# Patient Record
Sex: FEMALE | Race: WHITE | NOT HISPANIC OR LATINO | Employment: FULL TIME
[De-identification: names, ages, dates, MRNs, and addresses within clinical notes are randomized per-mention and may not be internally consistent; named-entity substitution may affect disease eponyms.]

---

## 2018-04-01 ENCOUNTER — HEALTH MAINTENANCE LETTER (OUTPATIENT)
Age: 38
End: 2018-04-01

## 2019-02-02 ENCOUNTER — NURSE TRIAGE (OUTPATIENT)
Dept: NURSING | Facility: CLINIC | Age: 39
End: 2019-02-02

## 2019-02-03 NOTE — TELEPHONE ENCOUNTER
Pt calling to see if she needs to be seen to get a script for Tamiflu.  Education provided.  Pt verbalized understanding and had no further questions.

## 2019-06-11 ENCOUNTER — TRANSFERRED RECORDS (OUTPATIENT)
Dept: HEALTH INFORMATION MANAGEMENT | Facility: CLINIC | Age: 39
End: 2019-06-11

## 2019-06-11 LAB — PAP-ABSTRACT: NORMAL

## 2019-10-17 ENCOUNTER — OFFICE VISIT (OUTPATIENT)
Dept: FAMILY MEDICINE | Facility: CLINIC | Age: 39
End: 2019-10-17

## 2019-10-17 VITALS
DIASTOLIC BLOOD PRESSURE: 80 MMHG | TEMPERATURE: 98.1 F | BODY MASS INDEX: 36.77 KG/M2 | RESPIRATION RATE: 16 BRPM | WEIGHT: 215.4 LBS | SYSTOLIC BLOOD PRESSURE: 128 MMHG | HEIGHT: 64 IN | HEART RATE: 79 BPM | OXYGEN SATURATION: 98 %

## 2019-10-17 DIAGNOSIS — S20.212D CONTUSION OF RIB ON LEFT SIDE, SUBSEQUENT ENCOUNTER: ICD-10-CM

## 2019-10-17 DIAGNOSIS — Z23 NEED FOR VACCINATION: ICD-10-CM

## 2019-10-17 DIAGNOSIS — S41.131D: Primary | ICD-10-CM

## 2019-10-17 PROCEDURE — 90715 TDAP VACCINE 7 YRS/> IM: CPT | Performed by: NURSE PRACTITIONER

## 2019-10-17 PROCEDURE — 90471 IMMUNIZATION ADMIN: CPT | Performed by: NURSE PRACTITIONER

## 2019-10-17 PROCEDURE — 99203 OFFICE O/P NEW LOW 30 MIN: CPT | Mod: 25 | Performed by: NURSE PRACTITIONER

## 2019-10-17 RX ORDER — SULFAMETHOXAZOLE/TRIMETHOPRIM 800-160 MG
1 TABLET ORAL 2 TIMES DAILY
Qty: 14 TABLET | Refills: 0 | Status: SHIPPED | OUTPATIENT
Start: 2019-10-17 | End: 2019-10-25

## 2019-10-17 ASSESSMENT — MIFFLIN-ST. JEOR: SCORE: 1633.08

## 2019-10-17 ASSESSMENT — PAIN SCALES - GENERAL: PAINLEVEL: MODERATE PAIN (4)

## 2019-10-17 NOTE — PATIENT INSTRUCTIONS
1.  Keep taking ibuprofen and add tylenol in between for pain  2.  Keep taking deep breaths   3.  Continue antibiotic for 1 week, refill sent to your pharmacy.  4.  Continue packing this week.  5.  Make follow-up appointment next week for recheck.  Call Monday and schedule on Friday in [place] with me.    Our Clinic hours are:  Mondays    7:20 am - 7 pm  Tues -  Fri  7:20 am - 5 pm    Clinic Phone: [phone]    The clinic lab opens at 7:30 am Mon - Fri and appointments are required.    [facility]  Ph. [phone]  Monday  8 am - 7pm  Tues - Fri 8 am - 5:30 pm
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
## 2019-10-18 ENCOUNTER — TELEPHONE (OUTPATIENT)
Dept: FAMILY MEDICINE | Facility: CLINIC | Age: 39
End: 2019-10-18
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
## 2019-10-18 NOTE — TELEPHONE ENCOUNTER
Reason for Call:  Other     Detailed comments: Pt was seen yesterday for abscess in her arm at the clinic with Cynthia.  Stating now she is in more pain and it is radiating elbow up to shoulder  Please advise.    Phone Number Patient can be reached at: Home number on file [phone] (home)    Best Time: any    Can we leave a detailed message on this number? YES    Call taken on 10/18/2019 at 12:22 PM by Alla Leal

## 2019-10-25 ENCOUNTER — OFFICE VISIT (OUTPATIENT)
Dept: FAMILY MEDICINE | Facility: CLINIC | Age: 39
End: 2019-10-25

## 2019-10-25 VITALS
WEIGHT: 214.5 LBS | BODY MASS INDEX: 36.62 KG/M2 | OXYGEN SATURATION: 97 % | RESPIRATION RATE: 16 BRPM | HEART RATE: 71 BPM | HEIGHT: 64 IN | SYSTOLIC BLOOD PRESSURE: 110 MMHG | DIASTOLIC BLOOD PRESSURE: 88 MMHG | TEMPERATURE: 98.4 F

## 2019-10-25 DIAGNOSIS — R07.81 RIB PAIN ON LEFT SIDE: ICD-10-CM

## 2019-10-25 DIAGNOSIS — T14.8XXA PUNCTURE WOUND: Primary | ICD-10-CM

## 2019-10-25 DIAGNOSIS — B37.31 YEAST INFECTION OF THE VAGINA: ICD-10-CM

## 2019-10-25 PROCEDURE — 99213 OFFICE O/P EST LOW 20 MIN: CPT | Performed by: NURSE PRACTITIONER

## 2019-10-25 RX ORDER — FLUCONAZOLE 150 MG/1
150 TABLET ORAL ONCE
Qty: 1 TABLET | Refills: 0 | Status: SHIPPED | OUTPATIENT
Start: 2019-10-25 | End: 2019-11-12

## 2019-10-25 RX ORDER — CYCLOBENZAPRINE HCL 10 MG
5-10 TABLET ORAL 3 TIMES DAILY PRN
Qty: 30 TABLET | Refills: 1 | Status: SHIPPED | OUTPATIENT
Start: 2019-10-25 | End: 2020-03-16

## 2019-10-25 ASSESSMENT — MIFFLIN-ST. JEOR: SCORE: 1629

## 2019-10-25 ASSESSMENT — PAIN SCALES - GENERAL: PAINLEVEL: SEVERE PAIN (6)
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
## 2019-10-25 NOTE — PATIENT INSTRUCTIONS
1.  Continue packing until you are not able to keep the packing in the hole.  2.  You can use topical antibiotic (neosporin or bacitracin) twice daily until scabbed over.  3.  Use ibuprofen and tylenol for pain in arm and rib pain.  4.  Use heat or ice 3 times daily for 15-20 minutes on the ribs.  5.  Try to stretch the ribs a little a couple of the days.  6.  Flexeril 10 mg 3 times daily as needed for muscle tightness.  7.  Diflucan ordered for yeast infection from antibiotic.  8.  Follow-up in clinic if any worsening symptoms with redness, drainage, or fevers.  9.  After healing if symptoms of swelling or pulling continue, follow-up with Dr. Fernández or someone for referral.

## 2019-11-12 ENCOUNTER — OFFICE VISIT (OUTPATIENT)
Dept: FAMILY MEDICINE | Facility: CLINIC | Age: 39
End: 2019-11-12

## 2019-11-12 VITALS
HEART RATE: 83 BPM | TEMPERATURE: 97.7 F | HEIGHT: 64 IN | DIASTOLIC BLOOD PRESSURE: 70 MMHG | WEIGHT: 216.8 LBS | BODY MASS INDEX: 37.01 KG/M2 | RESPIRATION RATE: 16 BRPM | SYSTOLIC BLOOD PRESSURE: 112 MMHG | OXYGEN SATURATION: 98 %

## 2019-11-12 DIAGNOSIS — S41.131D: Primary | ICD-10-CM

## 2019-11-12 PROCEDURE — 99213 OFFICE O/P EST LOW 20 MIN: CPT | Performed by: NURSE PRACTITIONER

## 2019-11-12 ASSESSMENT — PAIN SCALES - GENERAL: PAINLEVEL: MODERATE PAIN (5)

## 2019-11-12 ASSESSMENT — MIFFLIN-ST. JEOR: SCORE: 1639.43

## 2019-11-12 NOTE — PROGRESS NOTES
"Subjective     Lin Corona is a 39 year old female who presents to clinic today for the following health issues:    HPI   Concern -   Chief Complaint   Patient presents with     Wound Check     follow up right upper arm. States it's not healing, drainage, sensitive to the touch, burning         Therapies Tried and outcome: none      {HIST REVIEW/ LINKS 2 (Optional):515272}    Reviewed and updated as needed this visit by Provider  Tobacco  Meds         Review of Systems   {ROS COMP (Optional):664245}      Objective    /70 (BP Location: Right arm, Cuff Size: Adult Large)   Pulse 83   Temp 97.7  F (36.5  C) (Oral)   Resp 16   Ht 1.619 m (5' 3.75\")   Wt 98.3 kg (216 lb 12.8 oz)   SpO2 98%   Breastfeeding? No   BMI 37.51 kg/m    Body mass index is 37.51 kg/m .  Physical Exam   {Exam List (Optional):301669}    {Diagnostic Test Results (Optional):070264::\"Diagnostic Test Results:\",\"Labs reviewed in Epic\"}        {PROVIDER CHARTING PREFERENCE:577013}    "

## 2019-11-12 NOTE — PATIENT INSTRUCTIONS
1.  Make appointment with general surgery for consult for possible I&D of wound since this is not healing.
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
## 2019-11-13 ENCOUNTER — OFFICE VISIT (OUTPATIENT)
Dept: SURGERY | Facility: CLINIC | Age: 39
End: 2019-11-13

## 2019-11-13 VITALS
HEART RATE: 89 BPM | DIASTOLIC BLOOD PRESSURE: 73 MMHG | HEIGHT: 64 IN | TEMPERATURE: 97.7 F | SYSTOLIC BLOOD PRESSURE: 120 MMHG | BODY MASS INDEX: 37 KG/M2 | WEIGHT: 216.71 LBS

## 2019-11-13 DIAGNOSIS — T14.8XXA PUNCTURE WOUND: Primary | ICD-10-CM

## 2019-11-13 PROCEDURE — 99201 ZZC OFFICE/OUTPT VISIT, NEW, LEVEL I: CPT | Performed by: SURGERY

## 2019-11-13 ASSESSMENT — MIFFLIN-ST. JEOR: SCORE: 1639.03

## 2019-11-13 NOTE — NURSING NOTE
"Initial /73 (BP Location: Left arm, Patient Position: Sitting, Cuff Size: Adult Large)   Pulse 89   Temp 97.7  F (36.5  C) (Tympanic)   Ht 1.619 m (5' 3.75\")   Wt 98.3 kg (216 lb 11.4 oz)   BMI 37.49 kg/m   Estimated body mass index is 37.49 kg/m  as calculated from the following:    Height as of this encounter: 1.619 m (5' 3.75\").    Weight as of this encounter: 98.3 kg (216 lb 11.4 oz). .    Nathaly Lopez MA    "
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
## 2019-11-20 ENCOUNTER — OFFICE VISIT (OUTPATIENT)
Dept: SURGERY | Facility: CLINIC | Age: 39
End: 2019-11-20

## 2019-11-20 VITALS
RESPIRATION RATE: 12 BRPM | WEIGHT: 214 LBS | TEMPERATURE: 99.2 F | DIASTOLIC BLOOD PRESSURE: 90 MMHG | HEART RATE: 88 BPM | SYSTOLIC BLOOD PRESSURE: 136 MMHG | BODY MASS INDEX: 37.92 KG/M2 | HEIGHT: 63 IN

## 2019-11-20 DIAGNOSIS — T14.8XXA PUNCTURE WOUND: Primary | ICD-10-CM

## 2019-11-20 PROCEDURE — 99212 OFFICE O/P EST SF 10 MIN: CPT | Performed by: SURGERY

## 2019-11-20 RX ORDER — GABAPENTIN 300 MG/1
300 CAPSULE ORAL 3 TIMES DAILY
Qty: 90 CAPSULE | Refills: 0 | Status: SHIPPED | OUTPATIENT
Start: 2019-11-20 | End: 2020-03-16

## 2019-11-20 RX ORDER — HYDROCODONE BITARTRATE AND ACETAMINOPHEN 5; 325 MG/1; MG/1
1-2 TABLET ORAL EVERY 6 HOURS PRN
Qty: 20 TABLET | Refills: 0 | Status: SHIPPED | OUTPATIENT
Start: 2019-11-20 | End: 2020-03-16

## 2019-11-20 ASSESSMENT — MIFFLIN-ST. JEOR: SCORE: 1614.83

## 2019-11-20 NOTE — NURSING NOTE
"Initial BP (!) 151/92 (BP Location: Left arm, Patient Position: Sitting, Cuff Size: Adult Regular)   Pulse 88   Temp 99.2  F (37.3  C) (Tympanic)   Resp 12   Ht 1.6 m (5' 3\")   Wt 97.1 kg (214 lb)   BMI 37.91 kg/m   Estimated body mass index is 37.91 kg/m  as calculated from the following:    Height as of this encounter: 1.6 m (5' 3\").    Weight as of this encounter: 97.1 kg (214 lb). .    Rebeka Branham  [facility] RN    "
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
## 2019-12-18 ENCOUNTER — OFFICE VISIT (OUTPATIENT)
Dept: SURGERY | Facility: CLINIC | Age: 39
End: 2019-12-18

## 2019-12-18 VITALS
WEIGHT: 214 LBS | BODY MASS INDEX: 36.54 KG/M2 | HEIGHT: 64 IN | HEART RATE: 71 BPM | TEMPERATURE: 97.7 F | DIASTOLIC BLOOD PRESSURE: 76 MMHG | SYSTOLIC BLOOD PRESSURE: 133 MMHG

## 2019-12-18 DIAGNOSIS — R20.2 PARESTHESIA AND PAIN OF RIGHT EXTREMITY: Primary | ICD-10-CM

## 2019-12-18 DIAGNOSIS — M79.609 PARESTHESIA AND PAIN OF RIGHT EXTREMITY: Primary | ICD-10-CM

## 2019-12-18 PROCEDURE — 99212 OFFICE O/P EST SF 10 MIN: CPT | Performed by: SURGERY

## 2019-12-18 ASSESSMENT — MIFFLIN-ST. JEOR: SCORE: 1626.73

## 2019-12-18 NOTE — PROGRESS NOTES
Patient here for follow-up of a puncture wound to the right upper arm.  She took more Neurontin for the past month and reported little changes in the pain.  It is now mostly a numbness with areas of exquisite tenderness on the skin.    Exam:  Wound clean dry and intact.    Assessment and plan: 39-year-old female with paresthesias following injury to the right upper arm.  Possibly represents injury to the brachial nerve or surrounding smaller cutaneous nerves.  We will go ahead and order an MRI to look for inflammation around the nerve or fluid collections and other complications from the injury.  Advised patient that if there is some nerve damage this will likely fix itself slowly over time and there is not much more we can do about the paresthesias then just wait for them to resolve.  I assured her that this would resolve slowly over time.  She will get the MRI and I will call her with the results.    Marito France MD

## 2019-12-18 NOTE — NURSING NOTE
"Initial /76 (BP Location: Left arm, Patient Position: Sitting, Cuff Size: Adult Regular)   Pulse 71   Temp 97.7  F (36.5  C) (Tympanic)   Ht 1.619 m (5' 3.75\")   Wt 97.1 kg (214 lb)   BMI 37.02 kg/m   Estimated body mass index is 37.02 kg/m  as calculated from the following:    Height as of this encounter: 1.619 m (5' 3.75\").    Weight as of this encounter: 97.1 kg (214 lb). .    Nathaly Lopez MA    "

## 2019-12-18 NOTE — LETTER
12/18/2019         RE: Lin Corona  [address] [place] [id number]        Dear Colleague,    Thank you for referring your patient, Lin Corona, to the [facility]. Please see a copy of my visit note below.    Patient here for follow-up of a puncture wound to the right upper arm.  She took more Neurontin for the past month and reported little changes in the pain.  It is now mostly a numbness with areas of exquisite tenderness on the skin.    Exam:  Wound clean dry and intact.    Assessment and plan: 39-year-old female with paresthesias following injury to the right upper arm.  Possibly represents injury to the brachial nerve or surrounding smaller cutaneous nerves.  We will go ahead and order an MRI to look for inflammation around the nerve or fluid collections and other complications from the injury.  Advised patient that if there is some nerve damage this will likely fix itself slowly over time and there is not much more we can do about the paresthesias then just wait for them to resolve.  I assured her that this would resolve slowly over time.  She will get the MRI and I will call her with the results.    Marito France MD     Again, thank you for allowing me to participate in the care of your patient.        Sincerely,        Marito France MD

## 2019-12-31 ENCOUNTER — TELEPHONE (OUTPATIENT)
Dept: FAMILY MEDICINE | Facility: CLINIC | Age: 39
End: 2019-12-31

## 2019-12-31 NOTE — LETTER
[facility] [place]  [address]  [facility] [address]  [phone]  December 31, 2019    Lin Corona  [address] [address] [place] [address]  [place] [address]    Dear Lin,    We care about your health and have reviewed your health plan including your medical conditions, medication list, and lab results.  Based on this review, it is recommended that you follow up regarding the following health topic(s):     -Wellness (Physical) Visit     We recommend you take the following action(s):    -schedule a annual physical/pap with your primary care provider.     Please use [email or web address], or call the clinic at your earliest convenience, to schedule an appointment: [phone].     Please disregard this reminder if you have had this exam elsewhere within the last year. It would be helpful for us to have a copy of your recent pap smear report in our file so that we can best coordinate your care.     Thank you for trusting [facility] and we appreciate the opportunity to serve you.  We look forward to supporting your healthcare needs in the future.    Healthy Regards,      Your Health Care Team  [facility]

## 2020-02-17 ENCOUNTER — HOSPITAL ENCOUNTER (EMERGENCY)
Facility: CLINIC | Age: 40
Discharge: HOME | End: 2020-02-17
Attending: PHYSICIAN ASSISTANT | Admitting: PHYSICIAN ASSISTANT

## 2020-02-17 VITALS
HEART RATE: 84 BPM | SYSTOLIC BLOOD PRESSURE: 143 MMHG | RESPIRATION RATE: 18 BRPM | BODY MASS INDEX: 34.6 KG/M2 | DIASTOLIC BLOOD PRESSURE: 95 MMHG | WEIGHT: 200 LBS | TEMPERATURE: 98.4 F | OXYGEN SATURATION: 98 %

## 2020-02-17 DIAGNOSIS — J11.1 INFLUENZA-LIKE ILLNESS: ICD-10-CM

## 2020-02-17 DIAGNOSIS — J98.01 ACUTE BRONCHOSPASM: ICD-10-CM

## 2020-02-17 DIAGNOSIS — J02.9 ACUTE PHARYNGITIS, UNSPECIFIED ETIOLOGY: ICD-10-CM

## 2020-02-17 LAB
FLUAV AG UPPER RESP QL IA.RAPID: NEGATIVE
FLUBV AG UPPER RESP QL IA.RAPID: NEGATIVE
INTERNAL QC OK POCT: YES

## 2020-02-17 PROCEDURE — G0463 HOSPITAL OUTPT CLINIC VISIT: HCPCS | Performed by: PHYSICIAN ASSISTANT

## 2020-02-17 PROCEDURE — 99214 OFFICE O/P EST MOD 30 MIN: CPT | Mod: Z6 | Performed by: PHYSICIAN ASSISTANT

## 2020-02-17 PROCEDURE — 87804 INFLUENZA ASSAY W/OPTIC: CPT | Performed by: NURSE PRACTITIONER

## 2020-02-17 RX ORDER — DEXAMETHASONE 4 MG/1
10 TABLET ORAL ONCE
Qty: 3 TABLET | Refills: 0 | Status: SHIPPED | OUTPATIENT
Start: 2020-02-17 | End: 2020-03-16

## 2020-02-17 RX ORDER — ALBUTEROL SULFATE 90 UG/1
2 AEROSOL, METERED RESPIRATORY (INHALATION) EVERY 4 HOURS PRN
Qty: 1 INHALER | Refills: 0 | Status: SHIPPED | OUTPATIENT
Start: 2020-02-17 | End: 2020-03-16

## 2020-02-17 NOTE — ED AVS SNAPSHOT
[facility]  [address] [facility] [address]  Phone:  [phone]  Fax:  [phone]                                    Lin Corona   MRN: [id number]    Department:  [facility]   Date of Visit:  2/17/2020           After Visit Summary Signature Page    I have received my discharge instructions, and my questions have been answered. I have discussed any challenges I see with this plan with the nurse or doctor.    ..........................................................................................................................................  Patient/Patient Representative Signature      ..........................................................................................................................................  Patient Representative Print Name and Relationship to Patient    ..................................................               ................................................  Date                                   Time    ..........................................................................................................................................  Reviewed by Signature/Title    ...................................................              ..............................................  Date                                               Time          22EPIC Rev 08/18

## 2020-02-17 NOTE — ED PROVIDER NOTES
History     Chief Complaint   Patient presents with     Cough     cough and congestion, hot and cold spells, started sat with a sore throat     HPI  Lin Corona is a 39 year old female who presents with complaints of cough, sore throat, congestion, and subjective fevers today.  Pt states over the past couple hours, she has developed associated wheezing and frequent coughing fits.  Denies sinus pressure, chest pain, or shortness of breath.  Pt denies hx asthma or underlying lung disease.      Allergies:  No Known Allergies    Problem List:    Patient Active Problem List    Diagnosis Date Noted     Pelvic pain in female 02/02/2012     Priority: Medium     Dysmenorrhea 02/02/2012     Priority: Medium        Past Medical History:    History reviewed. No pertinent past medical history.    Past Surgical History:    Past Surgical History:   Procedure Laterality Date     APPENDECTOMY  7/13/2011     BREAST SURGERY  2000    Breast reduction     COLONOSCOPY  9/2011     GYN SURGERY  1/2012    Colposcopy exam        Family History:    Family History   Problem Relation Age of Onset     Heart Disease Father      Arthritis Maternal Grandmother      C.A.D. Paternal Grandmother      C.A.D. Paternal Grandfather        Social History:  Marital Status:   [4]  Social History     Tobacco Use     Smoking status: Former Smoker     Packs/day: 0.00     Smokeless tobacco: Never Used   Substance Use Topics     Alcohol use: Yes     Comment: occassional     Drug use: No        Medications:    albuterol (PROAIR HFA/PROVENTIL HFA/VENTOLIN HFA) 108 (90 Base) MCG/ACT inhaler  dexamethasone (DECADRON) 4 MG tablet  cyclobenzaprine (FLEXERIL) 10 MG tablet  etonogestrel-ethinyl estradiol (NUVARING) 0.12-0.015 MG/24HR vaginal ring  gabapentin (NEURONTIN) 300 MG capsule  HYDROcodone-acetaminophen (NORCO) 5-325 MG tablet          Review of Systems   Constitutional: Positive for fever.   HENT: Positive for congestion and sore throat.     Respiratory: Positive for cough and wheezing.    Cardiovascular: Negative.    Musculoskeletal: Negative.    Skin: Negative.    All other systems reviewed and are negative.      Physical Exam   BP: (!) 143/95  Pulse: 84  Temp: 98.4  F (36.9  C)  Resp: 18  Weight: 90.7 kg (200 lb)  SpO2: 98 %      Physical Exam  Constitutional:       General: She is not in acute distress.     Appearance: She is well-developed. She is not ill-appearing, toxic-appearing or diaphoretic.   HENT:      Head: Normocephalic and atraumatic.      Right Ear: Tympanic membrane, ear canal and external ear normal.      Left Ear: Tympanic membrane, ear canal and external ear normal.      Nose: Mucosal edema, congestion and rhinorrhea present.      Mouth/Throat:      Lips: Pink.      Mouth: Mucous membranes are moist.      Pharynx: Uvula midline. Posterior oropharyngeal erythema present. No pharyngeal swelling, oropharyngeal exudate or uvula swelling.      Tonsils: No tonsillar exudate or tonsillar abscesses.   Eyes:      Conjunctiva/sclera: Conjunctivae normal.      Pupils: Pupils are equal, round, and reactive to light.   Neck:      Musculoskeletal: Full passive range of motion without pain, normal range of motion and neck supple. Normal range of motion. No neck rigidity.   Cardiovascular:      Rate and Rhythm: Normal rate and regular rhythm.      Heart sounds: Normal heart sounds.   Pulmonary:      Effort: Pulmonary effort is normal. No respiratory distress.      Breath sounds: Normal air entry. No stridor, decreased air movement or transmitted upper airway sounds. Wheezing present. No decreased breath sounds, rhonchi or rales.   Lymphadenopathy:      Cervical: No cervical adenopathy.   Skin:     General: Skin is warm and dry.   Neurological:      Mental Status: She is alert and oriented to person, place, and time.         ED Course        Procedures    No results found for this or any previous visit (from the past 24 hour(s)).    Medications -  No data to display     Results for orders placed or performed during the hospital encounter of 02/17/20   Influenza A/B antigen POCT     Status: Normal   Result Value Ref Range    Influenza A negative neg    Influenza B negative neg    Internal QC OK Yes        Assessments & Plan (with Medical Decision Making)     Pt is a 39 year old female who presents with complaints of cough, sore throat, congestion, and subjective fevers today.  Pt states over the past couple hours, she has developed associated wheezing and frequent coughing fits.  Denies sinus pressure, chest pain, or shortness of breath.  Pt denies hx asthma or underlying lung disease.  Pt is afebrile on arrival.  Exam as above.  Influenza testing was negative.  Discussed results with patient.  Encouraged symptomatic treatments at home.  Return precautions were reviewed.  Hand-outs were provided.    Patient was sent with Albuterol inhaler and dose of Dexamethasone and was instructed to follow-up with PCP if no improvement in 5-7 days for continued care and management or sooner if new or worsening symptoms.  She is to return to the ED for persistent and/or worsening symptoms.  Patient expressed understanding of the diagnosis and plan and was discharged home in good condition.    I have reviewed the nursing notes.    I have reviewed the findings, diagnosis, plan and need for follow up with the patient.    Discharge Medication List as of 2/17/2020  3:43 PM      START taking these medications    Details   albuterol (PROAIR HFA/PROVENTIL HFA/VENTOLIN HFA) 108 (90 Base) MCG/ACT inhaler Inhale 2 puffs into the lungs every 4 hours as needed for shortness of breath / dyspnea or wheezing, Disp-1 Inhaler, R-0, E-PrescribePharmacy may dispense brand covered by insurance (Proair, or proventil or ventolin or generic albuterol inhaler)      dexamethasone (DECADRON) 4 MG tablet Take 2.5 tablets (10 mg) by mouth once for 1 dose, Disp-3 tablet, R-0, E-Prescribe              Final diagnoses:   Influenza-like illness   Acute pharyngitis, unspecified etiology   Acute bronchospasm       2/17/2020   [facility] EMERGENCY DEPARTMENT      Disclaimer:  This note consists of symbols derived from keyboarding, dictation and/or voice recognition software.  As a result, there may be errors in the script that have gone undetected.  Please consider this when interpreting information found in this chart.     Елена Mae PA-C  02/18/20 1936

## 2020-02-18 ASSESSMENT — ENCOUNTER SYMPTOMS
MUSCULOSKELETAL NEGATIVE: 1
WHEEZING: 1
SORE THROAT: 1
CARDIOVASCULAR NEGATIVE: 1
FEVER: 1
COUGH: 1

## 2020-03-16 ENCOUNTER — OFFICE VISIT (OUTPATIENT)
Dept: FAMILY MEDICINE | Facility: CLINIC | Age: 40
End: 2020-03-16

## 2020-03-16 ENCOUNTER — TELEPHONE (OUTPATIENT)
Dept: FAMILY MEDICINE | Facility: CLINIC | Age: 40
End: 2020-03-16

## 2020-03-16 VITALS
OXYGEN SATURATION: 98 % | BODY MASS INDEX: 36.19 KG/M2 | WEIGHT: 212 LBS | SYSTOLIC BLOOD PRESSURE: 120 MMHG | RESPIRATION RATE: 16 BRPM | TEMPERATURE: 97.8 F | HEART RATE: 74 BPM | DIASTOLIC BLOOD PRESSURE: 60 MMHG | HEIGHT: 64 IN

## 2020-03-16 DIAGNOSIS — Z13.1 SCREENING FOR DIABETES MELLITUS: ICD-10-CM

## 2020-03-16 DIAGNOSIS — Z11.4 SCREENING FOR HIV (HUMAN IMMUNODEFICIENCY VIRUS): ICD-10-CM

## 2020-03-16 DIAGNOSIS — L30.9 ECZEMA, UNSPECIFIED TYPE: Primary | ICD-10-CM

## 2020-03-16 DIAGNOSIS — Z13.220 SCREENING FOR LIPOID DISORDERS: ICD-10-CM

## 2020-03-16 DIAGNOSIS — R63.5 WEIGHT GAIN: ICD-10-CM

## 2020-03-16 LAB
ANION GAP SERPL CALCULATED.3IONS-SCNC: 11 MMOL/L (ref 3–14)
BUN SERPL-MCNC: 16 MG/DL (ref 7–30)
CALCIUM SERPL-MCNC: 9.2 MG/DL (ref 8.5–10.1)
CHLORIDE SERPL-SCNC: 109 MMOL/L (ref 94–109)
CHOLEST SERPL-MCNC: 245 MG/DL
CO2 SERPL-SCNC: 20 MMOL/L (ref 20–32)
CREAT SERPL-MCNC: 0.66 MG/DL (ref 0.52–1.04)
GFR SERPL CREATININE-BSD FRML MDRD: >90 ML/MIN/{1.73_M2}
GLUCOSE SERPL-MCNC: 96 MG/DL (ref 70–99)
HDLC SERPL-MCNC: 78 MG/DL
LDLC SERPL CALC-MCNC: 137 MG/DL
NONHDLC SERPL-MCNC: 167 MG/DL
POTASSIUM SERPL-SCNC: 3.9 MMOL/L (ref 3.4–5.3)
SODIUM SERPL-SCNC: 140 MMOL/L (ref 133–144)
TRIGL SERPL-MCNC: 149 MG/DL
TSH SERPL DL<=0.005 MIU/L-ACNC: 1.93 MU/L (ref 0.4–4)

## 2020-03-16 PROCEDURE — 36415 COLL VENOUS BLD VENIPUNCTURE: CPT | Performed by: NURSE PRACTITIONER

## 2020-03-16 PROCEDURE — 80061 LIPID PANEL: CPT | Performed by: NURSE PRACTITIONER

## 2020-03-16 PROCEDURE — 87389 HIV-1 AG W/HIV-1&-2 AB AG IA: CPT | Performed by: NURSE PRACTITIONER

## 2020-03-16 PROCEDURE — 84443 ASSAY THYROID STIM HORMONE: CPT | Performed by: NURSE PRACTITIONER

## 2020-03-16 PROCEDURE — 80048 BASIC METABOLIC PNL TOTAL CA: CPT | Performed by: NURSE PRACTITIONER

## 2020-03-16 PROCEDURE — 99214 OFFICE O/P EST MOD 30 MIN: CPT | Performed by: NURSE PRACTITIONER

## 2020-03-16 RX ORDER — TRIAMCINOLONE ACETONIDE 1 MG/G
OINTMENT TOPICAL 2 TIMES DAILY
Qty: 30 G | Refills: 1 | Status: SHIPPED | OUTPATIENT
Start: 2020-03-16 | End: 2022-01-19

## 2020-03-16 ASSESSMENT — MIFFLIN-ST. JEOR: SCORE: 1621.63

## 2020-03-16 NOTE — PATIENT INSTRUCTIONS
Will be notified of pending labs.  Continue with healthy diet, exercise. Can consider nutritionist consult at anytime.  Try the ointment for your rashes.      Our Clinic hours are:  Mondays    7:20 am - 7 pm  Tues -  Fri  7:20 am - 5 pm    Clinic Phone: [phone]    The clinic lab opens at 7:30 am Mon - Fri and appointments are required.    [facility]  Ph. [phone]  Monday  8 am - 7pm  Tues - Fri 8 am - 5:30 pm

## 2020-03-16 NOTE — LETTER
March 17, 2020      Lin AMADOR Cj  [address] [address] [place] [address]  [place] [address]        Dear ,    We are writing to inform you of your test results.    Labs were good other than a mild elevation in your cholesterol numbers.   Continue with healthy diet, exercise and repeat labs in one year.     Resulted Orders   HIV Antigen Antibody Combo   Result Value Ref Range    HIV Antigen Antibody Combo Nonreactive NR^Nonreactive          Comment:      HIV-1 p24 Ag & HIV-1/HIV-2 Ab Not Detected   Lipid Profile (Chol, Trig, HDL, LDL calc)   Result Value Ref Range    Cholesterol 245 (H) <200 mg/dL      Comment:      Desirable:       <200 mg/dl    Triglycerides 149 <150 mg/dL      Comment:      Fasting specimen    HDL Cholesterol 78 >49 mg/dL    LDL Cholesterol Calculated 137 (H) <100 mg/dL      Comment:      Above desirable:  100-129 mg/dl  Borderline High:  130-159 mg/dL  High:             160-189 mg/dL  Very high:       >189 mg/dl      Non HDL Cholesterol 167 (H) <130 mg/dL      Comment:      Above Desirable:  130-159 mg/dl  Borderline high:  160-189 mg/dl  High:             190-219 mg/dl  Very high:       >219 mg/dl     Basic metabolic panel   Result Value Ref Range    Sodium 140 133 - 144 mmol/L    Potassium 3.9 3.4 - 5.3 mmol/L    Chloride 109 94 - 109 mmol/L    Carbon Dioxide 20 20 - 32 mmol/L    Anion Gap 11 3 - 14 mmol/L    Glucose 96 70 - 99 mg/dL      Comment:      Fasting specimen    Urea Nitrogen 16 7 - 30 mg/dL    Creatinine 0.66 0.52 - 1.04 mg/dL    GFR Estimate >90 >60 mL/min/[1.73_m2]      Comment:      Non  GFR Calc  Starting 12/18/2018, serum creatinine based estimated GFR (eGFR) will be   calculated using the Chronic Kidney Disease Epidemiology Collaboration   (CKD-EPI) equation.      GFR Estimate If Black >90 >60 mL/min/[1.73_m2]      Comment:       GFR Calc  Starting 12/18/2018, serum creatinine based estimated GFR (eGFR) will be   calculated using the Chronic Kidney Disease  Epidemiology Collaboration   (CKD-EPI) equation.      Calcium 9.2 8.5 - 10.1 mg/dL   TSH with free T4 reflex   Result Value Ref Range    TSH 1.93 0.40 - 4.00 mU/L       If you have any questions or concerns, please call the clinic at the number listed above.       Sincerely,        TRUMAN Aguila CNP

## 2020-03-16 NOTE — PROGRESS NOTES
Subjective     Lin Corona is a 39 year old female who presents to clinic today for the following health issues:    HPI   Establish Care    Rash      Duration: comes every year when the weather gets warmer. Now she has it on her back neck. Itchy  And she itches in her sleep.    Description  Location: back of neck and hands and arms.  Itching: severe    Intensity:  moderate    Accompanying signs and symptoms: family history of psoriasis    History (similar episodes/previous evaluation): None    Precipitating or alleviating factors:  New exposures:  None  Recent travel: no      Therapies tried and outcome: hydrocortisone cream -  not effective, calamine lotion and Benadryl/diphenhydramine -  not effective, EO's.    Weight problem and wants labs.        She wishes to establish care. She is UTD on her pap smear, last one was normal at the [facility].  She has a few concerns. She's had heavy periods, she would consider hysterectomy as she is not planning on children. . She is engaged, monogamous. She is an . Family owns [employer].  She struggles with her weight. She used to be able to drop weight very easily in the past but now struggles.   Has had this recurrent, irritating rash on her hands and it's now on her neck. She's tried all kinds of OTC remedies for this and it doesn't help.          Patient Active Problem List   Diagnosis     Pelvic pain in female     Dysmenorrhea     Past Surgical History:   Procedure Laterality Date     APPENDECTOMY  2011     BREAST SURGERY  2000    Breast reduction     COLONOSCOPY  2011     GYN SURGERY  2012    Colposcopy exam      SHOULDER SURGERY Right        Social History     Tobacco Use     Smoking status: Former Smoker     Packs/day: 0.00     Smokeless tobacco: Never Used   Substance Use Topics     Alcohol use: Yes     Comment: occassional     Family History   Problem Relation Age of Onset     Heart Disease Father      Gallbladder Disease Father  "     Arthritis Maternal Grandmother      Unknown/Adopted Maternal Grandmother      CADENCEA.D. Paternal Grandmother      [email or web address]. Paternal Grandfather          Current Outpatient Medications   Medication Sig Dispense Refill     etonogestrel-ethinyl estradiol (NUVARING) 0.12-0.015 MG/24HR vaginal ring Place 1 each vaginally every 28 days.       triamcinolone (KENALOG) 0.1 % external ointment Apply topically 2 times daily 30 g 1     No Known Allergies  Recent Labs   Lab Test 01/25/12  0910   ALT 11   CR 0.69   GFRESTIMATED >90   GFRESTBLACK >90   POTASSIUM 4.3   TSH 1.19      BP Readings from Last 3 Encounters:   03/16/20 120/60   02/17/20 (!) 143/95   12/18/19 133/76    Wt Readings from Last 3 Encounters:   03/16/20 96.2 kg (212 lb)   02/17/20 90.7 kg (200 lb)   12/18/19 97.1 kg (214 lb)                      Reviewed and updated as needed this visit by Provider         Review of Systems   ROS COMP: Constitutional, HEENT, cardiovascular, pulmonary, gi and gu systems are negative, except as otherwise noted.      Objective    Resp 16   Ht 1.626 m (5' 4\")   Wt 96.2 kg (212 lb)   LMP 03/06/2020 (Exact Date)   BMI 36.39 kg/m    Body mass index is 36.39 kg/m .  Physical Exam   GENERAL: healthy, alert and no distress  HENT: ear canals and TM's normal, pharynx without erythema  NECK: no adenopathy, no asymmetry  RESP: lungs clear to auscultation - no rales, rhonchi or wheezes  CV: regular rate and rhythm, normal S1 S2, no S3 or S4, no murmur  ABDOMEN: soft, nontender, no hepatosplenomegaly, no masses and bowel sounds normal  MS: no gross musculoskeletal defects noted  SKIN: rash on hands and behind neck, c/w eczema    Diagnostic Test Results:  Labs reviewed in Epic  No results found for this or any previous visit (from the past 24 hour(s)).        Assessment & Plan     1. Eczema, unspecified type    - triamcinolone (KENALOG) 0.1 % external ointment; Apply topically 2 times daily  Dispense: 30 g; Refill: 1  Discussed how to take " "the medication(s), expected outcomes, potential side effects.    2. Weight gain    - TSH with free T4 reflex    3. Screening for HIV (human immunodeficiency virus)    - HIV Antigen Antibody Combo    4. Screening for diabetes mellitus    - Basic metabolic panel    5. Screening for lipoid disorders    - Lipid Profile (Chol, Trig, HDL, LDL calc)     BMI:   Estimated body mass index is 36.39 kg/m  as calculated from the following:    Height as of this encounter: 1.626 m (5' 4\").    Weight as of this encounter: 96.2 kg (212 lb).           See Patient Instructions  Patient Instructions   Will be notified of pending labs.  Continue with healthy diet, exercise. Can consider nutritionist consult at anytime.  Try the ointment for your rashes.      Our Clinic hours are:  Mondays    7:20 am - 7 pm  Tues -  Fri  7:20 am - 5 pm    Clinic Phone: [phone]    The clinic lab opens at 7:30 am Mon - Fri and appointments are required.    [facility]  Ph. [phone]  Monday  8 am - 7pm  Tues - Fri 8 am - 5:30 pm             Return in about 1 year (around 3/16/2021) for or sooner if symptoms persist or worsen, Routine Visit, Lab Work.    TRUMAN Aguila CNP  [facility]      "

## 2020-03-17 LAB — HIV 1+2 AB+HIV1 P24 AG SERPL QL IA: NONREACTIVE

## 2020-09-29 ENCOUNTER — ANCILLARY PROCEDURE (OUTPATIENT)
Dept: GENERAL RADIOLOGY | Facility: CLINIC | Age: 40
End: 2020-09-29
Attending: FAMILY MEDICINE
Payer: OTHER MISCELLANEOUS

## 2020-09-29 ENCOUNTER — OFFICE VISIT (OUTPATIENT)
Dept: FAMILY MEDICINE | Facility: CLINIC | Age: 40
End: 2020-09-29
Payer: OTHER MISCELLANEOUS

## 2020-09-29 VITALS
HEART RATE: 71 BPM | SYSTOLIC BLOOD PRESSURE: 112 MMHG | RESPIRATION RATE: 16 BRPM | TEMPERATURE: 98.3 F | DIASTOLIC BLOOD PRESSURE: 80 MMHG | BODY MASS INDEX: 33.97 KG/M2 | OXYGEN SATURATION: 98 % | HEIGHT: 64 IN | WEIGHT: 199 LBS

## 2020-09-29 DIAGNOSIS — S69.91XA WRIST INJURY, RIGHT, INITIAL ENCOUNTER: ICD-10-CM

## 2020-09-29 DIAGNOSIS — S69.91XA WRIST INJURY, RIGHT, INITIAL ENCOUNTER: Primary | ICD-10-CM

## 2020-09-29 PROCEDURE — 99213 OFFICE O/P EST LOW 20 MIN: CPT | Performed by: FAMILY MEDICINE

## 2020-09-29 PROCEDURE — 73090 X-RAY EXAM OF FOREARM: CPT | Mod: RT

## 2020-09-29 ASSESSMENT — MIFFLIN-ST. JEOR: SCORE: 1553.69

## 2020-09-29 NOTE — PATIENT INSTRUCTIONS
Our Clinic hours are:  Mondays    7:20 am - 7 pm  Tues -  Fri  7:20 am - 5 pm    Clinic Phone: [phone]    The clinic lab opens at 7:30 am Mon - Fri and appointments are required.    [facility]. [phone]  Monday  8 am - 7pm  Tues - Fri 8 am - 5:30 pm

## 2020-09-29 NOTE — PROGRESS NOTES
"Subjective     Lin Corona is a 40 year old female who presents to clinic today for the following health issues:    HPI       Chief Complaint   Patient presents with     Work Comp     right wrist pain        Musculoskeletal problem/pain  Onset/Duration: 8/19/20  Description  Location: wrist - right  Joint Swelling: YES  Redness: no  Pain: YES  Warmth: no  Intensity:  moderate  Progression of Symptoms:  worsening  Accompanying signs and symptoms:   Fevers: no  Numbness/tingling/weakness: YES  History  Trauma to the area: YES  Recent illness:  no  Previous similar problem: no  Previous evaluation:  no  Precipitating or alleviating factors:  Aggravating factors include: lifting, overuse and pulling   Therapies tried and outcome: ice, support wrap and Ibuprofen   Not much relief       Review of Systems         Objective    /80   Pulse 71   Temp 98.3  F (36.8  C)   Resp 16   Ht 1.619 m (5' 3.75\")   Wt 90.3 kg (199 lb)   SpO2 98%   BMI 34.43 kg/m    Body mass index is 34.43 kg/m .  Physical Exam               Further history obtained, clarified or corrected by physician:  She is 6 weeks status post right wrist injury that had not been evaluated.  She was pulling on a piece of [employer] electrical cord on the job and felt a sudden jerk and popping sensation in the right wrist.  Shortly after that she developed significant ecchymosis on the distal flexor portion of the wrist and she has had consistent soreness and tenderness over the radial side of the distal forearm proximal to the radial stylus.  The pain did improve but only to a point and the ecchymosis did resolve.  She continues to have pain and wears supportive tape on the wrist to try to relieve the discomfort.    OBJECTIVE:  /80   Pulse 71   Temp 98.3  F (36.8  C)   Resp 16   Ht 1.619 m (5' 3.75\")   Wt 90.3 kg (199 lb)   SpO2 98%   BMI 34.43 kg/m    LUNGS: clear to auscultation, normal breath sounds  CV: RRR without murmur  ABD: BS+, " soft, nontender, no masses, no hepatosplenomegaly   EXTREMITIES: without joint tenderness, swelling or erythema.  No muscle tenderness or abnormality.  The right wrist shows full range of motion she does have pain with resisted ulnar deviation and the pain is just proximal to the radial stylus which is also an area of mild tenderness.  There is no swelling, redness, or ecchymosis.    XR: neg    ASSESSMENT:  Wrist injury, right, initial encounter    PLAN:    She wants to can consider trying some physical therapy for what seems like a possible partial tendon tear slowly healing.  She also wants a referral to the orthopedist if symptoms are not improving.    Orders Placed This Encounter     XR Forearm Right 2 Views     PHYSICAL THERAPY REFERRAL     Orthopedic & Spine  Referral

## 2020-10-01 ENCOUNTER — TRANSFERRED RECORDS (OUTPATIENT)
Dept: HEALTH INFORMATION MANAGEMENT | Facility: CLINIC | Age: 40
End: 2020-10-01

## 2020-10-12 ENCOUNTER — HOSPITAL ENCOUNTER (OUTPATIENT)
Dept: OCCUPATIONAL THERAPY | Facility: CLINIC | Age: 40
Setting detail: THERAPIES SERIES
End: 2020-10-12
Attending: ORTHOPAEDIC SURGERY
Payer: OTHER MISCELLANEOUS

## 2020-10-12 PROCEDURE — 97535 SELF CARE MNGMENT TRAINING: CPT | Mod: GO | Performed by: OCCUPATIONAL THERAPIST

## 2020-10-12 PROCEDURE — L3906 WHO W/O JOINTS CF: HCPCS | Performed by: OCCUPATIONAL THERAPIST

## 2020-10-12 PROCEDURE — 97165 OT EVAL LOW COMPLEX 30 MIN: CPT | Mod: GO | Performed by: OCCUPATIONAL THERAPIST

## 2020-10-12 NOTE — PROGRESS NOTES
10/12/20   Hand Therapy Evaluation   General Information/History   Start Of Care Date 10/12/20   Referring Physician Dr. Diggs   Orders Evaluate And Treat As Indicated   Orders Date 10/01/20   Medical Diagnosis Right intersection syndrome   Additional Occupational Profile Info/Pertinent history of current problem Patient reports that she was working on a kitchen remodel and was reaching and heard a loud popping. Wrist got caught. It has been getting more painful since. Has a thumb spica splint but it is not helping much.   Previous treatment or current condition splinting, KT tape   Past medical history none listed   How/Where did it occur During contact with an object;At work   Onset date of current episode/exacerbation 08/19/20   Chronicity New   Hand Dominance Right   Affected side Right   Functional limitations perform activities of daily living;perform required work activities;perform desired leisure / sports activities   Reported Symptoms Pain;Loss of Motion/Stiffness;Catching   Prior level of function Independent ADL;Independent IADL   Important Activities horse back riding, Acting, outdoor things like camping   Patient role/Employment history Employed   Employment Status Working in normal job with restrictions   Primary Job Tasks Assembly;Gripping/pinching;Pushing/pulling;Prolonged standing;Reaching;Carrying;Repetitive tasks   Patient/Family goals statement Patient would like to be able to use and do twisting activities without pain   Fall Risk Screen   Fall screen completed by OT   Have you fallen 2 or more times in the past year? No   Have you fallen and had an injury in the past year? No   Is patient a fall risk? No   Abuse Screen (yes response referral indicated)   Feels Unsafe at Home or Work/School no   Feels Threatened by Someone no   Does Anyone Try to Keep You From Having Contact with Others or Doing Things Outside Your Home? no   Physical Signs of Abuse Present no   Pain   Pain Primary Pain  Report   Primary Pain Report   Location radial wrist into mid forearm   Pain Quality Shooting;Aching;Cramping   Frequency Intermittent   Scale 2/10;6/10   Pain Is Exacerbated By Lifting;Rest;Pushing;Gripping;Twisting , Pulling;Activity/movement   Pain Is Relieved By Nothing   Edema   Edema Distal Wrist Crease   Distal Wrist Crease (measured in cm)   Distal Wrist Crease - - Left 16   Distal Wrist Crease - - Right 16.5   Tenderness   Tenderness Second Dorsal   Second Dorsal   Right Moderate   Palpation   Palpation Assessed   Right Hand Palpation Comments severe tenderness with palpation to mid radial forearm , intersection, and dorsal forearm   ROM   ROM AROM   Special Tests   Special Tests Assessed   Right Hand Special Tests Comments increased pain with resistance to wrist and thumb extensors and APL   Sensation Findings   Sensation Findings Other (see comments)   Sensation Comments no sensory complaints   Strength   Strength Strength;Other (see comments)    Avg - Left 70    Avg - Right 38 4/10 pain   Lateral Pinch - Left 14   Lateral Pinch - Right 13   3 Point Pinch - Left 18   3 Point Pinch - Right 6   3 Point Pinch Comments pain 5/10   Education Assessment   Preferred Learning Style Listening   Barriers to Learning No barriers   Therapy Interventions   Planned Therapy Interventions Ultrasound;Iontophoresis (list drug);Light Therapy;Fluidotherapy;Strengthening;Stretching;Manual Therapy;Splinting;Education of splint wear, care, fit and precautions;Self Care/Home Management;Ergonomic Patient Education;Home Program   Therapy plan comments To be added as appropriate   Clinical Impression   Criteria for Skilled Therapeutic Interventions Met yes;treatment indicated   OT Diagnosis decreased ADL's IADL's   Influenced by the following impairments Pain;Edema;Decreased range of motion;Decreased strength   Assessment of Occupational Performance 5 or more Performance Deficits   Identified Performance Deficits work  tasks, home tasks, dressing, food prep, lifting , carrying,d dressing, sports   Clinical Decision Making (Complexity) Low complexity   Therapy Frequency 2x/week   Predicted Duration of Therapy Intervention (days/wks) 6 weeks   Risks and Benefits of Treatment have been explained. Yes   Patient, Family & other staff in agreement with plan of care Yes   Clinical Impression Comments clinical findings consistent with MD diagnosis of Intersection Syndrome   Hand Eval Goals   Hand Eval Goals 2;1;3   Hand Goal 1   Goal Identifier weight bearing   Goal Description Patient to go from 0 on UEFI to 3 to be able to push herself up from a chair with min difficulty   Target Date 11/26/20   Hand Goal 2   Goal Identifier lifting   Goal Description Patient to be able to lift a bag of groceries from 0 Extreme difficulty on UEFI to 1 A little bit)    Target Date 11/19/20   Hand Goal 3   Goal Identifier work   Goal Description Patient to report 75% improvement in being able to resume normal work tasks using tools.   Target Date 11/19/20   Nereida Bautista OTR/L CHT  Occupational Therapist, Certified Hand Therapist

## 2020-10-15 ENCOUNTER — HOSPITAL ENCOUNTER (OUTPATIENT)
Dept: OCCUPATIONAL THERAPY | Facility: CLINIC | Age: 40
Setting detail: THERAPIES SERIES
End: 2020-10-15
Attending: ORTHOPAEDIC SURGERY
Payer: OTHER MISCELLANEOUS

## 2020-10-15 PROCEDURE — 97035 APP MDLTY 1+ULTRASOUND EA 15: CPT | Mod: GO | Performed by: OCCUPATIONAL THERAPIST

## 2020-10-15 PROCEDURE — 97140 MANUAL THERAPY 1/> REGIONS: CPT | Mod: GO | Performed by: OCCUPATIONAL THERAPIST

## 2020-10-15 PROCEDURE — 97026 INFRARED THERAPY: CPT | Mod: GO | Performed by: OCCUPATIONAL THERAPIST

## 2020-10-19 ENCOUNTER — HOSPITAL ENCOUNTER (OUTPATIENT)
Dept: OCCUPATIONAL THERAPY | Facility: CLINIC | Age: 40
Setting detail: THERAPIES SERIES
End: 2020-10-19
Attending: ORTHOPAEDIC SURGERY
Payer: OTHER MISCELLANEOUS

## 2020-10-19 PROCEDURE — 97035 APP MDLTY 1+ULTRASOUND EA 15: CPT | Mod: GO | Performed by: OCCUPATIONAL THERAPIST

## 2020-10-19 PROCEDURE — 97140 MANUAL THERAPY 1/> REGIONS: CPT | Mod: GO | Performed by: OCCUPATIONAL THERAPIST

## 2020-10-19 PROCEDURE — 97026 INFRARED THERAPY: CPT | Mod: GO | Performed by: OCCUPATIONAL THERAPIST

## 2020-10-21 ENCOUNTER — HOSPITAL ENCOUNTER (OUTPATIENT)
Dept: OCCUPATIONAL THERAPY | Facility: CLINIC | Age: 40
Setting detail: THERAPIES SERIES
End: 2020-10-21
Attending: ORTHOPAEDIC SURGERY
Payer: OTHER MISCELLANEOUS

## 2020-10-21 PROCEDURE — 97035 APP MDLTY 1+ULTRASOUND EA 15: CPT | Mod: GO | Performed by: OCCUPATIONAL THERAPIST

## 2020-10-21 PROCEDURE — 97026 INFRARED THERAPY: CPT | Mod: GO | Performed by: OCCUPATIONAL THERAPIST

## 2020-10-21 PROCEDURE — 97140 MANUAL THERAPY 1/> REGIONS: CPT | Mod: GO | Performed by: OCCUPATIONAL THERAPIST

## 2020-10-28 ENCOUNTER — HOSPITAL ENCOUNTER (OUTPATIENT)
Dept: OCCUPATIONAL THERAPY | Facility: CLINIC | Age: 40
Setting detail: THERAPIES SERIES
End: 2020-10-28
Attending: ORTHOPAEDIC SURGERY
Payer: OTHER MISCELLANEOUS

## 2020-10-28 ENCOUNTER — OFFICE VISIT (OUTPATIENT)
Dept: OBGYN | Facility: CLINIC | Age: 40
End: 2020-10-28
Payer: COMMERCIAL

## 2020-10-28 VITALS
HEART RATE: 72 BPM | TEMPERATURE: 99.2 F | HEIGHT: 64 IN | BODY MASS INDEX: 34.18 KG/M2 | WEIGHT: 200.2 LBS | SYSTOLIC BLOOD PRESSURE: 123 MMHG | RESPIRATION RATE: 14 BRPM | DIASTOLIC BLOOD PRESSURE: 84 MMHG

## 2020-10-28 DIAGNOSIS — R10.2 PELVIC PAIN IN FEMALE: ICD-10-CM

## 2020-10-28 DIAGNOSIS — N93.9 ABNORMAL UTERINE BLEEDING (AUB): ICD-10-CM

## 2020-10-28 DIAGNOSIS — N80.9 ENDOMETRIOSIS: ICD-10-CM

## 2020-10-28 DIAGNOSIS — N94.6 DYSMENORRHEA: Primary | ICD-10-CM

## 2020-10-28 PROCEDURE — 97026 INFRARED THERAPY: CPT | Mod: GO | Performed by: OCCUPATIONAL THERAPIST

## 2020-10-28 PROCEDURE — 88305 TISSUE EXAM BY PATHOLOGIST: CPT | Performed by: PATHOLOGY

## 2020-10-28 PROCEDURE — 58100 BIOPSY OF UTERUS LINING: CPT | Performed by: OBSTETRICS & GYNECOLOGY

## 2020-10-28 PROCEDURE — 99204 OFFICE O/P NEW MOD 45 MIN: CPT | Mod: 25 | Performed by: OBSTETRICS & GYNECOLOGY

## 2020-10-28 PROCEDURE — 97035 APP MDLTY 1+ULTRASOUND EA 15: CPT | Mod: GO | Performed by: OCCUPATIONAL THERAPIST

## 2020-10-28 PROCEDURE — 97140 MANUAL THERAPY 1/> REGIONS: CPT | Mod: GO | Performed by: OCCUPATIONAL THERAPIST

## 2020-10-28 SDOH — ECONOMIC STABILITY: TRANSPORTATION INSECURITY
IN THE PAST 12 MONTHS, HAS LACK OF TRANSPORTATION KEPT YOU FROM MEETINGS, WORK, OR FROM GETTING THINGS NEEDED FOR DAILY LIVING?: NOT ASKED

## 2020-10-28 SDOH — ECONOMIC STABILITY: INCOME INSECURITY: HOW HARD IS IT FOR YOU TO PAY FOR THE VERY BASICS LIKE FOOD, HOUSING, MEDICAL CARE, AND HEATING?: NOT ASKED

## 2020-10-28 SDOH — ECONOMIC STABILITY: FOOD INSECURITY: WITHIN THE PAST 12 MONTHS, YOU WORRIED THAT YOUR FOOD WOULD RUN OUT BEFORE YOU GOT MONEY TO BUY MORE.: NOT ASKED

## 2020-10-28 SDOH — ECONOMIC STABILITY: TRANSPORTATION INSECURITY
IN THE PAST 12 MONTHS, HAS THE LACK OF TRANSPORTATION KEPT YOU FROM MEDICAL APPOINTMENTS OR FROM GETTING MEDICATIONS?: NOT ASKED

## 2020-10-28 SDOH — ECONOMIC STABILITY: FOOD INSECURITY: WITHIN THE PAST 12 MONTHS, THE FOOD YOU BOUGHT JUST DIDN'T LAST AND YOU DIDN'T HAVE MONEY TO GET MORE.: NOT ASKED

## 2020-10-28 ASSESSMENT — MIFFLIN-ST. JEOR: SCORE: 1563.1

## 2020-10-28 NOTE — NURSING NOTE
"Initial /84 (BP Location: Right arm, Patient Position: Sitting, Cuff Size: Adult Large)   Pulse 72   Temp 99.2  F (37.3  C) (Tympanic)   Resp 14   Ht 1.626 m (5' 4\")   Wt 90.8 kg (200 lb 3.2 oz)   LMP 10/08/2020 (Exact Date)   BMI 34.36 kg/m   Estimated body mass index is 34.36 kg/m  as calculated from the following:    Height as of this encounter: 1.626 m (5' 4\").    Weight as of this encounter: 90.8 kg (200 lb 3.2 oz). .      "

## 2020-10-28 NOTE — PROGRESS NOTES
"[facility]    Gynecology Office Note    CC:   Chief Complaint   Patient presents with     Consult     Menstraul pain and heavy bleeding         HPI: Lin Corona is a 40 year old  who presents for dysmenorrhea, heavy menstrual bleeding, and history of endometriosis. Patient reports a long history of painful, heavy menses for her whole life. She has been on birth control since the age of 11 for this. She reports she has pelvic pain about 3 out of every 4 weeks. She has one week where she feels \"normal.\" Pain doubles her over at times and is associated with nausea. She misses work frequently due to this. Also, her menstrual bleeding is very heavy and long, lasting 7-10 days. She is often concerned she will bleed through her protection.  Patient has tried OCPs, Depo provera, IUD, and now is using Nuvaring, yet her symptoms persist. In the past, patient underwent an appendectomy and was told by the general surgeon that she has extensive endometriosis in her abdomen. Has never had any gynecologic surgeries. Patient is recently  to her long time partner of 8 years. They do not want to have any children. He has one child from a previous relationship.    GYN Hx:     Patient is menopausal: no    Patient's last menstrual period was 10/08/2020 (exact date).    Cycle: Regular (with Nuvaring)  Dysmenorrhea: Yes, severe  Contraception: Nuva ring   Last Pap Smear: No results found for: PAP  Abnormal Pap Smears: Yes  Sexual Activity: currently sexually active with one male partner, relationship status:         ROS: A 10 pt ROS was completed and found to be otherwise negative unless mentioned in the HPI.     PMH:   Past Medical History:   Diagnosis Date     Dysmenorrhea      Endometriosis        PSHx:   Past Surgical History:   Procedure Laterality Date     APPENDECTOMY  2011     BREAST SURGERY      Breast reduction     COLONOSCOPY  2011     GYN SURGERY  2012    " Colposcopy exam      SHOULDER SURGERY Right        OBHx:   OB History    Para Term  AB Living   0 0 0 0 0 0   SAB TAB Ectopic Multiple Live Births   0 0 0 0 0       Medications:        etonogestrel-ethinyl estradiol (NUVARING) 0.12-0.015 MG/24HR vaginal ring, Place 1 each vaginally every 28 days.       triamcinolone (KENALOG) 0.1 % external ointment, Apply topically 2 times daily (Patient not taking: Reported on 2020)    No current facility-administered medications on file prior to visit.       Allergies:    No Known Allergies    Social History:   Works as an     Social History     Socioeconomic History     Marital status:      Spouse name: Not on file     Number of children: Not on file     Years of education: Not on file     Highest education level: Not on file   Occupational History     Not on file   Social Needs     Financial resource strain: Not on file     Food insecurity     Worry: Not on file     Inability: Not on file     Transportation needs     Medical: Not on file     Non-medical: Not on file   Tobacco Use     Smoking status: Former Smoker     Packs/day: 0.00     Smokeless tobacco: Never Used   Substance and Sexual Activity     Alcohol use: Yes     Comment: occassional     Drug use: No     Sexual activity: Yes     Partners: Male     Birth control/protection: Inserts     Comment: Nuvaring   Lifestyle     Physical activity     Days per week: Not on file     Minutes per session: Not on file     Stress: Not on file   Relationships     Social connections     Talks on phone: Not on file     Gets together: Not on file     Attends Holiness service: Not on file     Active member of club or organization: Not on file     Attends meetings of clubs or organizations: Not on file     Relationship status: Not on file     Intimate partner violence     Fear of current or ex partner: Not on file     Emotionally abused: Not on file     Physically abused: Not on file     Forced sexual  "activity: Not on file   Other Topics Concern     Parent/sibling w/ CABG, MI or angioplasty before 65F 55M? Not Asked   Social History Narrative     Not on file         Family History:   Family History   Problem Relation Age of Onset     Heart Disease Father      Gallbladder Disease Father      Arthritis Maternal Grandmother      Unknown/Adopted Maternal Grandmother      MERLE. Paternal Grandmother      MERLE. Paternal Grandfather      Cervical Cancer Cousin      Cervical Cancer Paternal Aunt        Physical Exam:   Vitals:    10/28/20 1426   BP: 123/84   BP Location: Right arm   Patient Position: Sitting   Cuff Size: Adult Large   Pulse: 72   Resp: 14   Temp: 99.2  F (37.3  C)   TempSrc: Tympanic   Weight: 90.8 kg (200 lb 3.2 oz)   Height: 1.626 m (5' 4\")      Estimated body mass index is 34.36 kg/m  as calculated from the following:    Height as of this encounter: 1.626 m (5' 4\").    Weight as of this encounter: 90.8 kg (200 lb 3.2 oz).    General appearance: well-hydrated, A&O x 3, no apparent distress  Neck: Appropriate symmetry, thyroid not enlarged  Lungs: Equal expansion bilaterally, no accessory muscle use  Skin: No rash, lesions, ulcers  Constitutional: See vitals  Abdomen: Soft, non-tender, non-distended. No rebound, rigidity, or guarding.  Extremities: no edema, no calf tenderness  Neuro: CN II-XII grossly intact  Genitourinary:  External genitalia: no erythema, no lesions.   Urethral meatus appropriate location without lesions or prolapse  Urethra: No masses, tenderness, or scarring  Bladder no fullness, masses, or tenderness.  Anus and Perineum: Unremarkable, no visible lesions  Vagina: Normal, healthy pink mucosa without any lesions. Physiologic vaginal discharge. Nuvaring in place  Cervix: normal appearance with some ectropian, no cervical motion tenderness.   Uterus: limited exam due to discomfort and body habitus, normal size, shape and consistency.   Adnexa: no masses or tenderness " bilaterally.      Endometrial Biopsy Procedure Note      Lin Corona  1980  [id number]    Indications:    Lin Corona is a 40 year old year old female, who is having an endometrial biopsy for abnormal uterine bleeding    Procedure:  Is a pregnancy test required: No. (Nuva ring in place)    Prior to the start of the procedure, written and verbal consent was obtained from the patient. The patient was then placed in the dorsal lithotomy position.  A speculum was placed in the vagina and the cervix visualized. The cervix was cleaned with betadine swabs x3. A tenaculum was placed on the cervix. A small plastic 5 mm Pipelle syringe curette was passed through the cervix to the fundus with return of moderate amount of tissue. This was placed in specimen jar and sent for permanent pathology. All instruments were removed.      The patient tolerated the procedure fairly well and she reported there was  cramping.      Post Procedure:    PLAN : Await the results of the biopsy. There were no complications. Patient was discharged in stable condition.    The patient was given post op instructions which included activity and pelvic restrictions.  She was advised to call the clinic if excessive bleeding, pelvic pain, or fever.     Follow-up based on results.       Assessment and Plan:     Encounter Diagnoses   Name Primary?     Dysmenorrhea Yes     Pelvic pain in female      Abnormal uterine bleeding (AUB)      Endometriosis      Patient has long history of dysmenorrhea, heavy menstrual bleeding, and endometriosis. She has not had insurance for a number of years but recently now has insurance and is seeking treatment for these issues. We discussed the chronic course of endometriosis and possible treatments. She has previously tried OCPs, Depo provera, IUD, and now Nuvaring and remains symptomatic. Discussed further medical management with GnRH agonists/antagonists vs. Surgical therapy. Discussed risks and benefits of  both. Did discuss surgical management with hysterectomy would likely need to include removal of ovaries as well is we were treating endometriosis. Discussed risks of early surgical menopause and possibly needing hormone replacement therapy afterwards. Patient also with questions about endometrial ablation. Discussed that this could help her heavy menstrual bleeding but would likely not help her pelvic pain. Patient currently undecided on treatment options. Provided pamphlets today.     EMB collected today. Pelvic ultrasound ordered. If patient would like surgery, will need US pre-op. If she decides on medical management, could get US done now, or offered trial of medications and updating US if this does not improve symptoms.     Will follow up in one week when we have pathology results and patient has had time to think about her options and discuss with .      Health maintenance: pap smear 2019 NIL, HPV negative      Jessica Gallardo DO      Duration of visit: 45 minutes. Of that time, >50% was in counseling and care coordination.

## 2020-10-29 ENCOUNTER — TRANSFERRED RECORDS (OUTPATIENT)
Dept: HEALTH INFORMATION MANAGEMENT | Facility: CLINIC | Age: 40
End: 2020-10-29

## 2020-10-30 ENCOUNTER — HOSPITAL ENCOUNTER (OUTPATIENT)
Dept: OCCUPATIONAL THERAPY | Facility: CLINIC | Age: 40
Setting detail: THERAPIES SERIES
End: 2020-10-30
Attending: ORTHOPAEDIC SURGERY
Payer: OTHER MISCELLANEOUS

## 2020-10-30 LAB — COPATH REPORT: NORMAL

## 2020-10-30 PROCEDURE — 97035 APP MDLTY 1+ULTRASOUND EA 15: CPT | Mod: GO | Performed by: OCCUPATIONAL THERAPIST

## 2020-10-30 PROCEDURE — 97140 MANUAL THERAPY 1/> REGIONS: CPT | Mod: GO | Performed by: OCCUPATIONAL THERAPIST

## 2020-10-30 PROCEDURE — 97026 INFRARED THERAPY: CPT | Mod: GO | Performed by: OCCUPATIONAL THERAPIST

## 2020-11-03 ENCOUNTER — HOSPITAL ENCOUNTER (OUTPATIENT)
Dept: ULTRASOUND IMAGING | Facility: CLINIC | Age: 40
Discharge: HOME | End: 2020-11-03
Attending: OBSTETRICS & GYNECOLOGY | Admitting: OBSTETRICS & GYNECOLOGY
Payer: COMMERCIAL

## 2020-11-03 DIAGNOSIS — N93.9 ABNORMAL UTERINE BLEEDING (AUB): ICD-10-CM

## 2020-11-03 DIAGNOSIS — N80.9 ENDOMETRIOSIS: ICD-10-CM

## 2020-11-03 PROCEDURE — 76856 US EXAM PELVIC COMPLETE: CPT

## 2020-11-04 ENCOUNTER — HOSPITAL ENCOUNTER (OUTPATIENT)
Dept: OCCUPATIONAL THERAPY | Facility: CLINIC | Age: 40
Setting detail: THERAPIES SERIES
End: 2020-11-04
Attending: ORTHOPAEDIC SURGERY
Payer: OTHER MISCELLANEOUS

## 2020-11-04 PROCEDURE — 97035 APP MDLTY 1+ULTRASOUND EA 15: CPT | Mod: GO | Performed by: OCCUPATIONAL THERAPIST

## 2020-11-04 PROCEDURE — 97026 INFRARED THERAPY: CPT | Mod: GO | Performed by: OCCUPATIONAL THERAPIST

## 2020-11-04 PROCEDURE — 97140 MANUAL THERAPY 1/> REGIONS: CPT | Mod: GO | Performed by: OCCUPATIONAL THERAPIST

## 2020-11-06 ENCOUNTER — HOSPITAL ENCOUNTER (OUTPATIENT)
Dept: OCCUPATIONAL THERAPY | Facility: CLINIC | Age: 40
Setting detail: THERAPIES SERIES
End: 2020-11-06
Attending: ORTHOPAEDIC SURGERY
Payer: OTHER MISCELLANEOUS

## 2020-11-06 PROCEDURE — 97035 APP MDLTY 1+ULTRASOUND EA 15: CPT | Mod: GO | Performed by: OCCUPATIONAL THERAPIST

## 2020-11-06 PROCEDURE — 97140 MANUAL THERAPY 1/> REGIONS: CPT | Mod: GO | Performed by: OCCUPATIONAL THERAPIST

## 2020-11-06 PROCEDURE — 97026 INFRARED THERAPY: CPT | Mod: GO | Performed by: OCCUPATIONAL THERAPIST

## 2020-11-11 ENCOUNTER — TELEPHONE (OUTPATIENT)
Dept: OBGYN | Facility: CLINIC | Age: 40
End: 2020-11-11

## 2020-11-11 NOTE — TELEPHONE ENCOUNTER
Reason for Call:  Other call back    Detailed comments: Pt states she had an U/S and a bx done.  Wondering about scheduling her surgery - Hysterectomy.  What does she need to do next?  Pt is looking to set up for January.    Phone Number Patient can be reached at: Home number on file [phone] (home)    Best Time:     Can we leave a detailed message on this number? YES    Call taken on 11/11/2020 at 11:29 AM by Breanne Dover

## 2020-11-16 ENCOUNTER — HOSPITAL ENCOUNTER (OUTPATIENT)
Dept: OCCUPATIONAL THERAPY | Facility: CLINIC | Age: 40
Setting detail: THERAPIES SERIES
End: 2020-11-16
Attending: ORTHOPAEDIC SURGERY
Payer: OTHER MISCELLANEOUS

## 2020-11-16 ENCOUNTER — VIRTUAL VISIT (OUTPATIENT)
Dept: FAMILY MEDICINE | Facility: CLINIC | Age: 40
End: 2020-11-16
Payer: COMMERCIAL

## 2020-11-16 DIAGNOSIS — L30.9 ECZEMA, UNSPECIFIED TYPE: Primary | ICD-10-CM

## 2020-11-16 PROCEDURE — 97140 MANUAL THERAPY 1/> REGIONS: CPT | Mod: GO | Performed by: OCCUPATIONAL THERAPIST

## 2020-11-16 PROCEDURE — 97026 INFRARED THERAPY: CPT | Mod: GO | Performed by: OCCUPATIONAL THERAPIST

## 2020-11-16 PROCEDURE — 97035 APP MDLTY 1+ULTRASOUND EA 15: CPT | Mod: GO | Performed by: OCCUPATIONAL THERAPIST

## 2020-11-16 PROCEDURE — 99213 OFFICE O/P EST LOW 20 MIN: CPT | Mod: 95 | Performed by: NURSE PRACTITIONER

## 2020-11-16 RX ORDER — TRIAMCINOLONE ACETONIDE 5 MG/G
CREAM TOPICAL 2 TIMES DAILY
Qty: 15 G | Refills: 1 | Status: SHIPPED | OUTPATIENT
Start: 2020-11-16 | End: 2022-03-10

## 2020-11-16 NOTE — PATIENT INSTRUCTIONS
Try the stronger steroid cream to your hand twice a day.  Follow up with dermatology if no relief or worsening.      Our Clinic hours are:  Mondays    7:20 am - 7 pm  Tues -  Fri  7:20 am - 5 pm    Clinic Phone: [phone]    The clinic lab opens at 7:30 am Mon - Fri and appointments are required.    [facility]  Ph. [phone]  Monday  8 am - 7pm  Tues - Fri 8 am - 5:30 pm

## 2020-11-16 NOTE — PROGRESS NOTES
"Lin Corona is a 40 year old female who is being evaluated via a billable video visit.      The patient has been notified of following:     \"This video visit will be conducted via a call between you and your physician/provider. We have found that certain health care needs can be provided without the need for an in-person physical exam.  This service lets us provide the care you need with a video conversation.  If a prescription is necessary we can send it directly to your pharmacy.  If lab work is needed we can place an order for that and you can then stop by our lab to have the test done at a later time.    Video visits are billed at different rates depending on your insurance coverage.  Please reach out to your insurance provider with any questions.    If during the course of the call the physician/provider feels a video visit is not appropriate, you will not be charged for this service.\"    Patient has given verbal consent for Video visit? Yes  How would you like to obtain your AVS? Mail a copy  If you are dropped from the video visit, the video invite should be resent to: Text to cell phone: [phone]  Will anyone else be joining your video visit? No      Subjective     Lin Corona is a 40 year old female who presents today via video visit for the following health issues:    HPI     Psoriasis, wants a stronger medication.    How many servings of fruits and vegetables do you eat daily?  2-3    On average, how many sweetened beverages do you drink each day (Examples: soda, juice, sweet tea, etc.  Do NOT count diet or artificially sweetened beverages)?   0    How many days per week do you exercise enough to make your heart beat faster? 3 or less    How many minutes a day do you exercise enough to make your heart beat faster? 20 - 29    How many days per week do you miss taking your medication? 0         Video Start Time: N/A         Review of Systems   See above        Objective           Vitals:  No " "vitals were obtained today due to virtual visit.    Physical Exam     GENERAL: Healthy, alert and no distress  RESP: No audible wheeze, cough, or visible cyanosis.  No visible retractions or increased work of breathing.                Assessment & Plan     Eczema, unspecified type    - triamcinolone (ARISTOCORT HP) 0.5 % external cream; Apply topically 2 times daily  - DERMATOLOGY ADULT REFERRAL; Future     BMI:   Estimated body mass index is 34.36 kg/m  as calculated from the following:    Height as of 10/28/20: 1.626 m (5' 4\").    Weight as of 10/28/20: 90.8 kg (200 lb 3.2 oz).            See Patient Instructions  Patient Instructions   Try the stronger steroid cream to your hand twice a day.  Follow up with dermatology if no relief or worsening.      Our Clinic hours are:  Mondays    7:20 am - 7 pm  Tues -  Fri  7:20 am - 5 pm    Clinic Phone: [phone]    The clinic lab opens at 7:30 am Mon - Fri and appointments are required.    [facility]  Ph. [phone]  Monday  8 am - 7pm  Tues - Fri 8 am - 5:30 pm             Return in about 2 weeks (around 11/30/2020) for or sooner if symptoms persist or worsen.    TRUMAN Aguila CNP  [facility]              "

## 2020-11-16 NOTE — TELEPHONE ENCOUNTER
Pt stopped by the clinic - states Jessica Gallardo, DO tried to call her last week.  Wondering what the next step is/ and her results.    Please call [phone].    Thanks-    -Breanne Dover  [facility] [place]

## 2020-11-18 ENCOUNTER — HOSPITAL ENCOUNTER (OUTPATIENT)
Dept: OCCUPATIONAL THERAPY | Facility: CLINIC | Age: 40
Setting detail: THERAPIES SERIES
End: 2020-11-18
Attending: ORTHOPAEDIC SURGERY
Payer: OTHER MISCELLANEOUS

## 2020-11-18 ENCOUNTER — MYC MEDICAL ADVICE (OUTPATIENT)
Dept: OBGYN | Facility: CLINIC | Age: 40
End: 2020-11-18

## 2020-11-18 ENCOUNTER — PREP FOR PROCEDURE (OUTPATIENT)
Dept: OBGYN | Facility: CLINIC | Age: 40
End: 2020-11-18

## 2020-11-18 DIAGNOSIS — R10.2 CHRONIC PELVIC PAIN IN FEMALE: Primary | ICD-10-CM

## 2020-11-18 DIAGNOSIS — G89.29 CHRONIC PELVIC PAIN IN FEMALE: Primary | ICD-10-CM

## 2020-11-18 DIAGNOSIS — N93.9 ABNORMAL UTERINE BLEEDING: ICD-10-CM

## 2020-11-18 PROCEDURE — 97140 MANUAL THERAPY 1/> REGIONS: CPT | Mod: GO | Performed by: OCCUPATIONAL THERAPIST

## 2020-11-18 PROCEDURE — 97026 INFRARED THERAPY: CPT | Mod: GO | Performed by: OCCUPATIONAL THERAPIST

## 2020-11-18 PROCEDURE — 97035 APP MDLTY 1+ULTRASOUND EA 15: CPT | Mod: GO | Performed by: OCCUPATIONAL THERAPIST

## 2020-11-18 RX ORDER — ACETAMINOPHEN 325 MG/1
975 TABLET ORAL ONCE
Status: CANCELLED | OUTPATIENT
Start: 2020-11-18 | End: 2020-11-18

## 2020-11-18 RX ORDER — PHENAZOPYRIDINE HYDROCHLORIDE 100 MG/1
200 TABLET, FILM COATED ORAL ONCE
Status: CANCELLED | OUTPATIENT
Start: 2020-11-18 | End: 2020-11-18

## 2020-11-18 RX ORDER — CEFAZOLIN SODIUM 1 G/50ML
1 INJECTION, SOLUTION INTRAVENOUS SEE ADMIN INSTRUCTIONS
Status: CANCELLED | OUTPATIENT
Start: 2020-11-18

## 2020-11-18 RX ORDER — CEFAZOLIN SODIUM 2 G/100ML
2 INJECTION, SOLUTION INTRAVENOUS
Status: CANCELLED | OUTPATIENT
Start: 2020-11-18

## 2020-11-18 NOTE — PROGRESS NOTES
11/18/20   Hand Therapy Progress Note   Signing Clinician's Name / Credentials   Signing clinician's name / credentials Nereida Bautista OTR/L CHT   Session Number   Session Number 10( reporting period  10/12/20 to 11/18/20   Quick Add   Quick Add  Hand   Hand   Referring Physician Dr. Funes   Medical Diagnosis Right intersection syndrome   Orders Evaluate And Treat As Indicated   Adult OT Eval Goals   Hand Eval Goals 2;1;3   Hand Goal 1   Goal Identifier weight bearing- Is not a 2 - goals still appropriate continue   Goal Description Patient to go from 0 on UEFI to 3 to be able to push herself up from a chair with min difficulty   Target Date 12/26/20   Hand Goal 2   Goal Identifier lifting is not at a 2 - goal still appropriate continue   Goal Description Patient to be able to lift a bag of groceries from 0 Extreme difficulty on UEFI to 1 A little bit)    Target Date 12/19/20   Hand Goal 3   Goal Identifier work- 25% better   Goal Description Patient to report 75% improve in being able to resume normal work tasks using tools.   Target Date 11/19/20   Subjective Report   Subjective Report back to work so is notably sore   Patient Reported % Functional Improvement 25 UEFI 47 was 19   Initial Pain level 6/10  (at worst 2 at best)   Current Pain level 5/10  (at worst 0 at best)   Objective Measures   Objective Measures Objective Measure 1   Strength  45 with 2/10 pain was 38 with 5/10 pain   Strength Apple Pinch 12 with 4/10 pain was 5 with 5/10 pain   Strength Lateral Pinch 12   Objective Measure 1   Objective Measure palpation to second dorsal compartment   Details 3/10   Modalities   Modalities Infrared;Ultrasound    Infrared   Infrared Treatment Minutes (80056) 2 Minutes   Skilled Intervention to enhance tissue repair   Treatment Detail 3j/cm2 second dorsal compartment   Ultrasound   Ultrasound, Minutes (92122) 8 Minutes   Skilled Intervention to improve healing   Treatment Detail 3 mhz,1.0w/cm2, 50%  pulsed second dorsal compartment   Therapeutic Interventions   Therapeutic Interventions Manual Therapy;Therapeutic Procedure/Exercise   Manual Therapy   Manual Therapy Minutes (14584) 15   Skilled Intervention to increase flexibility , decrease pain   Treatment Detail myofacial to second dorsal compartment and light STM with Iglesia   Assessments Completed   Assessments Completed Patient does have more symptoms today and is back to splinting more in the last few days since she started back to work.  But overall has made some significant progress towards goals.   Education   Learner Patient   Readiness Eager   Method Booklet/handout;Explanation;Video   Response Verbalizes understanding;Demonstrates understanding   Education Notes see home program   Plan   Home program splinting, activity modification, massage   Updates to plan of care patient to continue to wean from splint   Plan for next session will continue up to 2x/week for the he next month to continue to work towards meeting functional goals.   Total Session Time           Nereida Bautista OTR/L CHT  Occupational Therapist, Certified Hand Therapist

## 2020-11-19 DIAGNOSIS — Z11.52 ENCOUNTER FOR PREOPERATIVE SCREENING LABORATORY TESTING FOR COVID-19 VIRUS: Primary | ICD-10-CM

## 2020-11-19 DIAGNOSIS — Z01.812 ENCOUNTER FOR PREOPERATIVE SCREENING LABORATORY TESTING FOR COVID-19 VIRUS: Primary | ICD-10-CM

## 2020-11-19 NOTE — TELEPHONE ENCOUNTER
Have been playing phone tag with patient for a few days. Called her to discuss results of ultrasound. No structural abnormalities seen in uterus or adnexa. Patient with continued pelvic pain and AUB. Has had a chance to think about her options. Patient desires pursuing surgical management. Discussed options for hysterectomy with and without oophorectomy. With known history of endometriosis, if oophorectomy is not performed, higher likelihood of continued pelvic pain. If oophorectomy is performed at such a young age, would have higher risk of CAD, stroke, and osteoporosis, would recommend hormone replacement therapy. Patient has family history of ovarian cancer and is concerned about need for repeat surgery. She would prefer for everything to be removed and is agreeable to hormone replacement therapy. Discussed attempt for laparoscopic approach. Known history of endometriosis, diagnosed by general surgeon at time of appendectomy. Discussed higher risk of adhesions and possibility for need for larger incision to ensure safety. Patient understands. All questions answered. She would prefer to wait until January for surgery. Instructed to make appointment for pre-operative clearance within 30 days of surgery. She will also need preoperative COVID testing. Patient does not need to be seen or evaluated by myself in office unless she has further concerns. Also can do virtual visit if something comes up in the meantime.     Plan for TLH, BSO, cystoscopy on 1/11/2021. Pre-operative ordered placed.     Jessica Gallardo DO

## 2020-11-20 NOTE — TELEPHONE ENCOUNTER
"Lin AMADOR Cj  [id number]    You are now scheduled for surgery at [facility].  Below are the details for your surgery.  Please read the \"Preparing for Your Surgery\" instructions and let us know if you have any questions.    Type of surgery: HYSTERECTOMY, TOTAL, LAPAROSCOPIC, WITH SALPINGO-OOPHORECTOMY,   CYSTOSCOPY  Surgeon:  Jessica Gallardo DO  Location of surgery: [facility] OR    Date of surgery: 1-    Time: 7:30am   Arrival Time: 6:00am    Time can change, to be confirmed a couple of days prior by pre-op surgery nurse.    Pre-Op Appt Date: Patient to schedule with a PCP or Family Practice Provider within 30 days to the surgery.  Post-Op Appt Date: To be determined by provider     COVID test 2-4 days prior at [facility]  Date 1-8-21  Time 4:30pm    Packet sent out: Yes  Pre-cert/Authorization completed:  TBD by Financial Securing Office.   MA Sterilization/Hysterectomy Acknowledgment Consent signed: Not Applicable    [facility]  [phone]    Fax: [phone]  Same Day Surgery [phone]  Fax: [phone]  [facility] [phone]    "

## 2020-11-23 ENCOUNTER — HOSPITAL ENCOUNTER (OUTPATIENT)
Dept: OCCUPATIONAL THERAPY | Facility: CLINIC | Age: 40
Setting detail: THERAPIES SERIES
End: 2020-11-23
Attending: ORTHOPAEDIC SURGERY
Payer: OTHER MISCELLANEOUS

## 2020-11-23 PROCEDURE — 97026 INFRARED THERAPY: CPT | Mod: GO | Performed by: OCCUPATIONAL THERAPIST

## 2020-11-23 PROCEDURE — 97035 APP MDLTY 1+ULTRASOUND EA 15: CPT | Mod: GO | Performed by: OCCUPATIONAL THERAPIST

## 2020-11-23 PROCEDURE — 97140 MANUAL THERAPY 1/> REGIONS: CPT | Mod: GO | Performed by: OCCUPATIONAL THERAPIST

## 2020-11-30 ENCOUNTER — VIRTUAL VISIT (OUTPATIENT)
Dept: FAMILY MEDICINE | Facility: OTHER | Age: 40
End: 2020-11-30

## 2020-11-30 DIAGNOSIS — Z20.822 SUSPECTED COVID-19 VIRUS INFECTION: ICD-10-CM

## 2020-11-30 DIAGNOSIS — Z20.822 SUSPECTED COVID-19 VIRUS INFECTION: Primary | ICD-10-CM

## 2020-11-30 DIAGNOSIS — Z20.822 SUSPECTED 2019 NOVEL CORONAVIRUS INFECTION: Primary | ICD-10-CM

## 2020-11-30 PROCEDURE — U0003 INFECTIOUS AGENT DETECTION BY NUCLEIC ACID (DNA OR RNA); SEVERE ACUTE RESPIRATORY SYNDROME CORONAVIRUS 2 (SARS-COV-2) (CORONAVIRUS DISEASE [COVID-19]), AMPLIFIED PROBE TECHNIQUE, MAKING USE OF HIGH THROUGHPUT TECHNOLOGIES AS DESCRIBED BY CMS-2020-01-R: HCPCS | Performed by: NURSE PRACTITIONER

## 2020-11-30 NOTE — PROGRESS NOTES
"Date: 2020 07:32:29  Clinician: Jenifer Baird  Clinician NPI: [id number]  Patient: Lin [place]  Patient : 1980  Patient Address: [id number] [place] [address] [place] [address]  Patient Phone: [phone]  Visit Protocol: URI  Patient Summary:  Lin is a 40 year old ( : 1980 ) female who initiated a [facility] Visit for COVID-19 (Coronavirus) evaluation and screening. When asked the question \"Please sign me up to receive news, health information and promotions. \", Lin responded \"No\".    Lin states her symptoms started gradually 3-4 days ago. After her symptoms started, they improved and then got worse again.   Her symptoms consist of ear pain, a headache, enlarged lymph nodes, a cough, nasal congestion, nausea, rhinitis, facial pain or pressure, myalgia, malaise, a sore throat, and tooth pain. Lin also feels feverish but was unable to measure her temperature.   Symptom details     Nasal secretions: The color of her mucus is clear.    Cough: Lin coughs a few times an hour and her cough is not more bothersome at night. Phlegm does not come into her throat when she coughs. She does not believe her cough is caused by post-nasal drip.     Sore throat: Lin reports having mild throat pain (1-3 on a 10 point pain scale), does not have exudate on her tonsils, and can swallow liquids. The lymph nodes in her neck are enlarged. A rash has not appeared on the skin since the sore throat started.     Facial pain or pressure: The facial pain or pressure does not feel worse when bending or leaning forward.     Headache: She states the headache is mild (1-3 on a 10 point pain scale).     Tooth pain: The tooth pain is not caused by a cavity, recent dental work, or other mouth problems.      Lin denies having wheezing, vomiting, chills, ageusia, diarrhea, and anosmia. She also denies taking antibiotic medication in the past month, having recent facial or sinus surgery in the past 60 days, " and having a sinus infection within the past year. She is not experiencing dyspnea.   Precipitating events  Within the past week, Lin has not been exposed to someone with strep throat. She has not recently been exposed to someone with influenza. Lin has not been in close contact with any high risk individuals.   Pertinent COVID-19 (Coronavirus) information  Lin does not work or volunteer as healthcare worker or a . In the past 14 days, Lin has not worked or volunteered at a healthcare facility or group living setting.   In the past 14 days, she also has not lived in a congregate living setting.   Lin has not had a close contact with a laboratory-confirmed COVID-19 patient within 14 days of symptom onset.    Since December 2019, Lin has not been tested for COVID-19 and has had upper respiratory infection (URI) or influenza-like illness.      Date(s) of previous URI or influenza-like illness (free-text): Feb 2020     Symptoms Lin experienced during previous URI or influenza-like illness as reported by the patient (free-text): Sore throat,  head ache, short of breath        Pertinent medical history  Lin typically gets a yeast infection when she takes antibiotics. She is not sure if she has used fluconazole (Diflucan) to treat previous yeast infections.   She has not been told by her provider to avoid NSAIDs.   Lin does not have diabetes. She denies having immunosuppressive conditions (e.g., chemotherapy, HIV, organ transplant, long-term use of steroids or other immunosuppressive medications, splenectomy). She does not have severe COPD and congestive heart failure. She does not have asthma.   Lin does not need a return to work/school note.   Weight: 200 lbs   Lin does not smoke or use smokeless tobacco.   She denies pregnancy and denies breastfeeding. She has menstruated in the past month.   Additional information as reported by the patient (free  text): My  also has similar symptoms.  He has been approved for a covid test   Weight: 200 lbs    MEDICATIONS: NuvaRing vaginal, ALLERGIES: NKDA  Clinician Response:  Dear Lin,   Your symptoms show that you may have coronavirus (COVID-19). This illness can cause fever, cough and trouble breathing. Many people get a mild case and get better on their own. Some people can get very sick.  What should I do?  We would like to test you for this virus.   1. Please call [phone] to schedule your visit. Explain that you were referred by [facility] to have a COVID-19 test. Be ready to share your [facility] visit ID number.  * If you need to schedule in [facility] [place] please call [phone] or for [employer] employees please call [phone].  * If you need to schedule in the [place] area please call [phone]. [employer] employees call [phone].  The following will serve as your written order for this COVID Test, ordered by me, for the indication of suspected COVID [Z20.828]: The test will be ordered in [employer], our electronic health record, after you are scheduled. It will show as ordered and authorized by Anthony Nye MD.  Order: COVID-19 (Coronavirus) PCR for SYMPTOMATIC testing from [facility].   2. When it's time for your COVID test:  Stay at least 6 feet away from others. (If someone will drive you to your test, stay in the backseat, as far away from the  as you can.)   Cover your mouth and nose with a mask, tissue or washcloth.  Go straight to the testing site. Don't make any stops on the way there or back.      3.Starting now: Stay home and away from others (self-isolate) until:   You've had no fever---and no medicine that reduces fever---for one full day (24 hours). And...   Your other symptoms have gotten better. For example, your cough or breathing has improved. And...   At least 10 days have passed since your symptoms started.       During this time, don't leave the house except for testing or medical  "care.   Stay in your own room, even for meals. Use your own bathroom if you can.   Stay away from others in your home. No hugging, kissing or shaking hands. No visitors.  Don't go to work, school or anywhere else.    Clean \"high touch\" surfaces often (doorknobs, counters, handles, etc.). Use a household cleaning spray or wipes. You'll find a full list of  on the EPA website: www.epa.gov/pesticide-registration/list-n-disinfectants-use-against-sars-cov-2.   Cover your mouth and nose with a mask, tissue or washcloth to avoid spreading germs.  Wash your hands and face often. Use soap and water.  Caregivers in these groups are at risk for severe illness due to COVID-19:  o People 65 years and older  o People who live in a nursing home or long-term care facility  o People with chronic disease (lung, heart, cancer, diabetes, kidney, liver, immunologic)  o People who have a weakened immune system, including those who:   Are in cancer treatment  Take medicine that weakens the immune system, such as corticosteroids  Had a bone marrow or organ transplant  Have an immune deficiency  Have poorly controlled HIV or AIDS  Are obese (body mass index of 40 or higher)  Smoke regularly   o Caregivers should wear gloves while washing dishes, handling laundry and cleaning bedrooms and bathrooms.  o Use caution when washing and drying laundry: Don't shake dirty laundry, and use the warmest water setting that you can.  o For more tips, go to www.cdc.gov/coronavirus/2019-ncov/downloads/10Things.pdf.    4.Sign up for [employer]. We know it's scary to hear that you might have COVID-19. We want to track your symptoms to make sure you're okay over the next 2 weeks. Please look for an email from [employer]---this is a free, online program that we'll use to keep in touch. To sign up, follow the link in the email. Learn more at [email or web address]  How can I take care of myself?   Get lots of rest. Drink extra fluids " (unless a doctor has told you not to).   Take Tylenol (acetaminophen) for fever or pain. If you have liver or kidney problems, ask your family doctor if it's okay to take Tylenol.   Adults can take either:    650 mg (two 325 mg pills) every 4 to 6 hours, or...   1,000 mg (two 500 mg pills) every 8 hours as needed.    Note: Don't take more than 3,000 mg in one day. Acetaminophen is found in many medicines (both prescribed and over-the-counter medicines). Read all labels to be sure you don't take too much.   For children, check the Tylenol bottle for the right dose. The dose is based on the child's age or weight.    If you have other health problems (like cancer, heart failure, an organ transplant or severe kidney disease): Call your specialty clinic if you don't feel better in the next 2 days.       Know when to call 911. Emergency warning signs include:    Trouble breathing or shortness of breath Pain or pressure in the chest that doesn't go away Feeling confused like you haven't felt before, or not being able to wake up Bluish-colored lips or face.  Where can I get more information?   [facility] -- About COVID-19: [email or web address]   CDC -- What to Do If You're Sick: www.cdc.gov/coronavirus/2019-ncov/about/steps-when-sick.html   CDC -- Ending Home Isolation: www.cdc.gov/coronavirus/2019-ncov/hcp/disposition-in-home-patients.html   CDC -- Caring for Someone: www.cdc.gov/coronavirus/2019-ncov/if-you-are-sick/care-for-someone.html   [facility] -- Interim Guidance for Hospital Discharge to Home: www.health.[facility].[place]/diseases/coronavirus/hcp/hospdischarge.pdf   [facility] clinical trials (COVID-19 research studies): [email or web address]/umn-clinical-trials    Below are the COVID-19 hotlines at the [place] Department of [facility] ([facility]). Interpreters are available.    For health questions: Call 497-232-6064 or 1-310.573.5371 (7 a.m. to 7 p.m.) For questions about schools and childcare: Call  205-383-5014 or 1-102.642.2519 (7 a.m. to 7 p.m.)    Diagnosis: Cough  Diagnosis ICD: R05

## 2020-12-01 LAB
SARS-COV-2 RNA SPEC QL NAA+PROBE: NOT DETECTED
SPECIMEN SOURCE: NORMAL

## 2020-12-03 ENCOUNTER — NURSE TRIAGE (OUTPATIENT)
Dept: NURSING | Facility: CLINIC | Age: 40
End: 2020-12-03

## 2020-12-03 NOTE — TELEPHONE ENCOUNTER
"Pt reports feeling \"kind of foggy and dizziness as well as feeling like can't get a deep breath at times\". Pt reports she is able to walk without assistance. Symptoms started last Friday. No fever. Pt reports she has been staying home due to Covid 19 concerns and had some viral symptoms over the past week, but tested negative. Pt reports she does think she may be having anxiety due to fears about Covid 19.     Advised pt to see PCP within the next three days. Increase fluids and rest with feet elevated. Move slowly when going from sitting to standing. Advised pt to call back if symptoms worsen or new symptoms arise.     Pt verbalizes understanding and agrees to plan.     COVID 19 Nurse Triage Plan/Patient Instructions    Please be aware that novel coronavirus (COVID-19) may be circulating in the community. If you develop symptoms such as fever, cough, or SOB or if you have concerns about the presence of another infection including coronavirus (COVID-19), please contact your health care provider or visit [email or web address].     Disposition/Instructions    Home care recommended. Follow home care protocol based instructions.    Thank you for taking steps to prevent the spread of this virus.  o Limit your contact with others.  o Wear a simple mask to cover your cough.  o Wash your hands well and often.    Resources    [facility]: About COVID-19: [email or web address]    CDC: What to Do If You're Sick: www.cdc.gov/coronavirus/2019-ncov/about/steps-when-sick.html    CDC: Ending Home Isolation: www.cdc.gov/coronavirus/2019-ncov/hcp/disposition-in-home-patients.html     CDC: Caring for Someone: www.cdc.gov/coronavirus/2019-ncov/if-you-are-sick/care-for-someone.html     [facility]: Interim Guidance for Hospital Discharge to Home: www.health.[facility].[place]/diseases/coronavirus/hcp/hospdischarge.pdf    [facility] clinical trials (COVID-19 research studies): [email or web address].[email or web address]/umn-clinical-trials     Below " "are the COVID-19 hotlines at the [place] Department [facility] ([facility]). Interpreters are available.   o For health questions: Call 950-841-4968 or 1-265.419.9208 (7 a.m. to 7 p.m.)  o For questions about schools and childcare: Call 122-916-8074 or 1-523.438.2131 (7 a.m. to 7 p.m.)           Additional Information    Negative: Severe difficulty breathing (e.g., struggling for each breath, speaks in single words)    Negative: [1] Difficulty breathing or swallowing AND [2] started suddenly after medicine, an allergic food or bee sting    Negative: Shock suspected (e.g., cold/pale/clammy skin, too weak to stand, low BP, rapid pulse)    Negative: Difficult to awaken or acting confused (e.g., disoriented, slurred speech)    Negative: [1] Weakness (i.e., paralysis, loss of muscle strength) of the face, arm or leg on one side of the body AND [2] sudden onset AND [3] present now    Negative: [1] Numbness (i.e., loss of sensation) of the face, arm or leg on one side of the body AND [2] sudden onset AND [3] present now    Negative: [1] Loss of speech or garbled speech AND [2] sudden onset AND [3] present now    Negative: Overdose (accidental or intentional) of medications    Negative: [1] Fainted > 15 minutes ago AND [2] still feels too weak or dizzy to stand    Negative: Heart beating < 50 beats per minute OR > 140 beats per minute    Negative: Sounds like a life-threatening emergency to the triager    Negative: Chest pain    Negative: Rectal bleeding, bloody stool, or tarry-black stool    Negative: [1] Vomiting AND [2] contains red blood or black (\"coffee ground\") material    Negative: Vomiting is main symptom    Negative: Diarrhea is main symptom    Negative: Headache is main symptom    Negative: Patient states that he/she is having an anxiety/panic attack    Negative: Dizziness from low blood sugar (i.e., < 60 mg/dl or 3.5 mmol/l)    Negative: Dizziness is described as a spinning sensation (i.e., vertigo)    Negative: Heat " "exhaustion suspected (i.e., dehydration from heat exposure)    Negative: Difficulty breathing    Negative: SEVERE dizziness (e.g., unable to stand, requires support to walk, feels like passing out now)    Negative: Extra heart beats OR irregular heart beating  (i.e., \"palpitations\")    Negative: [1] Drinking very little AND [2] dehydration suspected (e.g., no urine > 12 hours, very dry mouth, very lightheaded)    Negative: Patient sounds very sick or weak to the triager    Negative: [1] MODERATE dizziness (e.g., interferes with normal activities) AND [2] has NOT been evaluated by physician for this  (Exception: dizziness caused by heat exposure, sudden standing, or poor fluid intake)    Negative: Fever present > 3 days (72 hours)    Negative: Taking a medicine that could cause dizziness (e.g., blood pressure medications, diuretics)    Negative: [1] MODERATE dizziness (e.g., interferes with normal activities) AND [2] has been evaluated by physician for this    [1] MILD dizziness (e.g., walking normally) AND [2] has NOT been evaluated by physician for this  (Exception: dizziness caused by heat exposure, sudden standing, or poor fluid intake)    Protocols used: DIZZINESS - [id number]      "

## 2020-12-14 ENCOUNTER — HEALTH MAINTENANCE LETTER (OUTPATIENT)
Age: 40
End: 2020-12-14

## 2020-12-31 ENCOUNTER — TRANSFERRED RECORDS (OUTPATIENT)
Dept: HEALTH INFORMATION MANAGEMENT | Facility: CLINIC | Age: 40
End: 2020-12-31

## 2021-01-04 ENCOUNTER — HOSPITAL ENCOUNTER (OUTPATIENT)
Dept: OCCUPATIONAL THERAPY | Facility: CLINIC | Age: 41
Setting detail: THERAPIES SERIES
End: 2021-01-04
Attending: ORTHOPAEDIC SURGERY
Payer: OTHER MISCELLANEOUS

## 2021-01-04 PROCEDURE — 97026 INFRARED THERAPY: CPT | Mod: GO | Performed by: OCCUPATIONAL THERAPIST

## 2021-01-04 PROCEDURE — 97140 MANUAL THERAPY 1/> REGIONS: CPT | Mod: GO | Performed by: OCCUPATIONAL THERAPIST

## 2021-01-08 ENCOUNTER — OFFICE VISIT (OUTPATIENT)
Dept: FAMILY MEDICINE | Facility: CLINIC | Age: 41
End: 2021-01-08
Payer: COMMERCIAL

## 2021-01-08 ENCOUNTER — ANESTHESIA EVENT (OUTPATIENT)
Dept: SURGERY | Facility: CLINIC | Age: 41
End: 2021-01-08
Payer: COMMERCIAL

## 2021-01-08 ENCOUNTER — HOSPITAL ENCOUNTER (OUTPATIENT)
Dept: OCCUPATIONAL THERAPY | Facility: CLINIC | Age: 41
Setting detail: THERAPIES SERIES
End: 2021-01-08
Attending: ORTHOPAEDIC SURGERY
Payer: OTHER MISCELLANEOUS

## 2021-01-08 VITALS
HEART RATE: 82 BPM | SYSTOLIC BLOOD PRESSURE: 130 MMHG | BODY MASS INDEX: 36.68 KG/M2 | OXYGEN SATURATION: 98 % | RESPIRATION RATE: 18 BRPM | WEIGHT: 207 LBS | HEIGHT: 63 IN | DIASTOLIC BLOOD PRESSURE: 80 MMHG | TEMPERATURE: 98.2 F

## 2021-01-08 DIAGNOSIS — N94.6 DYSMENORRHEA: ICD-10-CM

## 2021-01-08 DIAGNOSIS — Z01.818 PREOP GENERAL PHYSICAL EXAM: ICD-10-CM

## 2021-01-08 DIAGNOSIS — Z01.818 PRE-OPERATIVE EXAMINATION: Primary | ICD-10-CM

## 2021-01-08 DIAGNOSIS — Z01.812 ENCOUNTER FOR PREOPERATIVE SCREENING LABORATORY TESTING FOR COVID-19 VIRUS: ICD-10-CM

## 2021-01-08 DIAGNOSIS — Z11.52 ENCOUNTER FOR PREOPERATIVE SCREENING LABORATORY TESTING FOR COVID-19 VIRUS: ICD-10-CM

## 2021-01-08 DIAGNOSIS — N93.9 ABNORMAL UTERINE BLEEDING: ICD-10-CM

## 2021-01-08 PROCEDURE — 97026 INFRARED THERAPY: CPT | Mod: GO | Performed by: OCCUPATIONAL THERAPIST

## 2021-01-08 PROCEDURE — 97140 MANUAL THERAPY 1/> REGIONS: CPT | Mod: GO | Performed by: OCCUPATIONAL THERAPIST

## 2021-01-08 PROCEDURE — 99214 OFFICE O/P EST MOD 30 MIN: CPT | Performed by: NURSE PRACTITIONER

## 2021-01-08 PROCEDURE — U0003 INFECTIOUS AGENT DETECTION BY NUCLEIC ACID (DNA OR RNA); SEVERE ACUTE RESPIRATORY SYNDROME CORONAVIRUS 2 (SARS-COV-2) (CORONAVIRUS DISEASE [COVID-19]), AMPLIFIED PROBE TECHNIQUE, MAKING USE OF HIGH THROUGHPUT TECHNOLOGIES AS DESCRIBED BY CMS-2020-01-R: HCPCS | Performed by: OBSTETRICS & GYNECOLOGY

## 2021-01-08 PROCEDURE — U0005 INFEC AGEN DETEC AMPLI PROBE: HCPCS | Performed by: OBSTETRICS & GYNECOLOGY

## 2021-01-08 ASSESSMENT — MIFFLIN-ST. JEOR: SCORE: 1578.08

## 2021-01-08 ASSESSMENT — LIFESTYLE VARIABLES: TOBACCO_USE: 1

## 2021-01-08 NOTE — PROGRESS NOTES
[facility]  [address] [facility] [address]  Phone: [phone]  Primary Provider: Channing Fernández  Pre-op Performing Provider: JEFFERY SPRINGER    PREOPERATIVE EVALUATION:  Today's date: 1/8/2021    Lin Corona is a 40 year old female who presents for a preoperative evaluation.    Surgical Information:  Surgery/Procedure:   HYSTERECTOMY, TOTAL, LAPAROSCOPIC, WITH SALPINGO-OOPHORECTOMY     CYSTOSCOPY         Surgery Location:  [facility]   Surgeon: Jessica Gallardo DO  Surgery Date: 1/11/21  Time of Surgery: 7:30 am   Where patient plans to recover: At home with family  Fax number for surgical facility: Note does not need to be faxed, will be available electronically in Epic.    Type of Anesthesia Anticipated: General    Subjective     HPI related to upcoming procedure: has had worsening heavy and painful periods, FMH of ovarian cancer so will have uterus, cervix and ovaries removed.        Preop Questions 1/8/2021   1. Have you ever had a heart attack or stroke? No   2. Have you ever had surgery on your heart or blood vessels, such as a stent placement, a coronary artery bypass, or surgery on an artery in your head, neck, heart, or legs? No   3. Do you have chest pain with activity? No   4. Do you have a history of  heart failure? No   5. Do you currently have a cold, bronchitis or symptoms of other infection? No   6. Do you have a cough, shortness of breath, or wheezing? No   7. Do you or anyone in your family have previous history of blood clots? No   8. Do you or does anyone in your family have a serious bleeding problem such as prolonged bleeding following surgeries or cuts? No   9. Have you ever had problems with anemia or been told to take iron pills? No   10. Have you had any abnormal blood loss such as black, tarry or bloody stools, or abnormal vaginal bleeding? No   11. Have you ever had a blood transfusion? No   12. Are you willing to have a blood  transfusion if it is medically needed before, during, or after your surgery? Yes   13. Have you or any of your relatives ever had problems with anesthesia? No   14. Do you have sleep apnea, excessive snoring or daytime drowsiness? No   15. Do you have any artifical heart valves or other implanted medical devices like a pacemaker, defibrillator, or continuous glucose monitor? No   16. Do you have artificial joints? No   17. Are you allergic to latex? No   18. Is there any chance that you may be pregnant? No     Health Care Directive:  Patient does not have a Health Care Directive or Living Will: Discussed advance care planning with patient; information given to patient to review.    Preoperative Review of :        Status of Chronic Conditions:  See problem list for active medical problems.  Problems all longstanding and stable, except as noted/documented.  See ROS for pertinent symptoms related to these conditions.      Review of Systems  CONSTITUTIONAL: NEGATIVE for fever, chills, change in weight  INTEGUMENTARY/SKIN: NEGATIVE for worrisome rashes, moles or lesions  EYES: NEGATIVE for vision changes or irritation  ENT/MOUTH: NEGATIVE for ear, mouth and throat problems  RESP: NEGATIVE for significant cough or SOB  BREAST: NEGATIVE for masses, tenderness or discharge  CV: NEGATIVE for chest pain, palpitations or peripheral edema  GI: NEGATIVE for nausea, abdominal pain, heartburn, or change in bowel habits  : NEGATIVE for frequency, dysuria, or hematuria  MUSCULOSKELETAL: NEGATIVE for significant arthralgias or myalgia  NEURO: NEGATIVE for weakness, dizziness or paresthesias  ENDOCRINE: NEGATIVE for temperature intolerance, skin/hair changes  HEME: NEGATIVE for bleeding problems  PSYCHIATRIC: NEGATIVE for changes in mood or affect    Patient Active Problem List    Diagnosis Date Noted     Chronic pelvic pain in female 11/18/2020     Priority: Medium     Added automatically from request for surgery [id number]        "Abnormal uterine bleeding 11/18/2020     Priority: Medium     Added automatically from request for surgery [id number]       Endometriosis 10/28/2020     Priority: Medium     Pelvic pain in female 02/02/2012     Priority: Medium     Dysmenorrhea 02/02/2012     Priority: Medium      Past Medical History:   Diagnosis Date     Dysmenorrhea      Endometriosis      Past Surgical History:   Procedure Laterality Date     APPENDECTOMY  7/13/2011     BREAST SURGERY  2000    Breast reduction     COLONOSCOPY  9/2011     GYN SURGERY  1/2012    Colposcopy exam      SHOULDER SURGERY Right      Current Outpatient Medications   Medication Sig Dispense Refill     etonogestrel-ethinyl estradiol (NUVARING) 0.12-0.015 MG/24HR vaginal ring Place 1 each vaginally every 28 days.       triamcinolone (ARISTOCORT HP) 0.5 % external cream Apply topically 2 times daily 15 g 1     triamcinolone (KENALOG) 0.1 % external ointment Apply topically 2 times daily (Patient not taking: Reported on 1/8/2021) 30 g 1       No Known Allergies     Social History     Tobacco Use     Smoking status: Former Smoker     Packs/day: 0.00     Smokeless tobacco: Never Used   Substance Use Topics     Alcohol use: Yes     Comment: occassional     Family History   Problem Relation Age of Onset     Arthritis Mother      Heart Disease Father      Gallbladder Disease Father      Arthritis Maternal Grandmother      Unknown/Adopted Maternal Grandmother      C.A.D. Paternal Grandmother      Ovarian Cancer Paternal Grandmother      Cancer Paternal Grandmother      C.A.D. Paternal Grandfather      Cervical Cancer Cousin      Cervical Cancer Paternal Aunt      History   Drug Use No         Objective     /80   Pulse 82   Temp 98.2  F (36.8  C)   Resp 18   Ht 1.6 m (5' 3\")   Wt 93.9 kg (207 lb)   LMP 01/06/2021   SpO2 98%   BMI 36.67 kg/m      Physical Exam    GENERAL APPEARANCE: healthy, alert and no distress     EYES: EOMI, PERRL     HENT: ear canals and TM's normal " and nose and mouth without ulcers or lesions     NECK: no adenopathy, no asymmetry, masses, or scars and thyroid normal to palpation     RESP: lungs clear to auscultation - no rales, rhonchi or wheezes     CV: regular rates and rhythm, normal S1 S2, no S3 or S4 and no murmur, click or rub     ABDOMEN:  soft, nontender, no HSM or masses and bowel sounds normal     MS: extremities normal- no gross deformities noted, no evidence of inflammation in joints, FROM in all extremities.     SKIN: no suspicious lesions or rashes     NEURO: Normal strength and tone, sensory exam grossly normal, mentation intact and speech normal     PSYCH: mentation appears normal. and affect normal/bright     LYMPHATICS: No cervical adenopathy    Recent Labs   Lab Test 03/16/20  0925      POTASSIUM 3.9   CR 0.66        Diagnostics:  No labs were ordered during this visit.   No EKG required for low risk surgery (cataract, skin procedure, breast biopsy, etc).    Revised Cardiac Risk Index (RCRI):  The patient has the following serious cardiovascular risks for perioperative complications:   - No serious cardiac risks = 0 points     RCRI Interpretation: 0 points: Class I (very low risk - 0.4% complication rate)           Assessment & Plan   The proposed surgical procedure is considered LOW risk.    Pre-operative examination      Dysmenorrhea      Abnormal uterine bleeding          Risks and Recommendations:  The patient has the following additional risks and recommendations for perioperative complications:   - No identified additional risk factors other than previously addressed    Medication Instructions:  Patient is on no chronic medications    RECOMMENDATION:  APPROVAL GIVEN to proceed with proposed procedure, without further diagnostic evaluation.    Signed Electronically by: TRUMAN Aguila CNP    Copy of this evaluation report is provided to requesting physician.    [facility] "[employer]    [facility]  Guidelines    Revised Cardiac Risk Index

## 2021-01-08 NOTE — H&P (VIEW-ONLY)
[facility]  [address] [address] [address]  Phone: [phone]  Primary Provider: Channing Fernández  Pre-op Performing Provider: JEFFERY SPRINGER    PREOPERATIVE EVALUATION:  Today's date: 1/8/2021    Lin Corona is a 40 year old female who presents for a preoperative evaluation.    Surgical Information:  Surgery/Procedure:   HYSTERECTOMY, TOTAL, LAPAROSCOPIC, WITH SALPINGO-OOPHORECTOMY     CYSTOSCOPY         Surgery Location:  [facility]   Surgeon: Jessica Gallardo DO  Surgery Date: 1/11/21  Time of Surgery: 7:30 am   Where patient plans to recover: At home with family  Fax number for surgical facility: Note does not need to be faxed, will be available electronically in Epic.    Type of Anesthesia Anticipated: General    Subjective     HPI related to upcoming procedure: has had worsening heavy and painful periods, FMH of ovarian cancer so will have uterus, cervix and ovaries removed.        Preop Questions 1/8/2021   1. Have you ever had a heart attack or stroke? No   2. Have you ever had surgery on your heart or blood vessels, such as a stent placement, a coronary artery bypass, or surgery on an artery in your head, neck, heart, or legs? No   3. Do you have chest pain with activity? No   4. Do you have a history of  heart failure? No   5. Do you currently have a cold, bronchitis or symptoms of other infection? No   6. Do you have a cough, shortness of breath, or wheezing? No   7. Do you or anyone in your family have previous history of blood clots? No   8. Do you or does anyone in your family have a serious bleeding problem such as prolonged bleeding following surgeries or cuts? No   9. Have you ever had problems with anemia or been told to take iron pills? No   10. Have you had any abnormal blood loss such as black, tarry or bloody stools, or abnormal vaginal bleeding? No   11. Have you ever had a blood transfusion? No   12. Are you willing to have a blood  transfusion if it is medically needed before, during, or after your surgery? Yes   13. Have you or any of your relatives ever had problems with anesthesia? No   14. Do you have sleep apnea, excessive snoring or daytime drowsiness? No   15. Do you have any artifical heart valves or other implanted medical devices like a pacemaker, defibrillator, or continuous glucose monitor? No   16. Do you have artificial joints? No   17. Are you allergic to latex? No   18. Is there any chance that you may be pregnant? No     Health Care Directive:  Patient does not have a Health Care Directive or Living Will: Discussed advance care planning with patient; information given to patient to review.    Preoperative Review of :        Status of Chronic Conditions:  See problem list for active medical problems.  Problems all longstanding and stable, except as noted/documented.  See ROS for pertinent symptoms related to these conditions.      Review of Systems  CONSTITUTIONAL: NEGATIVE for fever, chills, change in weight  INTEGUMENTARY/SKIN: NEGATIVE for worrisome rashes, moles or lesions  EYES: NEGATIVE for vision changes or irritation  ENT/MOUTH: NEGATIVE for ear, mouth and throat problems  RESP: NEGATIVE for significant cough or SOB  BREAST: NEGATIVE for masses, tenderness or discharge  CV: NEGATIVE for chest pain, palpitations or peripheral edema  GI: NEGATIVE for nausea, abdominal pain, heartburn, or change in bowel habits  : NEGATIVE for frequency, dysuria, or hematuria  MUSCULOSKELETAL: NEGATIVE for significant arthralgias or myalgia  NEURO: NEGATIVE for weakness, dizziness or paresthesias  ENDOCRINE: NEGATIVE for temperature intolerance, skin/hair changes  HEME: NEGATIVE for bleeding problems  PSYCHIATRIC: NEGATIVE for changes in mood or affect    Patient Active Problem List    Diagnosis Date Noted     Chronic pelvic pain in female 11/18/2020     Priority: Medium     Added automatically from request for surgery [id number]        "Abnormal uterine bleeding 11/18/2020     Priority: Medium     Added automatically from request for surgery [id number]       Endometriosis 10/28/2020     Priority: Medium     Pelvic pain in female 02/02/2012     Priority: Medium     Dysmenorrhea 02/02/2012     Priority: Medium      Past Medical History:   Diagnosis Date     Dysmenorrhea      Endometriosis      Past Surgical History:   Procedure Laterality Date     APPENDECTOMY  7/13/2011     BREAST SURGERY  2000    Breast reduction     COLONOSCOPY  9/2011     GYN SURGERY  1/2012    Colposcopy exam      SHOULDER SURGERY Right      Current Outpatient Medications   Medication Sig Dispense Refill     etonogestrel-ethinyl estradiol (NUVARING) 0.12-0.015 MG/24HR vaginal ring Place 1 each vaginally every 28 days.       triamcinolone (ARISTOCORT HP) 0.5 % external cream Apply topically 2 times daily 15 g 1     triamcinolone (KENALOG) 0.1 % external ointment Apply topically 2 times daily (Patient not taking: Reported on 1/8/2021) 30 g 1       No Known Allergies     Social History     Tobacco Use     Smoking status: Former Smoker     Packs/day: 0.00     Smokeless tobacco: Never Used   Substance Use Topics     Alcohol use: Yes     Comment: occassional     Family History   Problem Relation Age of Onset     Arthritis Mother      Heart Disease Father      Gallbladder Disease Father      Arthritis Maternal Grandmother      Unknown/Adopted Maternal Grandmother      C.A.D. Paternal Grandmother      Ovarian Cancer Paternal Grandmother      Cancer Paternal Grandmother      C.A.D. Paternal Grandfather      Cervical Cancer Cousin      Cervical Cancer Paternal Aunt      History   Drug Use No         Objective     /80   Pulse 82   Temp 98.2  F (36.8  C)   Resp 18   Ht 1.6 m (5' 3\")   Wt 93.9 kg (207 lb)   LMP 01/06/2021   SpO2 98%   BMI 36.67 kg/m      Physical Exam    GENERAL APPEARANCE: healthy, alert and no distress     EYES: EOMI, PERRL     HENT: ear canals and TM's normal " and nose and mouth without ulcers or lesions     NECK: no adenopathy, no asymmetry, masses, or scars and thyroid normal to palpation     RESP: lungs clear to auscultation - no rales, rhonchi or wheezes     CV: regular rates and rhythm, normal S1 S2, no S3 or S4 and no murmur, click or rub     ABDOMEN:  soft, nontender, no HSM or masses and bowel sounds normal     MS: extremities normal- no gross deformities noted, no evidence of inflammation in joints, FROM in all extremities.     SKIN: no suspicious lesions or rashes     NEURO: Normal strength and tone, sensory exam grossly normal, mentation intact and speech normal     PSYCH: mentation appears normal. and affect normal/bright     LYMPHATICS: No cervical adenopathy    Recent Labs   Lab Test 03/16/20  0925      POTASSIUM 3.9   CR 0.66        Diagnostics:  No labs were ordered during this visit.   No EKG required for low risk surgery (cataract, skin procedure, breast biopsy, etc).    Revised Cardiac Risk Index (RCRI):  The patient has the following serious cardiovascular risks for perioperative complications:   - No serious cardiac risks = 0 points     RCRI Interpretation: 0 points: Class I (very low risk - 0.4% complication rate)           Assessment & Plan   The proposed surgical procedure is considered LOW risk.    Pre-operative examination      Dysmenorrhea      Abnormal uterine bleeding          Risks and Recommendations:  The patient has the following additional risks and recommendations for perioperative complications:   - No identified additional risk factors other than previously addressed    Medication Instructions:  Patient is on no chronic medications    RECOMMENDATION:  APPROVAL GIVEN to proceed with proposed procedure, without further diagnostic evaluation.    Signed Electronically by: TRUMAN Aguila CNP    Copy of this evaluation report is provided to requesting physician.    [facility]    [facility]  Guidelines    Revised Cardiac Risk Index

## 2021-01-08 NOTE — ANESTHESIA PREPROCEDURE EVALUATION
Anesthesia Pre-Procedure Evaluation    Patient: Lin Corona   MRN: [id number] : 1980          Preoperative Diagnosis: Chronic pelvic pain in female [R10.2, G89.29]  Abnormal uterine bleeding [N93.9]    Procedure(s):  HYSTERECTOMY, TOTAL, LAPAROSCOPIC, WITH SALPINGO-OOPHORECTOMY  CYSTOSCOPY    Past Medical History:   Diagnosis Date     Dysmenorrhea      Endometriosis      Past Surgical History:   Procedure Laterality Date     APPENDECTOMY  2011     BREAST SURGERY  2000    Breast reduction     COLONOSCOPY  2011     GYN SURGERY  2012    Colposcopy exam      SHOULDER SURGERY Right        Anesthesia Evaluation     . Pt has had prior anesthetic. Type: General and MAC           ROS/MED HX    ENT/Pulmonary:     (+)tobacco use, Past use , . .    Neurologic:  - neg neurologic ROS     Cardiovascular:  - neg cardiovascular ROS       METS/Exercise Tolerance:  >4 METS   Hematologic:         Musculoskeletal:  - neg musculoskeletal ROS       GI/Hepatic:  - neg GI/hepatic ROS       Renal/Genitourinary:  - ROS Renal section negative       Endo:     (+) Obesity, Other Endocrine Disorder Endometriosis.      Psychiatric:  - neg psychiatric ROS       Infectious Disease:         Malignancy:      - no malignancy   Other:                          Physical Exam  Normal systems: cardiovascular, pulmonary and dental    Airway   Mallampati: I  TM distance: >3 FB  Neck ROM: full    Dental     Cardiovascular       Pulmonary             Lab Results   Component Value Date    WBC 5.8 2012    HGB 13.9 2012    HCT 41.4 2012     2012     2020    POTASSIUM 3.9 2020    CHLORIDE 109 2020    CO2 20 2020    BUN 16 2020    CR 0.66 2020    GLC 96 2020    JOLYNN 9.2 2020    ALBUMIN 4.2 2012    PROTTOTAL 7.4 2012    ALT 11 2012    AST 22 2012    ALKPHOS 44 2012    BILITOTAL 0.6 2012    LIPASE 28 2012    TSH 1.93  "03/16/2020    HCG Negative 01/25/2012       Preop Vitals  BP Readings from Last 3 Encounters:   10/28/20 123/84   09/29/20 112/80   03/16/20 120/60    Pulse Readings from Last 3 Encounters:   10/28/20 72   09/29/20 71   03/16/20 74      Resp Readings from Last 3 Encounters:   10/28/20 14   09/29/20 16   03/16/20 16    SpO2 Readings from Last 3 Encounters:   09/29/20 98%   03/16/20 98%   02/17/20 98%      Temp Readings from Last 1 Encounters:   10/28/20 37.3  C (99.2  F) (Tympanic)    Ht Readings from Last 1 Encounters:   10/28/20 1.626 m (5' 4\")      Wt Readings from Last 1 Encounters:   10/28/20 90.8 kg (200 lb 3.2 oz)    Estimated body mass index is 34.36 kg/m  as calculated from the following:    Height as of 10/28/20: 1.626 m (5' 4\").    Weight as of 10/28/20: 90.8 kg (200 lb 3.2 oz).       Anesthesia Plan      History & Physical Review  History and physical reviewed and following examination; no interval change.    ASA Status:  2 .    NPO Status:  > 8 hours    Plan for General with Intravenous induction. Maintenance will be Inhalation.    PONV prophylaxis:  Ondansetron (or other 5HT-3) and Dexamethasone or Solumedrol  Additional equipment: Videolaryngoscope        Postoperative Care  Postoperative pain management:  Multi-modal analgesia.      Consents  Anesthetic plan, risks, benefits and alternatives discussed with:  Patient.  Use of blood products discussed: Yes.   Use of blood products discussed with Patient.  Consented to blood products.  .                 Mandie Garcia, APRN CRNA  "

## 2021-01-08 NOTE — PATIENT INSTRUCTIONS

## 2021-01-09 LAB
SARS-COV-2 RNA RESP QL NAA+PROBE: NOT DETECTED
SPECIMEN SOURCE: NORMAL

## 2021-01-11 ENCOUNTER — HOSPITAL ENCOUNTER (OUTPATIENT)
Facility: CLINIC | Age: 41
Discharge: HOME | End: 2021-01-11
Attending: OBSTETRICS & GYNECOLOGY | Admitting: OBSTETRICS & GYNECOLOGY
Payer: COMMERCIAL

## 2021-01-11 ENCOUNTER — ANESTHESIA (OUTPATIENT)
Dept: SURGERY | Facility: CLINIC | Age: 41
End: 2021-01-11
Payer: COMMERCIAL

## 2021-01-11 VITALS
WEIGHT: 207 LBS | HEART RATE: 64 BPM | RESPIRATION RATE: 12 BRPM | HEIGHT: 63 IN | OXYGEN SATURATION: 95 % | SYSTOLIC BLOOD PRESSURE: 108 MMHG | TEMPERATURE: 97.9 F | DIASTOLIC BLOOD PRESSURE: 64 MMHG | BODY MASS INDEX: 36.68 KG/M2

## 2021-01-11 DIAGNOSIS — N93.9 ABNORMAL UTERINE BLEEDING: ICD-10-CM

## 2021-01-11 DIAGNOSIS — Z90.710 S/P LAPAROSCOPIC HYSTERECTOMY: Primary | ICD-10-CM

## 2021-01-11 DIAGNOSIS — R10.2 CHRONIC PELVIC PAIN IN FEMALE: ICD-10-CM

## 2021-01-11 DIAGNOSIS — G89.29 CHRONIC PELVIC PAIN IN FEMALE: ICD-10-CM

## 2021-01-11 DIAGNOSIS — Z90.722 S/P BSO (BILATERAL SALPINGO-OOPHORECTOMY): ICD-10-CM

## 2021-01-11 LAB
ABO + RH BLD: NORMAL
ABO + RH BLD: NORMAL
BLD GP AB SCN SERPL QL: NORMAL
BLOOD BANK CMNT PATIENT-IMP: NORMAL
ERYTHROCYTE [DISTWIDTH] IN BLOOD BY AUTOMATED COUNT: 12.2 % (ref 10–15)
HCG UR QL: NEGATIVE
HCT VFR BLD AUTO: 39.1 % (ref 35–47)
HGB BLD-MCNC: 13 G/DL (ref 11.7–15.7)
MCH RBC QN AUTO: 31.6 PG (ref 26.5–33)
MCHC RBC AUTO-ENTMCNC: 33.2 G/DL (ref 31.5–36.5)
MCV RBC AUTO: 95 FL (ref 78–100)
PLATELET # BLD AUTO: 233 10E9/L (ref 150–450)
RBC # BLD AUTO: 4.12 10E12/L (ref 3.8–5.2)
SPECIMEN EXP DATE BLD: NORMAL
WBC # BLD AUTO: 7.8 10E9/L (ref 4–11)

## 2021-01-11 PROCEDURE — 250N000013 HC RX MED GY IP 250 OP 250 PS 637: Performed by: OBSTETRICS & GYNECOLOGY

## 2021-01-11 PROCEDURE — 85027 COMPLETE CBC AUTOMATED: CPT | Performed by: OBSTETRICS & GYNECOLOGY

## 2021-01-11 PROCEDURE — 258N000003 HC RX IP 258 OP 636: Performed by: NURSE ANESTHETIST, CERTIFIED REGISTERED

## 2021-01-11 PROCEDURE — 88307 TISSUE EXAM BY PATHOLOGIST: CPT | Mod: TC | Performed by: OBSTETRICS & GYNECOLOGY

## 2021-01-11 PROCEDURE — 81025 URINE PREGNANCY TEST: CPT | Performed by: OBSTETRICS & GYNECOLOGY

## 2021-01-11 PROCEDURE — 250N000009 HC RX 250: Performed by: NURSE ANESTHETIST, CERTIFIED REGISTERED

## 2021-01-11 PROCEDURE — 86850 RBC ANTIBODY SCREEN: CPT | Performed by: OBSTETRICS & GYNECOLOGY

## 2021-01-11 PROCEDURE — 250N000025 HC SEVOFLURANE, PER MIN: Performed by: OBSTETRICS & GYNECOLOGY

## 2021-01-11 PROCEDURE — 710N000012 HC RECOVERY PHASE 2, PER MINUTE: Performed by: OBSTETRICS & GYNECOLOGY

## 2021-01-11 PROCEDURE — 258N000003 HC RX IP 258 OP 636: Performed by: OBSTETRICS & GYNECOLOGY

## 2021-01-11 PROCEDURE — 272N000002 HC OR SUPPLY OTHER OPNP: Performed by: OBSTETRICS & GYNECOLOGY

## 2021-01-11 PROCEDURE — 272N000001 HC OR GENERAL SUPPLY STERILE: Performed by: OBSTETRICS & GYNECOLOGY

## 2021-01-11 PROCEDURE — 999N000141 HC STATISTIC PRE-PROCEDURE NURSING ASSESSMENT: Performed by: OBSTETRICS & GYNECOLOGY

## 2021-01-11 PROCEDURE — 250N000011 HC RX IP 250 OP 636: Performed by: OBSTETRICS & GYNECOLOGY

## 2021-01-11 PROCEDURE — 710N000009 HC RECOVERY PHASE 1, LEVEL 1, PER MIN: Performed by: OBSTETRICS & GYNECOLOGY

## 2021-01-11 PROCEDURE — 250N000009 HC RX 250: Performed by: REGISTERED NURSE

## 2021-01-11 PROCEDURE — 88307 TISSUE EXAM BY PATHOLOGIST: CPT | Mod: 26 | Performed by: PATHOLOGY

## 2021-01-11 PROCEDURE — 370N000017 HC ANESTHESIA TECHNICAL FEE, PER MIN: Performed by: OBSTETRICS & GYNECOLOGY

## 2021-01-11 PROCEDURE — 58571 TLH W/T/O 250 G OR LESS: CPT | Mod: 80 | Performed by: OBSTETRICS & GYNECOLOGY

## 2021-01-11 PROCEDURE — 250N000011 HC RX IP 250 OP 636: Performed by: REGISTERED NURSE

## 2021-01-11 PROCEDURE — 36415 COLL VENOUS BLD VENIPUNCTURE: CPT | Performed by: OBSTETRICS & GYNECOLOGY

## 2021-01-11 PROCEDURE — 250N000013 HC RX MED GY IP 250 OP 250 PS 637: Performed by: NURSE ANESTHETIST, CERTIFIED REGISTERED

## 2021-01-11 PROCEDURE — 86901 BLOOD TYPING SEROLOGIC RH(D): CPT | Performed by: OBSTETRICS & GYNECOLOGY

## 2021-01-11 PROCEDURE — 58571 TLH W/T/O 250 G OR LESS: CPT | Performed by: OBSTETRICS & GYNECOLOGY

## 2021-01-11 PROCEDURE — 360N000077 HC SURGERY LEVEL 4, PER MIN: Performed by: OBSTETRICS & GYNECOLOGY

## 2021-01-11 PROCEDURE — 86900 BLOOD TYPING SEROLOGIC ABO: CPT | Performed by: OBSTETRICS & GYNECOLOGY

## 2021-01-11 PROCEDURE — 271N000001 HC OR GENERAL SUPPLY NON-STERILE: Performed by: OBSTETRICS & GYNECOLOGY

## 2021-01-11 RX ORDER — ACETAMINOPHEN 325 MG/1
975 TABLET ORAL ONCE
Status: DISCONTINUED | OUTPATIENT
Start: 2021-01-11 | End: 2021-01-11 | Stop reason: HOSPADM

## 2021-01-11 RX ORDER — HYDROMORPHONE HYDROCHLORIDE 1 MG/ML
.3-.5 INJECTION, SOLUTION INTRAMUSCULAR; INTRAVENOUS; SUBCUTANEOUS EVERY 10 MIN PRN
Status: DISCONTINUED | OUTPATIENT
Start: 2021-01-11 | End: 2021-01-11 | Stop reason: HOSPADM

## 2021-01-11 RX ORDER — HYDRALAZINE HYDROCHLORIDE 20 MG/ML
2.5-5 INJECTION INTRAMUSCULAR; INTRAVENOUS EVERY 10 MIN PRN
Status: DISCONTINUED | OUTPATIENT
Start: 2021-01-11 | End: 2021-01-11 | Stop reason: HOSPADM

## 2021-01-11 RX ORDER — LIDOCAINE HYDROCHLORIDE 10 MG/ML
INJECTION, SOLUTION INFILTRATION; PERINEURAL PRN
Status: DISCONTINUED | OUTPATIENT
Start: 2021-01-11 | End: 2021-01-11

## 2021-01-11 RX ORDER — CEFAZOLIN SODIUM 1 G/50ML
1 INJECTION, SOLUTION INTRAVENOUS SEE ADMIN INSTRUCTIONS
Status: DISCONTINUED | OUTPATIENT
Start: 2021-01-11 | End: 2021-01-11 | Stop reason: HOSPADM

## 2021-01-11 RX ORDER — NORETHINDRONE ACETATE AND ETHINYL ESTRADIOL 1MG-20(21)
1 KIT ORAL DAILY
Qty: 84 TABLET | Refills: 3 | Status: SHIPPED | OUTPATIENT
Start: 2021-01-11 | End: 2021-02-23

## 2021-01-11 RX ORDER — KETAMINE HYDROCHLORIDE 10 MG/ML
INJECTION, SOLUTION INTRAMUSCULAR; INTRAVENOUS PRN
Status: DISCONTINUED | OUTPATIENT
Start: 2021-01-11 | End: 2021-01-11

## 2021-01-11 RX ORDER — ALBUTEROL SULFATE 0.83 MG/ML
2.5 SOLUTION RESPIRATORY (INHALATION) EVERY 4 HOURS PRN
Status: DISCONTINUED | OUTPATIENT
Start: 2021-01-11 | End: 2021-01-11 | Stop reason: HOSPADM

## 2021-01-11 RX ORDER — ONDANSETRON 4 MG/1
4 TABLET, ORALLY DISINTEGRATING ORAL EVERY 6 HOURS PRN
Qty: 10 TABLET | Refills: 0 | Status: SHIPPED | OUTPATIENT
Start: 2021-01-11 | End: 2021-04-22

## 2021-01-11 RX ORDER — OXYCODONE HYDROCHLORIDE 5 MG/1
5 TABLET ORAL EVERY 4 HOURS PRN
Qty: 15 TABLET | Refills: 0 | Status: SHIPPED | OUTPATIENT
Start: 2021-01-11 | End: 2021-04-22

## 2021-01-11 RX ORDER — ACETAMINOPHEN 325 MG/1
975 TABLET ORAL ONCE
Status: COMPLETED | OUTPATIENT
Start: 2021-01-11 | End: 2021-01-11

## 2021-01-11 RX ORDER — NALOXONE HYDROCHLORIDE 0.4 MG/ML
0.2 INJECTION, SOLUTION INTRAMUSCULAR; INTRAVENOUS; SUBCUTANEOUS
Status: DISCONTINUED | OUTPATIENT
Start: 2021-01-11 | End: 2021-01-11 | Stop reason: HOSPADM

## 2021-01-11 RX ORDER — GABAPENTIN 300 MG/1
300 CAPSULE ORAL ONCE
Status: COMPLETED | OUTPATIENT
Start: 2021-01-11 | End: 2021-01-11

## 2021-01-11 RX ORDER — ONDANSETRON 4 MG/1
4 TABLET, ORALLY DISINTEGRATING ORAL EVERY 30 MIN PRN
Status: DISCONTINUED | OUTPATIENT
Start: 2021-01-11 | End: 2021-01-11 | Stop reason: HOSPADM

## 2021-01-11 RX ORDER — BUPIVACAINE HYDROCHLORIDE 2.5 MG/ML
INJECTION, SOLUTION INFILTRATION; PERINEURAL PRN
Status: DISCONTINUED | OUTPATIENT
Start: 2021-01-11 | End: 2021-01-11 | Stop reason: HOSPADM

## 2021-01-11 RX ORDER — OXYCODONE HCL 5 MG/5 ML
5 SOLUTION, ORAL ORAL EVERY 4 HOURS PRN
Status: DISCONTINUED | OUTPATIENT
Start: 2021-01-11 | End: 2021-01-11 | Stop reason: HOSPADM

## 2021-01-11 RX ORDER — ONDANSETRON 2 MG/ML
INJECTION INTRAMUSCULAR; INTRAVENOUS PRN
Status: DISCONTINUED | OUTPATIENT
Start: 2021-01-11 | End: 2021-01-11

## 2021-01-11 RX ORDER — IBUPROFEN 600 MG/1
600 TABLET, FILM COATED ORAL
Status: DISCONTINUED | OUTPATIENT
Start: 2021-01-11 | End: 2021-01-11 | Stop reason: HOSPADM

## 2021-01-11 RX ORDER — LIDOCAINE 40 MG/G
CREAM TOPICAL
Status: DISCONTINUED | OUTPATIENT
Start: 2021-01-11 | End: 2021-01-11 | Stop reason: HOSPADM

## 2021-01-11 RX ORDER — SODIUM CHLORIDE, SODIUM LACTATE, POTASSIUM CHLORIDE, CALCIUM CHLORIDE 600; 310; 30; 20 MG/100ML; MG/100ML; MG/100ML; MG/100ML
INJECTION, SOLUTION INTRAVENOUS CONTINUOUS
Status: DISCONTINUED | OUTPATIENT
Start: 2021-01-11 | End: 2021-01-11 | Stop reason: HOSPADM

## 2021-01-11 RX ORDER — NALOXONE HYDROCHLORIDE 0.4 MG/ML
0.4 INJECTION, SOLUTION INTRAMUSCULAR; INTRAVENOUS; SUBCUTANEOUS
Status: DISCONTINUED | OUTPATIENT
Start: 2021-01-11 | End: 2021-01-11 | Stop reason: HOSPADM

## 2021-01-11 RX ORDER — METOCLOPRAMIDE HYDROCHLORIDE 5 MG/ML
10 INJECTION INTRAMUSCULAR; INTRAVENOUS EVERY 6 HOURS PRN
Status: DISCONTINUED | OUTPATIENT
Start: 2021-01-11 | End: 2021-01-11 | Stop reason: HOSPADM

## 2021-01-11 RX ORDER — METOPROLOL TARTRATE 1 MG/ML
1-2 INJECTION, SOLUTION INTRAVENOUS EVERY 5 MIN PRN
Status: DISCONTINUED | OUTPATIENT
Start: 2021-01-11 | End: 2021-01-11 | Stop reason: HOSPADM

## 2021-01-11 RX ORDER — OXYCODONE HYDROCHLORIDE 5 MG/1
5 TABLET ORAL
Status: DISCONTINUED | OUTPATIENT
Start: 2021-01-11 | End: 2021-01-11 | Stop reason: HOSPADM

## 2021-01-11 RX ORDER — KETOROLAC TROMETHAMINE 30 MG/ML
INJECTION, SOLUTION INTRAMUSCULAR; INTRAVENOUS PRN
Status: DISCONTINUED | OUTPATIENT
Start: 2021-01-11 | End: 2021-01-11

## 2021-01-11 RX ORDER — FENTANYL CITRATE 50 UG/ML
25-50 INJECTION, SOLUTION INTRAMUSCULAR; INTRAVENOUS
Status: DISCONTINUED | OUTPATIENT
Start: 2021-01-11 | End: 2021-01-11 | Stop reason: HOSPADM

## 2021-01-11 RX ORDER — METOCLOPRAMIDE 10 MG/1
10 TABLET ORAL EVERY 6 HOURS PRN
Status: DISCONTINUED | OUTPATIENT
Start: 2021-01-11 | End: 2021-01-11 | Stop reason: HOSPADM

## 2021-01-11 RX ORDER — DOCUSATE SODIUM 100 MG/1
100 CAPSULE, LIQUID FILLED ORAL 2 TIMES DAILY PRN
Qty: 30 CAPSULE | Refills: 0 | Status: SHIPPED | OUTPATIENT
Start: 2021-01-11 | End: 2021-02-23

## 2021-01-11 RX ORDER — CEFAZOLIN SODIUM 2 G/100ML
2 INJECTION, SOLUTION INTRAVENOUS
Status: COMPLETED | OUTPATIENT
Start: 2021-01-11 | End: 2021-01-11

## 2021-01-11 RX ORDER — FENTANYL CITRATE 50 UG/ML
INJECTION, SOLUTION INTRAMUSCULAR; INTRAVENOUS PRN
Status: DISCONTINUED | OUTPATIENT
Start: 2021-01-11 | End: 2021-01-11

## 2021-01-11 RX ORDER — PHENAZOPYRIDINE HYDROCHLORIDE 200 MG/1
200 TABLET, FILM COATED ORAL ONCE
Status: COMPLETED | OUTPATIENT
Start: 2021-01-11 | End: 2021-01-11

## 2021-01-11 RX ORDER — IBUPROFEN 600 MG/1
600 TABLET, FILM COATED ORAL EVERY 6 HOURS PRN
Qty: 30 TABLET | Refills: 0 | Status: SHIPPED | OUTPATIENT
Start: 2021-01-11 | End: 2021-08-27

## 2021-01-11 RX ORDER — ACETAMINOPHEN 325 MG/1
975 TABLET ORAL ONCE
Status: DISCONTINUED | OUTPATIENT
Start: 2021-01-11 | End: 2021-01-11

## 2021-01-11 RX ORDER — PROPOFOL 10 MG/ML
INJECTION, EMULSION INTRAVENOUS PRN
Status: DISCONTINUED | OUTPATIENT
Start: 2021-01-11 | End: 2021-01-11

## 2021-01-11 RX ORDER — MEPERIDINE HYDROCHLORIDE 25 MG/ML
12.5 INJECTION INTRAMUSCULAR; INTRAVENOUS; SUBCUTANEOUS
Status: COMPLETED | OUTPATIENT
Start: 2021-01-11 | End: 2021-01-11

## 2021-01-11 RX ORDER — ONDANSETRON 2 MG/ML
4 INJECTION INTRAMUSCULAR; INTRAVENOUS EVERY 30 MIN PRN
Status: DISCONTINUED | OUTPATIENT
Start: 2021-01-11 | End: 2021-01-11 | Stop reason: HOSPADM

## 2021-01-11 RX ORDER — DEXAMETHASONE SODIUM PHOSPHATE 4 MG/ML
INJECTION, SOLUTION INTRA-ARTICULAR; INTRALESIONAL; INTRAMUSCULAR; INTRAVENOUS; SOFT TISSUE PRN
Status: DISCONTINUED | OUTPATIENT
Start: 2021-01-11 | End: 2021-01-11

## 2021-01-11 RX ADMIN — CEFAZOLIN SODIUM 2 G: 2 INJECTION, SOLUTION INTRAVENOUS at 07:47

## 2021-01-11 RX ADMIN — SODIUM CHLORIDE, POTASSIUM CHLORIDE, SODIUM LACTATE AND CALCIUM CHLORIDE: 600; 310; 30; 20 INJECTION, SOLUTION INTRAVENOUS at 06:56

## 2021-01-11 RX ADMIN — FENTANYL CITRATE 50 MCG: 50 INJECTION, SOLUTION INTRAMUSCULAR; INTRAVENOUS at 10:16

## 2021-01-11 RX ADMIN — MEPERIDINE HYDROCHLORIDE 12.5 MG: 25 INJECTION INTRAMUSCULAR; INTRAVENOUS; SUBCUTANEOUS at 10:55

## 2021-01-11 RX ADMIN — ACETAMINOPHEN 975 MG: 325 TABLET, FILM COATED ORAL at 06:38

## 2021-01-11 RX ADMIN — FENTANYL CITRATE 100 MCG: 50 INJECTION, SOLUTION INTRAMUSCULAR; INTRAVENOUS at 07:38

## 2021-01-11 RX ADMIN — FENTANYL CITRATE 50 MCG: 50 INJECTION, SOLUTION INTRAMUSCULAR; INTRAVENOUS at 09:36

## 2021-01-11 RX ADMIN — METRONIDAZOLE 500 MG: 500 INJECTION, SOLUTION INTRAVENOUS at 07:35

## 2021-01-11 RX ADMIN — KETOROLAC TROMETHAMINE 30 MG: 30 INJECTION, SOLUTION INTRAMUSCULAR at 09:40

## 2021-01-11 RX ADMIN — ROCURONIUM BROMIDE 20 MG: 10 INJECTION INTRAVENOUS at 08:39

## 2021-01-11 RX ADMIN — SODIUM CHLORIDE, POTASSIUM CHLORIDE, SODIUM LACTATE AND CALCIUM CHLORIDE: 600; 310; 30; 20 INJECTION, SOLUTION INTRAVENOUS at 11:10

## 2021-01-11 RX ADMIN — ONDANSETRON 4 MG: 2 INJECTION INTRAMUSCULAR; INTRAVENOUS at 09:20

## 2021-01-11 RX ADMIN — PROPOFOL 170 MG: 10 INJECTION, EMULSION INTRAVENOUS at 07:38

## 2021-01-11 RX ADMIN — FENTANYL CITRATE 50 MCG: 50 INJECTION, SOLUTION INTRAMUSCULAR; INTRAVENOUS at 09:26

## 2021-01-11 RX ADMIN — SUGAMMADEX 200 MG: 100 INJECTION, SOLUTION INTRAVENOUS at 09:42

## 2021-01-11 RX ADMIN — MIDAZOLAM 2 MG: 1 INJECTION INTRAMUSCULAR; INTRAVENOUS at 07:37

## 2021-01-11 RX ADMIN — LIDOCAINE HYDROCHLORIDE 50 MG: 10 INJECTION, SOLUTION INFILTRATION; PERINEURAL at 07:38

## 2021-01-11 RX ADMIN — LIDOCAINE HYDROCHLORIDE 0.1 ML: 10 INJECTION, SOLUTION EPIDURAL; INFILTRATION; INTRACAUDAL; PERINEURAL at 06:56

## 2021-01-11 RX ADMIN — GABAPENTIN 300 MG: 300 CAPSULE ORAL at 06:38

## 2021-01-11 RX ADMIN — FENTANYL CITRATE 100 MCG: 50 INJECTION, SOLUTION INTRAMUSCULAR; INTRAVENOUS at 08:07

## 2021-01-11 RX ADMIN — SODIUM CHLORIDE, POTASSIUM CHLORIDE, SODIUM LACTATE AND CALCIUM CHLORIDE: 600; 310; 30; 20 INJECTION, SOLUTION INTRAVENOUS at 07:29

## 2021-01-11 RX ADMIN — KETAMINE HYDROCHLORIDE 10 MG: 10 INJECTION INTRAMUSCULAR; INTRAVENOUS at 08:07

## 2021-01-11 RX ADMIN — FENTANYL CITRATE 50 MCG: 50 INJECTION, SOLUTION INTRAMUSCULAR; INTRAVENOUS at 10:11

## 2021-01-11 RX ADMIN — ROCURONIUM BROMIDE 50 MG: 10 INJECTION INTRAVENOUS at 07:38

## 2021-01-11 RX ADMIN — ROCURONIUM BROMIDE 10 MG: 10 INJECTION INTRAVENOUS at 09:07

## 2021-01-11 RX ADMIN — OXYCODONE HYDROCHLORIDE 5 MG: 5 SOLUTION ORAL at 11:02

## 2021-01-11 RX ADMIN — PHENAZOPYRIDINE HYDROCHLORIDE 200 MG: 200 TABLET ORAL at 06:38

## 2021-01-11 RX ADMIN — PROCHLORPERAZINE EDISYLATE 10 MG: 5 INJECTION INTRAMUSCULAR; INTRAVENOUS at 10:14

## 2021-01-11 RX ADMIN — FENTANYL CITRATE 50 MCG: 50 INJECTION, SOLUTION INTRAMUSCULAR; INTRAVENOUS at 08:01

## 2021-01-11 RX ADMIN — KETAMINE HYDROCHLORIDE 30 MG: 10 INJECTION INTRAMUSCULAR; INTRAVENOUS at 07:38

## 2021-01-11 RX ADMIN — MEPERIDINE HYDROCHLORIDE 12.5 MG: 25 INJECTION INTRAMUSCULAR; INTRAVENOUS; SUBCUTANEOUS at 10:40

## 2021-01-11 RX ADMIN — DEXAMETHASONE SODIUM PHOSPHATE 8 MG: 4 INJECTION, SOLUTION INTRA-ARTICULAR; INTRALESIONAL; INTRAMUSCULAR; INTRAVENOUS; SOFT TISSUE at 07:38

## 2021-01-11 RX ADMIN — KETAMINE HYDROCHLORIDE 10 MG: 10 INJECTION INTRAMUSCULAR; INTRAVENOUS at 07:56

## 2021-01-11 ASSESSMENT — MIFFLIN-ST. JEOR: SCORE: 1578.08

## 2021-01-11 NOTE — DISCHARGE INSTRUCTIONS
Same Day Surgery Discharge Instructions  Special Precautions After Surgery - Adult    1. It is not unusual to feel lightheaded or faint, up to 24 hours after surgery or while taking pain medication.  If you have these symptoms; sit for a few minutes before standing and have someone assist you when getting up.  2. You should rest and relax for the next 24 hours and must have someone stay with you for at least 24 hours after your discharge.  3. DO NOT DRIVE any vehicle or operate mechanical equipment for 24 hours following the end of your surgery.  DO NOT DRIVE while taking narcotic pain medications that have been prescribed by your physician.  If you had a limb operated on, you must be able to use it fully to drive.  4. DO NOT drink alcoholic beverages for 24 hours following surgery or while taking prescription pain medication.  5. Drink clear liquids (apple juice, ginger ale, broth, 7-Up, etc.).  Progress to your regular diet as you feel able.  6. Any questions call your physician and do not make important decisions for 24 hours.      __________________________________________________________________________________________________________________________________  IMPORTANT NUMBERS:    [facility] Main Number:  [phone], [phone]  Pharmacy:  [phone]  Same Day Surgery:  [phone], Monday - Friday until 8:30 p.m.   Clinic:  [phone]

## 2021-01-11 NOTE — ANESTHESIA POSTPROCEDURE EVALUATION
Patient: Lin Corona    Procedure(s):  HYSTERECTOMY, TOTAL, LAPAROSCOPIC, WITH BILATERAL SALPINGO-OOPHORECTOMY  CYSTOSCOPY    Diagnosis:Chronic pelvic pain in female [R10.2, G89.29]  Abnormal uterine bleeding [N93.9]  Diagnosis Additional Information: No value filed.    Anesthesia Type:  General    Note:  Anesthesia Post Evaluation    Patient location during evaluation: Phase 2  Patient participation: Able to fully participate in evaluation  Level of consciousness: awake and alert  Pain management: adequate  Airway patency: patent  Cardiovascular status: acceptable and hemodynamically stable  Respiratory status: acceptable  Hydration status: acceptable  PONV: none     Anesthetic complications: None          Last vitals:  Vitals:    01/11/21 1210 01/11/21 1219 01/11/21 1230   BP: 115/70  113/72   Pulse:   61   Resp: 16  14   Temp:      SpO2: 97% 95% 93%         Electronically Signed By: TRUMAN James CRNA  January 11, 2021  1:02 PM

## 2021-01-11 NOTE — ANESTHESIA CARE TRANSFER NOTE
Patient: Lin Corona    Procedure(s):  HYSTERECTOMY, TOTAL, LAPAROSCOPIC, WITH BILATERAL SALPINGO-OOPHORECTOMY  CYSTOSCOPY    Diagnosis: Chronic pelvic pain in female [R10.2, G89.29]  Abnormal uterine bleeding [N93.9]  Diagnosis Additional Information: No value filed.    Anesthesia Type:   General     Note:  Airway :Face Mask  Patient transferred to:PACU  Handoff Report: Identifed the Patient, Identified the Reponsible Provider, Reviewed the pertinent medical history, Discussed the surgical course, Reviewed Intra-OP anesthesia mangement and issues during anesthesia, Set expectations for post-procedure period and Allowed opportunity for questions and acknowledgement of understanding      Vitals: (Last set prior to Anesthesia Care Transfer)    CRNA VITALS  1/11/2021 0922 - 1/11/2021 1002      1/11/2021             EKG:  NSR                Electronically Signed By: Nathaly Kowalski  January 11, 2021  10:02 AM

## 2021-01-11 NOTE — ANESTHESIA PROCEDURE NOTES
Airway   Date/Time: 1/11/2021 7:40 AM   Patient location during procedure: OR    Staff -   CRNA: Maria De Jesus Mederos APRN CRNA  Other Anesthesia Staff: Nathaly Kowalski  Performed By: EMELIA    Consent for Airway   Urgency: elective    Indications and Patient Condition  Indications for airway management: delmar-procedural  Mask difficulty assessment: 1 - vent by mask    Final Airway Details  Final airway type: endotracheal airway  Successful airway:ETT - single  Endotracheal Airway Details   ETT size (mm): 7.0  Cuffed: yes  Successful intubation technique: video laryngoscopy  Grade View of Cords: 1  Adjucts: stylet  Measured from: lips  Secured at (cm): 21  Secured with: cloth tape  Bite block used: None    Post intubation assessment   Placement verified by: capnometry and chest rise   Number of attempts at approach: 1  Number of other approaches attempted: 0  Secured with:cloth tape  Ease of procedure: easy  Dentition: Intact and Unchanged

## 2021-01-11 NOTE — ANESTHESIA CARE TRANSFER NOTE
Patient: Lin Corona    Procedure(s):  HYSTERECTOMY, TOTAL, LAPAROSCOPIC, WITH BILATERAL SALPINGO-OOPHORECTOMY  CYSTOSCOPY    Diagnosis: Chronic pelvic pain in female [R10.2, G89.29]  Abnormal uterine bleeding [N93.9]  Diagnosis Additional Information: No value filed.    Anesthesia Type:   General     Note:  Airway :Face Mask  Patient transferred to:PACU  Handoff Report: Identifed the Patient, Identified the Reponsible Provider, Reviewed the pertinent medical history, Discussed the surgical course, Reviewed Intra-OP anesthesia mangement and issues during anesthesia, Set expectations for post-procedure period and Allowed opportunity for questions and acknowledgement of understanding      Vitals: (Last set prior to Anesthesia Care Transfer)    CRNA VITALS  1/11/2021 0922 - 1/11/2021 0959      1/11/2021             Pulse:  91    SpO2:  90 %                Electronically Signed By: Nathaly Kowalski  January 11, 2021  9:59 AM

## 2021-01-11 NOTE — BRIEF OP NOTE
[facility]     Brief Operative Note    Pre-operative diagnosis: Chronic pelvic pain in female [R10.2, G89.29]  Abnormal uterine bleeding [N93.9]  Post-operative diagnosis S/p total laparoscopic hysterectomy, bilateral salpingo-oophorectomy, cystoscopy    Procedure: Procedure(s):  HYSTERECTOMY, TOTAL, LAPAROSCOPIC, WITH BILATERAL SALPINGO-OOPHORECTOMY  CYSTOSCOPY  Surgeon: Surgeon(s) and Role:     * Jessica Gallardo DO - Primary     * Vasquez Ford MD - Assisting  Anesthesia: General   Estimated blood loss: 50cc  Drains: None  Specimens:   ID Type Source Tests Collected by Time Destination   A : uterus with cervix, bilateral fallopian tubes and ovaries Organ Uterus with Bilateral Ovaries and Fallopian Tubes SURGICAL PATHOLOGY EXAM Jessica Gallardo DO 1/11/2021  8:19 AM      Findings:   Stage 4 endometriosis with complete obliteration of the posterior cul-du-sac, scaring of the left round ligament interiorly to the bladder, dilated appearing fallopian tubes bilaterally .  Complications: None.  Implants: * No implants in log *

## 2021-01-13 ENCOUNTER — MYC MEDICAL ADVICE (OUTPATIENT)
Dept: OBGYN | Facility: CLINIC | Age: 41
End: 2021-01-13

## 2021-01-14 ENCOUNTER — MYC MEDICAL ADVICE (OUTPATIENT)
Dept: OBGYN | Facility: CLINIC | Age: 41
End: 2021-01-14

## 2021-01-14 DIAGNOSIS — Z90.710 S/P LAPAROSCOPIC HYSTERECTOMY: Primary | ICD-10-CM

## 2021-01-14 RX ORDER — TRAMADOL HYDROCHLORIDE 50 MG/1
50 TABLET ORAL EVERY 6 HOURS PRN
Qty: 12 TABLET | Refills: 0 | Status: SHIPPED | OUTPATIENT
Start: 2021-01-14 | End: 2021-01-17

## 2021-01-14 NOTE — TELEPHONE ENCOUNTER
Please advise of any recommendations you have for the patient.     Mary Alaniz RN on 1/14/2021 at 10:15 AM

## 2021-01-14 NOTE — TELEPHONE ENCOUNTER
Called patient and discussed post operative progress. Patient having difficulty sleeping at night because she is having pain when she moves around. She has not been taking many Oxycodone because it doesn't help much with the pain and makes her feel anxious and agitated. Will switch to Tramadol to see if she gets better pain relief without these side effects. Encouraged to continue Tylenol and Motrin. She has had a decreased appetite but no nausea or vomiting. Is getting up and moving around well. No bowel movement yet, has been taking Colace twice a day. Encouraged to try laxative like Miralax this weekend if still no bowel movement. Has follow up appointment scheduled with me next week.     Jessica Gallardo,

## 2021-01-15 LAB — COPATH REPORT: NORMAL

## 2021-01-22 ENCOUNTER — TELEPHONE (OUTPATIENT)
Dept: OBGYN | Facility: CLINIC | Age: 41
End: 2021-01-22

## 2021-01-22 ENCOUNTER — OFFICE VISIT (OUTPATIENT)
Dept: OBGYN | Facility: CLINIC | Age: 41
End: 2021-01-22
Payer: COMMERCIAL

## 2021-01-22 ENCOUNTER — HOSPITAL ENCOUNTER (OUTPATIENT)
Dept: OCCUPATIONAL THERAPY | Facility: CLINIC | Age: 41
Setting detail: THERAPIES SERIES
End: 2021-01-22
Attending: ORTHOPAEDIC SURGERY
Payer: OTHER MISCELLANEOUS

## 2021-01-22 VITALS
RESPIRATION RATE: 14 BRPM | HEART RATE: 71 BPM | WEIGHT: 204.9 LBS | SYSTOLIC BLOOD PRESSURE: 126 MMHG | BODY MASS INDEX: 34.98 KG/M2 | TEMPERATURE: 98.3 F | DIASTOLIC BLOOD PRESSURE: 80 MMHG | HEIGHT: 64 IN

## 2021-01-22 DIAGNOSIS — Z90.710 S/P LAPAROSCOPIC HYSTERECTOMY: ICD-10-CM

## 2021-01-22 DIAGNOSIS — Z98.890 POSTOPERATIVE STATE: Primary | ICD-10-CM

## 2021-01-22 PROCEDURE — 97035 APP MDLTY 1+ULTRASOUND EA 15: CPT | Mod: GO | Performed by: OCCUPATIONAL THERAPIST

## 2021-01-22 PROCEDURE — 97140 MANUAL THERAPY 1/> REGIONS: CPT | Mod: GO | Performed by: OCCUPATIONAL THERAPIST

## 2021-01-22 PROCEDURE — 97026 INFRARED THERAPY: CPT | Mod: GO | Performed by: OCCUPATIONAL THERAPIST

## 2021-01-22 PROCEDURE — 99213 OFFICE O/P EST LOW 20 MIN: CPT | Performed by: OBSTETRICS & GYNECOLOGY

## 2021-01-22 RX ORDER — CEPHALEXIN 500 MG/1
500 CAPSULE ORAL 2 TIMES DAILY
Qty: 14 CAPSULE | Refills: 0 | Status: SHIPPED | OUTPATIENT
Start: 2021-01-22 | End: 2021-01-29

## 2021-01-22 RX ORDER — METRONIDAZOLE 500 MG/1
500 TABLET ORAL 2 TIMES DAILY
Qty: 14 TABLET | Refills: 0 | Status: SHIPPED | OUTPATIENT
Start: 2021-01-22 | End: 2021-01-29

## 2021-01-22 ASSESSMENT — MIFFLIN-ST. JEOR: SCORE: 1584.42

## 2021-01-22 NOTE — TELEPHONE ENCOUNTER
Paper work rec'd on 1-22-21 was filled out and given to Jessica Gallardo DO to sign.  Then will mail to pt.    -Breanne Dover  [facility] [place]

## 2021-01-22 NOTE — NURSING NOTE
"Initial /80 (BP Location: Right arm, Patient Position: Sitting, Cuff Size: Adult Large)   Pulse 71   Temp 98.3  F (36.8  C) (Tympanic)   Resp 14   Ht 1.626 m (5' 4\")   Wt 92.9 kg (204 lb 14.4 oz)   LMP 01/06/2021   BMI 35.17 kg/m   Estimated body mass index is 35.17 kg/m  as calculated from the following:    Height as of this encounter: 1.626 m (5' 4\").    Weight as of this encounter: 92.9 kg (204 lb 14.4 oz). .      "

## 2021-01-22 NOTE — PROGRESS NOTES
[facility]    Post-Operative Visit    Lin Corona  1980  [id number]    CC: Patient seen today for post-operative evaluation.    SUBJECTIVE:    Lin Corona is a 40 year old female who is s/p TLH, BSO, cystoscopy on 1/11/21. She reports that she is improving post operatively. She has had some issues with discomfort and cramping, mainly when trying to sleep. She normally sleeps on her belly and hasn't been able to do so since surgery. The cramping has been intermittent, sometimes in low back, sometimes in lower abdomen. Denies any fever, chills, vaginal discharge, or dysuria. She has a history of chronic constipation and has had irregular bowel movements since surgery. Just over the past few days has noticed some improvement with Colace and Miralax. She has only had scant pink vaginal discharge. Appetite is improving. No nausea or vomiting. She is ambulating well around the house. No issues with urination.       Past Medical History:   Diagnosis Date     Dysmenorrhea      Endometriosis        Past Surgical History:   Procedure Laterality Date     APPENDECTOMY  7/13/2011     BREAST SURGERY  2000    Breast reduction     COLONOSCOPY  9/2011     CYSTOSCOPY N/A 1/11/2021    Procedure: CYSTOSCOPY;  Surgeon: Jessica Gallardo DO;  Location: [facility] OR     GYN SURGERY  1/2012    Colposcopy exam      LAPAROSCOPIC HYSTERECTOMY TOTAL, BILATERAL SALPINGO-OOPHORECTOMY, COMBINED N/A 1/11/2021    Procedure: HYSTERECTOMY, TOTAL, LAPAROSCOPIC, WITH BILATERAL SALPINGO-OOPHORECTOMY;  Surgeon: Jessica Gallardo DO;  Location: [facility] OR     SHOULDER SURGERY Right        Current Outpatient Medications   Medication Sig Dispense Refill     cephALEXin (KEFLEX) 500 MG capsule Take 1 capsule (500 mg) by mouth 2 times daily for 7 days 14 capsule 0     docusate sodium (COLACE) 100 MG capsule Take 1 capsule (100 mg) by mouth 2 times daily as needed for constipation 30 capsule 0     ibuprofen (ADVIL/MOTRIN) 600 MG  "tablet Take 1 tablet (600 mg) by mouth every 6 hours as needed for pain (mild) 30 tablet 0     metroNIDAZOLE (FLAGYL) 500 MG tablet Take 1 tablet (500 mg) by mouth 2 times daily for 7 days 14 tablet 0     norethindrone-ethinyl estradiol (JUNEL FE 1/20) 1-20 MG-MCG tablet Take 1 tablet by mouth daily 84 tablet 3     triamcinolone (ARISTOCORT HP) 0.5 % external cream Apply topically 2 times daily 15 g 1     ondansetron (ZOFRAN-ODT) 4 MG ODT tab Take 1 tablet (4 mg) by mouth every 6 hours as needed for nausea Dissolve ON the tongue. (Patient not taking: Reported on 1/22/2021) 10 tablet 0     oxyCODONE (ROXICODONE) 5 MG tablet Take 1 tablet (5 mg) by mouth every 4 hours as needed for pain (Moderate to Severe) (Patient not taking: Reported on 1/22/2021) 15 tablet 0     triamcinolone (KENALOG) 0.1 % external ointment Apply topically 2 times daily (Patient not taking: Reported on 1/8/2021) 30 g 1       OBJECTIVE:  /80 (BP Location: Right arm, Patient Position: Sitting, Cuff Size: Adult Large)   Pulse 71   Temp 98.3  F (36.8  C) (Tympanic)   Resp 14   Ht 1.626 m (5' 4\")   Wt 92.9 kg (204 lb 14.4 oz)   LMP 01/06/2021   BMI 35.17 kg/m    Estimated body mass index is 35.17 kg/m  as calculated from the following:    Height as of this encounter: 1.626 m (5' 4\").    Weight as of this encounter: 92.9 kg (204 lb 14.4 oz).    General appearance: well-hydrated, A&O x 3, no apparent distress  Lungs: Equal expansion bilaterally, no accessory muscle use  Heart: No heaves or thrills. No peripheral varicosities  Constitutional: See vitals  Abdomen: Soft, non-tender, non-distended. No rebound, rigidity, or guarding. Incisions healed well, no erythema or induration, resolving ecchymosis.   Extremities: no edema  Neuro: CN II-XII grossly intact  Genitourinary:  External genitalia: no erythema, no lesions.   Urethral meatus appropriate location without lesions or prolapse  Urethra: No masses, tenderness, or scarring  Bladder no " fullness, masses, or tenderness.  Anus and Perineum: Unremarkable, no visible lesions  Vagina: Normal, healthy pink mucosa without any lesions.   Vaginal cuff: probed and intact, small amount of granulation tissue at the midline but no bleeding. No erythema or purulent drainage. Upon palpation, some induration noted     Pathology: Benign cervix, endometrium, and ovaries. Endometriosis noted on fallopian tubes, ovary, and uterine serosa.    ASSESSMENT/PLAN:    Lin was seen today for post-op visit.    Diagnoses and all orders for this visit:    Postoperative state    S/P laparoscopic hysterectomy  -     metroNIDAZOLE (FLAGYL) 500 MG tablet; Take 1 tablet (500 mg) by mouth 2 times daily for 7 days  -     cephALEXin (KEFLEX) 500 MG capsule; Take 1 capsule (500 mg) by mouth 2 times daily for 7 days        40 year old female who is 12 days s/p TLH, BSO, cystoscopy for endometriosis, chronic pelvic pain, and AUB. She is progressing well post operatively. Is still having some non-focal pelvic cramping. This with the finding of induration around the vaginal cuff, will treat for possible evolving cuff cellulitis. Cuff is probed and intact on exam today.     RTC in 4 weeks or sooner if symptoms do not improve.       Jessica Gallardo, DO

## 2021-01-25 NOTE — TELEPHONE ENCOUNTER
Paper work rec'd on 1-22-21 was filled out and mailed as requested.  Sent to be scanned.  Copy put up front.    -Breanne Dover  [facility] [place]

## 2021-01-29 ENCOUNTER — HOSPITAL ENCOUNTER (OUTPATIENT)
Dept: OCCUPATIONAL THERAPY | Facility: CLINIC | Age: 41
Setting detail: THERAPIES SERIES
End: 2021-01-29
Attending: ORTHOPAEDIC SURGERY
Payer: OTHER MISCELLANEOUS

## 2021-01-29 PROCEDURE — 97035 APP MDLTY 1+ULTRASOUND EA 15: CPT | Mod: GO | Performed by: OCCUPATIONAL THERAPIST

## 2021-01-29 PROCEDURE — 97026 INFRARED THERAPY: CPT | Mod: GO | Performed by: OCCUPATIONAL THERAPIST

## 2021-01-29 PROCEDURE — 97140 MANUAL THERAPY 1/> REGIONS: CPT | Mod: GO | Performed by: OCCUPATIONAL THERAPIST

## 2021-02-05 ENCOUNTER — HOSPITAL ENCOUNTER (OUTPATIENT)
Dept: OCCUPATIONAL THERAPY | Facility: CLINIC | Age: 41
Setting detail: THERAPIES SERIES
End: 2021-02-05
Attending: ORTHOPAEDIC SURGERY
Payer: OTHER MISCELLANEOUS

## 2021-02-05 ENCOUNTER — MYC MEDICAL ADVICE (OUTPATIENT)
Dept: OBGYN | Facility: CLINIC | Age: 41
End: 2021-02-05

## 2021-02-05 PROCEDURE — 97026 INFRARED THERAPY: CPT | Mod: GO | Performed by: OCCUPATIONAL THERAPIST

## 2021-02-05 PROCEDURE — 97140 MANUAL THERAPY 1/> REGIONS: CPT | Mod: GO | Performed by: OCCUPATIONAL THERAPIST

## 2021-02-05 PROCEDURE — 97035 APP MDLTY 1+ULTRASOUND EA 15: CPT | Mod: GO | Performed by: OCCUPATIONAL THERAPIST

## 2021-02-05 NOTE — TELEPHONE ENCOUNTER
Please advise. Patient is 3 1/2 weeks post op and still experiencing some mild cramping. Wondering if this is a normal healing process or something to be concerned about.     Thanks,    Katherine Alaniz RN

## 2021-02-12 ENCOUNTER — HOSPITAL ENCOUNTER (OUTPATIENT)
Dept: OCCUPATIONAL THERAPY | Facility: CLINIC | Age: 41
Setting detail: THERAPIES SERIES
End: 2021-02-12
Attending: ORTHOPAEDIC SURGERY
Payer: OTHER MISCELLANEOUS

## 2021-02-12 PROCEDURE — 97035 APP MDLTY 1+ULTRASOUND EA 15: CPT | Mod: GO | Performed by: OCCUPATIONAL THERAPIST

## 2021-02-12 PROCEDURE — 97026 INFRARED THERAPY: CPT | Mod: GO | Performed by: OCCUPATIONAL THERAPIST

## 2021-02-12 PROCEDURE — 97140 MANUAL THERAPY 1/> REGIONS: CPT | Mod: GO | Performed by: OCCUPATIONAL THERAPIST

## 2021-02-15 ENCOUNTER — AMBULATORY - HEALTHEAST (OUTPATIENT)
Dept: OTHER | Facility: CLINIC | Age: 41
End: 2021-02-15

## 2021-02-15 ENCOUNTER — DOCUMENTATION ONLY (OUTPATIENT)
Dept: OTHER | Facility: CLINIC | Age: 41
End: 2021-02-15

## 2021-02-16 ENCOUNTER — OFFICE VISIT (OUTPATIENT)
Dept: FAMILY MEDICINE | Facility: CLINIC | Age: 41
End: 2021-02-16
Payer: COMMERCIAL

## 2021-02-16 VITALS
HEART RATE: 79 BPM | BODY MASS INDEX: 37.74 KG/M2 | DIASTOLIC BLOOD PRESSURE: 80 MMHG | WEIGHT: 213 LBS | HEIGHT: 63 IN | SYSTOLIC BLOOD PRESSURE: 112 MMHG | OXYGEN SATURATION: 98 % | RESPIRATION RATE: 18 BRPM | TEMPERATURE: 98.3 F

## 2021-02-16 DIAGNOSIS — L98.9 BENIGN SKIN LESION: ICD-10-CM

## 2021-02-16 DIAGNOSIS — R20.0 RIGHT ARM NUMBNESS: Primary | ICD-10-CM

## 2021-02-16 PROCEDURE — 99214 OFFICE O/P EST MOD 30 MIN: CPT | Performed by: NURSE PRACTITIONER

## 2021-02-16 ASSESSMENT — MIFFLIN-ST. JEOR: SCORE: 1605.29
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
## 2021-02-16 NOTE — PATIENT INSTRUCTIONS
Follow up with surgeon for right arm concerns.  See dermatology for overall skin assessment.      Our Clinic hours are:  Mondays    7:20 am - 7 pm  Tues -  Fri  7:20 am - 5 pm    Clinic Phone: [phone]    The clinic lab opens at 7:30 am Mon - Fri and appointments are required.    [facility]  Ph. [phone]  Monday  8 am - 7pm  Tues - Fri 8 am - 5:30 pm

## 2021-02-17 ENCOUNTER — HOSPITAL ENCOUNTER (OUTPATIENT)
Dept: OCCUPATIONAL THERAPY | Facility: CLINIC | Age: 41
Setting detail: THERAPIES SERIES
End: 2021-02-17
Attending: ORTHOPAEDIC SURGERY
Payer: OTHER MISCELLANEOUS

## 2021-02-17 PROCEDURE — 97140 MANUAL THERAPY 1/> REGIONS: CPT | Mod: GO | Performed by: OCCUPATIONAL THERAPIST

## 2021-02-17 PROCEDURE — 97026 INFRARED THERAPY: CPT | Mod: GO | Performed by: OCCUPATIONAL THERAPIST

## 2021-02-17 PROCEDURE — 97035 APP MDLTY 1+ULTRASOUND EA 15: CPT | Mod: GO | Performed by: OCCUPATIONAL THERAPIST

## 2021-02-19 ENCOUNTER — OFFICE VISIT (OUTPATIENT)
Dept: DERMATOLOGY | Facility: CLINIC | Age: 41
End: 2021-02-19
Payer: COMMERCIAL

## 2021-02-19 VITALS — SYSTOLIC BLOOD PRESSURE: 106 MMHG | DIASTOLIC BLOOD PRESSURE: 69 MMHG | OXYGEN SATURATION: 97 % | HEART RATE: 81 BPM

## 2021-02-19 DIAGNOSIS — L81.4 LENTIGO: ICD-10-CM

## 2021-02-19 DIAGNOSIS — L20.9 ATOPIC DERMATITIS, UNSPECIFIED TYPE: ICD-10-CM

## 2021-02-19 DIAGNOSIS — L82.1 SEBORRHEIC KERATOSIS: ICD-10-CM

## 2021-02-19 DIAGNOSIS — L73.8 SEBACEOUS HYPERPLASIA: ICD-10-CM

## 2021-02-19 DIAGNOSIS — D22.9 MULTIPLE BENIGN NEVI: Primary | ICD-10-CM

## 2021-02-19 DIAGNOSIS — D18.01 CHERRY ANGIOMA: ICD-10-CM

## 2021-02-19 PROCEDURE — 96999 UNLISTED SPEC DERM SVC/PX: CPT | Performed by: PHYSICIAN ASSISTANT

## 2021-02-19 PROCEDURE — 99214 OFFICE O/P EST MOD 30 MIN: CPT | Performed by: PHYSICIAN ASSISTANT

## 2021-02-19 NOTE — LETTER
2/19/2021         RE: Lin Corona  [id number] [address] [place] [id number]        Dear Colleague,    Thank you for referring your patient, Lin Corona, to the [facility]. Please see a copy of my visit note below.    Lin Corona is an extremely pleasant 40 year old year old female patient here today for bumps on face and rash on hands. She notes bumps have been present for years, not painful and no bleeding. She notes rash has been present for years, itchy. She notes triamcinolone helps treat rash. She has a history of sensitive skin. Patient has no other skin complaints today.  Remainder of the HPI, Meds, PMH, Allergies, FH, and SH was reviewed in chart.    Pertinent Hx:   No personal history of skin cancer.   Past Medical History:   Diagnosis Date     Dysmenorrhea      Endometriosis        Past Surgical History:   Procedure Laterality Date     APPENDECTOMY  7/13/2011     BREAST SURGERY  2000    Breast reduction     COLONOSCOPY  9/2011     CYSTOSCOPY N/A 1/11/2021    Procedure: CYSTOSCOPY;  Surgeon: Jessica Gallardo DO;  Location: [facility] OR     GYN SURGERY  1/2012    Colposcopy exam      LAPAROSCOPIC HYSTERECTOMY TOTAL, BILATERAL SALPINGO-OOPHORECTOMY, COMBINED N/A 1/11/2021    Procedure: HYSTERECTOMY, TOTAL, LAPAROSCOPIC, WITH BILATERAL SALPINGO-OOPHORECTOMY;  Surgeon: Jessica Gallardo DO;  Location: [facility] OR     SHOULDER SURGERY Right         Family History   Problem Relation Age of Onset     Arthritis Mother      Heart Disease Father      Gallbladder Disease Father      Arthritis Maternal Grandmother      Unknown/Adopted Maternal Grandmother      C.A.D. Paternal Grandmother      Ovarian Cancer Paternal Grandmother      Cancer Paternal Grandmother      C.A.D. Paternal Grandfather      Cervical Cancer Cousin      Cervical Cancer Paternal Aunt        Social History     Socioeconomic History     Marital status:      Spouse name: Not on file     Number of children: Not on file      Years of education: Not on file     Highest education level: Not on file   Occupational History     Occupation:    Social Needs     Financial resource strain: Not on file     Food insecurity     Worry: Not on file     Inability: Not on file     Transportation needs     Medical: Not on file     Non-medical: Not on file   Tobacco Use     Smoking status: Former Smoker     Packs/day: 0.00     Smokeless tobacco: Never Used   Substance and Sexual Activity     Alcohol use: Yes     Comment: occassional     Drug use: No     Sexual activity: Yes     Partners: Male     Birth control/protection: Inserts/Ring     Comment: Nuvaring   Lifestyle     Physical activity     Days per week: Not on file     Minutes per session: Not on file     Stress: Not on file   Relationships     Social connections     Talks on phone: Not on file     Gets together: Not on file     Attends [employer] service: Not on file     Active member of club or organization: Not on file     Attends meetings of clubs or organizations: Not on file     Relationship status: Not on file     Intimate partner violence     Fear of current or ex partner: Not on file     Emotionally abused: Not on file     Physically abused: Not on file     Forced sexual activity: Not on file   Other Topics Concern     Parent/sibling w/ CABG, MI or angioplasty before 65F 55M? Not Asked   Social History Narrative     Not on file       Outpatient Encounter Medications as of 2/19/2021   Medication Sig Dispense Refill     docusate sodium (COLACE) 100 MG capsule Take 1 capsule (100 mg) by mouth 2 times daily as needed for constipation 30 capsule 0     ibuprofen (ADVIL/MOTRIN) 600 MG tablet Take 1 tablet (600 mg) by mouth every 6 hours as needed for pain (mild) 30 tablet 0     norethindrone-ethinyl estradiol (JUNEL FE 1/20) 1-20 MG-MCG tablet Take 1 tablet by mouth daily 84 tablet 3     triamcinolone (ARISTOCORT HP) 0.5 % external cream Apply topically 2 times daily 15 g 1      triamcinolone (KENALOG) 0.1 % external ointment Apply topically 2 times daily 30 g 1     ondansetron (ZOFRAN-ODT) 4 MG ODT tab Take 1 tablet (4 mg) by mouth every 6 hours as needed for nausea Dissolve ON the tongue. (Patient not taking: Reported on 1/22/2021) 10 tablet 0     oxyCODONE (ROXICODONE) 5 MG tablet Take 1 tablet (5 mg) by mouth every 4 hours as needed for pain (Moderate to Severe) (Patient not taking: Reported on 1/22/2021) 15 tablet 0     No facility-administered encounter medications on file as of 2/19/2021.              Review Of Systems  Skin: As above  Eyes: negative  Ears/Nose/Throat: negative  Respiratory: No shortness of breath, dyspnea on exertion, cough      O:   NAD, WDWN, Alert & Oriented, Mood & Affect wnl, Vitals stable   Here today alone   /69   Pulse 81   LMP 01/06/2021   SpO2 97%    General appearance normal   Vitals stable   Alert, oriented and in no acute distress   Eczematous plaques on neck, hands antecubtial fossa   Stuck on papules and brown macules on trunk and ext   Red papules on trunk  Brown papules and macules on torso and extremities  Yellow lobulate papules on cheeks nose, temples      The remainder of skin exam is normal       Eyes: Conjunctivae/lids:Normal     ENT: Lips: normal    MSK:Normal    Cardiovascular: peripheral edema none    Pulm: Breathing Normal    Neuro/Psych: Orientation:Alert and Orientedx3 ; Mood/Affect:normal   A/P:  1. Atopic Dermatitis  Rash is consistent with eczema.   Apply triamcinolone cream twice daily as needed to affected area on back of neck, hands. Discussed to moisturizer daily to twice daily.   2. Sebaceous hyperplasia on face x x4  Discussed treatment is considered cosmetic. Charge of 150 dollars.   Cauterized on setting of 3. Routine wound care.   3. Seborrheic keratosis, lentigo, angioma, benign nevi   BENIGN LESIONS DISCUSSED WITH PATIENT:  I discussed the specifics of tumor, prognosis, and genetics of benign lesions.  I explained  that treatment of these lesions would be purely cosmetic and not medically neccessary.  I discussed with patient different removal options including excision, cautery and /or laser.      Nature and genetics of benign skin lesions dicussed with patient.  Signs and Symptoms of skin cancer discussed with patient.  ABCDEs of melanoma reviewed with patient.  Patient encouraged to perform monthly skin exams.  UV precautions reviewed with patient.  Risks of non-melanoma skin cancer discussed with patient   Return to clinic in one year or sooner if needed.       Again, thank you for allowing me to participate in the care of your patient.        Sincerely,        Diana Lincoln PA-C

## 2021-02-22 NOTE — PROGRESS NOTES
Lin Corona is an extremely pleasant 40 year old year old female patient here today for bumps on face and rash on hands. She notes bumps have been present for years, not painful and no bleeding. She notes rash has been present for years, itchy. She notes triamcinolone helps treat rash. She has a history of sensitive skin. Patient has no other skin complaints today.  Remainder of the HPI, Meds, PMH, Allergies, FH, and SH was reviewed in chart.    Pertinent Hx:   No personal history of skin cancer.   Past Medical History:   Diagnosis Date     Dysmenorrhea      Endometriosis        Past Surgical History:   Procedure Laterality Date     APPENDECTOMY  7/13/2011     BREAST SURGERY  2000    Breast reduction     COLONOSCOPY  9/2011     CYSTOSCOPY N/A 1/11/2021    Procedure: CYSTOSCOPY;  Surgeon: Jessica Gallardo DO;  Location: [facility]     GYN SURGERY  1/2012    Colposcopy exam      LAPAROSCOPIC HYSTERECTOMY TOTAL, BILATERAL SALPINGO-OOPHORECTOMY, COMBINED N/A 1/11/2021    Procedure: HYSTERECTOMY, TOTAL, LAPAROSCOPIC, WITH BILATERAL SALPINGO-OOPHORECTOMY;  Surgeon: Jessica Gallardo DO;  Location: [facility]     SHOULDER SURGERY Right         Family History   Problem Relation Age of Onset     Arthritis Mother      Heart Disease Father      Gallbladder Disease Father      Arthritis Maternal Grandmother      Unknown/Adopted Maternal Grandmother      C.A.D. Paternal Grandmother      Ovarian Cancer Paternal Grandmother      Cancer Paternal Grandmother      C.A.D. Paternal Grandfather      Cervical Cancer Cousin      Cervical Cancer Paternal Aunt        Social History     Socioeconomic History     Marital status:      Spouse name: Not on file     Number of children: Not on file     Years of education: Not on file     Highest education level: Not on file   Occupational History     Occupation:    Social Needs     Financial resource strain: Not on file     Food insecurity     Worry: Not on file     Inability: Not  on file     Transportation needs     Medical: Not on file     Non-medical: Not on file   Tobacco Use     Smoking status: Former Smoker     Packs/day: 0.00     Smokeless tobacco: Never Used   Substance and Sexual Activity     Alcohol use: Yes     Comment: occassional     Drug use: No     Sexual activity: Yes     Partners: Male     Birth control/protection: Inserts/Ring     Comment: Nuvaring   Lifestyle     Physical activity     Days per week: Not on file     Minutes per session: Not on file     Stress: Not on file   Relationships     Social connections     Talks on phone: Not on file     Gets together: Not on file     Attends Buddhism service: Not on file     Active member of club or organization: Not on file     Attends meetings of clubs or organizations: Not on file     Relationship status: Not on file     Intimate partner violence     Fear of current or ex partner: Not on file     Emotionally abused: Not on file     Physically abused: Not on file     Forced sexual activity: Not on file   Other Topics Concern     Parent/sibling w/ CABG, MI or angioplasty before 65F 55M? Not Asked   Social History Narrative     Not on file       Outpatient Encounter Medications as of 2/19/2021   Medication Sig Dispense Refill     docusate sodium (COLACE) 100 MG capsule Take 1 capsule (100 mg) by mouth 2 times daily as needed for constipation 30 capsule 0     ibuprofen (ADVIL/MOTRIN) 600 MG tablet Take 1 tablet (600 mg) by mouth every 6 hours as needed for pain (mild) 30 tablet 0     norethindrone-ethinyl estradiol (JUNEL FE 1/20) 1-20 MG-MCG tablet Take 1 tablet by mouth daily 84 tablet 3     triamcinolone (ARISTOCORT HP) 0.5 % external cream Apply topically 2 times daily 15 g 1     triamcinolone (KENALOG) 0.1 % external ointment Apply topically 2 times daily 30 g 1     ondansetron (ZOFRAN-ODT) 4 MG ODT tab Take 1 tablet (4 mg) by mouth every 6 hours as needed for nausea Dissolve ON the tongue. (Patient not taking: Reported on  1/22/2021) 10 tablet 0     oxyCODONE (ROXICODONE) 5 MG tablet Take 1 tablet (5 mg) by mouth every 4 hours as needed for pain (Moderate to Severe) (Patient not taking: Reported on 1/22/2021) 15 tablet 0     No facility-administered encounter medications on file as of 2/19/2021.              Review Of Systems  Skin: As above  Eyes: negative  Ears/Nose/Throat: negative  Respiratory: No shortness of breath, dyspnea on exertion, cough      O:   NAD, WDWN, Alert & Oriented, Mood & Affect wnl, Vitals stable   Here today alone   /69   Pulse 81   LMP 01/06/2021   SpO2 97%    General appearance normal   Vitals stable   Alert, oriented and in no acute distress   Eczematous plaques on neck, hands antecubtial fossa   Stuck on papules and brown macules on trunk and ext   Red papules on trunk  Brown papules and macules on torso and extremities  Yellow lobulate papules on cheeks nose, temples      The remainder of skin exam is normal       Eyes: Conjunctivae/lids:Normal     ENT: Lips: normal    MSK:Normal    Cardiovascular: peripheral edema none    Pulm: Breathing Normal    Neuro/Psych: Orientation:Alert and Orientedx3 ; Mood/Affect:normal   A/P:  1. Atopic Dermatitis  Rash is consistent with eczema.   Apply triamcinolone cream twice daily as needed to affected area on back of neck, hands. Discussed to moisturizer daily to twice daily.   2. Sebaceous hyperplasia on face x x4  Discussed treatment is considered cosmetic. Charge of 150 dollars.   Cauterized on setting of 3. Routine wound care.   3. Seborrheic keratosis, lentigo, angioma, benign nevi   BENIGN LESIONS DISCUSSED WITH PATIENT:  I discussed the specifics of tumor, prognosis, and genetics of benign lesions.  I explained that treatment of these lesions would be purely cosmetic and not medically neccessary.  I discussed with patient different removal options including excision, cautery and /or laser.      Nature and genetics of benign skin lesions dicussed with  patient.  Signs and Symptoms of skin cancer discussed with patient.  ABCDEs of melanoma reviewed with patient.  Patient encouraged to perform monthly skin exams.  UV precautions reviewed with patient.  Risks of non-melanoma skin cancer discussed with patient   Return to clinic in one year or sooner if needed.

## 2021-02-23 ENCOUNTER — HOSPITAL ENCOUNTER (OUTPATIENT)
Dept: OCCUPATIONAL THERAPY | Facility: CLINIC | Age: 41
Setting detail: THERAPIES SERIES
End: 2021-02-23
Attending: ORTHOPAEDIC SURGERY
Payer: OTHER MISCELLANEOUS

## 2021-02-23 ENCOUNTER — OFFICE VISIT (OUTPATIENT)
Dept: OBGYN | Facility: CLINIC | Age: 41
End: 2021-02-23
Payer: COMMERCIAL

## 2021-02-23 VITALS
SYSTOLIC BLOOD PRESSURE: 127 MMHG | HEIGHT: 64 IN | BODY MASS INDEX: 36.77 KG/M2 | DIASTOLIC BLOOD PRESSURE: 83 MMHG | TEMPERATURE: 98.9 F | RESPIRATION RATE: 14 BRPM | WEIGHT: 215.4 LBS | HEART RATE: 70 BPM

## 2021-02-23 DIAGNOSIS — Z90.710 S/P LAPAROSCOPIC HYSTERECTOMY: ICD-10-CM

## 2021-02-23 DIAGNOSIS — R35.0 FREQUENT URINATION: Primary | ICD-10-CM

## 2021-02-23 DIAGNOSIS — Z98.890 POSTOPERATIVE STATE: ICD-10-CM

## 2021-02-23 LAB
ALBUMIN UR-MCNC: NEGATIVE MG/DL
APPEARANCE UR: CLEAR
BILIRUB UR QL STRIP: NEGATIVE
COLOR UR AUTO: YELLOW
GLUCOSE UR STRIP-MCNC: NEGATIVE MG/DL
HGB UR QL STRIP: NEGATIVE
KETONES UR STRIP-MCNC: ABNORMAL MG/DL
LEUKOCYTE ESTERASE UR QL STRIP: NEGATIVE
NITRATE UR QL: NEGATIVE
PH UR STRIP: 5.5 PH (ref 5–7)
SOURCE: ABNORMAL
SP GR UR STRIP: 1.02 (ref 1–1.03)
UROBILINOGEN UR STRIP-ACNC: 0.2 EU/DL (ref 0.2–1)

## 2021-02-23 PROCEDURE — 97026 INFRARED THERAPY: CPT | Mod: GO | Performed by: OCCUPATIONAL THERAPIST

## 2021-02-23 PROCEDURE — 87186 SC STD MICRODIL/AGAR DIL: CPT | Performed by: OBSTETRICS & GYNECOLOGY

## 2021-02-23 PROCEDURE — 81003 URINALYSIS AUTO W/O SCOPE: CPT | Performed by: OBSTETRICS & GYNECOLOGY

## 2021-02-23 PROCEDURE — 99024 POSTOP FOLLOW-UP VISIT: CPT | Performed by: OBSTETRICS & GYNECOLOGY

## 2021-02-23 PROCEDURE — 87088 URINE BACTERIA CULTURE: CPT | Performed by: OBSTETRICS & GYNECOLOGY

## 2021-02-23 PROCEDURE — 97035 APP MDLTY 1+ULTRASOUND EA 15: CPT | Mod: GO | Performed by: OCCUPATIONAL THERAPIST

## 2021-02-23 PROCEDURE — 87086 URINE CULTURE/COLONY COUNT: CPT | Performed by: OBSTETRICS & GYNECOLOGY

## 2021-02-23 PROCEDURE — 97140 MANUAL THERAPY 1/> REGIONS: CPT | Mod: GO | Performed by: OCCUPATIONAL THERAPIST

## 2021-02-23 RX ORDER — DOCUSATE SODIUM 100 MG/1
100 CAPSULE, LIQUID FILLED ORAL 2 TIMES DAILY PRN
Qty: 90 CAPSULE | Refills: 4 | Status: SHIPPED | OUTPATIENT
Start: 2021-02-23 | End: 2021-12-21

## 2021-02-23 RX ORDER — NORGESTIMATE AND ETHINYL ESTRADIOL 0.25-0.035
1 KIT ORAL DAILY
Qty: 84 TABLET | Refills: 4 | Status: SHIPPED | OUTPATIENT
Start: 2021-02-23 | End: 2021-11-10

## 2021-02-23 ASSESSMENT — MIFFLIN-ST. JEOR: SCORE: 1632.05

## 2021-02-23 NOTE — NURSING NOTE
"Initial /83 (BP Location: Right arm, Patient Position: Sitting, Cuff Size: Adult Large)   Pulse 70   Temp 98.9  F (37.2  C) (Tympanic)   Resp 14   Ht 1.626 m (5' 4\")   Wt 97.7 kg (215 lb 6.4 oz)   LMP 01/06/2021   BMI 36.97 kg/m   Estimated body mass index is 36.97 kg/m  as calculated from the following:    Height as of this encounter: 1.626 m (5' 4\").    Weight as of this encounter: 97.7 kg (215 lb 6.4 oz). .      "

## 2021-02-23 NOTE — PROGRESS NOTES
[facility]    Post-Operative Visit    Lin Corona  1980  [id number]    CC: Patient seen today for post-operative evaluation.    SUBJECTIVE:    Lin Corona is a 40 year old female who is s/p TLH, BSO on 1/11/21. She reports that she is doing well post operatively. She is concerned about increasing hot flashes and knee pain. She has also been having more frequent urination. Denies dysuria or hematuria. Feels some sensitivity around her abdominal incisions. Chronic constipation is improved with bowel movements every 2-3 days vs. Every 10 days pre operatively.     Past Medical History:   Diagnosis Date     Dysmenorrhea      Endometriosis        Past Surgical History:   Procedure Laterality Date     APPENDECTOMY  7/13/2011     BREAST SURGERY  2000    Breast reduction     COLONOSCOPY  9/2011     CYSTOSCOPY N/A 1/11/2021    Procedure: CYSTOSCOPY;  Surgeon: Jessica Gallardo DO;  Location: [facility]     GYN SURGERY  1/2012    Colposcopy exam      LAPAROSCOPIC HYSTERECTOMY TOTAL, BILATERAL SALPINGO-OOPHORECTOMY, COMBINED N/A 1/11/2021    Procedure: HYSTERECTOMY, TOTAL, LAPAROSCOPIC, WITH BILATERAL SALPINGO-OOPHORECTOMY;  Surgeon: Jessica Gallardo DO;  Location: [facility]     SHOULDER SURGERY Right        Current Outpatient Medications   Medication Sig Dispense Refill     docusate sodium (COLACE) 100 MG capsule Take 1 capsule (100 mg) by mouth 2 times daily as needed for constipation 90 capsule 4     ibuprofen (ADVIL/MOTRIN) 600 MG tablet Take 1 tablet (600 mg) by mouth every 6 hours as needed for pain (mild) 30 tablet 0     norgestimate-ethinyl estradiol (ORTHO-CYCLEN) 0.25-35 MG-MCG tablet Take 1 tablet by mouth daily 84 tablet 4     triamcinolone (ARISTOCORT HP) 0.5 % external cream Apply topically 2 times daily 15 g 1     triamcinolone (KENALOG) 0.1 % external ointment Apply topically 2 times daily 30 g 1     ondansetron (ZOFRAN-ODT) 4 MG ODT tab Take 1 tablet (4 mg) by mouth every 6  "hours as needed for nausea Dissolve ON the tongue. (Patient not taking: Reported on 1/22/2021) 10 tablet 0     oxyCODONE (ROXICODONE) 5 MG tablet Take 1 tablet (5 mg) by mouth every 4 hours as needed for pain (Moderate to Severe) (Patient not taking: Reported on 1/22/2021) 15 tablet 0       OBJECTIVE:  /83 (BP Location: Right arm, Patient Position: Sitting, Cuff Size: Adult Large)   Pulse 70   Temp 98.9  F (37.2  C) (Tympanic)   Resp 14   Ht 1.626 m (5' 4\")   Wt 97.7 kg (215 lb 6.4 oz)   LMP 01/06/2021   BMI 36.97 kg/m    Estimated body mass index is 36.97 kg/m  as calculated from the following:    Height as of this encounter: 1.626 m (5' 4\").    Weight as of this encounter: 97.7 kg (215 lb 6.4 oz).    General appearance: well-hydrated, A&O x 3, no apparent distress  Lungs: Equal expansion bilaterally, no accessory muscle use  Heart: No heaves or thrills. No peripheral varicosities  Constitutional: See vitals  Abdomen: Soft, non-tender, non-distended. No rebound, rigidity, or guarding. Incisions well healed, no erythema or induration.  Genitourinary:  External genitalia: no erythema, no lesions.   Urethral meatus appropriate location without lesions or prolapse  Urethra: No masses, tenderness, or scarring  Bladder no fullness, masses, or tenderness.  Anus and Perineum: Unremarkable, no visible lesions  Vagina: Normal, healthy pink mucosa without any lesions. Physiologic vaginal discharge. Vaginal cuff well healed, intact  Cervix: surgically absent  Uterus: surgically absent    ASSESSMENT/PLAN:    Lin was seen today for post-op visit.    Diagnoses and all orders for this visit:    Frequent urination  -     Urine Culture Aerobic Bacterial  -     *UA reflex to Microscopic    Postoperative state    S/P laparoscopic hysterectomy  -     docusate sodium (COLACE) 100 MG capsule; Take 1 capsule (100 mg) by mouth 2 times daily as needed for constipation  -     norgestimate-ethinyl estradiol (ORTHO-CYCLEN) " 0.25-35 MG-MCG tablet; Take 1 tablet by mouth daily        40 year old female who is 6 weeks s/p TLH, BSO for chronic pelvic pain and AUB. Patient is doing well post op. Some urinary symptoms today, UA and UCx ordered. Also complaining of some hot flashes and knee pain, will increase dose of HRT. Continue stool softeners for chronic constipation.     Cleared for increased physical and sexual activity. Counseled on slowly increasing since deconditioning from surgery.       RTC for annual exam or as needed.     Jessica Gallardo,

## 2021-02-25 DIAGNOSIS — N30.00 ACUTE CYSTITIS WITHOUT HEMATURIA: Primary | ICD-10-CM

## 2021-02-25 LAB
BACTERIA SPEC CULT: ABNORMAL
Lab: ABNORMAL
SPECIMEN SOURCE: ABNORMAL

## 2021-02-25 RX ORDER — NITROFURANTOIN 25; 75 MG/1; MG/1
100 CAPSULE ORAL 2 TIMES DAILY
Qty: 14 CAPSULE | Refills: 0 | Status: SHIPPED | OUTPATIENT
Start: 2021-02-25 | End: 2021-03-04

## 2021-02-26 ENCOUNTER — OFFICE VISIT (OUTPATIENT)
Dept: SURGERY | Facility: CLINIC | Age: 41
End: 2021-02-26
Payer: COMMERCIAL

## 2021-02-26 ENCOUNTER — ANCILLARY PROCEDURE (OUTPATIENT)
Dept: GENERAL RADIOLOGY | Facility: CLINIC | Age: 41
End: 2021-02-26
Attending: SURGERY
Payer: COMMERCIAL

## 2021-02-26 VITALS
HEART RATE: 74 BPM | BODY MASS INDEX: 36.77 KG/M2 | WEIGHT: 215.39 LBS | TEMPERATURE: 97.2 F | SYSTOLIC BLOOD PRESSURE: 110 MMHG | HEIGHT: 64 IN | DIASTOLIC BLOOD PRESSURE: 67 MMHG

## 2021-02-26 DIAGNOSIS — M79.609 PARESTHESIA AND PAIN OF RIGHT EXTREMITY: Primary | ICD-10-CM

## 2021-02-26 DIAGNOSIS — R20.2 PARESTHESIA AND PAIN OF RIGHT EXTREMITY: Primary | ICD-10-CM

## 2021-02-26 PROCEDURE — 73060 X-RAY EXAM OF HUMERUS: CPT | Mod: RT | Performed by: RADIOLOGY

## 2021-02-26 PROCEDURE — 99212 OFFICE O/P EST SF 10 MIN: CPT | Performed by: SURGERY

## 2021-02-26 ASSESSMENT — MIFFLIN-ST. JEOR: SCORE: 1632

## 2021-02-26 NOTE — LETTER
2/26/2021         RE: Lin Corona  [address] [address] [place] [id number]        Dear Colleague,    Thank you for referring your patient, Lin Corona, to the [facility]. Please see a copy of my visit note below.    40-year-old female seen by me about a year and a half ago complaining of right upper arm paresthesias.  Over the past year patient reports these have unchanged.  She still reports occasional sharp and achy pain in that upper inner arm.  She also has hyperesthesias on her inner arm occasionally so that even the light touch of clothing irritate it.  She has not lost any strength in her right upper arm.    X-ray-right humeral x-ray plain film done today shows no foreign bodies or other evidence of trauma.      Exam:  Right upper arm with no skin changes and no palpable masses although some irregularities possibly consistent with scar tissue      Assessment plan: Continue right upper arm pain following puncture injury about a year and a half ago.  Plain films are negative for foreign body.  Will order an MRI to evaluate nerve and muscle planes.  Explained to the patient that exploratory surgery to break up and remove scar tissue may not change her pain picture and may only continue and cause more scar tissue.  Patient understood and we will see what the MRI shows first.    Marito France MD       Again, thank you for allowing me to participate in the care of your patient.        Sincerely,        Marito France MD

## 2021-02-26 NOTE — NURSING NOTE
"Initial /67 (BP Location: Right arm, Patient Position: Sitting, Cuff Size: Adult Large)   Pulse 74   Temp 97.2  F (36.2  C) (Tympanic)   Ht 1.626 m (5' 4\")   Wt 97.7 kg (215 lb 6.2 oz)   LMP 01/06/2021   BMI 36.97 kg/m   Estimated body mass index is 36.97 kg/m  as calculated from the following:    Height as of this encounter: 1.626 m (5' 4\").    Weight as of this encounter: 97.7 kg (215 lb 6.2 oz). .    Nathaly Lopez MA    "

## 2021-02-26 NOTE — PROGRESS NOTES
40-year-old female seen by me about a year and a half ago complaining of right upper arm paresthesias.  Over the past year patient reports these have unchanged.  She still reports occasional sharp and achy pain in that upper inner arm.  She also has hyperesthesias on her inner arm occasionally so that even the light touch of clothing irritate it.  She has not lost any strength in her right upper arm.    X-ray-right humeral x-ray plain film done today shows no foreign bodies or other evidence of trauma.      Exam:  Right upper arm with no skin changes and no palpable masses although some irregularities possibly consistent with scar tissue      Assessment plan: Continue right upper arm pain following puncture injury about a year and a half ago.  Plain films are negative for foreign body.  Will order an MRI to evaluate nerve and muscle planes.  Explained to the patient that exploratory surgery to break up and remove scar tissue may not change her pain picture and may only continue and cause more scar tissue.  Patient understood and we will see what the MRI shows first.    Marito France MD

## 2021-03-01 ENCOUNTER — HOSPITAL ENCOUNTER (OUTPATIENT)
Dept: MRI IMAGING | Facility: CLINIC | Age: 41
Discharge: HOME | End: 2021-03-01
Attending: SURGERY | Admitting: SURGERY
Payer: COMMERCIAL

## 2021-03-01 DIAGNOSIS — M79.609 PARESTHESIA AND PAIN OF RIGHT EXTREMITY: ICD-10-CM

## 2021-03-01 DIAGNOSIS — R20.2 PARESTHESIA AND PAIN OF RIGHT EXTREMITY: ICD-10-CM

## 2021-03-01 PROCEDURE — A9585 GADOBUTROL INJECTION: HCPCS | Performed by: SURGERY

## 2021-03-01 PROCEDURE — 73223 MRI JOINT UPR EXTR W/O&W/DYE: CPT | Mod: RT

## 2021-03-01 PROCEDURE — 73223 MRI JOINT UPR EXTR W/O&W/DYE: CPT | Mod: 26 | Performed by: RADIOLOGY

## 2021-03-01 PROCEDURE — 255N000002 HC RX 255 OP 636: Performed by: SURGERY

## 2021-03-01 RX ORDER — GADOBUTROL 604.72 MG/ML
9 INJECTION INTRAVENOUS ONCE
Status: COMPLETED | OUTPATIENT
Start: 2021-03-01 | End: 2021-03-01

## 2021-03-01 RX ADMIN — GADOBUTROL 9 ML: 604.72 INJECTION INTRAVENOUS at 17:09

## 2021-03-10 ENCOUNTER — OFFICE VISIT (OUTPATIENT)
Dept: SURGERY | Facility: CLINIC | Age: 41
End: 2021-03-10
Payer: COMMERCIAL

## 2021-03-10 VITALS
HEIGHT: 64 IN | DIASTOLIC BLOOD PRESSURE: 80 MMHG | SYSTOLIC BLOOD PRESSURE: 125 MMHG | HEART RATE: 64 BPM | TEMPERATURE: 97.4 F | BODY MASS INDEX: 36.77 KG/M2 | WEIGHT: 215.39 LBS

## 2021-03-10 DIAGNOSIS — R20.2 PARESTHESIA AND PAIN OF RIGHT EXTREMITY: Primary | ICD-10-CM

## 2021-03-10 DIAGNOSIS — M79.609 PARESTHESIA AND PAIN OF RIGHT EXTREMITY: Primary | ICD-10-CM

## 2021-03-10 PROCEDURE — 99212 OFFICE O/P EST SF 10 MIN: CPT | Performed by: SURGERY

## 2021-03-10 RX ORDER — AMITRIPTYLINE HYDROCHLORIDE 10 MG/1
10 TABLET ORAL AT BEDTIME
Qty: 30 TABLET | Refills: 0 | Status: SHIPPED | OUTPATIENT
Start: 2021-03-10 | End: 2021-04-05

## 2021-03-10 RX ORDER — PREGABALIN 50 MG/1
50 CAPSULE ORAL 3 TIMES DAILY
Qty: 90 CAPSULE | Refills: 0 | Status: SHIPPED | OUTPATIENT
Start: 2021-03-10 | End: 2021-04-05

## 2021-03-10 ASSESSMENT — MIFFLIN-ST. JEOR: SCORE: 1632

## 2021-03-10 NOTE — LETTER
3/10/2021         RE: Lin Corona  [address] [address] [place] [id number]        Dear Colleague,    Thank you for referring your patient, Lin Corona, to the [facility]. Please see a copy of my visit note below.    40-year-old female here for follow-up of MRI of right upper extremity.  MRI reports seeing some scar tissue in the subcutaneous tissue which did not extend to the level of the median nerve.  Otherwise no evidence of continued inflammation or irritation of the neurovascular bundle.  Patient still has occasional paresthesias around her arm that do not interfere with activities of daily me but are yet annoying.    Assessment and plan: Paresthesias following trauma about 18 months ago.  I went and started the patient on 30 days of Lyrica and amitriptyline.  If this works and will continue on her prescription.  If not patient understands that this may be something that she may just have to live with.  Will follow-up again in 30 days.    Marito France MD       Again, thank you for allowing me to participate in the care of your patient.        Sincerely,        Marito France MD

## 2021-03-10 NOTE — PROGRESS NOTES
40-year-old female here for follow-up of MRI of right upper extremity.  MRI reports seeing some scar tissue in the subcutaneous tissue which did not extend to the level of the median nerve.  Otherwise no evidence of continued inflammation or irritation of the neurovascular bundle.  Patient still has occasional paresthesias around her arm that do not interfere with activities of daily me but are yet annoying.    Assessment and plan: Paresthesias following trauma about 18 months ago.  I went and started the patient on 30 days of Lyrica and amitriptyline.  If this works and will continue on her prescription.  If not patient understands that this may be something that she may just have to live with.  Will follow-up again in 30 days.    Marito France MD

## 2021-03-10 NOTE — NURSING NOTE
"Initial /80 (BP Location: Left arm, Patient Position: Sitting, Cuff Size: Adult Large)   Pulse 64   Temp 97.4  F (36.3  C) (Tympanic)   Ht 1.626 m (5' 4\")   Wt 97.7 kg (215 lb 6.2 oz)   LMP 01/06/2021   BMI 36.97 kg/m   Estimated body mass index is 36.97 kg/m  as calculated from the following:    Height as of this encounter: 1.626 m (5' 4\").    Weight as of this encounter: 97.7 kg (215 lb 6.2 oz). .    Nathaly Lopez MA    "

## 2021-03-31 ENCOUNTER — MYC MEDICAL ADVICE (OUTPATIENT)
Dept: OBGYN | Facility: CLINIC | Age: 41
End: 2021-03-31

## 2021-03-31 DIAGNOSIS — R35.0 URINARY FREQUENCY: ICD-10-CM

## 2021-03-31 DIAGNOSIS — R35.0 URINARY FREQUENCY: Primary | ICD-10-CM

## 2021-03-31 LAB
ALBUMIN UR-MCNC: NEGATIVE MG/DL
APPEARANCE UR: CLEAR
BACTERIA #/AREA URNS HPF: ABNORMAL /HPF
BILIRUB UR QL STRIP: NEGATIVE
COLOR UR AUTO: YELLOW
GLUCOSE UR STRIP-MCNC: NEGATIVE MG/DL
HGB UR QL STRIP: NEGATIVE
KETONES UR STRIP-MCNC: NEGATIVE MG/DL
LEUKOCYTE ESTERASE UR QL STRIP: NEGATIVE
NITRATE UR QL: NEGATIVE
NON-SQ EPI CELLS #/AREA URNS LPF: ABNORMAL /LPF
PH UR STRIP: 5 PH (ref 5–7)
RBC #/AREA URNS AUTO: ABNORMAL /HPF
SOURCE: ABNORMAL
SP GR UR STRIP: 1.02 (ref 1–1.03)
UROBILINOGEN UR STRIP-ACNC: 0.2 EU/DL (ref 0.2–1)
WBC #/AREA URNS AUTO: ABNORMAL /HPF

## 2021-03-31 PROCEDURE — 81001 URINALYSIS AUTO W/SCOPE: CPT | Performed by: OBSTETRICS & GYNECOLOGY

## 2021-03-31 PROCEDURE — 87147 CULTURE TYPE IMMUNOLOGIC: CPT | Performed by: OBSTETRICS & GYNECOLOGY

## 2021-03-31 PROCEDURE — 87086 URINE CULTURE/COLONY COUNT: CPT | Performed by: OBSTETRICS & GYNECOLOGY

## 2021-03-31 NOTE — TELEPHONE ENCOUNTER
Please review and advise.    Should patient see PCP to R/O UTI?  What do you think about the joint pain and Estrogen?    Thanks,    Eunice Castillo   [facility]yn Clinic  RN

## 2021-04-02 DIAGNOSIS — N32.89 BLADDER SPASM: Primary | ICD-10-CM

## 2021-04-02 LAB
BACTERIA SPEC CULT: ABNORMAL
BACTERIA SPEC CULT: ABNORMAL
Lab: ABNORMAL
SPECIMEN SOURCE: ABNORMAL

## 2021-04-02 RX ORDER — PHENAZOPYRIDINE HYDROCHLORIDE 100 MG/1
100 TABLET, FILM COATED ORAL 3 TIMES DAILY PRN
Qty: 9 TABLET | Refills: 0 | Status: SHIPPED | OUTPATIENT
Start: 2021-04-02 | End: 2021-04-19

## 2021-04-05 ENCOUNTER — MYC MEDICAL ADVICE (OUTPATIENT)
Dept: SURGERY | Facility: CLINIC | Age: 41
End: 2021-04-05

## 2021-04-05 DIAGNOSIS — M79.609 PARESTHESIA AND PAIN OF RIGHT EXTREMITY: ICD-10-CM

## 2021-04-05 DIAGNOSIS — R20.2 PARESTHESIA AND PAIN OF RIGHT EXTREMITY: ICD-10-CM

## 2021-04-05 RX ORDER — AMITRIPTYLINE HYDROCHLORIDE 10 MG/1
10 TABLET ORAL AT BEDTIME
Qty: 30 TABLET | Refills: 0 | Status: SHIPPED | OUTPATIENT
Start: 2021-04-05 | End: 2021-04-22

## 2021-04-05 RX ORDER — PREGABALIN 50 MG/1
50 CAPSULE ORAL 3 TIMES DAILY
Qty: 90 CAPSULE | Refills: 0 | Status: SHIPPED | OUTPATIENT
Start: 2021-04-05 | End: 2021-04-22

## 2021-04-07 ENCOUNTER — TELEPHONE (OUTPATIENT)
Dept: SURGERY | Facility: CLINIC | Age: 41
End: 2021-04-07

## 2021-04-07 DIAGNOSIS — M79.609 PARESTHESIA AND PAIN OF RIGHT EXTREMITY: ICD-10-CM

## 2021-04-07 DIAGNOSIS — R20.2 PARESTHESIA AND PAIN OF RIGHT EXTREMITY: ICD-10-CM

## 2021-04-07 RX ORDER — PREGABALIN 50 MG/1
50 CAPSULE ORAL 3 TIMES DAILY
Qty: 90 CAPSULE | Refills: 0 | Status: CANCELLED | OUTPATIENT
Start: 2021-04-07

## 2021-04-07 NOTE — TELEPHONE ENCOUNTER
See note below and advise if pt can fill early for her trip?  Vee CALHOUN RN BSN PHN  Specialty Clinics

## 2021-04-07 NOTE — TELEPHONE ENCOUNTER
Ninfa Pablo  Refill Pool Dyad 7 7 minutes ago (3:07 PM)     Patient is going on vacation and can fill her pregabalin 50mg on April 9. She will be leaving  on April 9, early in the morning and wont be back until April 19. We are wondering if it is ok for her to fill this medication early? Please call us at [phone] with an answer.     Thank you,     Lesley Pablo   Certified Pharmacy Technician, [facility]   Ph: [phone]   Fx: [phone]

## 2021-04-09 NOTE — PROGRESS NOTES
02/23/21   Note: This is a copy of patient's last daily visit and will also serve as their Discharge Summary as they have not been in for further treatment 30 days past this date. Final assessment of goals and physical and functional status , therefore unavailable.     Signing Clinician's Name / Credentials   Signing clinician's name / credentials JOHN PAUL Messina/L CHT   Session Number   Session Number 19 ( Reporting period 11/18/20 to 2/23/21)   Quick Add   Quick Add  Hand   Hand   Referring Physician Dr. Funes   Medical Diagnosis Right intersection syndrome   Orders Evaluate And Treat As Indicated   Adult OT Eval Goals   Hand Eval Goals 2;1;3   Hand Goal 1   Goal Identifier weight bearing- can do some light yoga and weight bearing and can feel it.   Goal Description Patient to go from 0 on UEFI to 3 to be able to push herself up from a chair with min difficulty   Target Date 02/26/21   Hand Goal 2   Goal Identifier lifting -   Goal Description Patient to be able to lift a bag of groceries from 0 Extreme difficulty on UEFI to 1 A little bit)    Target Date 02/19/21   Date Met 02/17/21   Hand Goal 3   Goal Identifier work-  Is supposed to go back on the first of March   Goal Description Patient to report 75% improvent in being able to resume normal work tasks using tools.   Target Date 03/19/21   Subjective Report   Subjective Report feels like she has not changed for a while   Patient Reported % Functional Improvement 40   Initial Pain level 6/10  (at worst 2 at best)   Current Pain level 2/10  (with use otherwise usually a 1  but only hurts when using)   Objective Measure 1   Objective Measure palpation to second dorsal compartment   Details 2-3/10   Modalities   Modalities Infrared;Ultrasound    Infrared   Infrared Treatment Minutes (74439) 2 Minutes   Skilled Intervention to enhance tissue repair   Treatment Detail 3j/cm2 second dorsal compartment   Ultrasound   Ultrasound, Minutes (39539) 8 Minutes    Skilled Intervention to improve healing   Therapeutic Interventions   Therapeutic Interventions Manual Therapy;Therapeutic Procedure/Exercise   Therapeutic Procedure/Exercise   Skilled Intervention verbal and physical cuing needed   Manual Therapy   Manual Therapy Minutes (65097) 15   Skilled Intervention to increase flexibility , decrease pain   Treatment Detail myofacial avoid second dorsal compartment and light STM with Iglesia surrounding   Assessments Completed   Assessments Completed Patient is feeling like she is plateauing   Education   Learner Patient   Readiness Eager   Method Booklet/handout;Explanation;Video   Response Verbalizes understanding;Demonstrates understanding   Education Notes see home program   Plan   Home program splinting, activity modification, massage   Updates to plan of care may need to splint again   Plan for next session will go on her own for the next 2.5 weeks and will do progress note and reassess next visit.   Total Session Time

## 2021-04-19 ENCOUNTER — ANCILLARY PROCEDURE (OUTPATIENT)
Dept: GENERAL RADIOLOGY | Facility: CLINIC | Age: 41
End: 2021-04-19
Attending: NURSE PRACTITIONER
Payer: COMMERCIAL

## 2021-04-19 ENCOUNTER — OFFICE VISIT (OUTPATIENT)
Dept: FAMILY MEDICINE | Facility: CLINIC | Age: 41
End: 2021-04-19
Payer: COMMERCIAL

## 2021-04-19 VITALS
HEIGHT: 64 IN | TEMPERATURE: 98.4 F | OXYGEN SATURATION: 98 % | DIASTOLIC BLOOD PRESSURE: 74 MMHG | BODY MASS INDEX: 36.54 KG/M2 | RESPIRATION RATE: 18 BRPM | HEART RATE: 83 BPM | WEIGHT: 214 LBS | SYSTOLIC BLOOD PRESSURE: 128 MMHG

## 2021-04-19 DIAGNOSIS — M25.562 LEFT KNEE PAIN, UNSPECIFIED CHRONICITY: ICD-10-CM

## 2021-04-19 DIAGNOSIS — R07.81 RIB PAIN ON LEFT SIDE: ICD-10-CM

## 2021-04-19 DIAGNOSIS — R07.81 RIB PAIN ON LEFT SIDE: Primary | ICD-10-CM

## 2021-04-19 PROCEDURE — 99214 OFFICE O/P EST MOD 30 MIN: CPT | Performed by: NURSE PRACTITIONER

## 2021-04-19 PROCEDURE — 71101 X-RAY EXAM UNILAT RIBS/CHEST: CPT | Mod: LT | Performed by: RADIOLOGY

## 2021-04-19 PROCEDURE — 73560 X-RAY EXAM OF KNEE 1 OR 2: CPT | Mod: LT | Performed by: RADIOLOGY

## 2021-04-19 ASSESSMENT — MIFFLIN-ST. JEOR: SCORE: 1625.7

## 2021-04-19 NOTE — PATIENT INSTRUCTIONS
Will be notified of pending x rays.  Start PT for your left knee.  Consult with surgeon about stopping other medications.      Our Clinic hours are:  Mondays    7:20 am - 7 pm  Tues -  Fri  7:20 am - 5 pm    Clinic Phone: [phone]    The clinic lab opens at 7:30 am Mon - Fri and appointments are required.    [facility]. [phone]  Monday  8 am - 7pm  Tues - Fri 8 am - 5:30 pm

## 2021-04-19 NOTE — PROGRESS NOTES
"    Assessment & Plan     Rib pain on left side    - XR Ribs & Chest Left G/E 3 Views; Future  Reviewed rest, deep breathing and pain control with ibuprofen as well as limiting risky activities until results of x ray are back.    Left knee pain, unspecified chronicity    - XR Knee Left 1/2 Views; Future  - PHYSICAL THERAPY REFERRAL; Future  Start PT and if no relief, will consider MRI and orthopedic consult.             BMI:   Estimated body mass index is 36.73 kg/m  as calculated from the following:    Height as of this encounter: 1.626 m (5' 4\").    Weight as of this encounter: 97.1 kg (214 lb).       See Patient Instructions  Patient Instructions   Will be notified of pending x rays.  Start PT for your left knee.  Consult with surgeon about stopping other medications.      Our Clinic hours are:  Mondays    7:20 am - 7 pm  Tues -  Fri  7:20 am - 5 pm    Clinic Phone: [phone]    The clinic lab opens at 7:30 am Mon - Fri and appointments are required.    [facility]. [phone]  Monday  8 am - 7pm  Tues - Fri 8 am - 5:30 pm             Return in about 4 weeks (around 5/17/2021) for or sooner if symptoms persist or worsen.    Dahlia Johns, TRUMAN CNP  [facility]    Subjective   Lin Price is a 40 year old who presents for the following health issues  accompanied by her self :    HPI   Chief Complaint   Patient presents with     Rib Pain     L rib pain      Knee Pain     L knee pain      Health Maintenance     patient declined flu shot        Concern - Rib pain L side from injury  Onset: 4-13-21  Description: Patient had an injury with her horse hurting L side ribs  she has been using heat and ice but only feel comfortable while applying it to area   Intensity: moderate  Progression of Symptoms:  same  Accompanying Signs & Symptoms: horse back riding injury   Previous history of similar problem: 1 yr ago injury with 4 phelan  Precipitating factors:    "     Worsened by: breathing, lying down, pulling    Alleviating factors:        Improved by: heat and ice only while applying   Therapies tried and outcome: Patient has been using heat and ice help while it is applied     Musculoskeletal problem/pain  Onset/Duration: 2-2021  Description Patient is having trouble with L knee she feels like it is a tendon increased pain with walking climbinf stairs and kneeling on knee   Location: knee - left  Joint Swelling: no  Redness: no  Pain: YES  Warmth: no  Intensity:  moderate  Progression of Symptoms:  worsening  Accompanying signs and symptoms:   Fevers: no  Numbness/tingling/weakness: no  History  Trauma to the area: no  Recent illness:  no  Previous similar problem: no  Previous evaluation:  no  Precipitating or alleviating factors:  Aggravating factors include: walking, climbing stairs and kneeling   Therapies tried and outcome: taping the knee     Past Medical History:   Diagnosis Date     Dysmenorrhea      Endometriosis      Current Outpatient Medications   Medication     amitriptyline (ELAVIL) 10 MG tablet     docusate sodium (COLACE) 100 MG capsule     ibuprofen (ADVIL/MOTRIN) 600 MG tablet     norgestimate-ethinyl estradiol (ORTHO-CYCLEN) 0.25-35 MG-MCG tablet     pregabalin (LYRICA) 50 MG capsule     triamcinolone (ARISTOCORT HP) 0.5 % external cream     triamcinolone (KENALOG) 0.1 % external ointment     ondansetron (ZOFRAN-ODT) 4 MG ODT tab     oxyCODONE (ROXICODONE) 5 MG tablet     No current facility-administered medications for this visit.              Review of Systems   Constitutional, HEENT, cardiovascular, pulmonary, gi and gu systems are negative, except as otherwise noted.      Objective    LMP 01/06/2021   There is no height or weight on file to calculate BMI.  Physical Exam   GENERAL: healthy, alert and no distress  NECK: no adenopathy, no asymmetry  RESP: lungs clear  CV: regular rate and rhythm  MS: no gross musculoskeletal defects noted, pain left  side of rib cage, left knee appears normal, FROM of knee without pain or limitations

## 2021-04-22 ENCOUNTER — ANCILLARY PROCEDURE (OUTPATIENT)
Dept: GENERAL RADIOLOGY | Facility: CLINIC | Age: 41
End: 2021-04-22
Attending: NURSE PRACTITIONER
Payer: COMMERCIAL

## 2021-04-22 ENCOUNTER — OFFICE VISIT (OUTPATIENT)
Dept: FAMILY MEDICINE | Facility: CLINIC | Age: 41
End: 2021-04-22
Payer: COMMERCIAL

## 2021-04-22 VITALS
SYSTOLIC BLOOD PRESSURE: 132 MMHG | WEIGHT: 213.4 LBS | OXYGEN SATURATION: 97 % | HEIGHT: 64 IN | HEART RATE: 81 BPM | TEMPERATURE: 98.3 F | DIASTOLIC BLOOD PRESSURE: 100 MMHG | RESPIRATION RATE: 16 BRPM | BODY MASS INDEX: 36.43 KG/M2

## 2021-04-22 DIAGNOSIS — S29.9XXA TRAUMATIC INJURY OF RIB: Primary | ICD-10-CM

## 2021-04-22 PROCEDURE — 99213 OFFICE O/P EST LOW 20 MIN: CPT | Performed by: NURSE PRACTITIONER

## 2021-04-22 PROCEDURE — 71101 X-RAY EXAM UNILAT RIBS/CHEST: CPT | Mod: LT | Performed by: RADIOLOGY

## 2021-04-22 RX ORDER — HYDROCODONE BITARTRATE AND ACETAMINOPHEN 5; 325 MG/1; MG/1
1 TABLET ORAL EVERY 4 HOURS PRN
Qty: 18 TABLET | Refills: 0 | Status: SHIPPED | OUTPATIENT
Start: 2021-04-22 | End: 2021-04-27

## 2021-04-22 RX ORDER — METHOCARBAMOL 500 MG/1
500 TABLET, FILM COATED ORAL 4 TIMES DAILY PRN
Qty: 30 TABLET | Refills: 1 | Status: SHIPPED | OUTPATIENT
Start: 2021-04-22 | End: 2021-07-08

## 2021-04-22 ASSESSMENT — MIFFLIN-ST. JEOR: SCORE: 1622.98

## 2021-04-22 NOTE — PROGRESS NOTES
"    Assessment & Plan     Traumatic injury of rib    - XR Ribs & Chest Left G/E 3 Views  - CT Chest w/o Contrast; Future  - HYDROcodone-acetaminophen (NORCO) 5-325 MG tablet; Take 1 tablet by mouth every 4 hours as needed for moderate to severe pain or severe pain  - methocarbamol (ROBAXIN) 500 MG tablet; Take 1 tablet (500 mg) by mouth 4 times daily as needed for muscle spasms             BMI:   Estimated body mass index is 36.63 kg/m  as calculated from the following:    Height as of this encounter: 1.626 m (5' 4\").    Weight as of this encounter: 96.8 kg (213 lb 6.4 oz).       FURTHER TESTING:       - CT - chest    FUTURE APPOINTMENTS:       - Follow-up visit in 2 weeks, sooner PRN new or worsening symptoms. Imaging pending.     Patient Instructions     Patient Education     Rib Contusion or Minor Fracture    A rib contusion is a bruise to one or more rib bones. It may cause pain, tenderness, swelling, and a purplish color to the skin. There may be a sharp pain with each breath. A rib contusion takes anywhere from a few days to a few weeks to heal. A minor rib fracture or break may cause the same symptoms as a rib contusion. The small crack may not be seen on a regular chest X-ray. Treatment for both problems is basically the same.  Home care    You may use over-the-counter pain medicine to control pain, unless another pain medicine was prescribed. If you have chronic liver or kidney disease or ever had a stomach ulcer or GI (gastrointestinal) bleeding, talk with your healthcare provider before using these medicines.    Rest. Don't lift anything heavy or do any activity that causes pain.    Apply an ice pack over the injured area for 15 to 20 minutes every 1 to 2 hours. You should do this for the first 24 to 48 hours. To make an ice pack, put ice cubes in a plastic bag that seals at the top. Wrap the bag in a clean, thin towel or cloth. Never put ice or an ice pack directly on the skin. Continue with ice packs as " needed for the relief of pain and swelling.    The first 3 to 4 weeks of healing will be the most painful. If your pain is not under control with the treatment given, call your healthcare provider. Sometimes a stronger pain medicine may be needed. A nerve block can be done in case of severe pain. It will numb the nerve between the ribs.  Follow-up care  Follow up with your healthcare provider, or as advised.  If X-rays were taken, you will be told of any new findings that may affect your care.  Call 911  Call 911 if you have:    Dizziness, weakness or fainting    Shortness of breath with or without chest discomfort    New or worsening pain  When to seek medical advice  Call your healthcare provider right away if any of these occur:    Fever of 100.4 F (38 C) or higher, or as directed by your healthcare provider    Chills    Stomach pain, vomiting  Nikhil last reviewed this educational content on 6/1/2018 2000-2021 [employer]. All rights reserved. This information is not intended as a substitute for professional medical care. Always follow your healthcare professional's instructions.               No follow-ups on file.    TRUMAN [facility]   Lin Price is a 40 year old who presents for the following health issues     HPI     Musculoskeletal problem/pain  Onset/Duration: 10 days   Description  Location: rib cage - left  Joint Swelling: no  Redness: YES- bruising   Pain: YES- sharp stabbing, pinching feeling   Warmth: YES- little bit   Intensity:  7/10, nausea from pain when it peaks   Progression of Symptoms:  Worsening, breath is feeling shorter  Accompanying signs and symptoms:   Fevers: no  Numbness/tingling/weakness: YES- weakness  History  Trauma to the area: YES- got pinned between a horse and a tree  Recent illness:  no  Previous similar problem: YES  Previous evaluation:  YES- seen 4/19, nothing seen on  "xray  Precipitating or alleviating factors:  Aggravating factors include: sitting, walking, climbing stairs, lifting, exercise, overuse and lying down   Therapies tried and outcome: heat, ice, acetaminophen, Ibuprofen and chiropractor-not effective        Review of Systems   Constitutional, HEENT, cardiovascular, pulmonary, gi and gu systems are negative, except as otherwise noted.      Objective    BP (!) 132/100   Pulse 81   Temp 98.3  F (36.8  C) (Tympanic)   Resp 16   Ht 1.626 m (5' 4\")   Wt 96.8 kg (213 lb 6.4 oz)   LMP 01/06/2021   SpO2 97%   BMI 36.63 kg/m    Body mass index is 36.63 kg/m .  Physical Exam   GENERAL: alert and mild distress  RESP: lungs clear to auscultation - no rales, rhonchi or wheezes  CV: regular rates and rhythm, normal S1 S2, no S3 or S4, no murmur, click or rub and no peripheral edema  MS: tenderness to palpation - left postero-lateral ribs, no midline tenderness  SKIN: no suspicious lesions or rashes. Edema to left upper back, no ecchymosis, no palpable subcutaneous emphysema  NEURO: Normal strength and tone, mentation intact and speech normal    Results for orders placed or performed in visit on 04/22/21 (from the past 24 hour(s))   XR Ribs & Chest Left G/E 3 Views    Narrative    XR LEFT RIBS AND CHEST THREE VIEWS   4/22/2021 3:16 PM     HISTORY: Left rib pain after injury.    COMPARISON: 4/19/2021 x-ray.      Impression    IMPRESSION: Lungs are clear. No pneumothorax. No evidence of rib  fracture. No significant change.               "

## 2021-04-22 NOTE — PATIENT INSTRUCTIONS
Patient Education     Rib Contusion or Minor Fracture    A rib contusion is a bruise to one or more rib bones. It may cause pain, tenderness, swelling, and a purplish color to the skin. There may be a sharp pain with each breath. A rib contusion takes anywhere from a few days to a few weeks to heal. A minor rib fracture or break may cause the same symptoms as a rib contusion. The small crack may not be seen on a regular chest X-ray. Treatment for both problems is basically the same.  Home care    You may use over-the-counter pain medicine to control pain, unless another pain medicine was prescribed. If you have chronic liver or kidney disease or ever had a stomach ulcer or GI (gastrointestinal) bleeding, talk with your healthcare provider before using these medicines.    Rest. Don't lift anything heavy or do any activity that causes pain.    Apply an ice pack over the injured area for 15 to 20 minutes every 1 to 2 hours. You should do this for the first 24 to 48 hours. To make an ice pack, put ice cubes in a plastic bag that seals at the top. Wrap the bag in a clean, thin towel or cloth. Never put ice or an ice pack directly on the skin. Continue with ice packs as needed for the relief of pain and swelling.    The first 3 to 4 weeks of healing will be the most painful. If your pain is not under control with the treatment given, call your healthcare provider. Sometimes a stronger pain medicine may be needed. A nerve block can be done in case of severe pain. It will numb the nerve between the ribs.  Follow-up care  Follow up with your healthcare provider, or as advised.  If X-rays were taken, you will be told of any new findings that may affect your care.  Call 911  Call 911 if you have:    Dizziness, weakness or fainting    Shortness of breath with or without chest discomfort    New or worsening pain  When to seek medical advice  Call your healthcare provider right away if any of these occur:    Fever of 100.4 F  (38 C) or higher, or as directed by your healthcare provider    Chills    Stomach pain, vomiting  Nikhil last reviewed this educational content on 6/1/2018 2000-2021 [employer]. All rights reserved. This information is not intended as a substitute for professional medical care. Always follow your healthcare professional's instructions.

## 2021-04-23 ENCOUNTER — HOSPITAL ENCOUNTER (OUTPATIENT)
Dept: CT IMAGING | Facility: CLINIC | Age: 41
Discharge: HOME | End: 2021-04-23
Attending: NURSE PRACTITIONER | Admitting: NURSE PRACTITIONER
Payer: COMMERCIAL

## 2021-04-23 ENCOUNTER — MYC MEDICAL ADVICE (OUTPATIENT)
Dept: FAMILY MEDICINE | Facility: CLINIC | Age: 41
End: 2021-04-23

## 2021-04-23 DIAGNOSIS — S29.9XXA TRAUMATIC INJURY OF RIB: ICD-10-CM

## 2021-04-23 PROCEDURE — 71250 CT THORAX DX C-: CPT

## 2021-04-27 ENCOUNTER — MYC MEDICAL ADVICE (OUTPATIENT)
Dept: FAMILY MEDICINE | Facility: CLINIC | Age: 41
End: 2021-04-27

## 2021-04-27 DIAGNOSIS — S29.9XXA TRAUMATIC INJURY OF RIB: ICD-10-CM

## 2021-04-27 RX ORDER — HYDROCODONE BITARTRATE AND ACETAMINOPHEN 5; 325 MG/1; MG/1
1 TABLET ORAL EVERY 4 HOURS PRN
Qty: 40 TABLET | Refills: 0 | Status: SHIPPED | OUTPATIENT
Start: 2021-04-27 | End: 2021-08-27

## 2021-04-28 ENCOUNTER — E-VISIT (OUTPATIENT)
Dept: URGENT CARE | Facility: CLINIC | Age: 41
End: 2021-04-28
Payer: COMMERCIAL

## 2021-04-28 DIAGNOSIS — Z20.822 SUSPECTED COVID-19 VIRUS INFECTION: ICD-10-CM

## 2021-04-28 DIAGNOSIS — Z20.822 SUSPECTED COVID-19 VIRUS INFECTION: Primary | ICD-10-CM

## 2021-04-28 LAB
SARS-COV-2 RNA RESP QL NAA+PROBE: NORMAL
SPECIMEN SOURCE: NORMAL

## 2021-04-28 PROCEDURE — U0005 INFEC AGEN DETEC AMPLI PROBE: HCPCS | Performed by: PHYSICIAN ASSISTANT

## 2021-04-28 PROCEDURE — U0003 INFECTIOUS AGENT DETECTION BY NUCLEIC ACID (DNA OR RNA); SEVERE ACUTE RESPIRATORY SYNDROME CORONAVIRUS 2 (SARS-COV-2) (CORONAVIRUS DISEASE [COVID-19]), AMPLIFIED PROBE TECHNIQUE, MAKING USE OF HIGH THROUGHPUT TECHNOLOGIES AS DESCRIBED BY CMS-2020-01-R: HCPCS | Performed by: PHYSICIAN ASSISTANT

## 2021-04-28 PROCEDURE — 99421 OL DIG E/M SVC 5-10 MIN: CPT | Performed by: PHYSICIAN ASSISTANT

## 2021-04-28 NOTE — PATIENT INSTRUCTIONS
Dear Lin Corona,    Your symptoms show that you may have coronavirus (COVID-19). This illness can cause fever, cough and trouble breathing. Many people get a mild case and get better on their own. Some people can get very sick.    Will I be tested for COVID-19?  We would like to test you for Covid-19 virus. I have placed orders for this test.     To schedule: go to your [employer] home page and scroll down to the section that says  You have an appointment that needs to be scheduled  and click the large green button that says  Schedule Now  and follow the steps to find the next available openings.    If you are unable to complete these [employer] scheduling steps, please call [phone] to schedule your testing.     Return to work/school/ guidance:  Please let your workplace manager and staffing office know when your quarantine ends     We can t give you an exact date as it depends on the above. You can calculate this on your own or work with your manager/staffing office to calculate this. (For example if you were exposed on 10/4, you would have to quarantine for 14 full days. That would be through 10/18. You could return on 10/19.)      If you receive a positive COVID-19 test result, follow the guidance of the those who are giving you the results. Usually the return to work is 10 (or in some cases 20 days from symptom onset.) If you work at [facility], you must also be cleared by Employee Occupational Health and Safety to return to work.        If you receive a negative COVID-19 test result and did not have a high risk exposure to someone with a known positive COVID-19 test, you can return to work once you're free of fever for 24 hours without fever-reducing medication and your symptoms are improving or resolved.      If you receive a negative COVID-19 test and If you had a high risk exposure to someone who has tested positive for COVID-19 then you can return to work 14 days after your last contact  with the positive individual    Note: If you have ongoing exposure to the covid positive person, this quarantine period may be more than 14 days. (For example, if you are continued to be exposed to your child who tested positive and cannot isolate from them, then the quarantine of 7-14 days can't start until your child is no longer contagious. This is typically 10 days from onset of the child's symptoms. So the total duration may be 17-24 days in this case.)    Sign up for [employer].   We know it's scary to hear that you might have COVID-19. We want to track your symptoms to make sure you're okay over the next 2 weeks. Please look for an email from [employer]--this is a free, online program that we'll use to keep in touch. To sign up, follow the link in the email you will receive. Learn more at [email or web address]    How can I take care of myself?    Get lots of rest. Drink extra fluids (unless a doctor has told you not to)    Take Tylenol (acetaminophen) or ibuprofen for fever or pain. If you have liver or kidney problems, ask your family doctor if it's okay to take Tylenol o ibuprofen    If you have other health problems (like cancer, heart failure, an organ transplant or severe kidney disease): Call your specialty clinic if you don't feel better in the next 2 days.    Know when to call 911. Emergency warning signs include:  o Trouble breathing or shortness of breath  o Pain or pressure in the chest that doesn't go away  o Feeling confused like you haven't felt before, or not being able to wake up  o Bluish-colored lips or face    Where can I get more information?  [facility] - About COVID-19:   [email or web address]    CDC - What to Do If You're Sick:   www.cdc.gov/coronavirus/2019-ncov/about/steps-when-sick.html    April 28, 2021  RE:  Lin Corona                                                                                                                  [id number]  [place] [address]      To whom it may concern:    I evaluated Lin Corona on April 28, 2021. Lin Corona should be excused from work/school.     They should let their workplace manager and staffing office know when their quarantine ends.    We can not give an exact date as it depends on the information below. They can calculate this on their own or work with their manager/staffing office to calculate this. (For example if they were exposed on 10/04, they would have to quarantine for 14 full days. That would be through 10/18. They could return on 10/19.)    Quarantine Guidelines:      If patient receives a positive COVID-19 test result, they should follow the guidance of those who are giving the results. Usually the return to work is 10 (or in some cases 20 days from symptom onset.) If they work at [employer], they must be cleared by Employee Occupational Health and Safety to return to work.        If patient receives a negative COVID-19 test result and did not have a high risk exposure to someone with a known positive COVID-19 test, they can return to work once they're free of fever for 24 hours without fever-reducing medication and their symptoms are improving or resolved.      If patient receives a negative COVID-19 test and if they had a high risk exposure to someone who has tested positive for COVID-19 then they can return to work 14 days after their last contact with the positive individual    Note: If there is ongoing exposure to the covid positive person, this quarantine period may be longer than 14 days. (For example, if they are continually exposed to their child, who tested positive and cannot isolate from them, then the quarantine of 7-14 days can't start until their child is no longer contagious. This is typically 10 days from onset to the child's symptoms. So the total duration may be 17-24 days in this case.)     Sincerely,  Soraida Raymundo PA-C

## 2021-04-29 LAB
LABORATORY COMMENT REPORT: NORMAL
SARS-COV-2 RNA RESP QL NAA+PROBE: NEGATIVE
SPECIMEN SOURCE: NORMAL

## 2021-06-23 ENCOUNTER — HOSPITAL ENCOUNTER (EMERGENCY)
Facility: CLINIC | Age: 41
Discharge: HOME | End: 2021-06-23
Attending: FAMILY MEDICINE | Admitting: FAMILY MEDICINE
Payer: COMMERCIAL

## 2021-06-23 VITALS
SYSTOLIC BLOOD PRESSURE: 150 MMHG | HEART RATE: 84 BPM | HEIGHT: 63 IN | DIASTOLIC BLOOD PRESSURE: 100 MMHG | WEIGHT: 206 LBS | TEMPERATURE: 98.2 F | BODY MASS INDEX: 36.5 KG/M2 | RESPIRATION RATE: 18 BRPM | OXYGEN SATURATION: 96 %

## 2021-06-23 DIAGNOSIS — S04.31XA INJURY OF RIGHT TRIGEMINAL NERVE, INITIAL ENCOUNTER: ICD-10-CM

## 2021-06-23 PROCEDURE — 99284 EMERGENCY DEPT VISIT MOD MDM: CPT | Performed by: FAMILY MEDICINE

## 2021-06-23 PROCEDURE — 250N000013 HC RX MED GY IP 250 OP 250 PS 637: Performed by: FAMILY MEDICINE

## 2021-06-23 PROCEDURE — 99283 EMERGENCY DEPT VISIT LOW MDM: CPT | Performed by: FAMILY MEDICINE

## 2021-06-23 RX ORDER — INDOMETHACIN 25 MG/1
CAPSULE ORAL
Qty: 20 CAPSULE | Refills: 0 | Status: SHIPPED | OUTPATIENT
Start: 2021-06-23 | End: 2021-08-27

## 2021-06-23 RX ORDER — INDOMETHACIN 25 MG/1
50 CAPSULE ORAL ONCE
Status: COMPLETED | OUTPATIENT
Start: 2021-06-23 | End: 2021-06-23

## 2021-06-23 RX ORDER — OXYCODONE HYDROCHLORIDE 5 MG/1
5 TABLET ORAL EVERY 6 HOURS PRN
Qty: 6 TABLET | Refills: 0 | Status: SHIPPED | OUTPATIENT
Start: 2021-06-23 | End: 2021-08-27

## 2021-06-23 RX ADMIN — INDOMETHACIN 50 MG: 25 CAPSULE ORAL at 14:59

## 2021-06-23 ASSESSMENT — MIFFLIN-ST. JEOR: SCORE: 1573.54

## 2021-06-23 NOTE — DISCHARGE INSTRUCTIONS
ICD-10-CM    1. Injury of right trigeminal nerve, initial encounter  S04.31XA     Absolutely no other NSAID (ibuprofen, naprosyn) with indomethacin.  inmdomethacin starting today - first dose here at 50 mg,  you can repeat this again tonight after 8-10 hours at 50 mg walways with food or milk.  tomorrow decrease to 25 mg orally twice to three times daily and then taper off over the next few days and as neeed onl;y at that time.  take tylenol 1000 mg orally every 6 hours scheduled for the next few days,  treat the tmj and follow-up with head and neck pain cliunic.  return for fever.  stay hydrated.   may use oxycodone for breakthrough pain at night

## 2021-06-23 NOTE — ED NOTES
Off and on right jaw pain and upper roof her mouth - was seen at head and neck pain clinic yesterday with CT done at that time. Patient was then started on prednisone and flexeril without any relief. Tearful in room on arrival - patient states she has tried taking old norco without any relief as well. Patient c/o headache, right side of face, head and ear pain.

## 2021-06-23 NOTE — ED PROVIDER NOTES
History     Chief Complaint   Patient presents with     Jaw Pain     Dx with TMJ/ painful jaw and tongue     HPI  Lin Corona is a 40 year old female who presents with a history of TMJ for which she has been saw an outpatient and then with [facility] in [place].  They Evaluated her yesterday she underwent CT that was performed yesterday.  The results are still pending.  She notes increased pain in that region from the examination.  She has had no obvious abscess or dental infection.  She was prescribed steroids and muscle relaxants.  Overnight she developed significant pain in the region of the right jaw and numbness sensation throughout the region of the jaw the palate the face.  This appears to follow only the right side and follow a trigeminal nerve distribution.  There is been no facial weakness or other neurologic deficits.  The pain is been severe sharp and interfered with sleep.  She feels that it is spasms in this area.  She has no obvious weakness.  There are no other acute changes.  She has a right-sided headache with this.    Allergies:  No Known Allergies    Problem List:    Patient Active Problem List    Diagnosis Date Noted     Chronic pelvic pain in female 11/18/2020     Priority: Medium     Added automatically from request for surgery [id number]       Abnormal uterine bleeding 11/18/2020     Priority: Medium     Added automatically from request for surgery [id number]       Endometriosis 10/28/2020     Priority: Medium     Pelvic pain in female 02/02/2012     Priority: Medium     Dysmenorrhea 02/02/2012     Priority: Medium        Past Medical History:    Past Medical History:   Diagnosis Date     Dysmenorrhea      Endometriosis        Past Surgical History:    Past Surgical History:   Procedure Laterality Date     APPENDECTOMY  7/13/2011     BREAST SURGERY  2000    Breast reduction     COLONOSCOPY  9/2011     CYSTOSCOPY N/A 1/11/2021    Procedure: CYSTOSCOPY;  Surgeon: Sami  "DO Jessica;  Location: [facility]     GYN SURGERY  1/2012    Colposcopy exam      LAPAROSCOPIC HYSTERECTOMY TOTAL, BILATERAL SALPINGO-OOPHORECTOMY, COMBINED N/A 1/11/2021    Procedure: HYSTERECTOMY, TOTAL, LAPAROSCOPIC, WITH BILATERAL SALPINGO-OOPHORECTOMY;  Surgeon: Jessica Gallardo DO;  Location: [facility]     SHOULDER SURGERY Right        Family History:    Family History   Problem Relation Age of Onset     Arthritis Mother      Heart Disease Father      Gallbladder Disease Father      Arthritis Maternal Grandmother      Unknown/Adopted Maternal Grandmother      C.A.D. Paternal Grandmother      Ovarian Cancer Paternal Grandmother      Cancer Paternal Grandmother      C.A.D. Paternal Grandfather      Cervical Cancer Cousin      Cervical Cancer Paternal Aunt        Social History:  Marital Status:   [2]  Social History     Tobacco Use     Smoking status: Former Smoker     Packs/day: 0.00     Smokeless tobacco: Never Used   Substance Use Topics     Alcohol use: Yes     Comment: occassional     Drug use: No        Medications:    indomethacin (INDOCIN) 25 MG capsule  oxyCODONE (ROXICODONE) 5 MG tablet  docusate sodium (COLACE) 100 MG capsule  HYDROcodone-acetaminophen (NORCO) 5-325 MG tablet  ibuprofen (ADVIL/MOTRIN) 600 MG tablet  methocarbamol (ROBAXIN) 500 MG tablet  norgestimate-ethinyl estradiol (ORTHO-CYCLEN) 0.25-35 MG-MCG tablet  triamcinolone (ARISTOCORT HP) 0.5 % external cream  triamcinolone (KENALOG) 0.1 % external ointment      Review of Systems    ROS:  5 point ROS negative except as noted above in HPI, including Gen., Resp., CV, GI &  system review.    Physical Exam   BP: (!) 150/100  Pulse: 84  Temp: 98.2  F (36.8  C)  Resp: 18  Height: 160 cm (5' 3\")  Weight: 93.4 kg (206 lb)  SpO2: 96 %      Physical Exam  Constitutional:       General: She is in acute distress.   HENT:      Head: Atraumatic.   Eyes:      Conjunctiva/sclera: Conjunctivae normal.   Cardiovascular:      Rate and Rhythm: Normal rate " and regular rhythm.      Heart sounds: No murmur.   Pulmonary:      Effort: No respiratory distress.      Breath sounds: Normal breath sounds. No stridor. No wheezing or rhonchi.   Musculoskeletal:      Right lower leg: No edema.      Left lower leg: No edema.   Skin:     Coloration: Skin is not pale.      Findings: No rash.   Neurological:      General: No focal deficit present.      Mental Status: She is alert.      Cranial Nerves: Cranial nerve deficit present.     There is pain at the right jaw that is severe.  There is tenderness to palpation in the region of the TMJ J joint but there is no obvious erythema swelling.  Oropharynx is clear and there is no mass palpated no abscess.  No obvious dental source of pain.  Her cranial nerves II through XII are intact with a sensation loss versus paresthesias in the right side of the face.  There is no obvious weakness.  There is no upper extremity or lower extremity weakness -neurologic exam is otherwise completely intact.    There is no adenopathy.      ED Course        Procedures               Critical Care time:  none               No results found for this or any previous visit (from the past 24 hour(s)).    Medications   indomethacin (INDOCIN) capsule 50 mg (has no administration in time range)       Assessments & Plan (with Medical Decision Making)     MDM: Lin Corona is a 40 year old female who presents after dental evaluation performed in the last day for TMJ now with significant trigeminal related pain numbness.  She tells me there was no injection with this.  She did undergo CT which is currently pending and I do not have access via care everywhere for this imaging.  Did ask for it to be sent to me.  Her findings are all in the trigeminal nerve distribution suspect this is related to trigeminal headache.  There are paresthesias in this region.  It is too short of a time to be trigeminal neuralgia but similar type headache that she has that is likely  hemicrania syndrome.  Recommended that she go on indomethacin currently with food or milk.  No NSAIDs.  Discussed the use of oxycodone only for breakthrough pain.  PDMP is reviewed.  Asked her to follow-up with her primary provider on Friday.  Asked her to also call her pain clinic and let her know of these new symptoms that are in the trigeminal nerve distribution.  There are no systemic neurologic changes that would suggest other evaluation.    PDMP Review       Value Time User    State PDMP site checked  Yes 6/23/2021  2:53 PM Weston Ahumada MD            I have reviewed the nursing notes.    I have reviewed the findings, diagnosis, plan and need for follow up with the patient.       New Prescriptions    INDOMETHACIN (INDOCIN) 25 MG CAPSULE    2 tabs twice to three times daily today, then 25 mg orally three times daily for the next 3-4 days, then as needed - always with food or milk    OXYCODONE (ROXICODONE) 5 MG TABLET    Take 1 tablet (5 mg) by mouth every 6 hours as needed for severe pain       Final diagnoses:   Injury of right trigeminal nerve, initial encounter - Absolutely no other NSAID (ibuprofen, naprosyn) with indomethacin.  inmdomethacin starting today - first dose here at 50 mg,  you can repeat this again tonight after 8-10 hours at 50 mg walways with food or milk.  tomorrow decrease to 25 mg orally twice to three times daily and then taper off over the next few days and as neeed onl;y at that time.  take tylenol 1000 mg orally every 6 hours scheduled for the next few days,  treat the tmj and follow-up with head and neck pain cliunic.  return for fever.  stay hydrated.   may use oxycodone for breakthrough pain at night       6/23/2021   [facility] EMERGENCY DEPT     Weston Ahumada MD  06/23/21 2009

## 2021-06-23 NOTE — ED NOTES
[facility] called back stating that CT was done yesterday and hasn't been read yet.  Unable to fax information to us.

## 2021-07-07 DIAGNOSIS — S29.9XXA TRAUMATIC INJURY OF RIB: ICD-10-CM

## 2021-07-08 RX ORDER — METHOCARBAMOL 500 MG/1
TABLET, FILM COATED ORAL
Qty: 60 TABLET | Refills: 1 | Status: SHIPPED | OUTPATIENT
Start: 2021-07-08 | End: 2022-01-19

## 2021-07-29 ENCOUNTER — OFFICE VISIT (OUTPATIENT)
Dept: DERMATOLOGY | Facility: CLINIC | Age: 41
End: 2021-07-29
Payer: COMMERCIAL

## 2021-07-29 VITALS — HEART RATE: 60 BPM | OXYGEN SATURATION: 98 % | SYSTOLIC BLOOD PRESSURE: 126 MMHG | DIASTOLIC BLOOD PRESSURE: 85 MMHG

## 2021-07-29 DIAGNOSIS — L20.9 ATOPIC DERMATITIS, UNSPECIFIED TYPE: Primary | ICD-10-CM

## 2021-07-29 LAB
ALBUMIN SERPL-MCNC: 3.6 G/DL (ref 3.4–5)
ALP SERPL-CCNC: 52 U/L (ref 40–150)
ALT SERPL W P-5'-P-CCNC: 20 U/L (ref 0–50)
ANION GAP SERPL CALCULATED.3IONS-SCNC: 7 MMOL/L (ref 3–14)
AST SERPL W P-5'-P-CCNC: 16 U/L (ref 0–45)
BILIRUB SERPL-MCNC: 0.5 MG/DL (ref 0.2–1.3)
BUN SERPL-MCNC: 15 MG/DL (ref 7–30)
CALCIUM SERPL-MCNC: 9.5 MG/DL (ref 8.5–10.1)
CHLORIDE BLD-SCNC: 104 MMOL/L (ref 94–109)
CO2 SERPL-SCNC: 24 MMOL/L (ref 20–32)
CREAT SERPL-MCNC: 0.69 MG/DL (ref 0.52–1.04)
ERYTHROCYTE [DISTWIDTH] IN BLOOD BY AUTOMATED COUNT: 12 % (ref 10–15)
GFR SERPL CREATININE-BSD FRML MDRD: >90 ML/MIN/1.73M2
GLUCOSE BLD-MCNC: 98 MG/DL (ref 70–99)
HCT VFR BLD AUTO: 40.3 % (ref 35–47)
HGB BLD-MCNC: 13.5 G/DL (ref 11.7–15.7)
MCH RBC QN AUTO: 32 PG (ref 26.5–33)
MCHC RBC AUTO-ENTMCNC: 33.5 G/DL (ref 31.5–36.5)
MCV RBC AUTO: 96 FL (ref 78–100)
PLATELET # BLD AUTO: 219 10E3/UL (ref 150–450)
POTASSIUM BLD-SCNC: 4.4 MMOL/L (ref 3.4–5.3)
PROT SERPL-MCNC: 7.7 G/DL (ref 6.8–8.8)
RBC # BLD AUTO: 4.22 10E6/UL (ref 3.8–5.2)
SODIUM SERPL-SCNC: 135 MMOL/L (ref 133–144)
WBC # BLD AUTO: 7.5 10E3/UL (ref 4–11)

## 2021-07-29 PROCEDURE — 85027 COMPLETE CBC AUTOMATED: CPT | Performed by: PHYSICIAN ASSISTANT

## 2021-07-29 PROCEDURE — 80053 COMPREHEN METABOLIC PANEL: CPT | Performed by: PHYSICIAN ASSISTANT

## 2021-07-29 PROCEDURE — 36415 COLL VENOUS BLD VENIPUNCTURE: CPT | Performed by: PHYSICIAN ASSISTANT

## 2021-07-29 PROCEDURE — 87340 HEPATITIS B SURFACE AG IA: CPT | Performed by: PHYSICIAN ASSISTANT

## 2021-07-29 PROCEDURE — 86803 HEPATITIS C AB TEST: CPT | Performed by: PHYSICIAN ASSISTANT

## 2021-07-29 PROCEDURE — 86481 TB AG RESPONSE T-CELL SUSP: CPT | Performed by: PHYSICIAN ASSISTANT

## 2021-07-29 PROCEDURE — 11900 INJECT SKIN LESIONS </W 7: CPT | Performed by: PHYSICIAN ASSISTANT

## 2021-07-29 PROCEDURE — 99213 OFFICE O/P EST LOW 20 MIN: CPT | Mod: 25 | Performed by: PHYSICIAN ASSISTANT

## 2021-07-29 RX ORDER — DUPILUMAB 300 MG/2ML
600 INJECTION, SOLUTION SUBCUTANEOUS ONCE
Qty: 4 ML | Refills: 0 | OUTPATIENT
Start: 2021-07-29 | End: 2021-09-02

## 2021-07-29 RX ORDER — CLOBETASOL PROPIONATE 0.5 MG/G
CREAM TOPICAL
Qty: 45 G | Refills: 4 | Status: SHIPPED | OUTPATIENT
Start: 2021-07-29 | End: 2022-03-10

## 2021-07-29 RX ORDER — DUPILUMAB 300 MG/2ML
300 INJECTION, SOLUTION SUBCUTANEOUS
Qty: 4 ML | Refills: 11 | OUTPATIENT
Start: 2021-07-29 | End: 2021-09-02

## 2021-07-29 NOTE — LETTER
7/29/2021         RE: Lin Corona  [id number] [address] [place] [id number]        Dear Colleague,    Thank you for referring your patient, Lin Corona, to the [facility]. Please see a copy of my visit note below.    .  Lin Corona is an extremely pleasant 41 year old year old female patient here today for recheck atopic dermatitis. She has tried moisturizers and topical steroids. She notes her hands are very itchy. Present for many years. Patient has no other skin complaints today.  Remainder of the HPI, Meds, PMH, Allergies, FH, and SH was reviewed in chart.    Pertinent Hx:   Atopic dermatitis   Past Medical History:   Diagnosis Date     Dysmenorrhea      Endometriosis        Past Surgical History:   Procedure Laterality Date     APPENDECTOMY  7/13/2011     BREAST SURGERY  2000    Breast reduction     COLONOSCOPY  9/2011     CYSTOSCOPY N/A 1/11/2021    Procedure: CYSTOSCOPY;  Surgeon: Jessica Gallardo DO;  Location: [facility]     GYN SURGERY  1/2012    Colposcopy exam      LAPAROSCOPIC HYSTERECTOMY TOTAL, BILATERAL SALPINGO-OOPHORECTOMY, COMBINED N/A 1/11/2021    Procedure: HYSTERECTOMY, TOTAL, LAPAROSCOPIC, WITH BILATERAL SALPINGO-OOPHORECTOMY;  Surgeon: Jessica Gallardo DO;  Location: [facility]     SHOULDER SURGERY Right         Family History   Problem Relation Age of Onset     Arthritis Mother      Heart Disease Father      Gallbladder Disease Father      Arthritis Maternal Grandmother      Unknown/Adopted Maternal Grandmother      C.A.D. Paternal Grandmother      Ovarian Cancer Paternal Grandmother      Cancer Paternal Grandmother      C.A.D. Paternal Grandfather      Cervical Cancer Cousin      Cervical Cancer Paternal Aunt        Social History     Socioeconomic History     Marital status:      Spouse name: Not on file     Number of children: Not on file     Years of education: Not on file     Highest education level: Not on file   Occupational History     Occupation:     Tobacco Use     Smoking status: Former Smoker     Packs/day: 0.00     Smokeless tobacco: Never Used   Substance and Sexual Activity     Alcohol use: Yes     Comment: occassional     Drug use: No     Sexual activity: Yes     Partners: Male     Birth control/protection: Inserts/Ring     Comment: Nuvaring   Other Topics Concern     Parent/sibling w/ CABG, MI or angioplasty before 65F 55M? Not Asked   Social History Narrative     Not on file     Social Determinants of Health     Financial Resource Strain:      Difficulty of Paying Living Expenses:    Food Insecurity:      Worried About Running Out of Food in the Last Year:      Ran Out of Food in the Last Year:    Transportation Needs:      Lack of Transportation (Medical):      Lack of Transportation (Non-Medical):    Physical Activity:      Days of Exercise per Week:      Minutes of Exercise per Session:    Stress:      Feeling of Stress :    Social Connections:      Frequency of Communication with Friends and Family:      Frequency of Social Gatherings with Friends and Family:      Attends Restorationism Services:      Active Member of Clubs or Organizations:      Attends Club or Organization Meetings:      Marital Status:    Intimate Partner Violence:      Fear of Current or Ex-Partner:      Emotionally Abused:      Physically Abused:      Sexually Abused:        Outpatient Encounter Medications as of 2021   Medication Sig Dispense Refill     clobetasol (TEMOVATE) 0.05 % external cream Apply twice daily as needed. 45 g 4     [] Dupilumab (DUPIXENT) 300 MG/2ML SOPN Inject 600 mg Subcutaneous once for 1 dose 4 mL 0     Dupilumab (DUPIXENT) 300 MG/2ML SOPN Inject 300 mg Subcutaneous every 14 days 4 mL 11     norgestimate-ethinyl estradiol (ORTHO-CYCLEN) 0.25-35 MG-MCG tablet Take 1 tablet by mouth daily 84 tablet 4     triamcinolone (ARISTOCORT HP) 0.5 % external cream Apply topically 2 times daily 15 g 1     triamcinolone (KENALOG) 0.1 % external  ointment Apply topically 2 times daily 30 g 1     docusate sodium (COLACE) 100 MG capsule Take 1 capsule (100 mg) by mouth 2 times daily as needed for constipation (Patient not taking: Reported on 7/29/2021) 90 capsule 4     HYDROcodone-acetaminophen (NORCO) 5-325 MG tablet Take 1 tablet by mouth every 4 hours as needed for moderate to severe pain or severe pain 40 tablet 0     ibuprofen (ADVIL/MOTRIN) 600 MG tablet Take 1 tablet (600 mg) by mouth every 6 hours as needed for pain (mild) 30 tablet 0     indomethacin (INDOCIN) 25 MG capsule 2 tabs twice to three times daily today, then 25 mg orally three times daily for the next 3-4 days, then as needed - always with food or milk 20 capsule 0     methocarbamol (ROBAXIN) 500 MG tablet TAKE ONE TABLET BY MOUTH FOUR TIMES A DAY AS NEEDED FOR MUSCLE SPASMS (Patient not taking: Reported on 7/29/2021) 60 tablet 1     oxyCODONE (ROXICODONE) 5 MG tablet Take 1 tablet (5 mg) by mouth every 6 hours as needed for severe pain 6 tablet 0     No facility-administered encounter medications on file as of 7/29/2021.             O:   NAD, WDWN, Alert & Oriented, Mood & Affect wnl, Vitals stable   Here today alone   /85 (BP Location: Right arm, Patient Position: Sitting, Cuff Size: Adult Large)   Pulse 60   LMP 01/06/2021   SpO2 98%    General appearance normal   Vitals stable   Alert, oriented and in no acute distress     Thicker eczematous plaques on hands  Eczematous plaques on neck, back, elbow       Eyes: Conjunctivae/lids:Normal     ENT: Lips: normal    MSK:Normal    Pulm: Breathing Normal    Neuro/Psych: Orientation:Alert and Orientedx3 ; Mood/Affect:normal   A/P:  1. Atopic Dermatitis   Discussed start dupixent. Apply clobetasol cream twice daily as needed.   Check baseline labs.   IL TAC: PGACAC discussed.  Risks including but not limited to injection site reaction, bruising, no resolution.  All questions answered and entertained to patient s satisfaction.  Informed  consent obtained.  IL TAC in concentration of 10mg/ml was injected ID to eczema on hands.  Total injected was  0.3 ml.  Patient tolerated without complications and given wound care instructions, including not to move product around.  Return in 4 weeks for follow-up and possible additional IL TAC.    Skin care regimen reviewed with patient: Eliminate harsh soaps, i.e. Dial, zest, irsih spring; Mild soaps such as Cetaphil or Dove sensitive skin, avoid hot or cold showers, aggressive use of emollients including vanicream, cetaphil or cerave discussed with patient.    Return to clinic in 3 months.       Again, thank you for allowing me to participate in the care of your patient.        Sincerely,        Diana Lincoln PA-C

## 2021-07-29 NOTE — NURSING NOTE
Chief Complaint   Patient presents with     Rash       Vitals:    07/29/21 1030   BP: 126/85   BP Location: Right arm   Patient Position: Sitting   Cuff Size: Adult Large   Pulse: 60   SpO2: 98%     Wt Readings from Last 1 Encounters:   06/23/21 93.4 kg (206 lb)       Yesenia Culver LPN .................7/29/2021

## 2021-07-30 LAB
HBV SURFACE AG SERPL QL IA: NONREACTIVE
HCV AB SERPL QL IA: NONREACTIVE

## 2021-08-01 LAB
GAMMA INTERFERON BACKGROUND BLD IA-ACNC: 0 IU/ML
M TB IFN-G BLD-IMP: NEGATIVE
M TB IFN-G CD4+ BCKGRND COR BLD-ACNC: 10 IU/ML
MITOGEN IGNF BCKGRD COR BLD-ACNC: 0 IU/ML
MITOGEN IGNF BCKGRD COR BLD-ACNC: 0.01 IU/ML
QUANTIFERON MITOGEN: 10 IU/ML
QUANTIFERON NIL TUBE: 0 IU/ML
QUANTIFERON TB1 TUBE: 0.01 IU/ML
QUANTIFERON TB2 TUBE: 0

## 2021-08-02 ENCOUNTER — TELEPHONE (OUTPATIENT)
Dept: DERMATOLOGY | Facility: CLINIC | Age: 41
End: 2021-08-02

## 2021-08-02 DIAGNOSIS — L20.9 ATOPIC DERMATITIS, UNSPECIFIED TYPE: Primary | ICD-10-CM

## 2021-08-02 RX ORDER — TACROLIMUS 1 MG/G
OINTMENT TOPICAL
Qty: 30 G | Refills: 1 | Status: SHIPPED | OUTPATIENT
Start: 2021-08-02 | End: 2022-03-10

## 2021-08-02 NOTE — TELEPHONE ENCOUNTER
Patient wants us to try light therapy or oral immunosuppressant mediation and topical protopic first. Does patient's schedule allow for light therapy?

## 2021-08-02 NOTE — TELEPHONE ENCOUNTER
Plan requires the following in order to cover Dupixent:    Elidel or Protopic trial       Light therapy or immunosuppression trial:      The also want scores

## 2021-08-02 NOTE — TELEPHONE ENCOUNTER
Spoke to patient and advised of notes below. She wants to think about it and I did send a My chart message with requirements from insurance so she can look up these medications as well.     She will let us know what she decides to try. I did advise NBUVB therapy is usually 2 to 3 times a week for 2 to 3 months and then if doing well on light therapy, most insurers will cover a home light machine...     Angelita Lopez RN

## 2021-08-02 NOTE — PROGRESS NOTES
.  Lin Corona is an extremely pleasant 41 year old year old female patient here today for recheck atopic dermatitis. She has tried moisturizers and topical steroids. She notes her hands are very itchy. Present for many years. Patient has no other skin complaints today.  Remainder of the HPI, Meds, PMH, Allergies, FH, and SH was reviewed in chart.    Pertinent Hx:   Atopic dermatitis   Past Medical History:   Diagnosis Date     Dysmenorrhea      Endometriosis        Past Surgical History:   Procedure Laterality Date     APPENDECTOMY  7/13/2011     BREAST SURGERY  2000    Breast reduction     COLONOSCOPY  9/2011     CYSTOSCOPY N/A 1/11/2021    Procedure: CYSTOSCOPY;  Surgeon: Jessica Gallardo DO;  Location: [facility]     GYN SURGERY  1/2012    Colposcopy exam      LAPAROSCOPIC HYSTERECTOMY TOTAL, BILATERAL SALPINGO-OOPHORECTOMY, COMBINED N/A 1/11/2021    Procedure: HYSTERECTOMY, TOTAL, LAPAROSCOPIC, WITH BILATERAL SALPINGO-OOPHORECTOMY;  Surgeon: Jessica Gallardo DO;  Location: [facility]     SHOULDER SURGERY Right         Family History   Problem Relation Age of Onset     Arthritis Mother      Heart Disease Father      Gallbladder Disease Father      Arthritis Maternal Grandmother      Unknown/Adopted Maternal Grandmother      C.A.D. Paternal Grandmother      Ovarian Cancer Paternal Grandmother      Cancer Paternal Grandmother      C.A.D. Paternal Grandfather      Cervical Cancer Cousin      Cervical Cancer Paternal Aunt        Social History     Socioeconomic History     Marital status:      Spouse name: Not on file     Number of children: Not on file     Years of education: Not on file     Highest education level: Not on file   Occupational History     Occupation:    Tobacco Use     Smoking status: Former Smoker     Packs/day: 0.00     Smokeless tobacco: Never Used   Substance and Sexual Activity     Alcohol use: Yes     Comment: occassional     Drug use: No     Sexual activity: Yes     Partners:  Male     Birth control/protection: Inserts/Ring     Comment: Nuvaring   Other Topics Concern     Parent/sibling w/ CABG, MI or angioplasty before 65F 55M? Not Asked   Social History Narrative     Not on file     Social Determinants of Health     Financial Resource Strain:      Difficulty of Paying Living Expenses:    Food Insecurity:      Worried About Running Out of Food in the Last Year:      Ran Out of Food in the Last Year:    Transportation Needs:      Lack of Transportation (Medical):      Lack of Transportation (Non-Medical):    Physical Activity:      Days of Exercise per Week:      Minutes of Exercise per Session:    Stress:      Feeling of Stress :    Social Connections:      Frequency of Communication with Friends and Family:      Frequency of Social Gatherings with Friends and Family:      Attends Judaism Services:      Active Member of Clubs or Organizations:      Attends Club or Organization Meetings:      Marital Status:    Intimate Partner Violence:      Fear of Current or Ex-Partner:      Emotionally Abused:      Physically Abused:      Sexually Abused:        Outpatient Encounter Medications as of 2021   Medication Sig Dispense Refill     clobetasol (TEMOVATE) 0.05 % external cream Apply twice daily as needed. 45 g 4     [] Dupilumab (DUPIXENT) 300 MG/2ML SOPN Inject 600 mg Subcutaneous once for 1 dose 4 mL 0     Dupilumab (DUPIXENT) 300 MG/2ML SOPN Inject 300 mg Subcutaneous every 14 days 4 mL 11     norgestimate-ethinyl estradiol (ORTHO-CYCLEN) 0.25-35 MG-MCG tablet Take 1 tablet by mouth daily 84 tablet 4     triamcinolone (ARISTOCORT HP) 0.5 % external cream Apply topically 2 times daily 15 g 1     triamcinolone (KENALOG) 0.1 % external ointment Apply topically 2 times daily 30 g 1     docusate sodium (COLACE) 100 MG capsule Take 1 capsule (100 mg) by mouth 2 times daily as needed for constipation (Patient not taking: Reported on 2021) 90 capsule 4      HYDROcodone-acetaminophen (NORCO) 5-325 MG tablet Take 1 tablet by mouth every 4 hours as needed for moderate to severe pain or severe pain 40 tablet 0     ibuprofen (ADVIL/MOTRIN) 600 MG tablet Take 1 tablet (600 mg) by mouth every 6 hours as needed for pain (mild) 30 tablet 0     indomethacin (INDOCIN) 25 MG capsule 2 tabs twice to three times daily today, then 25 mg orally three times daily for the next 3-4 days, then as needed - always with food or milk 20 capsule 0     methocarbamol (ROBAXIN) 500 MG tablet TAKE ONE TABLET BY MOUTH FOUR TIMES A DAY AS NEEDED FOR MUSCLE SPASMS (Patient not taking: Reported on 7/29/2021) 60 tablet 1     oxyCODONE (ROXICODONE) 5 MG tablet Take 1 tablet (5 mg) by mouth every 6 hours as needed for severe pain 6 tablet 0     No facility-administered encounter medications on file as of 7/29/2021.             O:   NAD, WDWN, Alert & Oriented, Mood & Affect wnl, Vitals stable   Here today alone   /85 (BP Location: Right arm, Patient Position: Sitting, Cuff Size: Adult Large)   Pulse 60   LMP 01/06/2021   SpO2 98%    General appearance normal   Vitals stable   Alert, oriented and in no acute distress     Thicker eczematous plaques on hands  Eczematous plaques on neck, back, elbow       Eyes: Conjunctivae/lids:Normal     ENT: Lips: normal    MSK:Normal    Pulm: Breathing Normal    Neuro/Psych: Orientation:Alert and Orientedx3 ; Mood/Affect:normal   A/P:  1. Atopic Dermatitis   Discussed start dupixent. Apply clobetasol cream twice daily as needed.   Check baseline labs.   IL TAC: PGACAC discussed.  Risks including but not limited to injection site reaction, bruising, no resolution.  All questions answered and entertained to patient s satisfaction.  Informed consent obtained.  IL TAC in concentration of 10mg/ml was injected ID to eczema on hands.  Total injected was  0.3 ml.  Patient tolerated without complications and given wound care instructions, including not to move product  around.  Return in 4 weeks for follow-up and possible additional IL TAC.    Skin care regimen reviewed with patient: Eliminate harsh soaps, i.e. Dial, zest, irsih spring; Mild soaps such as Cetaphil or Dove sensitive skin, avoid hot or cold showers, aggressive use of emollients including vanicream, cetaphil or cerave discussed with patient.    Return to clinic in 3 months.

## 2021-08-13 NOTE — TELEPHONE ENCOUNTER
Please call patient to get update on protopic: if at all helpful or if it burns when applying?  Also with her schedule is she unable to commit to light therapy, we can use this info to appeal to get dupixent covered.

## 2021-08-16 NOTE — TELEPHONE ENCOUNTER
"Message left to return call on listed cell number.    (which was \"[employer]\" so I suspect she has her calls rolled to her cell and voice mail stated she is out of the office until 8-23-21.)  I asked that she either call us back or read her My  chart message.       Angelita Lopez RN    "

## 2021-08-19 NOTE — TELEPHONE ENCOUNTER
Tried again to reach on listed cell number and got same message as per note below. Angelita Lopez RN

## 2021-08-27 ENCOUNTER — ANCILLARY PROCEDURE (OUTPATIENT)
Dept: GENERAL RADIOLOGY | Facility: CLINIC | Age: 41
End: 2021-08-27
Attending: NURSE PRACTITIONER
Payer: COMMERCIAL

## 2021-08-27 ENCOUNTER — OFFICE VISIT (OUTPATIENT)
Dept: FAMILY MEDICINE | Facility: CLINIC | Age: 41
End: 2021-08-27
Payer: COMMERCIAL

## 2021-08-27 VITALS
DIASTOLIC BLOOD PRESSURE: 88 MMHG | HEART RATE: 72 BPM | WEIGHT: 207.6 LBS | OXYGEN SATURATION: 98 % | RESPIRATION RATE: 18 BRPM | BODY MASS INDEX: 36.77 KG/M2 | SYSTOLIC BLOOD PRESSURE: 124 MMHG | TEMPERATURE: 97.6 F

## 2021-08-27 DIAGNOSIS — M79.671 RIGHT FOOT PAIN: Primary | ICD-10-CM

## 2021-08-27 DIAGNOSIS — M79.671 RIGHT FOOT PAIN: ICD-10-CM

## 2021-08-27 LAB
BASOPHILS # BLD AUTO: 0 10E3/UL (ref 0–0.2)
BASOPHILS NFR BLD AUTO: 0 %
CRP SERPL-MCNC: 12.6 MG/L (ref 0–8)
EOSINOPHIL # BLD AUTO: 0.1 10E3/UL (ref 0–0.7)
EOSINOPHIL NFR BLD AUTO: 2 %
ERYTHROCYTE [DISTWIDTH] IN BLOOD BY AUTOMATED COUNT: 12.3 % (ref 10–15)
HCT VFR BLD AUTO: 39.5 % (ref 35–47)
HGB BLD-MCNC: 13.3 G/DL (ref 11.7–15.7)
LYMPHOCYTES # BLD AUTO: 2.2 10E3/UL (ref 0.8–5.3)
LYMPHOCYTES NFR BLD AUTO: 31 %
MCH RBC QN AUTO: 31.4 PG (ref 26.5–33)
MCHC RBC AUTO-ENTMCNC: 33.7 G/DL (ref 31.5–36.5)
MCV RBC AUTO: 93 FL (ref 78–100)
MONOCYTES # BLD AUTO: 0.4 10E3/UL (ref 0–1.3)
MONOCYTES NFR BLD AUTO: 6 %
NEUTROPHILS # BLD AUTO: 4.2 10E3/UL (ref 1.6–8.3)
NEUTROPHILS NFR BLD AUTO: 60 %
PLATELET # BLD AUTO: 240 10E3/UL (ref 150–450)
RBC # BLD AUTO: 4.23 10E6/UL (ref 3.8–5.2)
URATE SERPL-MCNC: 3.5 MG/DL (ref 2.6–6)
WBC # BLD AUTO: 6.9 10E3/UL (ref 4–11)

## 2021-08-27 PROCEDURE — 84550 ASSAY OF BLOOD/URIC ACID: CPT | Performed by: NURSE PRACTITIONER

## 2021-08-27 PROCEDURE — 86431 RHEUMATOID FACTOR QUANT: CPT | Performed by: NURSE PRACTITIONER

## 2021-08-27 PROCEDURE — 73610 X-RAY EXAM OF ANKLE: CPT | Mod: RT | Performed by: RADIOLOGY

## 2021-08-27 PROCEDURE — 86618 LYME DISEASE ANTIBODY: CPT | Performed by: NURSE PRACTITIONER

## 2021-08-27 PROCEDURE — 36415 COLL VENOUS BLD VENIPUNCTURE: CPT | Performed by: NURSE PRACTITIONER

## 2021-08-27 PROCEDURE — 73630 X-RAY EXAM OF FOOT: CPT | Mod: RT | Performed by: RADIOLOGY

## 2021-08-27 PROCEDURE — 99213 OFFICE O/P EST LOW 20 MIN: CPT | Performed by: NURSE PRACTITIONER

## 2021-08-27 PROCEDURE — 86140 C-REACTIVE PROTEIN: CPT | Performed by: NURSE PRACTITIONER

## 2021-08-27 PROCEDURE — 85025 COMPLETE CBC W/AUTO DIFF WBC: CPT | Performed by: NURSE PRACTITIONER

## 2021-08-27 ASSESSMENT — PAIN SCALES - GENERAL: PAINLEVEL: EXTREME PAIN (8)

## 2021-08-27 NOTE — TELEPHONE ENCOUNTER
Medication Appeal Initiation    We have initiated an appeal for the requested medication:  Medication: dupixent

## 2021-08-27 NOTE — PROGRESS NOTES
"    Assessment & Plan     Right foot pain    - XR Foot Right G/E 3 Views; Future  - XR Ankle Right G/E 3 Views; Future  - CBC with platelets and differential; Future  - CRP, inflammation; Future  - Lyme Disease Karime with reflex to WB Serum; Future  - Rheumatoid factor; Future  - Uric acid; Future  - CBC with platelets and differential  - CRP, inflammation  - Lyme Disease Karime with reflex to WB Serum  - Rheumatoid factor  - Uric acid             BMI:   Estimated body mass index is 36.77 kg/m  as calculated from the following:    Height as of 6/23/21: 1.6 m (5' 3\").    Weight as of this encounter: 94.2 kg (207 lb 9.6 oz).       FUTURE APPOINTMENTS:       - Follow up in 1 week for persistent symptoms, sooner for new or worsening symptoms. Advised to see Podiatry with [facility] if not improving. To UC or ER over the weekend for new or worsening symptoms.     See Patient Instructions    No follow-ups on file.    TRUMAN German CNP  [facility]   Lin is a 41 year old who presents for the following health issues     HPI     Musculoskeletal problem/pain  Onset/Duration: x2 days- maybe just mild discomfort last weekend  Description  Location: ankle - right  Joint Swelling: YES and red/mild warmth- radiates up outer leg- has been walking on heel, that is more comfortable than walking with a flat foot.   Redness: no  Pain: YES- 8/10- worse when walking  Warmth: YES  Intensity:  Moderate to severe  Progression of Symptoms:  worsening  Accompanying signs and symptoms:   Fevers: no  Numbness/tingling/weakness: YES- weakness  History  Trauma to the area: no. Drove 14 hours home in a car over a week ago. No hx of DVT.   Recent illness:  no  Previous similar problem: no  Previous evaluation:  no  Precipitating or alleviating factors:  Aggravating factors include: standing, walking, climbing stairs, lifting and overuse  Therapies tried and outcome: rest/inactivity, ice " and Ibuprofen        Review of Systems   Constitutional, HEENT, cardiovascular, pulmonary, gi and gu systems are negative, except as otherwise noted.      Objective    /88 (BP Location: Right arm, Patient Position: Sitting, Cuff Size: Adult Regular)   Pulse 72   Temp 97.6  F (36.4  C) (Tympanic)   Resp 18   Wt 94.2 kg (207 lb 9.6 oz)   LMP 01/06/2021   SpO2 98%   Breastfeeding No   BMI 36.77 kg/m    Body mass index is 36.77 kg/m .  Physical Exam   GENERAL: healthy, alert and no distress  MS: RLE exam shows normal strength and muscle mass, ROM of all joints is normal and tender to palpation at 5th metatarsal and lateral malleolus and left calf. +2 dorsalis pedis and post tib pulses. Mild erythema, mild warmth over lateral malleolus, tender to calf palpation  NEURO: Normal strength and tone, mentation intact and speech normal    Xray - Reviewed and interpreted by me.  Unremarkable, pending radiology review

## 2021-08-27 NOTE — PATIENT INSTRUCTIONS
Patient Education     Tendonitis  A tendon is the thick fibrous cord that joins muscle to bone and allows joints to move. When a tendon becomes inflamed, it is called tendonitis. This can occur from overuse, injury, or infection. This usually involves the shoulders, forearm, wrist, hands and feet. Symptoms include pain, swelling and tenderness to the touch. Moving the joint increases the pain.  It takes 4 to 6 weeks or more for tendonitis to heal. It is treated by preventing motion of the tendon, occasionally with a splint or brace, and the use of anti-inflammatory medicine.  Home care    Some people find relief with ice packs. These can be crushed or cubed ice in a plastic bag or a bag of frozen vegetables wrapped in a thin towel. Other people get better relief with heat. This can include a hot shower, hot bath, or a moist towel warmed in a microwave. Try each and use the method that feels best, for 15 to 20 minutes several times a day.    Rest the inflamed joint and protect it from movement.    You may use over-the-counter ibuprofen or naproxen to treat pain and inflammation, unless another medicine was prescribed. If you can't take these medicines, acetaminophen may help with the pain, but does not treat inflammation. If you have chronic liver or kidney disease or ever had a stomach ulcer or gastrointestinal bleeding, talk with your doctor before using these medicines.    As your symptoms improve, begin gradual motion at the involved joint.  Follow-up care  Follow up with your healthcare provider if you are not improving after 5 to 7 days of treatment.  When to seek medical advice  Call your healthcare provider right away if any of these occur:    Redness over the painful area    Increasing pain or swelling at the joint    Fever lasting 24 to 48 hours or chills, or as advised by your healthcare provider  Nikhil last reviewed this educational content on 5/1/2018 2000-2021 [employer]. All  rights reserved. This information is not intended as a substitute for professional medical care. Always follow your healthcare professional's instructions.           Patient Education     Treating Tendonitis of the Foot  Your healthcare provider's first concern is to reduce your symptoms. Using ice and heat, taking medicines, and limiting activity help control pain and swelling. Follow all of your healthcare provider's instructions. Returning to activity too soon may cause your symptoms to come back.    Ice and heat  Ice helps prevent swelling and reduce pain. Place ice on the painful area for 10 to 15 minutes. Repeat the icing several times a day. If ?you have had the problem for a while, using heat may help. Apply a heating pad or hot towels to the tendon for 20 to 30 minutes 2 or 3 times a day.  Medicines  Your healthcare provider may tell you to take ibuprofen or other anti-inflammatory medicines. These reduce pain and swelling. Take them as directed. Don t wait until you feel pain. In more severe cases, cortisone may be injected to relieve pain.  Limiting activities  Rest allows the tissues in your foot to heal. Stay off your feet for a few days, then slowly work back into activity. If you do high-impact activities, such as running or aerobics, try other activities that place less strain on your foot. Cycling and swimming are good choices.  [employer] last reviewed this educational content on 1/1/2018 2000-2021 [employer]. All rights reserved. This information is not intended as a substitute for professional medical care. Always follow your healthcare professional's instructions.

## 2021-08-30 LAB
B BURGDOR IGG+IGM SER QL: 0.15
RHEUMATOID FACT SER NEPH-ACNC: 7 IU/ML

## 2021-08-30 NOTE — TELEPHONE ENCOUNTER
MEDICATION APPEAL DENIED    Medication: dupixent     Denial Date: 8/30/2021    Denial Rational: appeal denied stating she must try/fail light therapy or immunosuppression.

## 2021-08-30 NOTE — RESULT ENCOUNTER NOTE
Gulshan Ceballos    Your inflammatory marker is just slightly elevated. The uric acid is normal- this is not gout. Blood counts are normal- this is not infection. Still waiting for the Lyme and Rheumatoid screening. Please let us know if you have any questions.     Take care,    TRUMAN Osuna CNP

## 2021-08-30 NOTE — RESULT ENCOUNTER NOTE
Lin,    All of the labs were normal or acceptable.    Please contact my office if you have questions.    Nathaly Greenwood M.D.      Lin,    All of the labs were normal or acceptable.    Please contact my office if you have questions.    Nathaly Greenwood M.D. for [facility]

## 2021-08-31 ENCOUNTER — MYC MEDICAL ADVICE (OUTPATIENT)
Dept: SURGERY | Facility: CLINIC | Age: 41
End: 2021-08-31

## 2021-08-31 DIAGNOSIS — L20.9 ATOPIC DERMATITIS, UNSPECIFIED TYPE: Primary | ICD-10-CM

## 2021-08-31 NOTE — TELEPHONE ENCOUNTER
Message sent to patient with all information below. Will await response regarding plan for methotrexate    Juana RUIZ RN   Specialty Clinics

## 2021-08-31 NOTE — TELEPHONE ENCOUNTER
Insurance wants her to try an immunosuppressive medication first like methotrexate.    can discuss methotrexate with her.   Methotrexate is a medication used in low doses to treat inflammatory skin conditions such as psoriasis and eczema/dermatitis. It is also prescribed for rheumatoid arthritis, psoriatic arthritis, and increasingly, other inflammatory and autoimmune disorders (off-label). In much higher doses, it is used as a chemotherapy agent for leukemia and some other forms of cancer.    For responding skin diseases, methotrexate usually shows some benefit within 6 to 8 weeks. Maximum effects are generally achieved within 5 to 6 months, depending on dose escalation.    Side effects of methotrexate  Side effects can occur at any time during treatment with methotrexate, but are most common in the first few weeks. Folic acid supplements, is thought to reduce some of the side effects of methotrexate.     If the side effects described below or other problems trouble you, or should you develop any signs of infection or unusual bleeding, notify your doctor promptly and before your next dose of methotrexate is due.      The most common side effects of methotrexate are loss of appetite, nausea and diarrhea, and affect about one in 12 patients. These side effects are usually temporary, but changes in dose and/or supplemental folic acid tablets may be helpful.    An overdose of methotrexate or deficiency of the vitamin folic acid may result in anemia , reduced white cell count, risking serious infections, and low platelet count, resulting in bruising and bleeding.    Methotrexate is stored by the liver. Transaminase liver enzyme levels may rise for a few days after treatment but they quickly return to normal.     Long term therapy may be associated with scarring (fibrosis or cirrhosis) of the liver. This is more commonly due to other reasons such as fatty liver, diabetes, hyperlipidemia, and obesity (ie metabolic  syndrome), but can also develop from viral hepatitis and alcohol.    Methotrexate can rarely cause a lung reaction similar to pneumonia called acute pneumonitis or interstitial pneumonia.      Today  -  Some baseline laboratory tests will be checked  - A prescription for folic acid will be sent to pharmacy for you to start.  (This is a vitamin that can help reduce the side effects of methotrexate)  -  A test dose of the medication to the pharmacy   -  Take all of the pills in the methotrexate prescription on the same day  -  Recheck your labs at any [facility] 7 days from the date you take the test dose  -  Once your blood work is complete, our office we will call and let you know if you can continue methotrexate

## 2021-09-01 RX ORDER — METHOTREXATE 2.5 MG/1
TABLET ORAL
Qty: 3 TABLET | Refills: 0 | Status: SHIPPED | OUTPATIENT
Start: 2021-09-01 | End: 2021-09-15

## 2021-09-01 RX ORDER — FOLIC ACID 1 MG/1
1 TABLET ORAL DAILY
Qty: 90 TABLET | Refills: 3 | Status: SHIPPED | OUTPATIENT
Start: 2021-09-01 | End: 2022-03-10

## 2021-09-01 NOTE — TELEPHONE ENCOUNTER
Patient agreeable to trying Methotrexate.   Cued- please adjust as needed and sign if okay    Thank you,   Juana RUIZ RN   Specialty Clinics

## 2021-09-13 ENCOUNTER — LAB (OUTPATIENT)
Dept: LAB | Facility: CLINIC | Age: 41
End: 2021-09-13
Payer: COMMERCIAL

## 2021-09-13 DIAGNOSIS — L20.9 ATOPIC DERMATITIS, UNSPECIFIED TYPE: ICD-10-CM

## 2021-09-13 LAB
ALBUMIN SERPL-MCNC: 3.4 G/DL (ref 3.4–5)
ALP SERPL-CCNC: 56 U/L (ref 40–150)
ALT SERPL W P-5'-P-CCNC: 21 U/L (ref 0–50)
ANION GAP SERPL CALCULATED.3IONS-SCNC: 6 MMOL/L (ref 3–14)
AST SERPL W P-5'-P-CCNC: 14 U/L (ref 0–45)
BILIRUB SERPL-MCNC: 0.2 MG/DL (ref 0.2–1.3)
BUN SERPL-MCNC: 15 MG/DL (ref 7–30)
CALCIUM SERPL-MCNC: 8.6 MG/DL (ref 8.5–10.1)
CHLORIDE BLD-SCNC: 105 MMOL/L (ref 94–109)
CO2 SERPL-SCNC: 26 MMOL/L (ref 20–32)
CREAT SERPL-MCNC: 0.76 MG/DL (ref 0.52–1.04)
ERYTHROCYTE [DISTWIDTH] IN BLOOD BY AUTOMATED COUNT: 12.3 % (ref 10–15)
GFR SERPL CREATININE-BSD FRML MDRD: >90 ML/MIN/1.73M2
GLUCOSE BLD-MCNC: 100 MG/DL (ref 70–99)
HCT VFR BLD AUTO: 38.2 % (ref 35–47)
HGB BLD-MCNC: 12.6 G/DL (ref 11.7–15.7)
MCH RBC QN AUTO: 31.3 PG (ref 26.5–33)
MCHC RBC AUTO-ENTMCNC: 33 G/DL (ref 31.5–36.5)
MCV RBC AUTO: 95 FL (ref 78–100)
PLATELET # BLD AUTO: 247 10E3/UL (ref 150–450)
POTASSIUM BLD-SCNC: 3.9 MMOL/L (ref 3.4–5.3)
PROT SERPL-MCNC: 7.2 G/DL (ref 6.8–8.8)
RBC # BLD AUTO: 4.03 10E6/UL (ref 3.8–5.2)
SODIUM SERPL-SCNC: 137 MMOL/L (ref 133–144)
WBC # BLD AUTO: 8.9 10E3/UL (ref 4–11)

## 2021-09-13 PROCEDURE — 80053 COMPREHEN METABOLIC PANEL: CPT

## 2021-09-13 PROCEDURE — 85027 COMPLETE CBC AUTOMATED: CPT

## 2021-09-13 PROCEDURE — 36415 COLL VENOUS BLD VENIPUNCTURE: CPT

## 2021-09-15 ENCOUNTER — TELEPHONE (OUTPATIENT)
Dept: DERMATOLOGY | Facility: CLINIC | Age: 41
End: 2021-09-15

## 2021-09-15 DIAGNOSIS — L20.9 ATOPIC DERMATITIS, UNSPECIFIED TYPE: ICD-10-CM

## 2021-09-15 RX ORDER — METHOTREXATE 2.5 MG/1
6 TABLET ORAL WEEKLY
Qty: 30 TABLET | Refills: 2 | Status: SHIPPED | OUTPATIENT
Start: 2021-09-15 | End: 2022-01-19

## 2021-09-15 RX ORDER — METHOTREXATE 2.5 MG/1
6 TABLET ORAL WEEKLY
Qty: 30 TABLET | Refills: 2 | Status: CANCELLED | OUTPATIENT
Start: 2021-09-15

## 2021-09-15 RX ORDER — METHOTREXATE 2.5 MG/1
6 TABLET ORAL WEEKLY
Qty: 30 TABLET | Refills: 2 | Status: SHIPPED | OUTPATIENT
Start: 2021-09-15 | End: 2021-09-15

## 2021-09-20 ENCOUNTER — E-VISIT (OUTPATIENT)
Dept: URGENT CARE | Facility: URGENT CARE | Age: 41
End: 2021-09-20
Payer: COMMERCIAL

## 2021-09-20 DIAGNOSIS — Z20.822 SUSPECTED COVID-19 VIRUS INFECTION: Primary | ICD-10-CM

## 2021-09-20 PROCEDURE — 99421 OL DIG E/M SVC 5-10 MIN: CPT | Performed by: NURSE PRACTITIONER

## 2021-09-20 NOTE — PATIENT INSTRUCTIONS
Dear Lin Corona,    Your symptoms show that you may have coronavirus (COVID-19). This illness can cause fever, cough and trouble breathing. Many people get a mild case and get better on their own. Some people can get very sick.    Will I be tested for COVID-19?  We would like to test you for Covid-19 virus. I have placed orders for this test.     To schedule: go to your [employer] home page and scroll down to the section that says  You have an appointment that needs to be scheduled  and click the large green button that says  Schedule Now  and follow the steps to find the next available openings.    If you are unable to complete these [employer] scheduling steps, please call [phone] to schedule your testing.     Return to work/school/ guidance:  Please let your workplace manager and staffing office know when your quarantine ends     We can t give you an exact date as it depends on the above. You can calculate this on your own or work with your manager/staffing office to calculate this. (For example if you were exposed on 10/4, you would have to quarantine for 14 full days. That would be through 10/18. You could return on 10/19.)      If you receive a positive COVID-19 test result, follow the guidance of the those who are giving you the results. Usually the return to work is 10 (or in some cases 20 days from symptom onset.) If you work at [facility], you must also be cleared by Employee Occupational Health and Safety to return to work.        If you receive a negative COVID-19 test result and did not have a high risk exposure to someone with a known positive COVID-19 test, you can return to work once you're free of fever for 24 hours without fever-reducing medication and your symptoms are improving or resolved.      If you receive a negative COVID-19 test and If you had a high risk exposure to someone who has tested positive for COVID-19 then you can return to work 14 days after your last contact  with the positive individual    Note: If you have ongoing exposure to the covid positive person, this quarantine period may be more than 14 days. (For example, if you are continued to be exposed to your child who tested positive and cannot isolate from them, then the quarantine of 7-14 days can't start until your child is no longer contagious. This is typically 10 days from onset of the child's symptoms. So the total duration may be 17-24 days in this case.)    Sign up for [employer].   We know it's scary to hear that you might have COVID-19. We want to track your symptoms to make sure you're okay over the next 2 weeks. Please look for an email from [employer]--this is a free, online program that we'll use to keep in touch. To sign up, follow the link in the email you will receive. Learn more at [email or web address]    How can I take care of myself?    Get lots of rest. Drink extra fluids (unless a doctor has told you not to)    Take Tylenol (acetaminophen) or ibuprofen for fever or pain. If you have liver or kidney problems, ask your family doctor if it's okay to take Tylenol o ibuprofen    If you have other health problems (like cancer, heart failure, an organ transplant or severe kidney disease): Call your specialty clinic if you don't feel better in the next 2 days.    Know when to call 911. Emergency warning signs include:  o Trouble breathing or shortness of breath  o Pain or pressure in the chest that doesn't go away  o Feeling confused like you haven't felt before, or not being able to wake up  o Bluish-colored lips or face    Where can I get more information?  [facility] - About COVID-19:   [email or web address]    CDC - What to Do If You're Sick:   www.cdc.gov/coronavirus/2019-ncov/about/steps-when-sick.html

## 2021-09-30 RX ORDER — PREDNISONE 10 MG/1
TABLET ORAL
Qty: 42 TABLET | Refills: 0 | Status: SHIPPED | OUTPATIENT
Start: 2021-09-30 | End: 2021-11-22

## 2021-09-30 NOTE — TELEPHONE ENCOUNTER
Patient would like to try prednisone. [facility] is [facility].     Thank you,   Juana RUIZ RN   Specialty Clinics

## 2021-09-30 NOTE — TELEPHONE ENCOUNTER
Any recommendations for patient while she is waiting for methotrexate to be effective?     Thank you,   Juana RUIZ RN   Specialty Clinics

## 2021-10-02 ENCOUNTER — HEALTH MAINTENANCE LETTER (OUTPATIENT)
Age: 41
End: 2021-10-02

## 2021-10-12 ENCOUNTER — MYC MEDICAL ADVICE (OUTPATIENT)
Dept: FAMILY MEDICINE | Facility: CLINIC | Age: 41
End: 2021-10-12

## 2021-11-10 DIAGNOSIS — Z90.710 S/P LAPAROSCOPIC HYSTERECTOMY: ICD-10-CM

## 2021-11-10 RX ORDER — NORGESTIMATE AND ETHINYL ESTRADIOL 0.25-0.035
1 KIT ORAL DAILY
Qty: 84 TABLET | Refills: 1 | Status: SHIPPED | OUTPATIENT
Start: 2021-11-10 | End: 2022-03-10

## 2021-11-10 NOTE — TELEPHONE ENCOUNTER
"Last Written Prescription Date:  2/23/2021  Last Fill Quantity: 84,  # refills: 4  Last office visit: 2/23/2021 with prescribing provider: Dr. Gallardo    Future Office Visit:   Next 5 appointments (look out 90 days)    Nov 23, 2021  2:45 PM  (Arrive by 2:30 PM)  Return Visit with Diana Coyne PA-C  [facility] ([facility] ) [address] [id number] [phone]               Requested Prescriptions   Pending Prescriptions Disp Refills     norgestimate-ethinyl estradiol (ORTHO-CYCLEN) 0.25-35 MG-MCG tablet 84 tablet 4     Sig: Take 1 tablet by mouth daily       Contraceptives Protocol Passed - 11/10/2021 12:21 PM        Passed - Patient is not a current smoker if age is 35 or older        Passed - Recent (12 mo) or future (30 days) visit within the authorizing provider's specialty     Patient has had an office visit with the authorizing provider or a provider within the authorizing providers department within the previous 12 mos or has a future within next 30 days. See \"Patient Info\" tab in inbasket, or \"Choose Columns\" in Meds & Orders section of the refill encounter.              Passed - Medication is active on med list        Passed - No active pregnancy on record        Passed - No positive pregnancy test in past 12 months           Prescription approved per [facility] Refill Protocol.  Patient needed to change pharmacy.  Will send remainder of refills to new pharmacy.    Eunice Castillo   Ob/Gyn Clinic  RN    "

## 2021-11-10 NOTE — TELEPHONE ENCOUNTER
Requested Prescriptions   Pending Prescriptions Disp Refills     norgestimate-ethinyl estradiol (ORTHO-CYCLEN) 0.25-35 MG-MCG tablet 84 tablet 4     Sig: Take 1 tablet by mouth daily       There is no refill protocol information for this order          [facility] mail order pharmacy  [address] [place]  [address]  Ph:  [phone]  Fax:  [phone]    Last office visit: 2/23/2021 with prescribing provider:  Dr. Gallardo    Future Office Visit:   Next 5 appointments (look out 90 days)    Nov 23, 2021  2:45 PM  (Arrive by 2:30 PM)  Return Visit with Diana Coyne PA-C  [facility] ([facility] ) [address] [place] [id number] [phone]               [facility]  Specialty Clinic CSS

## 2021-11-15 ENCOUNTER — TELEPHONE (OUTPATIENT)
Dept: DERMATOLOGY | Facility: CLINIC | Age: 41
End: 2021-11-15
Payer: COMMERCIAL

## 2021-11-15 DIAGNOSIS — L20.9 ATOPIC DERMATITIS, UNSPECIFIED TYPE: ICD-10-CM

## 2021-11-22 ENCOUNTER — VIRTUAL VISIT (OUTPATIENT)
Dept: FAMILY MEDICINE | Facility: CLINIC | Age: 41
End: 2021-11-22
Payer: COMMERCIAL

## 2021-11-22 DIAGNOSIS — F41.1 GENERALIZED ANXIETY DISORDER: Primary | ICD-10-CM

## 2021-11-22 PROCEDURE — 99213 OFFICE O/P EST LOW 20 MIN: CPT | Mod: 95 | Performed by: NURSE PRACTITIONER

## 2021-11-22 RX ORDER — CITALOPRAM HYDROBROMIDE 10 MG/1
10 TABLET ORAL DAILY
Qty: 90 TABLET | Refills: 1 | Status: SHIPPED | OUTPATIENT
Start: 2021-11-22 | End: 2021-12-21 | Stop reason: DRUGHIGH

## 2021-11-22 ASSESSMENT — ANXIETY QUESTIONNAIRES
3. WORRYING TOO MUCH ABOUT DIFFERENT THINGS: MORE THAN HALF THE DAYS
IF YOU CHECKED OFF ANY PROBLEMS ON THIS QUESTIONNAIRE, HOW DIFFICULT HAVE THESE PROBLEMS MADE IT FOR YOU TO DO YOUR WORK, TAKE CARE OF THINGS AT HOME, OR GET ALONG WITH OTHER PEOPLE: SOMEWHAT DIFFICULT
5. BEING SO RESTLESS THAT IT IS HARD TO SIT STILL: NOT AT ALL
7. FEELING AFRAID AS IF SOMETHING AWFUL MIGHT HAPPEN: NOT AT ALL
6. BECOMING EASILY ANNOYED OR IRRITABLE: SEVERAL DAYS
1. FEELING NERVOUS, ANXIOUS, OR ON EDGE: MORE THAN HALF THE DAYS
GAD7 TOTAL SCORE: 7
2. NOT BEING ABLE TO STOP OR CONTROL WORRYING: NOT AT ALL

## 2021-11-22 ASSESSMENT — PATIENT HEALTH QUESTIONNAIRE - PHQ9: 5. POOR APPETITE OR OVEREATING: MORE THAN HALF THE DAYS

## 2021-11-22 NOTE — TELEPHONE ENCOUNTER
Can you provide me with the following so I can appeal:    1.) She needs to have a documented SCORAD score greater than 40, or an EASI score greater than 21, or documentation of continued disease severity and impaired activities of daily living on most successful treatment regiment.

## 2021-11-22 NOTE — PROGRESS NOTES
"Lin is a 41 year old who is being evaluated via a billable video visit.      How would you like to obtain your AVS? MyChart  If the video visit is dropped, the invitation should be resent by: Text to cell phone: [phone]  Will anyone else be joining your video visit? No    Video Start Time: 2:38    Assessment & Plan     Generalized anxiety disorder    - citalopram (CELEXA) 10 MG tablet; Take 1 tablet (10 mg) by mouth daily  Discussed how to take the medication(s), expected outcomes, potential side effects.  See below for plan of care.             BMI:   Estimated body mass index is 36.77 kg/m  as calculated from the following:    Height as of 6/23/21: 1.6 m (5' 3\").    Weight as of 8/27/21: 94.2 kg (207 lb 9.6 oz).       See Patient Instructions  Patient Instructions   Continue with therapy and try the medication once daily.  Let's touch base in 4 weeks, notify me sooner if you do not tolerate the medication.      Our Clinic hours are:  Mondays    7:20 am - 7 pm  Tues -  Fri  7:20 am - 5 pm    Clinic Phone: [phone]    The clinic lab opens at 7:30 am Mon - Fri and appointments are required.    [facility]. [phone]  Monday  8 am - 7pm  Tues - Fri 8 am - 5:30 pm             Return in about 4 weeks (around 12/20/2021) for or sooner if symptoms persist or worsen, Med Check.    TRUMAN Aguila CNP  [facility]    Jennifer Ceballos is a 41 year old who presents for the following health issues  accompanied by her none.    HPI     Abnormal Mood Symptoms  Onset/Duration: 6 mo   Description:   Depression (if yes, do PHQ-9): no  Anxiety (if yes, do LOS-7): YES  Accompanying Signs & Symptoms:  Still participating in activities that you used to enjoy: YES  Fatigue: YES  Irritability: YES  Difficulty concentrating: no  Changes in appetite: no  Problems with sleep: no  Heart racing/beating fast: no  Abnormally elevated, expansive, or irritable mood: " no  Persistently increased activity or energy: no  Thoughts of hurting yourself or others: no  History:  Recent stress or major life event: YES  Prior depression or anxiety: None  Family history of depression or anxiety: no  Alcohol/drug use: no  Difficulty sleeping: no  Precipitating or alleviating factors: None  Therapies tried and outcome: individual therapy  PHQ 11/22/2021   PHQ-9 Total Score 4   Q9: Thoughts of better off dead/self-harm past 2 weeks Not at all     LOS-7 SCORE 11/22/2021   Total Score 7     Feels well other than ongoing anxiety. She has been seeing a counselor for months. She has never been on any medication for her mood.  States she can't turn her thoughts off at night. Denies depression.      Review of Systems   See above      Objective           Vitals:  No vitals were obtained today due to virtual visit.    Physical Exam   GENERAL: Healthy, alert and no distress  EYES: Eyes grossly normal to inspection.  No discharge or erythema, or obvious scleral/conjunctival abnormalities.  RESP: No audible wheeze, cough, or visible cyanosis.  No visible retractions or increased work of breathing.    SKIN: Visible skin clear. No significant rash, abnormal pigmentation or lesions.  NEURO: Cranial nerves grossly intact.  Mentation and speech appropriate for age.  PSYCH: Mentation appears normal, affect normal/bright, judgement and insight intact, normal speech and appearance well-groomed.                Video-Visit Details    Type of service:  Video Visit    Video End Time:2:47 PM    Originating Location (pt. Location): Home    Distant Location (provider location):  [facility]     Platform used for Video Visit: [email or web address]

## 2021-11-22 NOTE — PATIENT INSTRUCTIONS
Continue with therapy and try the medication once daily.  Let's touch base in 4 weeks, notify me sooner if you do not tolerate the medication.      Our Clinic hours are:  Mondays    7:20 am - 7 pm  Tues -  Fri  7:20 am - 5 pm    Clinic Phone: [phone]    The clinic lab opens at 7:30 am Mon - Fri and appointments are required.    [facility]. [phone]  Monday  8 am - 7pm  Tues - Fri 8 am - 5:30 pm

## 2021-11-23 ENCOUNTER — OFFICE VISIT (OUTPATIENT)
Dept: DERMATOLOGY | Facility: CLINIC | Age: 41
End: 2021-11-23
Payer: COMMERCIAL

## 2021-11-23 VITALS — HEART RATE: 80 BPM | DIASTOLIC BLOOD PRESSURE: 80 MMHG | OXYGEN SATURATION: 97 % | SYSTOLIC BLOOD PRESSURE: 122 MMHG

## 2021-11-23 DIAGNOSIS — L20.9 ATOPIC DERMATITIS, UNSPECIFIED TYPE: Primary | ICD-10-CM

## 2021-11-23 PROCEDURE — 99213 OFFICE O/P EST LOW 20 MIN: CPT | Performed by: PHYSICIAN ASSISTANT

## 2021-11-23 ASSESSMENT — ANXIETY QUESTIONNAIRES: GAD7 TOTAL SCORE: 7

## 2021-11-23 ASSESSMENT — PATIENT HEALTH QUESTIONNAIRE - PHQ9: SUM OF ALL RESPONSES TO PHQ QUESTIONS 1-9: 4

## 2021-11-23 NOTE — NURSING NOTE
Chief Complaint   Patient presents with     Derm Problem     Atopic derm f/u        Sharon Kenyon on 11/23/2021 at 2:30 PM

## 2021-11-23 NOTE — LETTER
11/23/2021         RE: Lin Corona  [id number] [address] [place] [id number]        Dear Colleague,    Thank you for referring your patient, Lin Corona, to the [facility]. Please see a copy of my visit note below.    Lin Corona is an extremely pleasant 41 year old year old female patient here today for recheck atopic dermatitis. She had hair loss and side effects with methotrexate, recently stopped. She notes protopic caused burning and topical steroid do not control her rash. She would like to try again for dupixent. She is unable to due light therapy due to work schedule. Patient has no other skin complaints today.  Remainder of the HPI, Meds, PMH, Allergies, FH, and SH was reviewed in chart.    Pertinent Hx: Atopic Dermatitis  Past Medical History:   Diagnosis Date     Dysmenorrhea      Endometriosis        Past Surgical History:   Procedure Laterality Date     APPENDECTOMY  7/13/2011     BREAST SURGERY  2000    Breast reduction     COLONOSCOPY  9/2011     CYSTOSCOPY N/A 1/11/2021    Procedure: CYSTOSCOPY;  Surgeon: Jsesica Gallardo DO;  Location: [facility]     GYN SURGERY  1/2012    Colposcopy exam      LAPAROSCOPIC HYSTERECTOMY TOTAL, BILATERAL SALPINGO-OOPHORECTOMY, COMBINED N/A 1/11/2021    Procedure: HYSTERECTOMY, TOTAL, LAPAROSCOPIC, WITH BILATERAL SALPINGO-OOPHORECTOMY;  Surgeon: Jessica Gallardo DO;  Location: [facility]     SHOULDER SURGERY Right         Family History   Problem Relation Age of Onset     Arthritis Mother      Heart Disease Father      Gallbladder Disease Father      Arthritis Maternal Grandmother      Unknown/Adopted Maternal Grandmother      C.A.D. Paternal Grandmother      Ovarian Cancer Paternal Grandmother      Cancer Paternal Grandmother      C.A.D. Paternal Grandfather      Cervical Cancer Cousin      Cervical Cancer Paternal Aunt        Social History     Socioeconomic History     Marital status:      Spouse name: Not on file     Number of  children: Not on file     Years of education: Not on file     Highest education level: Not on file   Occupational History     Occupation:    Tobacco Use     Smoking status: Former Smoker     Packs/day: 0.00     Smokeless tobacco: Never Used   Vaping Use     Vaping Use: Never used   Substance and Sexual Activity     Alcohol use: Yes     Comment: occassional     Drug use: No     Sexual activity: Yes     Partners: Male     Birth control/protection: Pill     Comment: Nuvaring   Other Topics Concern     Parent/sibling w/ CABG, MI or angioplasty before 65F 55M? Not Asked   Social History Narrative     Not on file     Social Determinants of Health     Financial Resource Strain: Not on file   Food Insecurity: Not on file   Transportation Needs: Not on file   Physical Activity: Not on file   Stress: Not on file   Social Connections: Not on file   Intimate Partner Violence: Not on file   Housing Stability: Not on file       Outpatient Encounter Medications as of 11/23/2021   Medication Sig Dispense Refill     citalopram (CELEXA) 10 MG tablet Take 1 tablet (10 mg) by mouth daily 90 tablet 1     clobetasol (TEMOVATE) 0.05 % external cream Apply twice daily as needed. 45 g 4     docusate sodium (COLACE) 100 MG capsule Take 1 capsule (100 mg) by mouth 2 times daily as needed for constipation 90 capsule 4     methocarbamol (ROBAXIN) 500 MG tablet TAKE ONE TABLET BY MOUTH FOUR TIMES A DAY AS NEEDED FOR MUSCLE SPASMS 60 tablet 1     norgestimate-ethinyl estradiol (ORTHO-CYCLEN) 0.25-35 MG-MCG tablet Take 1 tablet by mouth daily 84 tablet 1     tacrolimus (PROTOPIC) 0.1 % external ointment Apply twice daily as needed. 30 g 1     triamcinolone (ARISTOCORT HP) 0.5 % external cream Apply topically 2 times daily 15 g 1     folic acid (FOLVITE) 1 MG tablet Take 1 tablet (1 mg) by mouth daily Do not take on the day that you take your Methotrexate. Take all other days of the week (Patient not taking: Reported on 11/23/2021) 90  tablet 3     methotrexate sodium 2.5 MG TABS Take 6 tablets (15 mg) by mouth once a week (Patient not taking: Reported on 11/23/2021) 30 tablet 2     triamcinolone (KENALOG) 0.1 % external ointment Apply topically 2 times daily 30 g 1     No facility-administered encounter medications on file as of 11/23/2021.             O:   NAD, WDWN, Alert & Oriented, Mood & Affect wnl, Vitals stable   Here today alone   /80 (BP Location: Left arm, Patient Position: Sitting, Cuff Size: Adult Large)   Pulse 80   LMP 01/06/2021   SpO2 97%    General appearance normal   Vitals stable   Alert, oriented and in no acute distress     Eczematous plaques on face, arms, mild on torso, mild on legs       Eyes: Conjunctivae/lids:Normal     ENT: Lips: normal    MSK:Normal    Pulm: Breathing Normal    Neuro/Psych: Orientation:Alert and Orientedx3 ; Mood/Affect:normal   A/P:  1. Atopic Dermatitis   SCORAD: 56.9  Patient is having issues with sleeping due to itching.   Will try for dupixent again.   She had side effects with methotrexate and burning with protopic. She notes topical steroid help but does not clear skin.   Skin care regimen reviewed with patient: Eliminate harsh soaps, i.e. Dial, zest, irsih spring; Mild soaps such as Cetaphil or Dove sensitive skin, avoid hot or cold showers, aggressive use of emollients including vanicream, cetaphil or cerave discussed with patient.    Return to clinic in 3 months      Again, thank you for allowing me to participate in the care of your patient.        Sincerely,        Diana Lincoln PA-C

## 2021-11-24 NOTE — PROGRESS NOTES
Lin Corona is an extremely pleasant 41 year old year old female patient here today for recheck atopic dermatitis. She had hair loss and side effects with methotrexate, recently stopped. She notes protopic caused burning and topical steroid do not control her rash. She would like to try again for dupixent. She is unable to due light therapy due to work schedule. Patient has no other skin complaints today.  Remainder of the HPI, Meds, PMH, Allergies, FH, and SH was reviewed in chart.    Pertinent Hx: Atopic Dermatitis  Past Medical History:   Diagnosis Date     Dysmenorrhea      Endometriosis        Past Surgical History:   Procedure Laterality Date     APPENDECTOMY  7/13/2011     BREAST SURGERY  2000    Breast reduction     COLONOSCOPY  9/2011     CYSTOSCOPY N/A 1/11/2021    Procedure: CYSTOSCOPY;  Surgeon: Jessica Gallardo DO;  Location: [facility]     GYN SURGERY  1/2012    Colposcopy exam      LAPAROSCOPIC HYSTERECTOMY TOTAL, BILATERAL SALPINGO-OOPHORECTOMY, COMBINED N/A 1/11/2021    Procedure: HYSTERECTOMY, TOTAL, LAPAROSCOPIC, WITH BILATERAL SALPINGO-OOPHORECTOMY;  Surgeon: Jessica Gallardo DO;  Location: [facility]     SHOULDER SURGERY Right         Family History   Problem Relation Age of Onset     Arthritis Mother      Heart Disease Father      Gallbladder Disease Father      Arthritis Maternal Grandmother      Unknown/Adopted Maternal Grandmother      C.A.D. Paternal Grandmother      Ovarian Cancer Paternal Grandmother      Cancer Paternal Grandmother      C.A.D. Paternal Grandfather      Cervical Cancer Cousin      Cervical Cancer Paternal Aunt        Social History     Socioeconomic History     Marital status:      Spouse name: Not on file     Number of children: Not on file     Years of education: Not on file     Highest education level: Not on file   Occupational History     Occupation:    Tobacco Use     Smoking status: Former Smoker     Packs/day: 0.00     Smokeless tobacco: Never  Used   Vaping Use     Vaping Use: Never used   Substance and Sexual Activity     Alcohol use: Yes     Comment: occassional     Drug use: No     Sexual activity: Yes     Partners: Male     Birth control/protection: Pill     Comment: Nuvaring   Other Topics Concern     Parent/sibling w/ CABG, MI or angioplasty before 65F 55M? Not Asked   Social History Narrative     Not on file     Social Determinants of Health     Financial Resource Strain: Not on file   Food Insecurity: Not on file   Transportation Needs: Not on file   Physical Activity: Not on file   Stress: Not on file   Social Connections: Not on file   Intimate Partner Violence: Not on file   Housing Stability: Not on file       Outpatient Encounter Medications as of 11/23/2021   Medication Sig Dispense Refill     citalopram (CELEXA) 10 MG tablet Take 1 tablet (10 mg) by mouth daily 90 tablet 1     clobetasol (TEMOVATE) 0.05 % external cream Apply twice daily as needed. 45 g 4     docusate sodium (COLACE) 100 MG capsule Take 1 capsule (100 mg) by mouth 2 times daily as needed for constipation 90 capsule 4     methocarbamol (ROBAXIN) 500 MG tablet TAKE ONE TABLET BY MOUTH FOUR TIMES A DAY AS NEEDED FOR MUSCLE SPASMS 60 tablet 1     norgestimate-ethinyl estradiol (ORTHO-CYCLEN) 0.25-35 MG-MCG tablet Take 1 tablet by mouth daily 84 tablet 1     tacrolimus (PROTOPIC) 0.1 % external ointment Apply twice daily as needed. 30 g 1     triamcinolone (ARISTOCORT HP) 0.5 % external cream Apply topically 2 times daily 15 g 1     folic acid (FOLVITE) 1 MG tablet Take 1 tablet (1 mg) by mouth daily Do not take on the day that you take your Methotrexate. Take all other days of the week (Patient not taking: Reported on 11/23/2021) 90 tablet 3     methotrexate sodium 2.5 MG TABS Take 6 tablets (15 mg) by mouth once a week (Patient not taking: Reported on 11/23/2021) 30 tablet 2     triamcinolone (KENALOG) 0.1 % external ointment Apply topically 2 times daily 30 g 1     No  facility-administered encounter medications on file as of 11/23/2021.             O:   NAD, WDWN, Alert & Oriented, Mood & Affect wnl, Vitals stable   Here today alone   /80 (BP Location: Left arm, Patient Position: Sitting, Cuff Size: Adult Large)   Pulse 80   LMP 01/06/2021   SpO2 97%    General appearance normal   Vitals stable   Alert, oriented and in no acute distress     Eczematous plaques on face, arms, mild on torso, mild on legs       Eyes: Conjunctivae/lids:Normal     ENT: Lips: normal    MSK:Normal    Pulm: Breathing Normal    Neuro/Psych: Orientation:Alert and Orientedx3 ; Mood/Affect:normal   A/P:  1. Atopic Dermatitis   SCORAD: 56.9  Patient is having issues with sleeping due to itching.   Will try for dupixent again.   She had side effects with methotrexate and burning with protopic. She notes topical steroid help but does not clear skin.   Skin care regimen reviewed with patient: Eliminate harsh soaps, i.e. Dial, zest, irsih spring; Mild soaps such as Cetaphil or Dove sensitive skin, avoid hot or cold showers, aggressive use of emollients including vanicream, cetaphil or cerave discussed with patient.    Return to clinic in 3 months

## 2021-11-26 NOTE — TELEPHONE ENCOUNTER
Medication Appeal Initiation    We have initiated an appeal for the requested medication:  Medication: dupixent   Appeal Start Date: 11/26/2021    Insurance Company:  [employer]    12/1-per call to plan, case is pending

## 2021-12-06 RX ORDER — DUPILUMAB 300 MG/2ML
600 INJECTION, SOLUTION SUBCUTANEOUS ONCE
Qty: 4 ML | Refills: 0 | Status: SHIPPED | OUTPATIENT
Start: 2021-12-06 | End: 2021-12-06

## 2021-12-06 RX ORDER — DUPILUMAB 300 MG/2ML
300 INJECTION, SOLUTION SUBCUTANEOUS
Qty: 4 ML | Refills: 11 | Status: SHIPPED | OUTPATIENT
Start: 2021-12-06 | End: 2022-11-18

## 2021-12-06 NOTE — TELEPHONE ENCOUNTER
Please re-release rx for Dupixent. PA Appeal was approved     MEDICATION APPEAL APPROVED-Loading dose PA only approved through 12/17/21. I will call [employer] and have this marked as urgent.     Medication: dupixent   Authorization Effective Date: 12/3/2021  Authorization Expiration Date: 4/3/2022  Approved Dose/Quantity: load and maintenance   Reference #: [id number]   Insurance Company:[employer] # [id number]  Expected CoPay: na     CoPay Card Available: Yes    Foundation Assistance Needed: na  Which Pharmacy is filling the prescription (Not needed for infusion/clinic administered): [facility] [place], [place] - [address]

## 2021-12-06 NOTE — TELEPHONE ENCOUNTER
"Re you asking that a Dupixient order be resent to a pharmacy?     I don't know how else to \"release\" a medication order that is not given in the clinic.     Thank you, (Diana is not in the office on Mondays)     Angelita Lopez RN        "

## 2021-12-21 ENCOUNTER — OFFICE VISIT (OUTPATIENT)
Dept: FAMILY MEDICINE | Facility: CLINIC | Age: 41
End: 2021-12-21
Payer: COMMERCIAL

## 2021-12-21 VITALS
RESPIRATION RATE: 16 BRPM | DIASTOLIC BLOOD PRESSURE: 80 MMHG | HEART RATE: 73 BPM | SYSTOLIC BLOOD PRESSURE: 138 MMHG | HEIGHT: 63 IN | OXYGEN SATURATION: 98 % | TEMPERATURE: 98 F | BODY MASS INDEX: 37.92 KG/M2 | WEIGHT: 214 LBS

## 2021-12-21 DIAGNOSIS — F41.1 GAD (GENERALIZED ANXIETY DISORDER): Primary | ICD-10-CM

## 2021-12-21 PROCEDURE — 99213 OFFICE O/P EST LOW 20 MIN: CPT | Performed by: NURSE PRACTITIONER

## 2021-12-21 RX ORDER — CITALOPRAM HYDROBROMIDE 20 MG/1
20 TABLET ORAL DAILY
Qty: 90 TABLET | Refills: 1 | Status: SHIPPED | OUTPATIENT
Start: 2021-12-21 | End: 2022-05-31

## 2021-12-21 ASSESSMENT — ANXIETY QUESTIONNAIRES
3. WORRYING TOO MUCH ABOUT DIFFERENT THINGS: MORE THAN HALF THE DAYS
1. FEELING NERVOUS, ANXIOUS, OR ON EDGE: SEVERAL DAYS
2. NOT BEING ABLE TO STOP OR CONTROL WORRYING: SEVERAL DAYS
5. BEING SO RESTLESS THAT IT IS HARD TO SIT STILL: SEVERAL DAYS
7. FEELING AFRAID AS IF SOMETHING AWFUL MIGHT HAPPEN: NOT AT ALL
GAD7 TOTAL SCORE: 6
IF YOU CHECKED OFF ANY PROBLEMS ON THIS QUESTIONNAIRE, HOW DIFFICULT HAVE THESE PROBLEMS MADE IT FOR YOU TO DO YOUR WORK, TAKE CARE OF THINGS AT HOME, OR GET ALONG WITH OTHER PEOPLE: SOMEWHAT DIFFICULT
6. BECOMING EASILY ANNOYED OR IRRITABLE: SEVERAL DAYS

## 2021-12-21 ASSESSMENT — MIFFLIN-ST. JEOR: SCORE: 1604.83

## 2021-12-21 ASSESSMENT — PATIENT HEALTH QUESTIONNAIRE - PHQ9
5. POOR APPETITE OR OVEREATING: NOT AT ALL
SUM OF ALL RESPONSES TO PHQ QUESTIONS 1-9: 7

## 2021-12-21 NOTE — PROGRESS NOTES
"  Assessment & Plan     LOS (generalized anxiety disorder)    - citalopram (CELEXA) 20 MG tablet; Take 1 tablet (20 mg) by mouth daily  Will increase to 20 mg to see if that will help with her ongoing anxiety. Continue counseling.  See her back in 4 weeks and will do PE at that time as well.           BMI:   Estimated body mass index is 37.91 kg/m  as calculated from the following:    Height as of this encounter: 1.6 m (5' 3\").    Weight as of this encounter: 97.1 kg (214 lb).       See Patient Instructions  Patient Instructions   Increase Citalopram to 20 mg daily and continue with counseling.  Return for physical and medication recheck in 4 weeks. Come fasting.      Our Clinic hours are:  Mondays    7:20 am - 7 pm  Tues -  Fri  7:20 am - 5 pm    Clinic Phone: [phone]    The clinic lab opens at 7:30 am Mon - Fri and appointments are required.    [facility]. [phone]  Monday  8 am - 7pm  Tues - Fri 8 am - 5:30 pm             Return in about 4 weeks (around 1/18/2022) for Med Check, or sooner if symptoms persist or worsen, Physical Exam, Lab Work.    TRUMAN Aguila CNP  [facility]    Jennifer Ceballos is a 41 year old who presents for the following health issues  accompanied by her self .    HPI       Chief Complaint   Patient presents with     Ear Problem     patient feels like something is in her right ear      Anxiety       Depression and Anxiety Follow-Up    How are you doing with your depression since your last visit? Improved  A little     How are you doing with your anxiety since your last visit?  Improved  A little     Are you having other symptoms that might be associated with depression or anxiety? No    Have you had a significant life event? Job Concerns and Health Concerns     Do you have any concerns with your use of alcohol or other drugs? No    Social History     Tobacco Use     Smoking status: Former Smoker     Packs/day: 0.00 "     Smokeless tobacco: Never Used   Vaping Use     Vaping Use: Never used   Substance Use Topics     Alcohol use: Yes     Comment: occassional     Drug use: No     PHQ 11/22/2021 12/21/2021   PHQ-9 Total Score 4 7   Q9: Thoughts of better off dead/self-harm past 2 weeks Not at all Not at all     LOS-7 SCORE 11/22/2021 12/21/2021   Total Score 7 6     Last PHQ-9 12/21/2021   1.  Little interest or pleasure in doing things 0   2.  Feeling down, depressed, or hopeless 0   3.  Trouble falling or staying asleep, or sleeping too much 1   4.  Feeling tired or having little energy 2   5.  Poor appetite or overeating 1   6.  Feeling bad about yourself 0   7.  Trouble concentrating 1   8.  Moving slowly or restless 2   Q9: Thoughts of better off dead/self-harm past 2 weeks 0   PHQ-9 Total Score 7   Difficulty at work, home, or with people Not difficult at all     LOS-7  12/21/2021   1. Feeling nervous, anxious, or on edge 1   2. Not being able to stop or control worrying 1   3. Worrying too much about different things 2   4. Trouble relaxing 0   5. Being so restless that it is hard to sit still 1   6. Becoming easily annoyed or irritable 1   7. Feeling afraid, as if something awful might happen 0   LOS-7 Total Score 6   If you checked any problems, how difficult have they made it for you to do your work, take care of things at home, or get along with other people? Somewhat difficult       Suicide Assessment Five-step Evaluation and Treatment (SAFE-T)      How many servings of fruits and vegetables do you eat daily?  4 or more    On average, how many sweetened beverages do you drink each day (Examples: soda, juice, sweet tea, etc.  Do NOT count diet or artificially sweetened beverages)?   0    How many days per week do you exercise enough to make your heart beat faster? 3 or less    How many minutes a day do you exercise enough to make your heart beat faster? 20 - 29    How many days per week do you miss taking your  "medication? 0        Review of Systems   Constitutional, HEENT, cardiovascular, pulmonary, gi and gu systems are negative, except as otherwise noted.      Objective    /80   Pulse 73   Temp 98  F (36.7  C)   Resp 16   Ht 1.6 m (5' 3\")   Wt 97.1 kg (214 lb)   LMP 01/06/2021   SpO2 98%   BMI 37.91 kg/m    Body mass index is 37.91 kg/m .  Physical Exam   GENERAL: healthy, alert and no distress  NECK: no adenopathy, no asymmetry  RESP: lungs clear to auscultation - no rales, rhonchi or wheezes  CV: regular rate and rhythm  MS: no gross musculoskeletal defects noted  PSYCH: pleasant, talkative, somewhat anxious                  "

## 2021-12-21 NOTE — PATIENT INSTRUCTIONS
Increase Citalopram to 20 mg daily and continue with counseling.  Return for physical and medication recheck in 4 weeks. Come fasting.      Our Clinic hours are:  Mondays    7:20 am - 7 pm  Tues -  Fri  7:20 am - 5 pm    Clinic Phone: [phone]    The clinic lab opens at 7:30 am Mon - Fri and appointments are required.    [facility]. [phone]  Monday  8 am - 7pm  Tues - Fri 8 am - 5:30 pm

## 2021-12-22 ASSESSMENT — ANXIETY QUESTIONNAIRES: GAD7 TOTAL SCORE: 6

## 2022-01-19 ENCOUNTER — OFFICE VISIT (OUTPATIENT)
Dept: FAMILY MEDICINE | Facility: CLINIC | Age: 42
End: 2022-01-19
Payer: COMMERCIAL

## 2022-01-19 VITALS
HEIGHT: 63 IN | OXYGEN SATURATION: 98 % | WEIGHT: 218 LBS | TEMPERATURE: 98.2 F | DIASTOLIC BLOOD PRESSURE: 82 MMHG | RESPIRATION RATE: 16 BRPM | HEART RATE: 82 BPM | BODY MASS INDEX: 38.62 KG/M2 | SYSTOLIC BLOOD PRESSURE: 122 MMHG

## 2022-01-19 DIAGNOSIS — F41.9 ANXIETY AND DEPRESSION: ICD-10-CM

## 2022-01-19 DIAGNOSIS — F32.A ANXIETY AND DEPRESSION: ICD-10-CM

## 2022-01-19 DIAGNOSIS — Z13.220 SCREENING FOR LIPOID DISORDERS: ICD-10-CM

## 2022-01-19 DIAGNOSIS — Z13.1 SCREENING FOR DIABETES MELLITUS: ICD-10-CM

## 2022-01-19 DIAGNOSIS — Z00.00 ROUTINE GENERAL MEDICAL EXAMINATION AT A HEALTH CARE FACILITY: Primary | ICD-10-CM

## 2022-01-19 DIAGNOSIS — R63.5 WEIGHT GAIN: ICD-10-CM

## 2022-01-19 DIAGNOSIS — K59.09 CHRONIC CONSTIPATION: ICD-10-CM

## 2022-01-19 LAB
ANION GAP SERPL CALCULATED.3IONS-SCNC: 6 MMOL/L (ref 3–14)
BUN SERPL-MCNC: 19 MG/DL (ref 7–30)
CALCIUM SERPL-MCNC: 9 MG/DL (ref 8.5–10.1)
CHLORIDE BLD-SCNC: 106 MMOL/L (ref 94–109)
CHOLEST SERPL-MCNC: 261 MG/DL
CO2 SERPL-SCNC: 24 MMOL/L (ref 20–32)
CREAT SERPL-MCNC: 0.65 MG/DL (ref 0.52–1.04)
FASTING STATUS PATIENT QL REPORTED: YES
GFR SERPL CREATININE-BSD FRML MDRD: >90 ML/MIN/1.73M2
GLUCOSE BLD-MCNC: 103 MG/DL (ref 70–99)
HDLC SERPL-MCNC: 71 MG/DL
LDLC SERPL CALC-MCNC: 144 MG/DL
NONHDLC SERPL-MCNC: 190 MG/DL
POTASSIUM BLD-SCNC: 4.4 MMOL/L (ref 3.4–5.3)
SODIUM SERPL-SCNC: 136 MMOL/L (ref 133–144)
TRIGL SERPL-MCNC: 228 MG/DL
TSH SERPL DL<=0.005 MIU/L-ACNC: 2.18 MU/L (ref 0.4–4)

## 2022-01-19 PROCEDURE — 84443 ASSAY THYROID STIM HORMONE: CPT | Performed by: NURSE PRACTITIONER

## 2022-01-19 PROCEDURE — 99396 PREV VISIT EST AGE 40-64: CPT | Performed by: NURSE PRACTITIONER

## 2022-01-19 PROCEDURE — 99213 OFFICE O/P EST LOW 20 MIN: CPT | Mod: 25 | Performed by: NURSE PRACTITIONER

## 2022-01-19 PROCEDURE — 80061 LIPID PANEL: CPT | Performed by: NURSE PRACTITIONER

## 2022-01-19 PROCEDURE — 80048 BASIC METABOLIC PNL TOTAL CA: CPT | Performed by: NURSE PRACTITIONER

## 2022-01-19 PROCEDURE — 36415 COLL VENOUS BLD VENIPUNCTURE: CPT | Performed by: NURSE PRACTITIONER

## 2022-01-19 ASSESSMENT — ENCOUNTER SYMPTOMS
DIZZINESS: 1
ABDOMINAL PAIN: 1
BREAST MASS: 0
SORE THROAT: 1

## 2022-01-19 ASSESSMENT — MIFFLIN-ST. JEOR: SCORE: 1622.97

## 2022-01-19 NOTE — PROGRESS NOTES
SUBJECTIVE:   CC: Lin Corona is an 41 year old woman who presents for preventive health visit.       Patient has been advised of split billing requirements and indicates understanding: Yes  Healthy Habits:     Getting at least 3 servings of Calcium per day:  Yes    Bi-annual eye exam:  NO    Dental care twice a year:  Yes    Sleep apnea or symptoms of sleep apnea:  Daytime drowsiness    Diet:  Carbohydrate counting and Other    Frequency of exercise:  1 day/week    Duration of exercise:  45-60 minutes    Taking medications regularly:  Yes    Medication side effects:  None    PHQ-2 Total Score: 0    Additional concerns today:  Yes      Has has constipation for years and was hoping hysterectomy would help. It still bothers her on occasion.  Some hot flashes noted. Mood  Is reported to be stable. Difficulty losing weight.      Constipation     Onset: about 1 year    Description:   Frequency of bowel movements: 5-7 days.  Stool consistency: soft formed    Progression of Symptoms:  same    Accompanying Signs & Symptoms:  Abdominal pain (cramping?): YES  Blood in stool: no   Rectal pain: no   Nausea/vomiting: no   Weight loss or gain: YES   History:   History of abdominal surgery: YES    Precipitating factors:   Recent use of narcotics, anticholinergics, calcium channel blockers, antacids, or iron supplements: no   Chronic Laxative Use: no          Therapies Tried and outcome: increase in fiber, increase in fluids, laxatives and stool softeners- not much relief       Today's PHQ-2 Score:   PHQ-2 ( 1999 Pf[employer]) 1/19/2022   Q1: Little interest or pleasure in doing things 0   Q2: Feeling down, depressed or hopeless 0   PHQ-2 Score 0   PHQ-2 Total Score (12-17 Years)- Positive if 3 or more points; Administer PHQ-A if positive -   Q1: Little interest or pleasure in doing things Not at all   Q2: Feeling down, depressed or hopeless Not at all   PHQ-2 Score 0       Abuse: Current or Past (Physical, Sexual or Emotional) -  No  Do you feel safe in your environment? Yes        Social History     Tobacco Use     Smoking status: Former Smoker     Packs/day: 0.00     Smokeless tobacco: Never Used   Substance Use Topics     Alcohol use: Yes     Comment: occassional     If you drink alcohol do you typically have >3 drinks per day or >7 drinks per week? No    Alcohol Use 1/19/2022   Prescreen: >3 drinks/day or >7 drinks/week? Yes   AUDIT SCORE  6       Reviewed orders with patient.  Reviewed health maintenance and updated orders accordingly - Yes  BP Readings from Last 3 Encounters:   01/19/22 122/82   12/21/21 138/80   11/23/21 122/80    Wt Readings from Last 3 Encounters:   01/19/22 98.9 kg (218 lb)   12/21/21 97.1 kg (214 lb)   08/27/21 94.2 kg (207 lb 9.6 oz)                  Patient Active Problem List   Diagnosis     Pelvic pain in female     Dysmenorrhea     Endometriosis     Chronic pelvic pain in female     Abnormal uterine bleeding     Anxiety and depression     Past Surgical History:   Procedure Laterality Date     APPENDECTOMY  7/13/2011     BREAST SURGERY  2000    Breast reduction     COLONOSCOPY  9/2011     CYSTOSCOPY N/A 1/11/2021    Procedure: CYSTOSCOPY;  Surgeon: Jessica Gallardo DO;  Location: [facility]     GYN SURGERY  1/2012    Colposcopy exam      HYSTERECTOMY       LAPAROSCOPIC HYSTERECTOMY TOTAL, BILATERAL SALPINGO-OOPHORECTOMY, COMBINED N/A 1/11/2021    Procedure: HYSTERECTOMY, TOTAL, LAPAROSCOPIC, WITH BILATERAL SALPINGO-OOPHORECTOMY;  Surgeon: Jessica Gallardo DO;  Location: [facility]     SHOULDER SURGERY Right        Social History     Tobacco Use     Smoking status: Former Smoker     Packs/day: 0.00     Smokeless tobacco: Never Used   Substance Use Topics     Alcohol use: Yes     Comment: occassional     Family History   Problem Relation Age of Onset     Arthritis Mother      Heart Disease Father      Gallbladder Disease Father      Arthritis Maternal Grandmother      Unknown/Adopted Maternal Grandmother      C.A.DTorrey  Paternal Grandmother      Ovarian Cancer Paternal Grandmother      Cancer Paternal Grandmother      MERLE. Paternal Grandfather      Cervical Cancer Cousin      Cervical Cancer Paternal Aunt          Current Outpatient Medications   Medication Sig Dispense Refill     citalopram (CELEXA) 20 MG tablet Take 1 tablet (20 mg) by mouth daily 90 tablet 1     Dupilumab (DUPIXENT) 300 MG/2ML SOPN Inject 300 mg Subcutaneous every 14 days 4 mL 11     norgestimate-ethinyl estradiol (ORTHO-CYCLEN) 0.25-35 MG-MCG tablet Take 1 tablet by mouth daily 84 tablet 1     clobetasol (TEMOVATE) 0.05 % external cream Apply twice daily as needed. (Patient not taking: Reported on 12/21/2021) 45 g 4     folic acid (FOLVITE) 1 MG tablet Take 1 tablet (1 mg) by mouth daily Do not take on the day that you take your Methotrexate. Take all other days of the week (Patient not taking: Reported on 11/23/2021) 90 tablet 3     tacrolimus (PROTOPIC) 0.1 % external ointment Apply twice daily as needed. (Patient not taking: Reported on 12/21/2021) 30 g 1     triamcinolone (ARISTOCORT HP) 0.5 % external cream Apply topically 2 times daily (Patient not taking: Reported on 12/21/2021) 15 g 1     No Known Allergies  Recent Labs   Lab Test 09/13/21  1438 07/29/21  1108 03/16/20  0925   LDL  --   --  137*   HDL  --   --  78   TRIG  --   --  149   ALT 21 20  --    CR 0.76 0.69 0.66   GFRESTIMATED >90 >90 >90   GFRESTBLACK  --   --  >90   POTASSIUM 3.9 4.4 3.9   TSH  --   --  1.93        Breast Cancer Screening:    FHS-7:   Breast CA Risk Assessment (FHS-7) 1/19/2022   Did any of your first-degree relatives have breast or ovarian cancer? Yes   Did any of your relatives have bilateral breast cancer? Unknown   Did any man in your family have breast cancer? No   Did any woman in your family have breast and ovarian cancer? No   Did any woman in your family have breast cancer before age 50 y? Yes   Do you have 2 or more relatives with breast and/or ovarian cancer? Yes  "  Do you have 2 or more relatives with breast and/or bowel cancer? Yes       Mammogram Screening - Offered annual screening and updated Health Maintenance for [place] plan based on risk factor consideration    Pertinent mammograms are reviewed under the imaging tab.    History of abnormal Pap smear: Status post benign hysterectomy. Health Maintenance and Surgical History updated.     Reviewed and updated as needed this visit by clinical staff  Tobacco  Allergies  Meds   Med Hx  Surg Hx  Fam Hx  Soc Hx       Reviewed and updated as needed this visit by Provider               Past Medical History:   Diagnosis Date     Anxiety and depression      Dysmenorrhea      Endometriosis      Intrinsic eczema       Past Surgical History:   Procedure Laterality Date     APPENDECTOMY  7/13/2011     BREAST SURGERY  2000    Breast reduction     COLONOSCOPY  9/2011     CYSTOSCOPY N/A 1/11/2021    Procedure: CYSTOSCOPY;  Surgeon: Jessica Gallardo DO;  Location: [facility]     GYN SURGERY  1/2012    Colposcopy exam      HYSTERECTOMY       LAPAROSCOPIC HYSTERECTOMY TOTAL, BILATERAL SALPINGO-OOPHORECTOMY, COMBINED N/A 1/11/2021    Procedure: HYSTERECTOMY, TOTAL, LAPAROSCOPIC, WITH BILATERAL SALPINGO-OOPHORECTOMY;  Surgeon: Jessica Gallardo DO;  Location: [facility]     SHOULDER SURGERY Right        Review of Systems   HENT: Positive for sore throat.    Eyes: Positive for visual disturbance.   Gastrointestinal: Positive for abdominal pain.   Breasts:  Negative for tenderness, breast mass and discharge.   Genitourinary: Positive for pelvic pain. Negative for vaginal bleeding and vaginal discharge.   Neurological: Positive for dizziness.          OBJECTIVE:   /82   Pulse 82   Temp 98.2  F (36.8  C)   Resp 16   Ht 1.6 m (5' 3\")   Wt 98.9 kg (218 lb)   LMP 01/06/2021   SpO2 98%   BMI 38.62 kg/m    Physical Exam  GENERAL: healthy, alert and no distress  EYES: Eyes grossly normal to inspection, PERRL and conjunctivae and sclerae " normal  HENT: ear canals and TM's normal, nose and mouth without ulcers or lesions  NECK: no adenopathy, no asymmetry, masses, or scars and thyroid normal to palpation  RESP: lungs clear to auscultation - no rales, rhonchi or wheezes  CV: regular rate and rhythm, normal S1 S2, no S3 or S4, no murmur, click or rub, no peripheral edema and peripheral pulses strong  ABDOMEN: soft, obese, nontender, no hepatosplenomegaly, no masses and bowel sounds normal  MS: no gross musculoskeletal defects noted, no edema  SKIN: no suspicious lesions or rashes  NEURO: Normal strength and tone, mentation intact and speech normal  PSYCH: mentation appears normal, affect normal/bright    Diagnostic Test Results:  Labs reviewed in Epic  No results found for this or any previous visit (from the past 24 hour(s)).    ASSESSMENT/PLAN:       ICD-10-CM    1. Routine general medical examination at a health care facility  Z00.00    2. Weight gain  R63.5 TSH with free T4 reflex     TSH with free T4 reflex   3. Screening for lipoid disorders  Z13.220 Lipid panel reflex to direct LDL Fasting     Lipid panel reflex to direct LDL Fasting   4. Screening for diabetes mellitus  Z13.1 Basic metabolic panel  (Ca, Cl, CO2, Creat, Gluc, K, Na, BUN)     Basic metabolic panel  (Ca, Cl, CO2, Creat, Gluc, K, Na, BUN)   5. Anxiety and depression  F41.9     F32.A    6. Chronic constipation  K59.09      Will obtain labs today. Offered her nutritional consult for concerns with weight and difficulty losing weight.  She will continue with present OBCP and consider consulting with ob/Gynecology for other post menopause treatment options.  Mood is good and she wishes to continue with Citalopram at present dose and counseling.  Add soluble fiber daily.      COUNSELING:  Reviewed preventive health counseling, as reflected in patient instructions       Regular exercise       Healthy diet/nutrition       Vision screening    Estimated body mass index is 38.62 kg/m  as  "calculated from the following:    Height as of this encounter: 1.6 m (5' 3\").    Weight as of this encounter: 98.9 kg (218 lb).    Weight management plan: Discussed healthy diet and exercise guidelines    She reports that she has quit smoking. She smoked 0.00 packs per day. She has never used smokeless tobacco.      Counseling Resources:  ATP IV Guidelines  Pooled Cohorts Equation Calculator  Breast Cancer Risk Calculator  BRCA-Related Cancer Risk Assessment: FHS-7 Tool  FRAX Risk Assessment  ICSI Preventive Guidelines  Dietary Guidelines for Americans, 2010  USDA's MyPlate  ASA Prophylaxis  Lung CA Screening    TRUMAN Aguila CNP  [facility]  "

## 2022-01-19 NOTE — PATIENT INSTRUCTIONS
Will be notified of pending labs.    Preventive Health Recommendations  Female Ages 40 to 49    Yearly exam:     See your health care provider every year in order to  1. Review health changes.   2. Discuss preventive care.    3. Review your medicines if your doctor prescribed any.      Get a Pap test every three years (unless you have an abnormal result and your provider advises testing more often).      If you get Pap tests with HPV test, you only need to test every 5 years, unless you have an abnormal result. You do not need a Pap test if your uterus was removed (hysterectomy) and you have not had cancer.      You should be tested each year for STDs (sexually transmitted diseases), if you're at risk.     Ask your doctor if you should have a mammogram.      Have a colonoscopy (test for colon cancer) if someone in your family has had colon cancer or polyps before age 50.       Have a cholesterol test every 5 years.       Have a diabetes test (fasting glucose) after age 45. If you are at risk for diabetes, you should have this test every 3 years.    Shots: Get a flu shot each year. Get a tetanus shot every 10 years.     Nutrition:     Eat at least 5 servings of fruits and vegetables each day.    Eat whole-grain bread, whole-wheat pasta and brown rice instead of white grains and rice.    Get adequate Calcium and Vitamin D.      Lifestyle    Exercise at least 150 minutes a week (an average of 30 minutes a day, 5 days a week). This will help you control your weight and prevent disease.    Limit alcohol to one drink per day.    No smoking.     Wear sunscreen to prevent skin cancer.    See your dentist every six months for an exam and cleaning.

## 2022-01-28 ENCOUNTER — OFFICE VISIT (OUTPATIENT)
Dept: DERMATOLOGY | Facility: CLINIC | Age: 42
End: 2022-01-28
Payer: COMMERCIAL

## 2022-01-28 VITALS — DIASTOLIC BLOOD PRESSURE: 84 MMHG | SYSTOLIC BLOOD PRESSURE: 128 MMHG | OXYGEN SATURATION: 99 % | HEART RATE: 78 BPM

## 2022-01-28 DIAGNOSIS — L20.9 ATOPIC DERMATITIS, UNSPECIFIED TYPE: Primary | ICD-10-CM

## 2022-01-28 PROCEDURE — 99213 OFFICE O/P EST LOW 20 MIN: CPT | Performed by: PHYSICIAN ASSISTANT

## 2022-01-28 RX ORDER — HYDROXYZINE HYDROCHLORIDE 25 MG/1
TABLET, FILM COATED ORAL
Qty: 60 TABLET | Refills: 4 | Status: SHIPPED | OUTPATIENT
Start: 2022-01-28 | End: 2023-03-17

## 2022-01-28 RX ORDER — TRIAMCINOLONE ACETONIDE 1 MG/G
OINTMENT TOPICAL
Qty: 453.6 G | Refills: 4 | Status: SHIPPED | OUTPATIENT
Start: 2022-01-28 | End: 2022-03-10

## 2022-01-28 NOTE — NURSING NOTE
Chief Complaint   Patient presents with     Eczema     f/u        Vitals:    01/28/22 0709   BP: 128/84   BP Location: Right arm   Patient Position: Sitting   Cuff Size: Adult Large   Pulse: 78   SpO2: 99%     Wt Readings from Last 1 Encounters:   01/19/22 98.9 kg (218 lb)       Yesenia Culver LPN .................1/28/2022

## 2022-01-28 NOTE — PROGRESS NOTES
Lin Corona is an extremely pleasant 41 year old year old female patient here today for atopic dermatitis. She notes her skin recently flared, now started to calm down. She notes she is using and essential oil which has been helping. She notes it is itchy and burning. She has been on dupixent for the past month. She works as an .  Patient has no other skin complaints today.  Remainder of the HPI, Meds, PMH, Allergies, FH, and SH was reviewed in chart.    Past Medical History:   Diagnosis Date     Anxiety and depression      Dysmenorrhea      Endometriosis      Intrinsic eczema        Past Surgical History:   Procedure Laterality Date     APPENDECTOMY  7/13/2011     BREAST SURGERY  2000    Breast reduction     COLONOSCOPY  9/2011     CYSTOSCOPY N/A 1/11/2021    Procedure: CYSTOSCOPY;  Surgeon: Jessica Gallardo DO;  Location: [facility]     GYN SURGERY  1/2012    Colposcopy exam      HYSTERECTOMY       LAPAROSCOPIC HYSTERECTOMY TOTAL, BILATERAL SALPINGO-OOPHORECTOMY, COMBINED N/A 1/11/2021    Procedure: HYSTERECTOMY, TOTAL, LAPAROSCOPIC, WITH BILATERAL SALPINGO-OOPHORECTOMY;  Surgeon: Jessica Gallardo DO;  Location: [facility]     SHOULDER SURGERY Right         Family History   Problem Relation Age of Onset     Arthritis Mother      Heart Disease Father      Gallbladder Disease Father      Arthritis Maternal Grandmother      Unknown/Adopted Maternal Grandmother      C.A.D. Paternal Grandmother      Ovarian Cancer Paternal Grandmother      Cancer Paternal Grandmother      C.A.D. Paternal Grandfather      Cervical Cancer Cousin      Cervical Cancer Paternal Aunt        Social History     Socioeconomic History     Marital status:      Spouse name: Not on file     Number of children: Not on file     Years of education: Not on file     Highest education level: Not on file   Occupational History     Occupation:    Tobacco Use     Smoking status: Former Smoker     Packs/day: 0.00     Smokeless  tobacco: Never Used   Vaping Use     Vaping Use: Never used   Substance and Sexual Activity     Alcohol use: Yes     Comment: occassional     Drug use: No     Sexual activity: Yes     Partners: Male     Birth control/protection: Pill     Comment: Nuvaring   Other Topics Concern     Parent/sibling w/ CABG, MI or angioplasty before 65F 55M? Not Asked   Social History Narrative     Not on file     Social Determinants of Health     Financial Resource Strain: Not on file   Food Insecurity: Not on file   Transportation Needs: Not on file   Physical Activity: Not on file   Stress: Not on file   Social Connections: Not on file   Intimate Partner Violence: Not on file   Housing Stability: Not on file       Outpatient Encounter Medications as of 1/28/2022   Medication Sig Dispense Refill     citalopram (CELEXA) 20 MG tablet Take 1 tablet (20 mg) by mouth daily 90 tablet 1     clobetasol (TEMOVATE) 0.05 % external cream Apply twice daily as needed. 45 g 4     Dupilumab (DUPIXENT) 300 MG/2ML SOPN Inject 300 mg Subcutaneous every 14 days 4 mL 11     hydrOXYzine (ATARAX) 25 MG tablet Take 1-2 tablets at bedtime. 60 tablet 4     norgestimate-ethinyl estradiol (ORTHO-CYCLEN) 0.25-35 MG-MCG tablet Take 1 tablet by mouth daily 84 tablet 1     triamcinolone (KENALOG) 0.1 % external ointment Apply twice daily as needed. 453.6 g 4     folic acid (FOLVITE) 1 MG tablet Take 1 tablet (1 mg) by mouth daily Do not take on the day that you take your Methotrexate. Take all other days of the week (Patient not taking: Reported on 11/23/2021) 90 tablet 3     tacrolimus (PROTOPIC) 0.1 % external ointment Apply twice daily as needed. (Patient not taking: Reported on 12/21/2021) 30 g 1     triamcinolone (ARISTOCORT HP) 0.5 % external cream Apply topically 2 times daily (Patient not taking: Reported on 12/21/2021) 15 g 1     No facility-administered encounter medications on file as of 1/28/2022.             O:   NAD, WDWN, Alert & Oriented, Mood &  Affect wnl, Vitals stable   Here today alone   /84 (BP Location: Right arm, Patient Position: Sitting, Cuff Size: Adult Large)   Pulse 78   LMP 01/06/2021   SpO2 99%    General appearance normal   Vitals stable   Alert, oriented and in no acute distress     Eczematous plaques and papules on arms and legs, torso mostly clear today       Eyes: Conjunctivae/lids:Normal     ENT: Lips: normal    MSK:Normal    Pulm: Breathing Normal    Neuro/Psych: Orientation:Alert and Orientedx3 ; Mood/Affect:normal     A/P:  1. Atopic Dermatitis   Continue dupixent.   Start Aquaphor spray.   Take hydroxyzine for itch.   Apply tac ointment if needed.   If not improving consider patch testing or biopsy.

## 2022-01-28 NOTE — LETTER
1/28/2022         RE: Lin Corona  [id number] [address] [place] [id number]        Dear Colleague,    Thank you for referring your patient, Lin Corona, to the [facility]. Please see a copy of my visit note below.    Lin Corona is an extremely pleasant 41 year old year old female patient here today for atopic dermatitis. She notes her skin recently flared, now started to calm down. She notes she is using and essential oil which has been helping. She notes it is itchy and burning. She has been on dupixent for the past month. She works as an .  Patient has no other skin complaints today.  Remainder of the HPI, Meds, PMH, Allergies, FH, and SH was reviewed in chart.    Past Medical History:   Diagnosis Date     Anxiety and depression      Dysmenorrhea      Endometriosis      Intrinsic eczema        Past Surgical History:   Procedure Laterality Date     APPENDECTOMY  7/13/2011     BREAST SURGERY  2000    Breast reduction     COLONOSCOPY  9/2011     CYSTOSCOPY N/A 1/11/2021    Procedure: CYSTOSCOPY;  Surgeon: Jessica Gallardo DO;  Location: [facility]     GYN SURGERY  1/2012    Colposcopy exam      HYSTERECTOMY       LAPAROSCOPIC HYSTERECTOMY TOTAL, BILATERAL SALPINGO-OOPHORECTOMY, COMBINED N/A 1/11/2021    Procedure: HYSTERECTOMY, TOTAL, LAPAROSCOPIC, WITH BILATERAL SALPINGO-OOPHORECTOMY;  Surgeon: Jessica Gallardo DO;  Location: [facility]     SHOULDER SURGERY Right         Family History   Problem Relation Age of Onset     Arthritis Mother      Heart Disease Father      Gallbladder Disease Father      Arthritis Maternal Grandmother      Unknown/Adopted Maternal Grandmother      C.A.D. Paternal Grandmother      Ovarian Cancer Paternal Grandmother      Cancer Paternal Grandmother      C.A.D. Paternal Grandfather      Cervical Cancer Cousin      Cervical Cancer Paternal Aunt        Social History     Socioeconomic History     Marital status:      Spouse name: Not on file      Number of children: Not on file     Years of education: Not on file     Highest education level: Not on file   Occupational History     Occupation:    Tobacco Use     Smoking status: Former Smoker     Packs/day: 0.00     Smokeless tobacco: Never Used   Vaping Use     Vaping Use: Never used   Substance and Sexual Activity     Alcohol use: Yes     Comment: occassional     Drug use: No     Sexual activity: Yes     Partners: Male     Birth control/protection: Pill     Comment: Nuvaring   Other Topics Concern     Parent/sibling w/ CABG, MI or angioplasty before 65F 55M? Not Asked   Social History Narrative     Not on file     Social Determinants of Health     Financial Resource Strain: Not on file   Food Insecurity: Not on file   Transportation Needs: Not on file   Physical Activity: Not on file   Stress: Not on file   Social Connections: Not on file   Intimate Partner Violence: Not on file   Housing Stability: Not on file       Outpatient Encounter Medications as of 1/28/2022   Medication Sig Dispense Refill     citalopram (CELEXA) 20 MG tablet Take 1 tablet (20 mg) by mouth daily 90 tablet 1     clobetasol (TEMOVATE) 0.05 % external cream Apply twice daily as needed. 45 g 4     Dupilumab (DUPIXENT) 300 MG/2ML SOPN Inject 300 mg Subcutaneous every 14 days 4 mL 11     hydrOXYzine (ATARAX) 25 MG tablet Take 1-2 tablets at bedtime. 60 tablet 4     norgestimate-ethinyl estradiol (ORTHO-CYCLEN) 0.25-35 MG-MCG tablet Take 1 tablet by mouth daily 84 tablet 1     triamcinolone (KENALOG) 0.1 % external ointment Apply twice daily as needed. 453.6 g 4     folic acid (FOLVITE) 1 MG tablet Take 1 tablet (1 mg) by mouth daily Do not take on the day that you take your Methotrexate. Take all other days of the week (Patient not taking: Reported on 11/23/2021) 90 tablet 3     tacrolimus (PROTOPIC) 0.1 % external ointment Apply twice daily as needed. (Patient not taking: Reported on 12/21/2021) 30 g 1     triamcinolone  (ARISTOCORT HP) 0.5 % external cream Apply topically 2 times daily (Patient not taking: Reported on 12/21/2021) 15 g 1     No facility-administered encounter medications on file as of 1/28/2022.             O:   NAD, WDWN, Alert & Oriented, Mood & Affect wnl, Vitals stable   Here today alone   /84 (BP Location: Right arm, Patient Position: Sitting, Cuff Size: Adult Large)   Pulse 78   LMP 01/06/2021   SpO2 99%    General appearance normal   Vitals stable   Alert, oriented and in no acute distress     Eczematous plaques and papules on arms and legs, torso mostly clear today       Eyes: Conjunctivae/lids:Normal     ENT: Lips: normal    MSK:Normal    Pulm: Breathing Normal    Neuro/Psych: Orientation:Alert and Orientedx3 ; Mood/Affect:normal     A/P:  1. Atopic Dermatitis   Continue dupixent.   Start Aquaphor spray.   Take hydroxyzine for itch.   Apply tac ointment if needed.   If not improving consider patch testing or biopsy.       Again, thank you for allowing me to participate in the care of your patient.        Sincerely,        Diana Lincoln PA-C

## 2022-03-10 ENCOUNTER — OFFICE VISIT (OUTPATIENT)
Dept: OBGYN | Facility: CLINIC | Age: 42
End: 2022-03-10
Payer: COMMERCIAL

## 2022-03-10 VITALS
TEMPERATURE: 97.5 F | BODY MASS INDEX: 38.34 KG/M2 | HEIGHT: 63 IN | WEIGHT: 216.4 LBS | SYSTOLIC BLOOD PRESSURE: 126 MMHG | HEART RATE: 70 BPM | RESPIRATION RATE: 12 BRPM | DIASTOLIC BLOOD PRESSURE: 83 MMHG

## 2022-03-10 DIAGNOSIS — Z79.890 SURGICAL MENOPAUSE ON HORMONE REPLACEMENT THERAPY: Primary | ICD-10-CM

## 2022-03-10 DIAGNOSIS — E89.40 SURGICAL MENOPAUSE ON HORMONE REPLACEMENT THERAPY: Primary | ICD-10-CM

## 2022-03-10 DIAGNOSIS — K59.01 SLOW TRANSIT CONSTIPATION: ICD-10-CM

## 2022-03-10 PROCEDURE — 99213 OFFICE O/P EST LOW 20 MIN: CPT | Performed by: OBSTETRICS & GYNECOLOGY

## 2022-03-10 RX ORDER — NORGESTIMATE AND ETHINYL ESTRADIOL 0.25-0.035
1 KIT ORAL DAILY
Qty: 84 TABLET | Refills: 4 | Status: SHIPPED | OUTPATIENT
Start: 2022-03-10 | End: 2023-02-06

## 2022-03-10 NOTE — PROGRESS NOTES
[facility]    Gynecology Office Note    CC:   Chief Complaint   Patient presents with     Follow Up        HPI: Lin Corona is a 41 year old  who presents for constipation. Patient reports continued issues with chronic constipation. Prior to hysterectomy was having very infrequent bowel movements, about every 10 days. Since surgery, she has continued to take stool softeners and reports bowel movements about every 4-6 days. Large volume stools when she is able to go. Also having associated symptoms of bloating and occasional abdominal cramping. She is wondering if her surgery or history of known pelvic adhesions would be contributing to the constipation. Additionally reports that she has an occasional hot flash, about once per week. Wondering if this is normal while she is taking the OCPs for hormone replacement.    GYN Hx:     Patient is menopausal: yes, surgical, maintained on HRT    Patient's last menstrual period was 2021.      ROS: A 10 pt ROS was completed and found to be otherwise negative unless mentioned in the HPI.     PMH:   Past Medical History:   Diagnosis Date     Anxiety and depression      Dysmenorrhea      Endometriosis      Intrinsic eczema        PSHx:   Past Surgical History:   Procedure Laterality Date     APPENDECTOMY  2011     BREAST SURGERY      Breast reduction     COLONOSCOPY  2011     CYSTOSCOPY N/A 2021    Procedure: CYSTOSCOPY;  Surgeon: Jessica Gallardo DO;  Location: [facility]     GYN SURGERY  2012    Colposcopy exam      HYSTERECTOMY       LAPAROSCOPIC HYSTERECTOMY TOTAL, BILATERAL SALPINGO-OOPHORECTOMY, COMBINED N/A 2021    Procedure: HYSTERECTOMY, TOTAL, LAPAROSCOPIC, WITH BILATERAL SALPINGO-OOPHORECTOMY;  Surgeon: Jessica Gallardo DO;  Location: [facility]     SHOULDER SURGERY Right        OBHx:   OB History    Para Term  AB Living   0 0 0 0 0 0   SAB IAB Ectopic Multiple Live Births   0 0 0 0 0        Medications:   citalopram (CELEXA) 20 MG tablet, Take 1 tablet (20 mg) by mouth daily  Dupilumab (DUPIXENT) 300 MG/2ML SOPN, Inject 300 mg Subcutaneous every 14 days  hydrOXYzine (ATARAX) 25 MG tablet, Take 1-2 tablets at bedtime. (Patient not taking: Reported on 3/10/2022)    No current facility-administered medications on file prior to visit.      Allergies:    No Known Allergies    Social History:   Social History     Socioeconomic History     Marital status:      Spouse name: Not on file     Number of children: Not on file     Years of education: Not on file     Highest education level: Not on file   Occupational History     Occupation:    Tobacco Use     Smoking status: Former Smoker     Packs/day: 0.00     Smokeless tobacco: Never Used   Vaping Use     Vaping Use: Never used   Substance and Sexual Activity     Alcohol use: Yes     Comment: occassional     Drug use: No     Sexual activity: Yes     Partners: Male     Birth control/protection: Pill     Comment: Nuvaring   Other Topics Concern     Parent/sibling w/ CABG, MI or angioplasty before 65F 55M? Not Asked   Social History Narrative     Not on file     Social Determinants of Health     Financial Resource Strain: Not on file   Food Insecurity: Not on file   Transportation Needs: Not on file   Physical Activity: Not on file   Stress: Not on file   Social Connections: Not on file   Intimate Partner Violence: Not on file   Housing Stability: Not on file         Family History:  Family History   Problem Relation Age of Onset     Arthritis Mother      Heart Disease Father      Gallbladder Disease Father      Arthritis Maternal Grandmother      Unknown/Adopted Maternal Grandmother      C.A.D. Paternal Grandmother      Ovarian Cancer Paternal Grandmother      Cancer Paternal Grandmother      C.A.D. Paternal Grandfather      Cervical Cancer Cousin      Cervical Cancer Paternal Aunt        Physical Exam:   Vitals:    03/10/22 1005   BP:  "126/83   BP Location: Left arm   Patient Position: Sitting   Cuff Size: Adult Large   Pulse: 70   Resp: 12   Temp: 97.5  F (36.4  C)   TempSrc: Tympanic   Weight: 98.2 kg (216 lb 6.4 oz)   Height: 1.6 m (5' 3\")      Estimated body mass index is 38.33 kg/m  as calculated from the following:    Height as of this encounter: 1.6 m (5' 3\").    Weight as of this encounter: 98.2 kg (216 lb 6.4 oz).    General appearance: well-hydrated, A&O x 3, no apparent distress  Lungs: Equal expansion bilaterally, no accessory muscle use  Heart: No heaves or thrills.   Constitutional: See vitals  Neuro: CN II-XII grossly intact      Assessment and Plan:     Encounter Diagnoses   Name Primary?     Surgical menopause on hormone replacement therapy Yes     Slow transit constipation      Patient's main concern today is continued constipation. This has been a chronic issue. Prior to hysterectomy, only having bowel movements about every 6-10 days. Now has improved to every 4-6 days but still feeling bloated and having some pain. Discussed that this was unlikely to be due to a GYN origin. She did have some pelvic adhesions from endometriosis but these were taken down at the time of surgery. While surgery can cause more adhesions, this typically presents as bowel obstruction if severe. Patient seems to be having chronic constipation which again pre-dates surgery. She has been taking Colace BID. Encouraged to try laxatives as well, could start with daily Miralax. Then would recommend GI follow up.     Patient is currently maintained on Ortho Cyclen (35mcg) for post surgical menopause. Reports occasional hot flash, about one per week. Otherwise has been doing well. With minimal symptoms, would recommend continuing current dose. If symptoms become more bothersome, could readdress. Refill for medication given.      Return to clinic as needed.    Jessica Gallardo,     "

## 2022-03-10 NOTE — NURSING NOTE
"Initial /83 (BP Location: Left arm, Patient Position: Sitting, Cuff Size: Adult Large)   Pulse 70   Temp 97.5  F (36.4  C) (Tympanic)   Resp 12   Ht 1.6 m (5' 3\")   Wt 98.2 kg (216 lb 6.4 oz)   LMP 01/06/2021   BMI 38.33 kg/m   Estimated body mass index is 38.33 kg/m  as calculated from the following:    Height as of this encounter: 1.6 m (5' 3\").    Weight as of this encounter: 98.2 kg (216 lb 6.4 oz). .      "

## 2022-03-22 ENCOUNTER — OFFICE VISIT (OUTPATIENT)
Dept: DERMATOLOGY | Facility: CLINIC | Age: 42
End: 2022-03-22
Payer: COMMERCIAL

## 2022-03-22 VITALS — SYSTOLIC BLOOD PRESSURE: 130 MMHG | DIASTOLIC BLOOD PRESSURE: 90 MMHG | OXYGEN SATURATION: 99 % | HEART RATE: 79 BPM

## 2022-03-22 DIAGNOSIS — L30.9 DERMATITIS: ICD-10-CM

## 2022-03-22 PROCEDURE — 99213 OFFICE O/P EST LOW 20 MIN: CPT | Mod: 25 | Performed by: PHYSICIAN ASSISTANT

## 2022-03-22 PROCEDURE — 88312 SPECIAL STAINS GROUP 1: CPT | Performed by: DERMATOLOGY

## 2022-03-22 PROCEDURE — 88305 TISSUE EXAM BY PATHOLOGIST: CPT | Performed by: DERMATOLOGY

## 2022-03-22 PROCEDURE — 11102 TANGNTL BX SKIN SINGLE LES: CPT | Performed by: PHYSICIAN ASSISTANT

## 2022-03-22 NOTE — NURSING NOTE
"Initial BP (!) 130/90 (BP Location: Right arm, Patient Position: Sitting, Cuff Size: Adult Large)   Pulse 79   LMP 01/06/2021   SpO2 99%  Estimated body mass index is 38.33 kg/m  as calculated from the following:    Height as of 3/10/22: 1.6 m (5' 3\").    Weight as of 3/10/22: 98.2 kg (216 lb 6.4 oz). .      "

## 2022-03-22 NOTE — PROGRESS NOTES
Lin Corona is an extremely pleasant 41 year old year old female patient here today for recheck dermatitis. Currently on dupixent but flaring on hands. She notes itchy and painful. Here today for true test.  Patient has no other skin complaints today.  Remainder of the HPI, Meds, PMH, Allergies, FH, and SH was reviewed in chart.    Past Medical History:   Diagnosis Date     Anxiety and depression      Dysmenorrhea      Endometriosis      Intrinsic eczema        Past Surgical History:   Procedure Laterality Date     APPENDECTOMY  7/13/2011     BREAST SURGERY  2000    Breast reduction     COLONOSCOPY  9/2011     CYSTOSCOPY N/A 1/11/2021    Procedure: CYSTOSCOPY;  Surgeon: Jessica Gallardo DO;  Location: [facility]     GYN SURGERY  1/2012    Colposcopy exam      HYSTERECTOMY       LAPAROSCOPIC HYSTERECTOMY TOTAL, BILATERAL SALPINGO-OOPHORECTOMY, COMBINED N/A 1/11/2021    Procedure: HYSTERECTOMY, TOTAL, LAPAROSCOPIC, WITH BILATERAL SALPINGO-OOPHORECTOMY;  Surgeon: Jessica Gallardo DO;  Location: [facility]     SHOULDER SURGERY Right         Family History   Problem Relation Age of Onset     Arthritis Mother      Heart Disease Father      Gallbladder Disease Father      Arthritis Maternal Grandmother      Unknown/Adopted Maternal Grandmother      C.A.D. Paternal Grandmother      Ovarian Cancer Paternal Grandmother      Cancer Paternal Grandmother      C.A.D. Paternal Grandfather      Cervical Cancer Cousin      Cervical Cancer Paternal Aunt        Social History     Socioeconomic History     Marital status:      Spouse name: Not on file     Number of children: Not on file     Years of education: Not on file     Highest education level: Not on file   Occupational History     Occupation:    Tobacco Use     Smoking status: Former Smoker     Packs/day: 0.00     Smokeless tobacco: Never Used   Vaping Use     Vaping Use: Never used   Substance and Sexual Activity     Alcohol use: Yes     Comment: occassional      Drug use: No     Sexual activity: Yes     Partners: Male     Birth control/protection: Pill     Comment: Nuvaring   Other Topics Concern     Parent/sibling w/ CABG, MI or angioplasty before 65F 55M? Not Asked   Social History Narrative     Not on file     Social Determinants of Health     Financial Resource Strain: Not on file   Food Insecurity: Not on file   Transportation Needs: Not on file   Physical Activity: Not on file   Stress: Not on file   Social Connections: Not on file   Intimate Partner Violence: Not on file   Housing Stability: Not on file       Outpatient Encounter Medications as of 3/22/2022   Medication Sig Dispense Refill     citalopram (CELEXA) 20 MG tablet Take 1 tablet (20 mg) by mouth daily 90 tablet 1     Dupilumab (DUPIXENT) 300 MG/2ML SOPN Inject 300 mg Subcutaneous every 14 days 4 mL 11     norgestimate-ethinyl estradiol (ORTHO-CYCLEN) 0.25-35 MG-MCG tablet Take 1 tablet by mouth daily 84 tablet 4     hydrOXYzine (ATARAX) 25 MG tablet Take 1-2 tablets at bedtime. (Patient not taking: Reported on 3/10/2022) 60 tablet 4     No facility-administered encounter medications on file as of 3/22/2022.             O:   NAD, WDWN, Alert & Oriented, Mood & Affect wnl, Vitals stable   Here today alone   BP (!) 130/90 (BP Location: Right arm, Patient Position: Sitting, Cuff Size: Adult Large)   Pulse 79   LMP 01/06/2021   SpO2 99%    General appearance normal   Vitals stable   Alert, oriented and in no acute distress  Pink flat topped papules on hands, wrists       Eyes: Conjunctivae/lids:Normal     ENT: Lips: normal    MSK:Normal    Pulm: Breathing Normal    Neuro/Psych: Orientation:Alert and Orientedx3 ; Mood/Affect:normal   A/P:  1. Dermatiits   R/O contact dermatitis  True test applied from left to right side.   Also cut up glove that she uses for work.   1. Inside bottom of glove.  2. Outside bottom of glove.   3. Top of glove     Biopsied right dorsal hand   R/O lichen planus vs irritant  dermatitis vs contact dermatitis vs atopic dermatitis   TANGENTIAL BIOPSY SENT OUT:  After consent, anesthesia with LEC and prep, tangential excision performed and specimen sent out for permanent section histology.  No complications and routine wound care. Patient told to call our office in 1-2 weeks for result.

## 2022-03-22 NOTE — LETTER
3/22/2022         RE: Lin Corona  [address] [address] [place] [id number]        Dear Colleague,    Thank you for referring your patient, Lin Corona, to the [facility]. Please see a copy of my visit note below.    Lin Corona is an extremely pleasant 41 year old year old female patient here today for recheck dermatitis. Currently on dupixent but flaring on hands. She notes itchy and painful. Here today for true test.  Patient has no other skin complaints today.  Remainder of the HPI, Meds, PMH, Allergies, FH, and SH was reviewed in chart.    Past Medical History:   Diagnosis Date     Anxiety and depression      Dysmenorrhea      Endometriosis      Intrinsic eczema        Past Surgical History:   Procedure Laterality Date     APPENDECTOMY  7/13/2011     BREAST SURGERY  2000    Breast reduction     COLONOSCOPY  9/2011     CYSTOSCOPY N/A 1/11/2021    Procedure: CYSTOSCOPY;  Surgeon: Jessica Gallardo DO;  Location: [facility]     GYN SURGERY  1/2012    Colposcopy exam      HYSTERECTOMY       LAPAROSCOPIC HYSTERECTOMY TOTAL, BILATERAL SALPINGO-OOPHORECTOMY, COMBINED N/A 1/11/2021    Procedure: HYSTERECTOMY, TOTAL, LAPAROSCOPIC, WITH BILATERAL SALPINGO-OOPHORECTOMY;  Surgeon: Jessica Gallardo DO;  Location: [facility]     SHOULDER SURGERY Right         Family History   Problem Relation Age of Onset     Arthritis Mother      Heart Disease Father      Gallbladder Disease Father      Arthritis Maternal Grandmother      Unknown/Adopted Maternal Grandmother      C.A.D. Paternal Grandmother      Ovarian Cancer Paternal Grandmother      Cancer Paternal Grandmother      C.A.D. Paternal Grandfather      Cervical Cancer Cousin      Cervical Cancer Paternal Aunt        Social History     Socioeconomic History     Marital status:      Spouse name: Not on file     Number of children: Not on file     Years of education: Not on file     Highest education level: Not on file   Occupational History      Occupation:    Tobacco Use     Smoking status: Former Smoker     Packs/day: 0.00     Smokeless tobacco: Never Used   Vaping Use     Vaping Use: Never used   Substance and Sexual Activity     Alcohol use: Yes     Comment: occassional     Drug use: No     Sexual activity: Yes     Partners: Male     Birth control/protection: Pill     Comment: Nuvaring   Other Topics Concern     Parent/sibling w/ CABG, MI or angioplasty before 65F 55M? Not Asked   Social History Narrative     Not on file     Social Determinants of Health     Financial Resource Strain: Not on file   Food Insecurity: Not on file   Transportation Needs: Not on file   Physical Activity: Not on file   Stress: Not on file   Social Connections: Not on file   Intimate Partner Violence: Not on file   Housing Stability: Not on file       Outpatient Encounter Medications as of 3/22/2022   Medication Sig Dispense Refill     citalopram (CELEXA) 20 MG tablet Take 1 tablet (20 mg) by mouth daily 90 tablet 1     Dupilumab (DUPIXENT) 300 MG/2ML SOPN Inject 300 mg Subcutaneous every 14 days 4 mL 11     norgestimate-ethinyl estradiol (ORTHO-CYCLEN) 0.25-35 MG-MCG tablet Take 1 tablet by mouth daily 84 tablet 4     hydrOXYzine (ATARAX) 25 MG tablet Take 1-2 tablets at bedtime. (Patient not taking: Reported on 3/10/2022) 60 tablet 4     No facility-administered encounter medications on file as of 3/22/2022.             O:   NAD, WDWN, Alert & Oriented, Mood & Affect wnl, Vitals stable   Here today alone   BP (!) 130/90 (BP Location: Right arm, Patient Position: Sitting, Cuff Size: Adult Large)   Pulse 79   LMP 01/06/2021   SpO2 99%    General appearance normal   Vitals stable   Alert, oriented and in no acute distress  Pink flat topped papules on hands, wrists       Eyes: Conjunctivae/lids:Normal     ENT: Lips: normal    MSK:Normal    Pulm: Breathing Normal    Neuro/Psych: Orientation:Alert and Orientedx3 ; Mood/Affect:normal   A/P:  1. Dermatiits   R/O  contact dermatitis  True test applied from left to right side.   Also cut up glove that she uses for work.   1. Inside bottom of glove.  2. Outside bottom of glove.   3. Top of glove     Biopsied right dorsal hand   R/O lichen planus vs irritant dermatitis vs contact dermatitis vs atopic dermatitis   TANGENTIAL BIOPSY SENT OUT:  After consent, anesthesia with LEC and prep, tangential excision performed and specimen sent out for permanent section histology.  No complications and routine wound care. Patient told to call our office in 1-2 weeks for result.            Again, thank you for allowing me to participate in the care of your patient.        Sincerely,        Diana Lincoln PA-C

## 2022-03-22 NOTE — PATIENT INSTRUCTIONS
Wound Care Instructions     FOR SUPERFICIAL WOUNDS     [facility] [phone]    [facility] [phone]          Right hand    AFTER 24 HOURS YOU SHOULD REMOVE THE BANDAGE AND BEGIN DAILY DRESSING CHANGES AS FOLLOWS:     1) Remove Dressing.     2) Clean and dry the area with tap water using a Q-tip or sterile gauze pad.     3) Apply Vaseline, Aquaphor, Polysporin ointment or Bacitracin ointment over entire wound.  Do NOT use Neosporin ointment.     4) Cover the wound with a band-aid, or a sterile non-stick gauze pad and micropore paper tape      REPEAT THESE INSTRUCTIONS AT LEAST ONCE A DAY UNTIL THE WOUND HAS COMPLETELY HEALED.    It is an old wives tale that a wound heals better when it is exposed to air and allowed to dry out. The wound will heal faster with a better cosmetic result if it is kept moist with ointment and covered with a bandage.    **Do not let the wound dry out.**      Supplies Needed:      *Cotton tipped applicators (Q-tips)    *Polysporin Ointment or Bacitracin Ointment (NOT NEOSPORIN)    *Band-aids or non-stick gauze pads and micropore paper tape.      PATIENT INFORMATION:    During the healing process you will notice a number of changes. All wounds develop a small halo of redness surrounding the wound.  This means healing is occurring. Severe itching with extensive redness usually indicates sensitivity to the ointment or bandage tape used to dress the wound.  You should call our office if this develops.      Swelling  and/or discoloration around your surgical site is common, particularly when performed around the eye.    All wounds normally drain.  The larger the wound the more drainage there will be.  After 7-10 days, you will notice the wound beginning to shrink and new skin will begin to grow.  The wound is healed when you can see skin has formed over the entire area.  A healed wound has a healthy, shiny look to the surface and is red to dark pink in color to  normalize.  Wounds may take approximately 4-6 weeks to heal.  Larger wounds may take 6-8 weeks.  After the wound is healed you may discontinue dressing changes.    You may experience a sensation of tightness as your wound heals. This is normal and will gradually subside.    Your healed wound may be sensitive to temperature changes. This sensitivity improves with time, but if you re having a lot of discomfort, try to avoid temperature extremes.    Patients frequently experience itching after their wound appears to have healed because of the continue healing under the skin.  Plain Vaseline will help relieve the itching.        POSSIBLE COMPLICATIONS    BLEEDIN. Leave the bandage in place.  2. Use tightly rolled up gauze or a cloth to apply direct pressure over the bandage for 30  minutes.  3. Reapply pressure for an additional 30 minutes if necessary  4. Use additional gauze and tape to maintain pressure once the bleeding has stopped.

## 2022-03-24 ENCOUNTER — OFFICE VISIT (OUTPATIENT)
Dept: DERMATOLOGY | Facility: CLINIC | Age: 42
End: 2022-03-24
Payer: COMMERCIAL

## 2022-03-24 DIAGNOSIS — L30.9 DERMATITIS: Primary | ICD-10-CM

## 2022-03-24 PROCEDURE — 99207 PR NO CHARGE NURSE ONLY: CPT | Performed by: PHYSICIAN ASSISTANT

## 2022-03-24 NOTE — LETTER
3/24/2022         RE: Lin Corona  [address] [address] [place] [id number]        Dear Colleague,    Thank you for referring your patient, Lin Corona, to the [facility]. Please see a copy of my visit note below.    Patient is here today for 48 hour read.   ++strong positive reaction to thimerosal  + positive reaction to gold   Return in 24 hours for final read.       Again, thank you for allowing me to participate in the care of your patient.        Sincerely,        Diana Lincoln PA-C

## 2022-03-24 NOTE — PROGRESS NOTES
Patient is here today for 48 hour read.   ++strong positive reaction to thimerosal  + positive reaction to gold   Return in 24 hours for final read.

## 2022-03-25 ENCOUNTER — OFFICE VISIT (OUTPATIENT)
Dept: DERMATOLOGY | Facility: CLINIC | Age: 42
End: 2022-03-25
Payer: COMMERCIAL

## 2022-03-25 ENCOUNTER — TELEPHONE (OUTPATIENT)
Dept: DERMATOLOGY | Facility: CLINIC | Age: 42
End: 2022-03-25

## 2022-03-25 VITALS — HEART RATE: 71 BPM | OXYGEN SATURATION: 98 % | SYSTOLIC BLOOD PRESSURE: 118 MMHG | DIASTOLIC BLOOD PRESSURE: 79 MMHG

## 2022-03-25 DIAGNOSIS — L30.9 DERMATITIS: Primary | ICD-10-CM

## 2022-03-25 PROCEDURE — 99213 OFFICE O/P EST LOW 20 MIN: CPT | Performed by: PHYSICIAN ASSISTANT

## 2022-03-25 NOTE — TELEPHONE ENCOUNTER
[facility] Call Center    Phone Message    May a detailed message be left on voicemail: yes     Reason for Call: Medication Question or concern regarding medication   Prescription Clarification  Name of Medication: Dupilumab (DUPIXENT) 300 MG/2ML SOPN  Prescribing Provider: Diana Coyne PAAddisC   Pharmacy: [place], [place] - [facility] [address]   What on the order needs clarification? Pharmacist states the Prior authorization expires on 4/3/22 so they sent a new one on 3/16/22 and are wondering if it has been received yet because they haven't received in back.     Please send a new PA for the Dupixent.     Action Taken: Other: [facility]    Travel Screening: Not Applicable

## 2022-03-25 NOTE — LETTER
3/25/2022         RE: Lin Corona  [address] [address] [place] [id number]        Dear Colleague,    Thank you for referring your patient, Lin Corona, to the [facility]. Please see a copy of my visit note below.    Lin Corona is an extremely pleasant 41 year old year old female patient here today for here today for 72 hour patch test. Patient has no other skin complaints today.  Remainder of the HPI, Meds, PMH, Allergies, FH, and SH was reviewed in chart.    Past Medical History:   Diagnosis Date     Anxiety and depression      Dysmenorrhea      Endometriosis      Intrinsic eczema        Past Surgical History:   Procedure Laterality Date     APPENDECTOMY  7/13/2011     BREAST SURGERY  2000    Breast reduction     COLONOSCOPY  9/2011     CYSTOSCOPY N/A 1/11/2021    Procedure: CYSTOSCOPY;  Surgeon: Jessica Gallardo DO;  Location: [facility]     GYN SURGERY  1/2012    Colposcopy exam      HYSTERECTOMY       LAPAROSCOPIC HYSTERECTOMY TOTAL, BILATERAL SALPINGO-OOPHORECTOMY, COMBINED N/A 1/11/2021    Procedure: HYSTERECTOMY, TOTAL, LAPAROSCOPIC, WITH BILATERAL SALPINGO-OOPHORECTOMY;  Surgeon: Jessica Gallardo DO;  Location: [facility]     SHOULDER SURGERY Right         Family History   Problem Relation Age of Onset     Arthritis Mother      Heart Disease Father      Gallbladder Disease Father      Arthritis Maternal Grandmother      Unknown/Adopted Maternal Grandmother      C.A.D. Paternal Grandmother      Ovarian Cancer Paternal Grandmother      Cancer Paternal Grandmother      C.A.D. Paternal Grandfather      Cervical Cancer Cousin      Cervical Cancer Paternal Aunt        Social History     Socioeconomic History     Marital status:      Spouse name: Not on file     Number of children: Not on file     Years of education: Not on file     Highest education level: Not on file   Occupational History     Occupation:    Tobacco Use     Smoking status: Former Smoker      Packs/day: 0.00     Smokeless tobacco: Never Used   Vaping Use     Vaping Use: Never used   Substance and Sexual Activity     Alcohol use: Yes     Comment: occassional     Drug use: No     Sexual activity: Yes     Partners: Male     Birth control/protection: Pill     Comment: Nuvaring   Other Topics Concern     Parent/sibling w/ CABG, MI or angioplasty before 65F 55M? Not Asked   Social History Narrative     Not on file     Social Determinants of Health     Financial Resource Strain: Not on file   Food Insecurity: Not on file   Transportation Needs: Not on file   Physical Activity: Not on file   Stress: Not on file   Social Connections: Not on file   Intimate Partner Violence: Not on file   Housing Stability: Not on file       Outpatient Encounter Medications as of 3/25/2022   Medication Sig Dispense Refill     citalopram (CELEXA) 20 MG tablet Take 1 tablet (20 mg) by mouth daily 90 tablet 1     Dupilumab (DUPIXENT) 300 MG/2ML SOPN Inject 300 mg Subcutaneous every 14 days 4 mL 11     norgestimate-ethinyl estradiol (ORTHO-CYCLEN) 0.25-35 MG-MCG tablet Take 1 tablet by mouth daily 84 tablet 4     hydrOXYzine (ATARAX) 25 MG tablet Take 1-2 tablets at bedtime. (Patient not taking: Reported on 3/10/2022) 60 tablet 4     No facility-administered encounter medications on file as of 3/25/2022.             O:   NAD, WDWN, Alert & Oriented, Mood & Affect wnl, Vitals stable   Here today alone   /79 (BP Location: Right arm, Patient Position: Sitting, Cuff Size: Adult Large)   Pulse 71   LMP 01/06/2021   SpO2 98%    General appearance normal   Vitals stable   Alert, oriented and in no acute distress   +++ thimerosal   ++nickel, gold       Eyes: Conjunctivae/lids:Normal     ENT: Lips: normal    MSK:Normal    Pulm: Breathing Normal    Neuro/Psych: Orientation:Alert and Orientedx3 ; Mood/Affect:normal   A/P:  1. Contact dermatitis   Discussed allergens, handout was given. Unsure if contact allergies are associated with  rash.   Awaiting biopsy results.   Will send info from [employer] website.         Again, thank you for allowing me to participate in the care of your patient.        Sincerely,        Diana Lincoln PA-C

## 2022-03-25 NOTE — NURSING NOTE
Chief Complaint   Patient presents with     Derm Problem     Patch Testing- 72 hours       Vitals:    03/25/22 0936   BP: 118/79   BP Location: Right arm   Patient Position: Sitting   Cuff Size: Adult Large   Pulse: 71   SpO2: 98%     Wt Readings from Last 1 Encounters:   03/10/22 98.2 kg (216 lb 6.4 oz)       Yesenia Culver LPN .................3/25/2022

## 2022-03-26 NOTE — PROGRESS NOTES
Lin Corona is an extremely pleasant 41 year old year old female patient here today for here today for 72 hour patch test. Patient has no other skin complaints today.  Remainder of the HPI, Meds, PMH, Allergies, FH, and SH was reviewed in chart.    Past Medical History:   Diagnosis Date     Anxiety and depression      Dysmenorrhea      Endometriosis      Intrinsic eczema        Past Surgical History:   Procedure Laterality Date     APPENDECTOMY  7/13/2011     BREAST SURGERY  2000    Breast reduction     COLONOSCOPY  9/2011     CYSTOSCOPY N/A 1/11/2021    Procedure: CYSTOSCOPY;  Surgeon: Jessica Gallardo DO;  Location: [facility]     GYN SURGERY  1/2012    Colposcopy exam      HYSTERECTOMY       LAPAROSCOPIC HYSTERECTOMY TOTAL, BILATERAL SALPINGO-OOPHORECTOMY, COMBINED N/A 1/11/2021    Procedure: HYSTERECTOMY, TOTAL, LAPAROSCOPIC, WITH BILATERAL SALPINGO-OOPHORECTOMY;  Surgeon: Jessica Gallardo DO;  Location: [facility] OR     SHOULDER SURGERY Right         Family History   Problem Relation Age of Onset     Arthritis Mother      Heart Disease Father      Gallbladder Disease Father      Arthritis Maternal Grandmother      Unknown/Adopted Maternal Grandmother      C.A.D. Paternal Grandmother      Ovarian Cancer Paternal Grandmother      Cancer Paternal Grandmother      C.A.D. Paternal Grandfather      Cervical Cancer Cousin      Cervical Cancer Paternal Aunt        Social History     Socioeconomic History     Marital status:      Spouse name: Not on file     Number of children: Not on file     Years of education: Not on file     Highest education level: Not on file   Occupational History     Occupation:    Tobacco Use     Smoking status: Former Smoker     Packs/day: 0.00     Smokeless tobacco: Never Used   Vaping Use     Vaping Use: Never used   Substance and Sexual Activity     Alcohol use: Yes     Comment: occassional     Drug use: No     Sexual activity: Yes     Partners: Male     Birth  control/protection: Pill     Comment: Nuvaring   Other Topics Concern     Parent/sibling w/ CABG, MI or angioplasty before 65F 55M? Not Asked   Social History Narrative     Not on file     Social Determinants of Health     Financial Resource Strain: Not on file   Food Insecurity: Not on file   Transportation Needs: Not on file   Physical Activity: Not on file   Stress: Not on file   Social Connections: Not on file   Intimate Partner Violence: Not on file   Housing Stability: Not on file       Outpatient Encounter Medications as of 3/25/2022   Medication Sig Dispense Refill     citalopram (CELEXA) 20 MG tablet Take 1 tablet (20 mg) by mouth daily 90 tablet 1     Dupilumab (DUPIXENT) 300 MG/2ML SOPN Inject 300 mg Subcutaneous every 14 days 4 mL 11     norgestimate-ethinyl estradiol (ORTHO-CYCLEN) 0.25-35 MG-MCG tablet Take 1 tablet by mouth daily 84 tablet 4     hydrOXYzine (ATARAX) 25 MG tablet Take 1-2 tablets at bedtime. (Patient not taking: Reported on 3/10/2022) 60 tablet 4     No facility-administered encounter medications on file as of 3/25/2022.             O:   NAD, WDWN, Alert & Oriented, Mood & Affect wnl, Vitals stable   Here today alone   /79 (BP Location: Right arm, Patient Position: Sitting, Cuff Size: Adult Large)   Pulse 71   LMP 01/06/2021   SpO2 98%    General appearance normal   Vitals stable   Alert, oriented and in no acute distress   +++ thimerosal   ++nickel, gold       Eyes: Conjunctivae/lids:Normal     ENT: Lips: normal    MSK:Normal    Pulm: Breathing Normal    Neuro/Psych: Orientation:Alert and Orientedx3 ; Mood/Affect:normal   A/P:  1. Contact dermatitis   Discussed allergens, handout was given. Unsure if contact allergies are associated with rash.   Awaiting biopsy results.   Will send info from [employer] website.

## 2022-03-29 NOTE — TELEPHONE ENCOUNTER
PA Initiation    Medication: PA for the Dupilumab (DUPIXENT) 300 MG/2ML SOPN expires on 4/3/22, pharmacy sent a new one on 3/16/22  Insurance Company: [employer] - Phone [phone] Fax [phone]  Pharmacy Filling the Rx: [place], [place] - [address]  Filling Pharmacy Phone: [phone]  Filling Pharmacy Fax:    Start Date: 3/29/2022    Central Prior Authorization Team   Phone: [phone]

## 2022-03-31 LAB
PATH REPORT.COMMENTS IMP SPEC: NORMAL
PATH REPORT.FINAL DX SPEC: NORMAL
PATH REPORT.GROSS SPEC: NORMAL
PATH REPORT.MICROSCOPIC SPEC OTHER STN: NORMAL
PATH REPORT.RELEVANT HX SPEC: NORMAL

## 2022-04-05 NOTE — TELEPHONE ENCOUNTER
Prior Authorization Follow Up    Called [employer] - Phone [phone] Fax [phone] to check status of PA for the Dupilumab (DUPIXENT) 300 MG/2ML SOPN. Request was approved just this morning per the rep with date of 04/05/22-04/05/23 and there is a paid claim at the pharmacy. Rep will refax the approval letter and will document once received.

## 2022-04-06 NOTE — TELEPHONE ENCOUNTER
Prior Authorization Approval    Authorization Effective Date: 4/5/2022  Authorization Expiration Date: 4/5/2023  Medication: Dupilumab (DUPIXENT) 300 MG/2ML SOPN Approved  Approved Dose/Quantity:   Reference #:     Insurance Company: [employer] - Phone [phone] Fax [phone]  Which Pharmacy is filling the prescription (Not needed for infusion/clinic administered): [place], [place] - [address]  Pharmacy Notified: Yes  Patient Notified: Yes    Will document approval once received.

## 2022-04-12 ENCOUNTER — VIRTUAL VISIT (OUTPATIENT)
Dept: FAMILY MEDICINE | Facility: CLINIC | Age: 42
End: 2022-04-12
Payer: COMMERCIAL

## 2022-04-12 DIAGNOSIS — F41.9 ANXIETY AND DEPRESSION: Primary | ICD-10-CM

## 2022-04-12 DIAGNOSIS — F32.A ANXIETY AND DEPRESSION: Primary | ICD-10-CM

## 2022-04-12 PROCEDURE — 96127 BRIEF EMOTIONAL/BEHAV ASSMT: CPT | Mod: 95 | Performed by: NURSE PRACTITIONER

## 2022-04-12 PROCEDURE — 99213 OFFICE O/P EST LOW 20 MIN: CPT | Mod: TEL | Performed by: NURSE PRACTITIONER

## 2022-04-12 ASSESSMENT — ANXIETY QUESTIONNAIRES
3. WORRYING TOO MUCH ABOUT DIFFERENT THINGS: SEVERAL DAYS
2. NOT BEING ABLE TO STOP OR CONTROL WORRYING: SEVERAL DAYS
6. BECOMING EASILY ANNOYED OR IRRITABLE: SEVERAL DAYS
5. BEING SO RESTLESS THAT IT IS HARD TO SIT STILL: NOT AT ALL
IF YOU CHECKED OFF ANY PROBLEMS ON THIS QUESTIONNAIRE, HOW DIFFICULT HAVE THESE PROBLEMS MADE IT FOR YOU TO DO YOUR WORK, TAKE CARE OF THINGS AT HOME, OR GET ALONG WITH OTHER PEOPLE: SOMEWHAT DIFFICULT
7. FEELING AFRAID AS IF SOMETHING AWFUL MIGHT HAPPEN: NOT AT ALL
1. FEELING NERVOUS, ANXIOUS, OR ON EDGE: SEVERAL DAYS
GAD7 TOTAL SCORE: 5

## 2022-04-12 ASSESSMENT — PATIENT HEALTH QUESTIONNAIRE - PHQ9
5. POOR APPETITE OR OVEREATING: SEVERAL DAYS
SUM OF ALL RESPONSES TO PHQ QUESTIONS 1-9: 5

## 2022-04-12 NOTE — PATIENT INSTRUCTIONS
Try the Atarax 25 mg up to three times a day as needed and can take 50 mg at bedtime.  Continue with Citalopram and counseling.  Notify me if any further concerns with anxiety.      Our Clinic hours are:  Mondays    7:20 am - 7 pm  Tues -  Fri  7:20 am - 5 pm    Clinic Phone: [phone]    The clinic lab opens at 7:30 am Mon - Fri and appointments are required.    [facility]  Ph. [phone]  Monday  8 am - 7pm  Tues - Fri 8 am - 5:30 pm

## 2022-04-13 ASSESSMENT — ANXIETY QUESTIONNAIRES: GAD7 TOTAL SCORE: 5

## 2022-04-18 ENCOUNTER — APPOINTMENT (OUTPATIENT)
Dept: CT IMAGING | Facility: CLINIC | Age: 42
End: 2022-04-18
Attending: FAMILY MEDICINE
Payer: COMMERCIAL

## 2022-04-18 ENCOUNTER — HOSPITAL ENCOUNTER (EMERGENCY)
Facility: CLINIC | Age: 42
Discharge: HOME | End: 2022-04-18
Attending: FAMILY MEDICINE | Admitting: FAMILY MEDICINE
Payer: COMMERCIAL

## 2022-04-18 VITALS
SYSTOLIC BLOOD PRESSURE: 96 MMHG | TEMPERATURE: 99.5 F | BODY MASS INDEX: 37.2 KG/M2 | DIASTOLIC BLOOD PRESSURE: 59 MMHG | WEIGHT: 210 LBS | RESPIRATION RATE: 18 BRPM | HEART RATE: 68 BPM | OXYGEN SATURATION: 91 %

## 2022-04-18 DIAGNOSIS — R51.9 ACUTE NONINTRACTABLE HEADACHE, UNSPECIFIED HEADACHE TYPE: ICD-10-CM

## 2022-04-18 DIAGNOSIS — U07.1 INFECTION DUE TO 2019 NOVEL CORONAVIRUS: ICD-10-CM

## 2022-04-18 LAB
FLUAV RNA SPEC QL NAA+PROBE: NEGATIVE
FLUBV RNA RESP QL NAA+PROBE: NEGATIVE
HOLD SPECIMEN: NORMAL
SARS-COV-2 RNA RESP QL NAA+PROBE: POSITIVE

## 2022-04-18 PROCEDURE — 99285 EMERGENCY DEPT VISIT HI MDM: CPT | Mod: 25 | Performed by: FAMILY MEDICINE

## 2022-04-18 PROCEDURE — 96365 THER/PROPH/DIAG IV INF INIT: CPT | Performed by: FAMILY MEDICINE

## 2022-04-18 PROCEDURE — 96361 HYDRATE IV INFUSION ADD-ON: CPT | Performed by: FAMILY MEDICINE

## 2022-04-18 PROCEDURE — 96372 THER/PROPH/DIAG INJ SC/IM: CPT | Mod: 59 | Performed by: FAMILY MEDICINE

## 2022-04-18 PROCEDURE — C9803 HOPD COVID-19 SPEC COLLECT: HCPCS | Performed by: FAMILY MEDICINE

## 2022-04-18 PROCEDURE — 258N000003 HC RX IP 258 OP 636: Performed by: FAMILY MEDICINE

## 2022-04-18 PROCEDURE — 87636 SARSCOV2 & INF A&B AMP PRB: CPT | Performed by: FAMILY MEDICINE

## 2022-04-18 PROCEDURE — 96376 TX/PRO/DX INJ SAME DRUG ADON: CPT | Performed by: FAMILY MEDICINE

## 2022-04-18 PROCEDURE — 96375 TX/PRO/DX INJ NEW DRUG ADDON: CPT | Performed by: FAMILY MEDICINE

## 2022-04-18 PROCEDURE — 70450 CT HEAD/BRAIN W/O DYE: CPT

## 2022-04-18 PROCEDURE — 250N000011 HC RX IP 250 OP 636: Performed by: FAMILY MEDICINE

## 2022-04-18 RX ORDER — OXYCODONE HYDROCHLORIDE 5 MG/1
5 TABLET ORAL EVERY 6 HOURS PRN
Qty: 12 TABLET | Refills: 0 | Status: SHIPPED | OUTPATIENT
Start: 2022-04-18 | End: 2022-04-21

## 2022-04-18 RX ORDER — MAGNESIUM SULFATE HEPTAHYDRATE 40 MG/ML
2 INJECTION, SOLUTION INTRAVENOUS ONCE
Status: COMPLETED | OUTPATIENT
Start: 2022-04-18 | End: 2022-04-18

## 2022-04-18 RX ORDER — ONDANSETRON 2 MG/ML
4 INJECTION INTRAMUSCULAR; INTRAVENOUS
Status: COMPLETED | OUTPATIENT
Start: 2022-04-18 | End: 2022-04-18

## 2022-04-18 RX ORDER — MAGNESIUM SULFATE HEPTAHYDRATE 500 MG/ML
2 INJECTION, SOLUTION INTRAMUSCULAR; INTRAVENOUS ONCE
Status: DISCONTINUED | OUTPATIENT
Start: 2022-04-18 | End: 2022-04-18

## 2022-04-18 RX ORDER — DEXAMETHASONE 6 MG/1
6 TABLET ORAL DAILY
Qty: 10 TABLET | Refills: 0 | Status: SHIPPED | OUTPATIENT
Start: 2022-04-18 | End: 2022-09-15

## 2022-04-18 RX ORDER — OLANZAPINE 10 MG/2ML
10 INJECTION, POWDER, FOR SOLUTION INTRAMUSCULAR DAILY PRN
Status: DISCONTINUED | OUTPATIENT
Start: 2022-04-18 | End: 2022-04-18 | Stop reason: HOSPADM

## 2022-04-18 RX ORDER — HYDROMORPHONE HYDROCHLORIDE 1 MG/ML
0.5 INJECTION, SOLUTION INTRAMUSCULAR; INTRAVENOUS; SUBCUTANEOUS
Status: COMPLETED | OUTPATIENT
Start: 2022-04-18 | End: 2022-04-18

## 2022-04-18 RX ORDER — KETOROLAC TROMETHAMINE 15 MG/ML
15 INJECTION, SOLUTION INTRAMUSCULAR; INTRAVENOUS ONCE
Status: COMPLETED | OUTPATIENT
Start: 2022-04-18 | End: 2022-04-18

## 2022-04-18 RX ADMIN — HYDROMORPHONE HYDROCHLORIDE 0.5 MG: 1 INJECTION, SOLUTION INTRAMUSCULAR; INTRAVENOUS; SUBCUTANEOUS at 12:20

## 2022-04-18 RX ADMIN — HYDROMORPHONE HYDROCHLORIDE 0.5 MG: 1 INJECTION, SOLUTION INTRAMUSCULAR; INTRAVENOUS; SUBCUTANEOUS at 16:34

## 2022-04-18 RX ADMIN — ONDANSETRON 4 MG: 2 INJECTION INTRAMUSCULAR; INTRAVENOUS at 09:30

## 2022-04-18 RX ADMIN — KETOROLAC TROMETHAMINE 15 MG: 15 INJECTION, SOLUTION INTRAMUSCULAR; INTRAVENOUS at 09:30

## 2022-04-18 RX ADMIN — MAGNESIUM SULFATE 2 G: 2 INJECTION INTRAVENOUS at 15:24

## 2022-04-18 RX ADMIN — SODIUM CHLORIDE 1000 ML: 9 INJECTION, SOLUTION INTRAVENOUS at 12:19

## 2022-04-18 RX ADMIN — OLANZAPINE 10 MG: 10 INJECTION, POWDER, FOR SOLUTION INTRAMUSCULAR at 16:36

## 2022-04-18 RX ADMIN — HYDROMORPHONE HYDROCHLORIDE 0.5 MG: 1 INJECTION, SOLUTION INTRAMUSCULAR; INTRAVENOUS; SUBCUTANEOUS at 10:59

## 2022-04-18 RX ADMIN — SODIUM CHLORIDE, POTASSIUM CHLORIDE, SODIUM LACTATE AND CALCIUM CHLORIDE 1000 ML: 600; 310; 30; 20 INJECTION, SOLUTION INTRAVENOUS at 09:30

## 2022-04-18 RX ADMIN — DEXAMETHASONE 6 MG: 2 TABLET ORAL at 15:27

## 2022-04-18 NOTE — ED TRIAGE NOTES
Pt here with headache since Saturday. Cough started yesterday. No history of migraines. OTC medications not working.

## 2022-04-18 NOTE — DISCHARGE INSTRUCTIONS
COVID-19 care loop referral placed.  Push fluids, rest.  Tylenol/ibuprofen for headache with oxycodone as needed for breakthrough pain.  Dexamethasone 6 mg p.o. daily x10 days.  Return to the emergency department if worse or changes.

## 2022-04-18 NOTE — ED PROVIDER NOTES
History     Chief Complaint   Patient presents with     Headache     Cough     HPI  Lin Corona is a 41 year old female, past medical history is significant for anxiety, depression, chronic pelvic pain, abnormal uterine bleeding, endometriosis, dysmenorrhea, eczema, presents to the emergency department concerns of headache and cough.  History is obtained from the patient states that she began with a left sided retro-orbital achy type headache associate with nausea and anorexia but no vomiting about 3 days prior to presentation.  Cough began yesterday which is dry and nonproductive.  Denies any fever chills or sweats.  Decreased oral intake.  She typically does not get headaches, she has no history for migraines.  This is something new for her and concerning.  She is advised that she is under considerable stress with her  in hospital for over a month right now with what is believed to be some form of transverse myelitis.  She is tried both Tylenol and ibuprofen at home without significant relief.  She thought it was getting better the day before yesterday but it seems to have progressed.  No paresthesias or weakness.  To be clear, the cough began well after the onset of the headache.  Headache was not brought on by forceful coughing or straining i.e. Valsalva maneuver.    Allergies:  Allergies   Allergen Reactions     Gold Rash     Rash with true test     Nickel Rash     Positive for contact dermatitis, true test     Thimerosal Rash     Strong reaction with true test.        Problem List:    Patient Active Problem List    Diagnosis Date Noted     Anxiety and depression 01/19/2022     Priority: Medium     Chronic pelvic pain in female 11/18/2020     Priority: Medium     Added automatically from request for surgery [id number]       Abnormal uterine bleeding 11/18/2020     Priority: Medium     Added automatically from request for surgery [id number]       Endometriosis 10/28/2020     Priority: Medium     Pelvic  pain in female 02/02/2012     Priority: Medium     Dysmenorrhea 02/02/2012     Priority: Medium        Past Medical History:    Past Medical History:   Diagnosis Date     Anxiety and depression      Dysmenorrhea      Endometriosis      Intrinsic eczema        Past Surgical History:    Past Surgical History:   Procedure Laterality Date     APPENDECTOMY  7/13/2011     BREAST SURGERY  2000    Breast reduction     COLONOSCOPY  9/2011     CYSTOSCOPY N/A 1/11/2021    Procedure: CYSTOSCOPY;  Surgeon: Jessica Gallardo DO;  Location: [facility] OR     GYN SURGERY  1/2012    Colposcopy exam      HYSTERECTOMY       LAPAROSCOPIC HYSTERECTOMY TOTAL, BILATERAL SALPINGO-OOPHORECTOMY, COMBINED N/A 1/11/2021    Procedure: HYSTERECTOMY, TOTAL, LAPAROSCOPIC, WITH BILATERAL SALPINGO-OOPHORECTOMY;  Surgeon: Jessica Gallardo DO;  Location: [facility] OR     SHOULDER SURGERY Right        Family History:    Family History   Problem Relation Age of Onset     Arthritis Mother      Heart Disease Father      Gallbladder Disease Father      Arthritis Maternal Grandmother      Unknown/Adopted Maternal Grandmother      C.A.D. Paternal Grandmother      Ovarian Cancer Paternal Grandmother      Cancer Paternal Grandmother      C.A.D. Paternal Grandfather      Cervical Cancer Cousin      Cervical Cancer Paternal Aunt        Social History:  Marital Status:   [2]  Social History     Tobacco Use     Smoking status: Former Smoker     Packs/day: 0.00     Smokeless tobacco: Never Used   Vaping Use     Vaping Use: Never used   Substance Use Topics     Alcohol use: Yes     Comment: occassional     Drug use: No        Medications:    citalopram (CELEXA) 20 MG tablet  dexamethasone (DECADRON) 6 MG tablet  Dupilumab (DUPIXENT) 300 MG/2ML SOPN  hydrOXYzine (ATARAX) 25 MG tablet  norgestimate-ethinyl estradiol (ORTHO-CYCLEN) 0.25-35 MG-MCG tablet  oxyCODONE (ROXICODONE) 5 MG tablet          Review of Systems   All other systems reviewed and are  negative.      Physical Exam   BP: 134/86  Pulse: 94  Temp: 99.5  F (37.5  C)  Resp: 18  Weight: 95.3 kg (210 lb)  SpO2: 98 %      Physical Exam  Vitals and nursing note reviewed.   Constitutional:       General: She is not in acute distress.     Appearance: Normal appearance. She is normal weight. She is not ill-appearing.   HENT:      Head: Normocephalic and atraumatic.      Right Ear: Tympanic membrane normal.      Left Ear: Tympanic membrane normal.      Nose: Nose normal.      Mouth/Throat:      Mouth: Mucous membranes are dry.      Pharynx: Oropharynx is clear.   Eyes:      Extraocular Movements: Extraocular movements intact.      Pupils: Pupils are equal, round, and reactive to light.   Cardiovascular:      Rate and Rhythm: Normal rate and regular rhythm.      Pulses: Normal pulses.   Pulmonary:      Effort: Pulmonary effort is normal.      Breath sounds: Normal breath sounds.   Abdominal:      General: Bowel sounds are normal.      Palpations: Abdomen is soft.   Musculoskeletal:         General: Normal range of motion.      Cervical back: Normal range of motion and neck supple.   Skin:     General: Skin is warm.      Capillary Refill: Capillary refill takes less than 2 seconds.   Neurological:      General: No focal deficit present.      Mental Status: She is alert and oriented to person, place, and time.   Psychiatric:         Mood and Affect: Mood normal.         Behavior: Behavior normal.         ED Course                 Procedures              Critical Care time:  none               Results for orders placed or performed during the hospital encounter of 04/18/22 (from the past 24 hour(s))   [facility] Draw    Narrative    The following orders were created for panel order [facility] Draw.  Procedure                               Abnormality         Status                     ---------                               -----------         ------                     Extra Red Top Tube[[id number]]                                Final result               Extra Green Top (Lithium...[[id number]]                      Final result               Extra Purple Top Tube[[id number]]                            Final result                 Please view results for these tests on the individual orders.   Extra Red Top Tube   Result Value Ref Range    Hold Specimen JIC    Extra Green Top (Lithium Heparin) Tube   Result Value Ref Range    Hold Specimen JIC    Extra Purple Top Tube   Result Value Ref Range    Hold Specimen JIC    Symptomatic; Yes; 4/16/2022 Influenza A/B & SARS-CoV2 (COVID-19) Virus PCR Multiplex Nose    Specimen: Nose; Swab   Result Value Ref Range    Influenza A PCR Negative Negative    Influenza B PCR Negative Negative    SARS CoV2 PCR Positive (A) Negative    Narrative    Testing was performed using the belinda SARS-CoV-2 & Influenza A/B Assay on the belinda Kalina System. This test should be ordered for the detection of SARS-CoV-2 and influenza viruses in individuals who meet clinical and/or epidemiological criteria. Test performance is unknown in asymptomatic patients. This test is for in vitro diagnostic use under the FDA EUA for laboratories certified under CLIA to perform moderate and/or high complexity testing. This test has not been FDA cleared or approved. A negative result does not rule out the presence of PCR inhibitors in the specimen or target RNA in concentration below the limit of detection for the assay. If only one viral target is positive but coinfection with multiple targets is suspected, the sample should be re-tested with another FDA cleared, approved or authorized test, if coinfection would change clinical management. [facility] are certified under the Clinical Laboratory Improvement Amendments of 1988 (CLIA-88) as  qualified to perform moderate and/or high complexity laboratory testing.   CT Head w/o Contrast    Narrative    CT OF THE HEAD WITHOUT CONTRAST April 18, 2022 10:24 AM     HISTORY:  Headache.    TECHNIQUE: Axial CT images of the head from the skull base to the  vertex were acquired without IV contrast. Radiation dose for this scan  was reduced using automated exposure control, adjustment of the mA  and/or kV according to patient size, or iterative reconstruction  technique.    COMPARISON: None.    FINDINGS: The ventricles and basal cisterns are within normal limits  in configuration. There is no midline shift. There are no extra-axial  fluid collections. Gray-white differentiation is well maintained.    No intracranial hemorrhage, mass or recent infarct.    The visualized paranasal sinuses are well-aerated. There is no  mastoiditis. There are no fractures of the visualized bones.      Impression    IMPRESSION: Normal head CT.         JACOB SOTO MD         SYSTEM ID:  [id number]   12:15 PM  Was reviewed in the room with the patient.  She had some initial improvement to the Dilaudid.  No response to Toradol with respect to the headache.  She feels like the headache is coming back somewhat.  I plan to try an additional 1 L of fluids and an additional dose of 0.5 of Dilaudid.  Head CT is reassuring.  I think part of her presentation likely relates to dehydration in the context of acute COVID infection.  This was a surprise to her but she has been around her  who was hospitalized over the last 1 month.  She notified him and he has been placed in quarantine at the hospital that he is currently at she has had contact with him recently.  The patient herself is unvaccinated for COVID.      2:35 PM  After showing considerable improvement the patient now identifies just prior to discharge that her headache is worse and she would like something further for her headache.  I plan to try Zyprexa IM as an adjunct to her treatment already as well as 2 g of magnesium IV.    3:41PM  Significantly improved after the Zyprexa, magnesium, fluids and 1 dose of IV Dilaudid 0.5 mg.  The patient feels improved  enough at this point that she would like to go home.  We reviewed the COVID-19 care loop referral.  Return criteria for the emergency department.  Quarantine plan all questions answered disposition is to home.      Medications   HYDROmorphone (PF) (DILAUDID) injection 0.5 mg (0.5 mg Intravenous Given 4/18/22 1220)   dexamethasone (DECADRON) tablet 6 mg (has no administration in time range)   OLANZapine (zyPREXA) injection 10 mg (has no administration in time range)   magnesium sulfate injection 2 g (has no administration in time range)   ondansetron (ZOFRAN) injection 4 mg (4 mg Intravenous Given 4/18/22 0930)   ketorolac (TORADOL) injection 15 mg (15 mg Intravenous Given 4/18/22 0930)   lactated ringers BOLUS 1,000 mL (0 mLs Intravenous Stopped 4/18/22 1030)   0.9% sodium chloride BOLUS (1,000 mLs Intravenous New Bag 4/18/22 1219)       Assessments & Plan (with Medical Decision Making)   Assessments and plan with medical decision making at the time stamp above.    Disclaimer: This note consists of symbols derived from keyboarding, dictation and/or voice recognition software. As a result, there may be errors in the script that have gone undetected. Please consider this when interpreting information found in this chart.      I have reviewed the nursing notes.    I have reviewed the findings, diagnosis, plan and need for follow up with the patient.       New Prescriptions    DEXAMETHASONE (DECADRON) 6 MG TABLET    Take 1 tablet (6 mg) by mouth daily for 10 days    OXYCODONE (ROXICODONE) 5 MG TABLET    Take 1 tablet (5 mg) by mouth every 6 hours as needed for pain       Final diagnoses:   Infection due to 2019 novel coronavirus   Acute nonintractable headache, unspecified headache type       4/18/2022   [facility] EMERGENCY DEPT     Marc Negro MD  04/23/22 6217

## 2022-05-27 DIAGNOSIS — F41.1 GAD (GENERALIZED ANXIETY DISORDER): ICD-10-CM

## 2022-05-31 RX ORDER — CITALOPRAM HYDROBROMIDE 20 MG/1
TABLET ORAL
Qty: 90 TABLET | Refills: 1 | Status: SHIPPED | OUTPATIENT
Start: 2022-05-31 | End: 2022-09-15 | Stop reason: SINTOL

## 2022-05-31 NOTE — TELEPHONE ENCOUNTER
Prescription approved per [facility] Refill Protocol    Daly Garrison RN MSN

## 2022-06-29 ENCOUNTER — OFFICE VISIT (OUTPATIENT)
Dept: FAMILY MEDICINE | Facility: CLINIC | Age: 42
End: 2022-06-29
Payer: COMMERCIAL

## 2022-06-29 VITALS
HEIGHT: 63 IN | SYSTOLIC BLOOD PRESSURE: 138 MMHG | HEART RATE: 63 BPM | WEIGHT: 218 LBS | OXYGEN SATURATION: 98 % | RESPIRATION RATE: 16 BRPM | DIASTOLIC BLOOD PRESSURE: 80 MMHG | TEMPERATURE: 97.7 F | BODY MASS INDEX: 38.62 KG/M2

## 2022-06-29 DIAGNOSIS — N64.4 BREAST PAIN: Primary | ICD-10-CM

## 2022-06-29 PROCEDURE — 99213 OFFICE O/P EST LOW 20 MIN: CPT | Performed by: NURSE PRACTITIONER

## 2022-06-29 ASSESSMENT — PAIN SCALES - GENERAL: PAINLEVEL: MODERATE PAIN (5)

## 2022-06-29 NOTE — PROGRESS NOTES
"  Assessment & Plan     Breast pain    - MA Diagnostic Digital Bilateral; Future      Will obtain mammogram, if normal, this could be pectoral muscle pull.        See Patient Instructions  Patient Instructions   Call to schedule Mammogram, [phone].  Will be notified of those results.      Our Clinic hours are:  Mondays    7:20 am - 7 pm  Tues -  Fri  7:20 am - 5 pm    Clinic Phone: [phone]    The clinic lab opens at 7:30 am Mon - Fri and appointments are required.    [facility]. [phone]  Monday  8 am - 7pm  Tues - Fri 8 am - 5:30 pm           Return in about 2 weeks (around 7/13/2022) for or sooner if symptoms persist or worsen.    TRUMAN Aguila CNP  [facility]    Jennifer Ceballos is a 41 year old, presenting for the following health issues:  Breast Problem (Right breast discomfort x 3 weeks )      History of Present Illness       Reason for visit:  Lump  Symptom onset:  3-4 weeks ago    She eats 2-3 servings of fruits and vegetables daily.She consumes 0 sweetened beverage(s) daily.She exercises with enough effort to increase her heart rate 30 to 60 minutes per day.  She exercises with enough effort to increase her heart rate 3 or less days per week.   She is taking medications regularly.       States she's had pain in her right outer breast for about 3 weeks. She thought maybe she strained her muscle but cannot identify a specific event. Thought it would get better but it is persisting. Worse when touching around. Denies lumps. Denies nipple discharge. Has had a bilateral breast reduction when 19 years old.        Review of Systems   Constitutional, HEENT, cardiovascular, pulmonary, gi and gu systems are negative, except as otherwise noted.      Objective    /80   Pulse 63   Temp 97.7  F (36.5  C)   Resp 16   Ht 1.6 m (5' 3\")   Wt 98.9 kg (218 lb)   LMP 01/06/2021   SpO2 98%   BMI 38.62 kg/m    Body mass index is 38.62 " kg/m .  Physical Exam   GENERAL: healthy, alert and no distress  BREAST: scars under both breasts from previous reduction, no dimpling or nipple discharge, on exam tenderness on right outer breast without any appreciable lumps in right or left breast or axillary   NECK: no adenopathy, no asymmetry  RESP: lungs clear to auscultation - no rales, rhonchi or wheezes  CV: regular rate and rhythm, normal S1 S2, no S3 or S4, no murmur  MS: no gross musculoskeletal defects noted                      .  ..

## 2022-06-29 NOTE — PATIENT INSTRUCTIONS
Call to schedule Mammogram, [phone].  Will be notified of those results.      Our Clinic hours are:  Mondays    7:20 am - 7 pm  Tues -  Fri  7:20 am - 5 pm    Clinic Phone: [phone]    The clinic lab opens at 7:30 am Mon - Fri and appointments are required.    [facility]  Ph. [phone]  Monday  8 am - 7pm  Tues - Fri 8 am - 5:30 pm

## 2022-07-22 ENCOUNTER — HOSPITAL ENCOUNTER (OUTPATIENT)
Dept: MAMMOGRAPHY | Facility: CLINIC | Age: 42
Discharge: HOME | End: 2022-07-22
Attending: NURSE PRACTITIONER
Payer: COMMERCIAL

## 2022-07-22 ENCOUNTER — HOSPITAL ENCOUNTER (OUTPATIENT)
Dept: ULTRASOUND IMAGING | Facility: CLINIC | Age: 42
Discharge: HOME | End: 2022-07-22
Attending: NURSE PRACTITIONER
Payer: COMMERCIAL

## 2022-07-22 DIAGNOSIS — N64.4 BREAST PAIN: ICD-10-CM

## 2022-07-22 PROCEDURE — 76642 ULTRASOUND BREAST LIMITED: CPT | Mod: RT

## 2022-07-22 PROCEDURE — 77066 DX MAMMO INCL CAD BI: CPT

## 2022-09-03 ENCOUNTER — HEALTH MAINTENANCE LETTER (OUTPATIENT)
Age: 42
End: 2022-09-03

## 2022-09-15 ENCOUNTER — OFFICE VISIT (OUTPATIENT)
Dept: FAMILY MEDICINE | Facility: CLINIC | Age: 42
End: 2022-09-15
Payer: COMMERCIAL

## 2022-09-15 VITALS
SYSTOLIC BLOOD PRESSURE: 134 MMHG | RESPIRATION RATE: 16 BRPM | OXYGEN SATURATION: 98 % | BODY MASS INDEX: 38.91 KG/M2 | HEART RATE: 83 BPM | WEIGHT: 219.6 LBS | TEMPERATURE: 98.1 F | DIASTOLIC BLOOD PRESSURE: 82 MMHG | HEIGHT: 63 IN

## 2022-09-15 DIAGNOSIS — L72.0 EPIDERMOID CYST OF SKIN OF BACK: ICD-10-CM

## 2022-09-15 DIAGNOSIS — H93.90 EAR LESION: Primary | ICD-10-CM

## 2022-09-15 DIAGNOSIS — M79.2 NEURALGIA: ICD-10-CM

## 2022-09-15 PROCEDURE — 99213 OFFICE O/P EST LOW 20 MIN: CPT | Performed by: NURSE PRACTITIONER

## 2022-09-15 RX ORDER — DULOXETIN HYDROCHLORIDE 20 MG/1
20 CAPSULE, DELAYED RELEASE ORAL DAILY
Qty: 30 CAPSULE | Refills: 1 | Status: SHIPPED | OUTPATIENT
Start: 2022-09-15 | End: 2023-07-13

## 2022-09-15 NOTE — PROGRESS NOTES
"  Assessment & Plan     Ear lesion    - Adult Dermatology Referral; Future    Neuralgia  Trial:  - DULoxetine (CYMBALTA) 20 MG capsule; Take 1 capsule (20 mg) by mouth daily    Epidermoid cyst of skin of back  Observation               BMI:   Estimated body mass index is 38.9 kg/m  as calculated from the following:    Height as of this encounter: 1.6 m (5' 3\").    Weight as of this encounter: 99.6 kg (219 lb 9.6 oz).   Weight management plan: Patient was referred to their PCP to discuss a diet and exercise plan.    CONSULTATION/REFERRAL to DERM    FUTURE APPOINTMENTS:       - Follow-up visit in case of new or worsening symptoms, message in a month or so on how Duloxetine is working    See Patient Instructions    No follow-ups on file.    TRUMAN German CNP  [facility]   Lin is a 42 year old, presenting for the following health issues:  Ear Problem and Breast Pain      History of Present Illness       Reason for visit:  Ear pain, breast pain follow up mammogram, lump on back  Symptoms include:  Irritation pain  Symptom intensity:  Mild  Symptom progression:  Staying the same  Had these symptoms before:  No  What makes it worse:  Sleeping on the side where the ear pain is.  What makes it better:  No touching it    She eats 2-3 servings of fruits and vegetables daily.She consumes 0 sweetened beverage(s) daily.She exercises with enough effort to increase her heart rate 20 to 29 minutes per day.  She exercises with enough effort to increase her heart rate 3 or less days per week.   She is taking medications regularly.       Breast Concern  Onset/Duration: 3 months - diagnostic mammogram and ultrasound unremarkable  Description:   Location: right breast - starts at lateral ribs and shoots across breast- can happen multiple times daily last seconds  Pain or tenderness: YES- shooting pain   Redness: No  Intensity: moderate  Progression of Symptoms: same and " "intermittent  Accompanying Signs & Symptoms:  Any lumps in axillary region: No  Movable: N/A  Nipple discharge: none   Changes in the skin or nipple: no   On Hormone therapy: YES  Does it change with menstrual cycle: N/A  Previous history of similar problem: none   First degree relative with breast cancer: 3 paternal aunts with breast cancer at age 40-50  Breast reduction at age 18  Precipitating factors:           Worsened by: none   Alleviating factors:            Improved by: none  Therapies tried and outcome: None  Patient's last menstrual period was 01/06/2021.    Concern - Ear problem   Onset: couple weeks   Description: hard spot on the top of her outer ear   Intensity: mild  Progression of Symptoms:  same  Accompanying Signs & Symptoms: painful, scabs over once in awhile where there is a hard spot  Previous history of similar problem: none   Precipitating factors:        Worsened by: bumping it or sleeping on it   Alleviating factors:        Improved by: none   Therapies tried and outcome: None    Concern - Derm problem   Onset: years   Description: patient has a hard lump under the skin of her middle left side of her back   Intensity: mild  Progression of Symptoms:  same  Accompanying Signs & Symptoms: painful at times  Previous history of similar problem: none   Precipitating factors:        Worsened by: pressure   Alleviating factors:        Improved by: none   Therapies tried and outcome: None      Review of Systems   Constitutional, HEENT, cardiovascular, pulmonary, gi and gu systems are negative, except as otherwise noted.      Objective    /82   Pulse 83   Temp 98.1  F (36.7  C) (Tympanic)   Resp 16   Ht 1.6 m (5' 3\")   Wt 99.6 kg (219 lb 9.6 oz)   LMP 01/06/2021   SpO2 98%   BMI 38.90 kg/m    Body mass index is 38.9 kg/m .  Physical Exam   GENERAL: healthy, alert and no distress  RESP: lungs clear to auscultation - no rales, rhonchi or wheezes  BREAST: no tenderness, no rash  CV: " regular rates and rhythm, normal S1 S2, no S3 or S4, no murmur, click or rub and no peripheral edema  SKIN: left top of ear with small nodule with central umbilication, lower left back with firm, non tender cystic lesion  NEURO: Normal strength and tone, mentation intact and speech normal

## 2022-11-18 DIAGNOSIS — L20.9 ATOPIC DERMATITIS, UNSPECIFIED TYPE: ICD-10-CM

## 2022-11-18 RX ORDER — DUPILUMAB 300 MG/2ML
300 INJECTION, SOLUTION SUBCUTANEOUS
Qty: 4 ML | Refills: 5 | Status: SHIPPED | OUTPATIENT
Start: 2022-11-18 | End: 2023-01-31

## 2022-11-18 NOTE — TELEPHONE ENCOUNTER
Requested Prescriptions   Pending Prescriptions Disp Refills     dupilumab (DUPIXENT) 300 MG/2ML prefilled pen 4 mL 11     Sig: Inject 2 mLs (300 mg) Subcutaneous every 14 days       There is no refill protocol information for this order        Last office visit: 3/25/2022 with prescribing provider:  SAMMY BOJORQUEZ    Future Office Visit:          Rock Leach  [facility]

## 2022-11-18 NOTE — LETTER
November 18, 2022      Lin Corona  [address] [place] [address]  [place] [address]              Dear Lin,     We have received a refill request for your Dupixent.  Many medications require routine follow-up with your doctor for continued refills.     Your prescription(s) have been refilled for so you may have time to schedule a follow-up appointment. You will be due in March for a follow up and to renew your prior authorization for the insurance company.      Please call to schedule soon so we can assure you have an appointment before your next refills are needed. If you have already made a follow up appointment, please disregard this letter.      You can be scheduled with any of our dermatology providers. Please call [phone] to schedule.       Thank you,   [facility]

## 2022-11-21 ENCOUNTER — MYC MEDICAL ADVICE (OUTPATIENT)
Dept: DERMATOLOGY | Facility: CLINIC | Age: 42
End: 2022-11-21

## 2022-11-21 NOTE — LETTER
[facility] [place]  [address] [address]  Phone: [phone]    November 23, 2022    Lin Corona                                                                                                                [address] [address] [place] [id number]            Dear Ms. Cj,    Please schedule a Dermatology follow up appointment so the Prior authorization (PA) for Dupixient can be renewed. The current Prior authorization is good only until 4-5-2023.      We are currently booking Dermatology appointments into March 2023 with > 900 patients on our waiting list for a sooner than available Dermatology appointment.      You will have to be seen within a year in order for the Prior authorization to be renewed, otherwise the PA cannot be processed.   [phone] for Dermatology scheduling if you cannot schedule an appointment online.      Thank you,      Diana ESPINAL / [facility]

## 2023-01-16 NOTE — TELEPHONE ENCOUNTER
Patient calling to notify care team that she has new insurance and is going to contact [facility] to initiate a PA for Dupixent. Patient is scheduled for appt. 04/25/2023.    Rock Leach  [facility]

## 2023-01-18 ENCOUNTER — TELEPHONE (OUTPATIENT)
Dept: DERMATOLOGY | Facility: CLINIC | Age: 43
End: 2023-01-18
Payer: COMMERCIAL

## 2023-01-18 DIAGNOSIS — L20.9 ATOPIC DERMATITIS, UNSPECIFIED TYPE: ICD-10-CM

## 2023-01-30 NOTE — TELEPHONE ENCOUNTER
[facility] calling to inquire upon status of PA request.    Please call pharmacy with any questions- Ph: [phone]    [facility]  [facility]

## 2023-01-31 RX ORDER — DUPILUMAB 300 MG/2ML
300 INJECTION, SOLUTION SUBCUTANEOUS
Qty: 4 ML | Refills: 2 | Status: SHIPPED | OUTPATIENT
Start: 2023-01-31 | End: 2023-04-25

## 2023-01-31 NOTE — TELEPHONE ENCOUNTER
PA Initiation    Medication: dupilumab (DUPIXENT) 300 MG/2ML prefilled pen  Insurance Company: [employer] - Phone [phone] Fax [phone]  Pharmacy Filling the Rx:   [facility]   Filling Pharmacy Phone:    Filling Pharmacy Fax:    Start Date: 1/31/2023        Writer spoke to patient about changing her prescription over to the [facility]. She states that she would love th swap over. Writer initiated new Rx request from Care team to the  spec pharm        Writer will follow up with patient once approved.      Molly Vidal [facility]  Specialty Pharmacy Clinic Liaison     [facility]    [email or web address]    Phone: [phone]  Fax: [phone]     Connect with [facility] on social media.

## 2023-02-02 NOTE — TELEPHONE ENCOUNTER
Prior Authorization Approval    Authorization Effective Date: 1/1/2023  Authorization Expiration Date: 2/1/2024  Medication: dupilumab (DUPIXENT) 300 MG/2ML prefilled pen  Approved Dose/Quantity:   Reference #: [id number]   Insurance Company: [employer] - Phone [phone] Fax [phone]  Expected CoPay:       CoPay Card Available: Yes    Foundation Assistance Needed:    Which Pharmacy is filling the prescription (Not needed for infusion/clinic administered): [facility] - [address]  Pharmacy Notified: Yes  Patient Notified: Yes          Molly Vidal [facility]  Specialty Pharmacy Clinic Liaison     [facility]    [email or web address]    Phone: [phone]  Fax: [phone]     Connect with [facility] on social media.

## 2023-02-03 DIAGNOSIS — E89.40 SURGICAL MENOPAUSE ON HORMONE REPLACEMENT THERAPY: ICD-10-CM

## 2023-02-03 DIAGNOSIS — Z79.890 SURGICAL MENOPAUSE ON HORMONE REPLACEMENT THERAPY: ICD-10-CM

## 2023-02-05 ENCOUNTER — APPOINTMENT (OUTPATIENT)
Dept: GENERAL RADIOLOGY | Facility: CLINIC | Age: 43
End: 2023-02-05
Attending: PHYSICIAN ASSISTANT
Payer: COMMERCIAL

## 2023-02-05 ENCOUNTER — HOSPITAL ENCOUNTER (EMERGENCY)
Facility: CLINIC | Age: 43
Discharge: HOME | End: 2023-02-05
Attending: PHYSICIAN ASSISTANT | Admitting: PHYSICIAN ASSISTANT
Payer: COMMERCIAL

## 2023-02-05 VITALS
RESPIRATION RATE: 18 BRPM | OXYGEN SATURATION: 97 % | SYSTOLIC BLOOD PRESSURE: 141 MMHG | HEART RATE: 94 BPM | DIASTOLIC BLOOD PRESSURE: 97 MMHG | TEMPERATURE: 98.5 F | HEIGHT: 63 IN | BODY MASS INDEX: 38.98 KG/M2 | WEIGHT: 220 LBS

## 2023-02-05 DIAGNOSIS — M25.551 ACUTE PAIN OF RIGHT HIP: ICD-10-CM

## 2023-02-05 PROCEDURE — G0463 HOSPITAL OUTPT CLINIC VISIT: HCPCS | Mod: 25 | Performed by: PHYSICIAN ASSISTANT

## 2023-02-05 PROCEDURE — 73502 X-RAY EXAM HIP UNI 2-3 VIEWS: CPT

## 2023-02-05 PROCEDURE — 99213 OFFICE O/P EST LOW 20 MIN: CPT | Performed by: PHYSICIAN ASSISTANT

## 2023-02-05 PROCEDURE — 250N000013 HC RX MED GY IP 250 OP 250 PS 637: Performed by: PHYSICIAN ASSISTANT

## 2023-02-05 RX ORDER — LIDOCAINE 4 G/G
1 PATCH TOPICAL ONCE
Status: DISCONTINUED | OUTPATIENT
Start: 2023-02-05 | End: 2023-02-05 | Stop reason: HOSPADM

## 2023-02-05 RX ORDER — METHYLPREDNISOLONE 4 MG
TABLET, DOSE PACK ORAL
Qty: 21 TABLET | Refills: 0 | Status: SHIPPED | OUTPATIENT
Start: 2023-02-05 | End: 2023-07-13

## 2023-02-05 RX ADMIN — LIDOCAINE 1 PATCH: 560 PATCH PERCUTANEOUS; TOPICAL; TRANSDERMAL at 11:31

## 2023-02-05 ASSESSMENT — ENCOUNTER SYMPTOMS
CONSTITUTIONAL NEGATIVE: 1
NEUROLOGICAL NEGATIVE: 1
JOINT SWELLING: 0
ARTHRALGIAS: 1

## 2023-02-05 ASSESSMENT — ACTIVITIES OF DAILY LIVING (ADL): ADLS_ACUITY_SCORE: 35

## 2023-02-05 NOTE — DISCHARGE INSTRUCTIONS
Starting a Medrol Dosepak today due to concern for sciatic nerve irritation.  Avoid ibuprofen while taking prednisone such as for the Medrol Dosepak.    Recommend over-the-counter Tylenol/ibuprofen as needed for pain control.  May apply lidocaine patches to the affected area to, apply for 12 hours only in a 24-hour period.  May apply either ice or heat to the right hip in 15 to 20-minute intervals as desired for relief of pain.  Provided referral to orthopedics for further evaluation and management, may be used as needed.    Recommend urgent medical evaluation if you develop worsening pain, pain out of proportion to injury, numbness or tingling or loss of feeling, or redness/tenderness/swelling in the right hip or right lower extremity.

## 2023-02-05 NOTE — ED PROVIDER NOTES
History     Chief Complaint   Patient presents with     Fall     Hip Pain     HPI  Lin Corona is a 42 year old female with a past medical history of anxiety depression who presents for evaluation of right hip pain which she developed after a fall which occurred 2 weeks ago.  States that she fell and slipped on the ice, fell on her bottom and tailbone.  She initially had some pain and stiffness in the cervical spine but that is resolved.  Main concern today is ongoing and slightly worsening hip pain, felt in the sciatic nerve area, right side.  Pain is exacerbated with certain movements such as flexing the hip.  She also feels a popping sensation deep inside if she extends the hip.  She has been able to ambulate without difficulty over the past 2 weeks.  Works as an  she will has to squat and bend a lot as part of her work.  She has been taking ibuprofen on occasion which has helped alleviate the pain somewhat.  Goes to the chiropractor twice per month as well but has not sought chiropractic care for this issue.  Has normal strength and sensation in the right lower extremity.  No numbness in the groin area, no bowel or bladder incontinence or retention    Allergies:  Allergies   Allergen Reactions     Gold Rash     Rash with true test     Nickel Rash     Positive for contact dermatitis, true test     Thimerosal Rash     Strong reaction with true test.        Problem List:    Patient Active Problem List    Diagnosis Date Noted     Epidermoid cyst of skin of back 09/15/2022     Priority: Medium     Neuralgia 09/15/2022     Priority: Medium     Anxiety and depression 01/19/2022     Priority: Medium     Chronic pelvic pain in female 11/18/2020     Priority: Medium     Added automatically from request for surgery [id number]       Abnormal uterine bleeding 11/18/2020     Priority: Medium     Added automatically from request for surgery [id number]       Endometriosis 10/28/2020     Priority: Medium     Pelvic  pain in female 02/02/2012     Priority: Medium     Dysmenorrhea 02/02/2012     Priority: Medium        Past Medical History:    Past Medical History:   Diagnosis Date     Anxiety and depression      Dysmenorrhea      Endometriosis      Intrinsic eczema        Past Surgical History:    Past Surgical History:   Procedure Laterality Date     APPENDECTOMY  07/13/2011     BREAST SURGERY  2000    Breast reduction     COLONOSCOPY  09/2011     CYSTOSCOPY N/A 01/11/2021    Procedure: CYSTOSCOPY;  Surgeon: Jessica Gallardo DO;  Location: [facility] OR     GYN SURGERY  01/2012    Colposcopy exam      HYSTERECTOMY       LAPAROSCOPIC HYSTERECTOMY TOTAL, BILATERAL SALPINGO-OOPHORECTOMY, COMBINED N/A 01/11/2021    Procedure: HYSTERECTOMY, TOTAL, LAPAROSCOPIC, WITH BILATERAL SALPINGO-OOPHORECTOMY;  Surgeon: Jessica Gallardo DO;  Location: [facility] OR     SHOULDER SURGERY Right        Family History:    Family History   Problem Relation Age of Onset     Arthritis Mother      Heart Disease Father      Gallbladder Disease Father      Arthritis Maternal Grandmother      Unknown/Adopted Maternal Grandmother      C.A.D. Paternal Grandmother      Ovarian Cancer Paternal Grandmother      Cancer Paternal Grandmother      C.A.D. Paternal Grandfather      Cervical Cancer Cousin      Cervical Cancer Paternal Aunt        Social History:  Marital Status:   [2]  Social History     Tobacco Use     Smoking status: Former     Packs/day: 0.00     Types: Cigarettes     Smokeless tobacco: Never   Vaping Use     Vaping Use: Never used   Substance Use Topics     Alcohol use: Yes     Comment: occassional     Drug use: No        Medications:    DULoxetine (CYMBALTA) 20 MG capsule  dupilumab (DUPIXENT) 300 MG/2ML prefilled pen  hydrOXYzine (ATARAX) 25 MG tablet  norgestimate-ethinyl estradiol (ORTHO-CYCLEN) 0.25-35 MG-MCG tablet          Review of Systems   Constitutional: Negative.    Musculoskeletal: Positive for arthralgias. Negative for joint swelling.  "  Skin: Negative.    Neurological: Negative.        Physical Exam   BP: (!) 141/97  Pulse: 94  Temp: 98.5  F (36.9  C)  Resp: 18  Height: 160 cm (5' 3\")  Weight: 99.8 kg (220 lb)  SpO2: 97 %      Physical Exam  Constitutional:       General: She is not in acute distress.     Appearance: Normal appearance. She is normal weight.   Cardiovascular:      Pulses: Normal pulses.           Posterior tibial pulses are 2+ on the right side.   Musculoskeletal:         General: No swelling.      Cervical back: Normal range of motion and neck supple. No rigidity. No muscular tenderness.      Right hip: Tenderness present. No deformity, lacerations, bony tenderness or crepitus. Normal range of motion. Normal strength.        Legs:       Comments: Moderate tenderness over the sciatic nerve distribution in the right hip.  No pain over the greater trochanter.  Normal range of motion of flexion and extension of the right hip.  Pain is elicited in the affected area shown in the diagram above with GALILEA test but not with FADIR test.  Pain is elicited in the affected area with logrolling the hip.  No pain on compression of the pelvis.   Skin:     Capillary Refill: Capillary refill takes less than 2 seconds.      Coloration: Skin is not jaundiced or pale.      Findings: No bruising, erythema, lesion or rash.   Neurological:      Mental Status: She is alert and oriented to person, place, and time.      Sensory: No sensory deficit.         ED Course                 Procedures                Results for orders placed or performed during the hospital encounter of 02/05/23 (from the past 24 hour(s))   Pelvis XR w/ unilateral hip right    Narrative    EXAM: XR PELVIS AND HIP RIGHT 1 VIEW  LOCATION: [facility]  DATE/TIME: 2/5/2023 11:19 AM    INDICATION: right hip pain after a fall  COMPARISON: None.      Impression    IMPRESSION: Normal joint spaces and alignment. No displaced fracture. There is some cortical " irregularity of the right proximal femur superior femoral head/neck junction on the AP view. This probably relates to a synovial herniation pit rather than a   nondisplaced fracture; however if there is high clinical suspicion for fracture, MRI would be recommended for clarification.       Medications   Lidocaine (LIDOCARE) 4 % Patch 1 patch (1 patch Transdermal Patch/Med Applied 2/5/23 0194)       Assessments & Plan (with Medical Decision Making)     X-rays of the right hip and pelvis showed no findings consistent with acute fracture or dislocation today.  There was a concern about a cortical irregularity of the right proximal femur at the femoral head/neck junction, possibly due to a synovial herniation pit.  There is low clinical concern for a fracture today on exam.    Presentation physical exam suggest sciatic nerve irritation on the right side given moderate pain over the sciatic nerve distribution in the right pelvis.  Pain radiates down past the right knee at times when rotating the hip and flexing the hip.  The patient describes having a popping sensation deep in the hip 2, which I feel may be attributed to IT band tightness.    Placed a lidocaine patch over the right hip while in clinic, which helped improve the pain.    Starting a Medrol Dosepak today due to concern for sciatic nerve irritation.  Avoid ibuprofen while taking prednisone such as for the Medrol Dosepak.    Recommend over-the-counter Tylenol/ibuprofen as needed for pain control.  May apply lidocaine patches to the affected area to, apply for 12 hours only in a 24-hour period.  May apply either ice or heat to the right hip in 15 to 20-minute intervals as desired for relief of pain.  Provided referral to orthopedics for further evaluation and management, may be used as needed.    Recommend urgent medical evaluation if you develop worsening pain, pain out of proportion to injury, numbness or tingling or loss of feeling, or  redness/tenderness/swelling in the right hip or right lower extremity.      I have reviewed the nursing notes.    I have reviewed the findings, diagnosis, plan and need for follow up with the patient.      New Prescriptions    No medications on file       Final diagnoses:   Acute pain of right hip - Secondary to a fall, concern for sciatic nerve irritation as well as IT band tightness       2/5/2023   [facility] EMERGENCY DEPT     Crescencio Ocampo PA-C  02/05/23 0617

## 2023-02-06 RX ORDER — NORGESTIMATE AND ETHINYL ESTRADIOL 0.25-0.035
1 KIT ORAL DAILY
Qty: 84 TABLET | Refills: 0 | Status: SHIPPED | OUTPATIENT
Start: 2023-02-06 | End: 2023-04-13

## 2023-02-06 NOTE — TELEPHONE ENCOUNTER
"Last Written Prescription Date:  3/10/2022  Last Fill Quantity: 84,  # refills: 3   Last office visit: 3/10/2022 with prescribing provider: Dr. Gallardo   Future Office Visit:     Next 5 appointments (look out 90 days)    Apr 25, 2023  2:15 PM  (Arrive by 2:00 PM)  Return Visit with Diana Coyne PA-C  [facility] ([facility] [address] [address] [phone]             Requested Prescriptions   Pending Prescriptions Disp Refills     norgestimate-ethinyl estradiol (ORTHO-CYCLEN) 0.25-35 MG-MCG tablet 84 tablet 0     Sig: Take 1 tablet by mouth daily       Contraceptives Protocol Passed - 2/3/2023  3:44 PM        Passed - Patient is not a current smoker if age is 35 or older        Passed - Recent (12 mo) or future (30 days) visit within the authorizing provider's specialty     Patient has had an office visit with the authorizing provider or a provider within the authorizing providers department within the previous 12 mos or has a future within next 30 days. See \"Patient Info\" tab in inbasket, or \"Choose Columns\" in Meds & Orders section of the refill encounter.              Passed - Medication is active on med list        Passed - No active pregnancy on record        Passed - No positive pregnancy test in past 12 months           Prescription approved per [facility] Refill Protocol.    Eunice Castillo   Ob/Gyn Clinic  RN    "

## 2023-02-15 ENCOUNTER — OFFICE VISIT (OUTPATIENT)
Dept: FAMILY MEDICINE | Facility: CLINIC | Age: 43
End: 2023-02-15
Payer: COMMERCIAL

## 2023-02-15 VITALS
OXYGEN SATURATION: 97 % | HEIGHT: 63 IN | SYSTOLIC BLOOD PRESSURE: 130 MMHG | HEART RATE: 80 BPM | WEIGHT: 231 LBS | RESPIRATION RATE: 16 BRPM | DIASTOLIC BLOOD PRESSURE: 82 MMHG | BODY MASS INDEX: 40.93 KG/M2 | TEMPERATURE: 97.8 F

## 2023-02-15 DIAGNOSIS — Z13.220 SCREENING FOR HYPERLIPIDEMIA: ICD-10-CM

## 2023-02-15 DIAGNOSIS — Z13.1 ENCOUNTER FOR SCREENING EXAMINATION FOR IMPAIRED GLUCOSE REGULATION AND DIABETES MELLITUS: ICD-10-CM

## 2023-02-15 DIAGNOSIS — E66.01 MORBID OBESITY (H): ICD-10-CM

## 2023-02-15 DIAGNOSIS — R53.83 OTHER FATIGUE: Primary | ICD-10-CM

## 2023-02-15 PROCEDURE — 99214 OFFICE O/P EST MOD 30 MIN: CPT | Performed by: FAMILY MEDICINE

## 2023-02-15 ASSESSMENT — PAIN SCALES - GENERAL: PAINLEVEL: MILD PAIN (2)

## 2023-02-15 NOTE — PATIENT INSTRUCTIONS
Focus on hydration  1 small change-walking at work or with dogs once your hip is feeling better      If symptoms are worsening please let us know.

## 2023-02-15 NOTE — PROGRESS NOTES
"  Assessment & Plan     Other fatigue  Unclear etiology, will obtain baseline blood work to rule out any metabolic cause.  I did recommend focusing on hydration and slowly increasing exercise as able.  She is willing to meet with her counselor again to help address possible any underlying mood issue.  She would not want to restart any SSRIs at the moment as previously has not tolerated them.  Recommended follow-up in 3 months or sooner if symptoms are worsening  - Comprehensive metabolic panel (BMP + Alb, Alk Phos, ALT, AST, Total. Bili, TP)  - CBC with platelets and differential  - TSH with free T4 reflex  - Vitamin D Deficiency    Screening for hyperlipidemia  - Lipid panel reflex to direct LDL Non-fasting    Encounter for screening examination for impaired glucose regulation and diabetes mellitus  - Hemoglobin A1c    Morbid obesity (H)  Some disordered eating tendencies. Noted for complexity.       Return in about 3 months (around 5/15/2023) for Routine preventive.    ROSA MARIA HILLMAN DO  [facility]   Lin is a 42 year old, presenting for the following health issues:  Musculoskeletal Problem and Consult     Feels really off at times.  Sometimes nauseas, foggy feeling.  Sometimes feels she can' see straight.  Does feel that it has been going on for a long time now.  Does realize that she has a lot of stress in her life right now and has tried antidepressants in the past and doesn't like them because they cause her to be more \"spacey\"  Has also gained a lot of weight in the last year.    History of Present Illness       Reason for visit:  Hip/ just not feeling right    She eats 2-3 servings of fruits and vegetables daily.She consumes 0 sweetened beverage(s) daily.She exercises with enough effort to increase her heart rate 9 or less minutes per day.  She exercises with enough effort to increase her heart rate 3 or less days per week.   She is taking medications " "regularly.     -Feeling off. Lingering headache, occasionally nauseous but very fatigued. Feels like  She could take a nap anywhere.     Feels like she is sleeping well. Wakes up feeling rested, will get tired later in the day      She has gained weight over last years. Stress and changes in her life.     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     Chronic constipation, did meet with carlita who started her on magnesium and other supplements.  Has not started these yet.    Up-to-date on her preventative care.    Denies B symptoms, neurological symptoms or abdominal pain.     Review of Systems   Constitutional, HEENT, cardiovascular, pulmonary, gi and gu systems are negative, except as otherwise noted.      Objective    /82 (BP Location: Right arm, Patient Position: Chair, Cuff Size: Adult Large)   Pulse 80   Temp 97.8  F (36.6  C) (Tympanic)   Resp 16   Ht 1.6 m (5' 3\")   Wt 104.8 kg (231 lb)   LMP 01/06/2021   SpO2 97%   BMI 40.92 kg/m    Body mass index is 40.92 kg/m .  Physical Exam  Constitutional:       Appearance: Normal appearance.   HENT:      Head: Normocephalic.   Eyes:      Conjunctiva/sclera: Conjunctivae normal.   Cardiovascular:      Rate and Rhythm: Normal rate and regular rhythm.      Pulses: Normal pulses.   Pulmonary:      Effort: Pulmonary effort is normal.      Breath sounds: Normal breath sounds.   Abdominal:      General: Bowel sounds are normal.   Musculoskeletal:      Right lower leg: No edema.      Left lower leg: No edema.   Skin:     General: Skin is warm and dry.   Neurological:      General: No focal deficit present.      Mental Status: She is alert.   Psychiatric:         Thought Content: Thought content normal.         Judgment: Judgment normal.                    "

## 2023-02-16 DIAGNOSIS — Z79.890 SURGICAL MENOPAUSE ON HORMONE REPLACEMENT THERAPY: ICD-10-CM

## 2023-02-16 DIAGNOSIS — E89.40 SURGICAL MENOPAUSE ON HORMONE REPLACEMENT THERAPY: ICD-10-CM

## 2023-02-17 ENCOUNTER — OFFICE VISIT (OUTPATIENT)
Dept: ORTHOPEDICS | Facility: CLINIC | Age: 43
End: 2023-02-17
Attending: PHYSICIAN ASSISTANT
Payer: COMMERCIAL

## 2023-02-17 VITALS — WEIGHT: 231 LBS | BODY MASS INDEX: 40.93 KG/M2 | HEIGHT: 63 IN

## 2023-02-17 DIAGNOSIS — L20.9 ATOPIC DERMATITIS, UNSPECIFIED TYPE: ICD-10-CM

## 2023-02-17 DIAGNOSIS — M67.951 TENDINOPATHY OF RIGHT GLUTEAL REGION: Primary | ICD-10-CM

## 2023-02-17 DIAGNOSIS — M25.551 ACUTE PAIN OF RIGHT HIP: ICD-10-CM

## 2023-02-17 PROCEDURE — 99203 OFFICE O/P NEW LOW 30 MIN: CPT | Performed by: FAMILY MEDICINE

## 2023-02-17 NOTE — PATIENT INSTRUCTIONS
# Right Hip Pain: Lin Corona  was seen today for acute hip pain. Symptoms had been going on for 4 weeks in the setting of a fall. On examination there are positive findings of tenderness to palpation over the greater trochanter, no pain with sciatic nerve stretching. Imaging findings showed no hip fracture. Likely cause of patient's condition due to irritated gluteal tendons. Other possible conditions contributing to symptoms include hip joint pain  Counseled patient on nature of condition and treatment options.  Given this plan as below, follow-up 1 mon as needed     Image Findings: no fracture  Treatment: Activities as tolerated, home exercises given today, PT referral placed  Job: As tolerated  Medications/Injections: Limited tylenol/ibuprofen for pain for 1-2 weeks, Topical Voltaren gel, defer for now  Follow-up: In one month if symptoms do not improve, sooner if worsening  Can consider steroid injection    Please call [phone]   Ask for my team if you have any questions or concerns    If you have not yet received the influenza vaccine but would like to get one, please call  [phone] or you can schedule via [employer]    It was great seeing you today!    Kishore Krause MD, CAQSM

## 2023-02-17 NOTE — PROGRESS NOTES
ASSESSMENT & PLAN    Lin was seen today for pain.    Diagnoses and all orders for this visit:    Tendinopathy of right gluteal region  -     Physical Therapy Referral; Future    Acute pain of right hip  Comments:  Secondary to a fall, concern for sciatic nerve irritation as well as IT band tightness  Orders:  -     Orthopedic  Referral  -     Physical Therapy Referral; Future    Atopic dermatitis, unspecified type      This issue is acute and Worsening.    # Right Hip Pain: Lin Corona  was seen today for acute hip pain. Symptoms had been going on for 4 weeks in the setting of a fall. On examination there are positive findings of tenderness to palpation over the greater trochanter, no pain with sciatic nerve stretching. Imaging findings showed no hip fracture. Likely cause of patient's condition due to irritated gluteal tendons. Other possible conditions contributing to symptoms include hip joint pain  Counseled patient on nature of condition and treatment options.  Given this plan as below, follow-up 1 mon as needed     Image Findings: no fracture  Treatment: Activities as tolerated, home exercises given today, PT referral placed  Job: As tolerated  Medications/Injections: Limited tylenol/ibuprofen for pain for 1-2 weeks, Topical Voltaren gel, defer for now  Follow-up: In one month if symptoms do not improve, sooner if worsening  Can consider steroid injection    Kishore Krause MD  [facility] [place]    -----  Chief Complaint   Patient presents with     Right Hip - Pain       SUBJECTIVE  Lin Corona is a/an 42 year old female who is seen in consultation at the request of  Crescecnio Ocampo PA-C for evaluation of right hip pain.     The patient is seen by themselves.      Onset: 1/22/23, ~4 week(s) ago. Patient describes injury as fall on ice.  Patient presented to ED 2/5/23 and xrays were performed. Previous fall 1 month prior.   Location of Pain: right  "lateral and anterior hip, right buttock pain   Worsened by: walking, increased activity   Better with: altered gait   Treatments tried: ibuprofen, stretching, lateral movement, hip external rotation    Associated symptoms: numbness in buttock     Orthopedic/Surgical history: NO  Social History/Occupation: , currently working more office work   Reviewed ER note on 2/5/23  No family history pertinent to patient's problem today.    Goes to chiropractor for back adjustment  Pain doesn't really affect sleep/work    REVIEW OF SYSTEMS:  Review of Systems  Constitutional, HEENT, cardiovascular, pulmonary, gi and gu systems are negative, except as otherwise noted.    OBJECTIVE:  Ht 1.6 m (5' 3\")   Wt 104.8 kg (231 lb)   LMP 01/06/2021   BMI 40.92 kg/m     General: healthy, alert and in no distress  HEENT: no scleral icterus or conjunctival erythema  Skin: no suspicious lesions or rash. No jaundice.  CV: distal perfusion intact    Resp: normal respiratory effort without conversational dyspnea   Psych: normal mood and affect  Gait: normal steady gait with appropriate coordination and balance    Neuro: Normal light sensory exam of right lower extremity     Ortho Exam   RIGHT HIP  Inspection:    No swelling, bruising, discoloration, or obvious deformity or asymmetry  Palpation:    Tender about the greater trochanteric region. Otherwise all other landmarks are nontender.    Crepitus is Absent  Active Range of Motion:     Flexion full, extension full / IR full / ER  full  Strength:    Flexion 5/5 / extension 5/5 / adduction 5/5 / abduction 5/5  Special Tests:    Positive: GALILEA pain over GT, anterior impingement (FADIR)    Negative: Logroll,       RADIOLOGY:  I independently, visualized and reviewed these images with the patient      Results for orders placed or performed during the hospital encounter of 02/05/23   Pelvis XR w/ unilateral hip right    Narrative    EXAM: XR PELVIS AND HIP RIGHT 1 VIEW  LOCATION: [facility]  DATE/TIME: 2/5/2023 11:19 AM    INDICATION: right hip pain after a fall  COMPARISON: None.      Impression    IMPRESSION: Normal joint spaces and alignment. No displaced fracture. There is some cortical irregularity of the right proximal femur superior femoral head/neck junction on the AP view. This probably relates to a synovial herniation pit rather than a   nondisplaced fracture; however if there is high clinical suspicion for fracture, MRI would be recommended for clarification.         Review of external notes as documented elsewhere in note  Review of the result(s) of each unique test - reviewed right hip x-rays       Disclaimer: This note consists of symbols derived from keyboarding, dictation and/or voice recognition software. As a result, there may be errors in the script that have gone undetected. Please consider this when interpreting information found in this chart.

## 2023-02-17 NOTE — LETTER
2/17/2023         RE: Lin Corona  [id number] [address] [place] [id number]        Dear Colleague,    Thank you for referring your patient, Lin Corona, to the [facility]. Please see a copy of my visit note below.    ASSESSMENT & PLAN    Lin was seen today for pain.    Diagnoses and all orders for this visit:    Tendinopathy of right gluteal region  -     Physical Therapy Referral; Future    Acute pain of right hip  Comments:  Secondary to a fall, concern for sciatic nerve irritation as well as IT band tightness  Orders:  -     Orthopedic  Referral  -     Physical Therapy Referral; Future    Atopic dermatitis, unspecified type      This issue is acute and Worsening.    # Right Hip Pain: Lin Corona  was seen today for acute hip pain. Symptoms had been going on for 4 weeks in the setting of a fall. On examination there are positive findings of tenderness to palpation over the greater trochanter, no pain with sciatic nerve stretching. Imaging findings showed no hip fracture. Likely cause of patient's condition due to irritated gluteal tendons. Other possible conditions contributing to symptoms include hip joint pain  Counseled patient on nature of condition and treatment options.  Given this plan as below, follow-up 1 mon as needed     Image Findings: no fracture  Treatment: Activities as tolerated, home exercises given today, PT referral placed  Job: As tolerated  Medications/Injections: Limited tylenol/ibuprofen for pain for 1-2 weeks, Topical Voltaren gel, defer for now  Follow-up: In one month if symptoms do not improve, sooner if worsening  Can consider steroid injection    Kishore Krause MD  [facility]    -----  Chief Complaint   Patient presents with     Right Hip - Pain       SUBJECTIVE  Lin Corona is a/an 42 year old female who is seen in consultation at the request of  Crescencio Ocampo PA-C  "for evaluation of right hip pain.     The patient is seen by themselves.      Onset: 1/22/23, ~4 week(s) ago. Patient describes injury as fall on ice.  Patient presented to ED 2/5/23 and xrays were performed. Previous fall 1 month prior.   Location of Pain: right lateral and anterior hip, right buttock pain   Worsened by: walking, increased activity   Better with: altered gait   Treatments tried: ibuprofen, stretching, lateral movement, hip external rotation    Associated symptoms: numbness in buttock     Orthopedic/Surgical history: NO  Social History/Occupation: , currently working more office work   Reviewed ER note on 2/5/23  No family history pertinent to patient's problem today.    Goes to chiropractor for back adjustment  Pain doesn't really affect sleep/work    REVIEW OF SYSTEMS:  Review of Systems  Constitutional, HEENT, cardiovascular, pulmonary, gi and gu systems are negative, except as otherwise noted.    OBJECTIVE:  Ht 1.6 m (5' 3\")   Wt 104.8 kg (231 lb)   LMP 01/06/2021   BMI 40.92 kg/m     General: healthy, alert and in no distress  HEENT: no scleral icterus or conjunctival erythema  Skin: no suspicious lesions or rash. No jaundice.  CV: distal perfusion intact    Resp: normal respiratory effort without conversational dyspnea   Psych: normal mood and affect  Gait: normal steady gait with appropriate coordination and balance    Neuro: Normal light sensory exam of right lower extremity     Ortho Exam   RIGHT HIP  Inspection:    No swelling, bruising, discoloration, or obvious deformity or asymmetry  Palpation:    Tender about the greater trochanteric region. Otherwise all other landmarks are nontender.    Crepitus is Absent  Active Range of Motion:     Flexion full, extension full / IR full / ER  full  Strength:    Flexion 5/5 / extension 5/5 / adduction 5/5 / abduction 5/5  Special Tests:    Positive: GALILEA pain over GT, anterior impingement (FADIR)    Negative: Logroll, "       RADIOLOGY:  I independently, visualized and reviewed these images with the patient      Results for orders placed or performed during the hospital encounter of 02/05/23   Pelvis XR w/ unilateral hip right    Narrative    EXAM: XR PELVIS AND HIP RIGHT 1 VIEW  LOCATION: [facility]  DATE/TIME: 2/5/2023 11:19 AM    INDICATION: right hip pain after a fall  COMPARISON: None.      Impression    IMPRESSION: Normal joint spaces and alignment. No displaced fracture. There is some cortical irregularity of the right proximal femur superior femoral head/neck junction on the AP view. This probably relates to a synovial herniation pit rather than a   nondisplaced fracture; however if there is high clinical suspicion for fracture, MRI would be recommended for clarification.         Review of external notes as documented elsewhere in note  Review of the result(s) of each unique test - reviewed right hip x-rays       Disclaimer: This note consists of symbols derived from keyboarding, dictation and/or voice recognition software. As a result, there may be errors in the script that have gone undetected. Please consider this when interpreting information found in this chart.          Again, thank you for allowing me to participate in the care of your patient.        Sincerely,        Kishore Krause MD

## 2023-02-21 RX ORDER — NORGESTIMATE AND ETHINYL ESTRADIOL 0.25-0.035
1 KIT ORAL DAILY
Start: 2023-02-21

## 2023-02-21 NOTE — TELEPHONE ENCOUNTER
"Last Written Prescription Date:  2/6/2023  Last Fill Quantity: 84,  # refills: 0   Last office visit: Visit date not found with prescribing provider:  3/2022  Future Office Visit: 3/10/2022    Next 5 appointments (look out 90 days)    Apr 25, 2023  2:15 PM  (Arrive by 2:00 PM)  Return Visit with Diana Coyne PA-C  [facility] ([facility] [place] ) [address] [place] [id number] [phone]          Requested Prescriptions   Pending Prescriptions Disp Refills     norgestimate-ethinyl estradiol (ORTHO-CYCLEN) 0.25-35 MG-MCG tablet 84 tablet 0     Sig: Take 1 tablet by mouth daily       Contraceptives Protocol Passed - 2/20/2023  1:28 PM        Passed - Patient is not a current smoker if age is 35 or older        Passed - Recent (12 mo) or future (30 days) visit within the authorizing provider's specialty     Patient has had an office visit with the authorizing provider or a provider within the authorizing providers department within the previous 12 mos or has a future within next 30 days. See \"Patient Info\" tab in inbasket, or \"Choose Columns\" in Meds & Orders section of the refill encounter.              Passed - Medication is active on med list        Passed - No active pregnancy on record        Passed - No positive pregnancy test in past 12 months                         Prescription denied.  Patient cannot use [facility] anymore for  due to insurance.  Patient will contact [employer] to notify  Patient aware she is in need of office visit for yearly visit with Dr. Gallardo if she wished to continue after this refill.  Patient will jim to schedule appointment.    Eunice Castillo   Ob[facility]Gyn Clinic  RN    "

## 2023-03-17 DIAGNOSIS — L20.9 ATOPIC DERMATITIS, UNSPECIFIED TYPE: ICD-10-CM

## 2023-03-17 RX ORDER — HYDROXYZINE HYDROCHLORIDE 25 MG/1
TABLET, FILM COATED ORAL
Qty: 60 TABLET | Refills: 1 | Status: SHIPPED | OUTPATIENT
Start: 2023-03-17 | End: 2023-04-25

## 2023-03-17 NOTE — TELEPHONE ENCOUNTER
Requested Prescriptions   Pending Prescriptions Disp Refills     hydrOXYzine (ATARAX) 25 MG tablet 60 tablet 4     Sig: Take 1-2 tablets at bedtime.       There is no refill protocol information for this order        Last office visit: 3/25/2022 with prescribing provider:  SAMMY COYNE    Future Office Visit:   Next 5 appointments (look out 90 days)    Apr 25, 2023  2:15 PM  (Arrive by 2:00 PM)  Return Visit with Sammy Coyne PA-C  [facility] ([facility] [place] ) [address] [facility] [id number] [phone]               [facility]

## 2023-04-13 ENCOUNTER — OFFICE VISIT (OUTPATIENT)
Dept: OBGYN | Facility: CLINIC | Age: 43
End: 2023-04-13
Payer: COMMERCIAL

## 2023-04-13 VITALS
WEIGHT: 215.4 LBS | SYSTOLIC BLOOD PRESSURE: 122 MMHG | HEIGHT: 63 IN | BODY MASS INDEX: 38.16 KG/M2 | DIASTOLIC BLOOD PRESSURE: 81 MMHG | TEMPERATURE: 98.4 F | RESPIRATION RATE: 14 BRPM | HEART RATE: 74 BPM

## 2023-04-13 DIAGNOSIS — E66.09 CLASS 2 OBESITY DUE TO EXCESS CALORIES WITHOUT SERIOUS COMORBIDITY WITH BODY MASS INDEX (BMI) OF 38.0 TO 38.9 IN ADULT: ICD-10-CM

## 2023-04-13 DIAGNOSIS — E89.40 SURGICAL MENOPAUSE ON HORMONE REPLACEMENT THERAPY: Primary | ICD-10-CM

## 2023-04-13 DIAGNOSIS — Z79.890 SURGICAL MENOPAUSE ON HORMONE REPLACEMENT THERAPY: Primary | ICD-10-CM

## 2023-04-13 DIAGNOSIS — Z12.31 ENCOUNTER FOR SCREENING MAMMOGRAM FOR BREAST CANCER: ICD-10-CM

## 2023-04-13 DIAGNOSIS — E66.812 CLASS 2 OBESITY DUE TO EXCESS CALORIES WITHOUT SERIOUS COMORBIDITY WITH BODY MASS INDEX (BMI) OF 38.0 TO 38.9 IN ADULT: ICD-10-CM

## 2023-04-13 PROCEDURE — 99214 OFFICE O/P EST MOD 30 MIN: CPT | Performed by: OBSTETRICS & GYNECOLOGY

## 2023-04-13 RX ORDER — NORGESTIMATE AND ETHINYL ESTRADIOL 0.25-0.035
1 KIT ORAL DAILY
Qty: 84 TABLET | Refills: 3 | Status: SHIPPED | OUTPATIENT
Start: 2023-04-13 | End: 2023-10-23

## 2023-04-13 NOTE — NURSING NOTE
"Initial /81 (BP Location: Left arm, Patient Position: Sitting, Cuff Size: Adult Large)   Pulse 74   Temp 98.4  F (36.9  C) (Tympanic)   Resp 14   Ht 1.6 m (5' 3\")   Wt 97.7 kg (215 lb 6.4 oz)   LMP 01/06/2021   BMI 38.16 kg/m   Estimated body mass index is 38.16 kg/m  as calculated from the following:    Height as of this encounter: 1.6 m (5' 3\").    Weight as of this encounter: 97.7 kg (215 lb 6.4 oz). .      "

## 2023-04-13 NOTE — PROGRESS NOTES
[facility]    Gynecology Office Note    CC:   Chief Complaint   Patient presents with     Consult        HPI: Lin Corona is a 42 year old  who presents for follow up. She is s/p TLH, BSO for chronic pelvic pain and endometriosis. Maintained on HTR for surgical menopause. She has no concerns with her current dosing regimen. Only rare hot flash which is not bothersome.     GYN Hx:     S/p TLH, BSO 2021 for chronic pelvic pain and endometriosis. Maintained on HRT.    Normal pap smear history, no indication for continued screening.    Mammogram due 2023.    ROS: A 10 pt ROS was completed and found to be otherwise negative unless mentioned in the HPI.     PMH:   Past Medical History:   Diagnosis Date     Anxiety and depression      Dysmenorrhea      Endometriosis      Intrinsic eczema        PSHx:   Past Surgical History:   Procedure Laterality Date     APPENDECTOMY  2011     BREAST SURGERY      Breast reduction     COLONOSCOPY  2011     CYSTOSCOPY N/A 2021    Procedure: CYSTOSCOPY;  Surgeon: Jessica Gallardo DO;  Location: [facility] OR     GYN SURGERY  2012    Colposcopy exam      HYSTERECTOMY       LAPAROSCOPIC HYSTERECTOMY TOTAL, BILATERAL SALPINGO-OOPHORECTOMY, COMBINED N/A 2021    Procedure: HYSTERECTOMY, TOTAL, LAPAROSCOPIC, WITH BILATERAL SALPINGO-OOPHORECTOMY;  Surgeon: Jessica Gallardo DO;  Location: [facility] OR     SHOULDER SURGERY Right        OBHx:   OB History    Para Term  AB Living   0 0 0 0 0 0   SAB IAB Ectopic Multiple Live Births   0 0 0 0 0       Medications:   dupilumab (DUPIXENT) 300 MG/2ML prefilled pen, Inject 2 mLs (300 mg) Subcutaneous every 14 days  hydrOXYzine (ATARAX) 25 MG tablet, Take 1-2 tablets at bedtime.  DULoxetine (CYMBALTA) 20 MG capsule, Take 1 capsule (20 mg) by mouth daily (Patient not taking: Reported on 2/15/2023)  methylPREDNISolone (MEDROL DOSEPAK) 4 MG tablet therapy pack, Follow Package Directions  (Patient not taking: Reported on 2/15/2023)    No current facility-administered medications on file prior to visit.      Allergies:      Allergies   Allergen Reactions     Gold Rash     Rash with true test     Nickel Rash     Positive for contact dermatitis, true test     Thimerosal Rash     Strong reaction with true test.        Social History:   Social History     Socioeconomic History     Marital status:      Spouse name: Not on file     Number of children: Not on file     Years of education: Not on file     Highest education level: Not on file   Occupational History     Occupation:    Tobacco Use     Smoking status: Former     Packs/day: 0.00     Types: Cigarettes     Smokeless tobacco: Never   Vaping Use     Vaping status: Never Used     Passive vaping exposure: Yes   Substance and Sexual Activity     Alcohol use: Yes     Comment: occassional     Drug use: No     Sexual activity: Yes     Partners: Male     Birth control/protection: Pill     Comment: Nuvaring   Other Topics Concern     Parent/sibling w/ CABG, MI or angioplasty before 65F 55M? Not Asked   Social History Narrative     Not on file     Social Determinants of Health     Financial Resource Strain: Not on file   Food Insecurity: Not on file   Transportation Needs: Not on file   Physical Activity: Not on file   Stress: Not on file   Social Connections: Not on file   Intimate Partner Violence: Not on file   Housing Stability: Not on file         Family History:   Family History   Problem Relation Age of Onset     Arthritis Mother      Heart Disease Father      Gallbladder Disease Father      Arthritis Maternal Grandmother      Unknown/Adopted Maternal Grandmother      C.A.D. Paternal Grandmother      Ovarian Cancer Paternal Grandmother      Cancer Paternal Grandmother      C.A.D. Paternal Grandfather      Cervical Cancer Cousin      Cervical Cancer Paternal Aunt        Physical Exam:   Vitals:    04/13/23 0900   BP: 122/81   BP  "Location: Left arm   Patient Position: Sitting   Cuff Size: Adult Large   Pulse: 74   Resp: 14   Temp: 98.4  F (36.9  C)   TempSrc: Tympanic   Weight: 97.7 kg (215 lb 6.4 oz)   Height: 1.6 m (5' 3\")      Estimated body mass index is 38.16 kg/m  as calculated from the following:    Height as of this encounter: 1.6 m (5' 3\").    Weight as of this encounter: 97.7 kg (215 lb 6.4 oz).    General appearance: well-hydrated, A&O x 3, no apparent distress  Lungs: Equal expansion bilaterally, no accessory muscle use  Heart: No heaves or thrills.   Constitutional: See vitals  Extremities: no edema  Neuro: CN II-XII grossly intact      Assessment and Plan:     Encounter Diagnoses   Name Primary?     Surgical menopause on hormone replacement therapy Yes     Class 2 obesity due to excess calories without serious comorbidity with body mass index (BMI) of 38.0 to 38.9 in adult      Encounter for screening mammogram for breast cancer      Doing well with current HRT regimen, refill sent.    Also discussed sexual health concerns. Last year, her  had transient myelitis and now has difficulty with ejaculation. This has lead to some frustration for him in their sex life. Discussed some strategies for decreasing pressure with intimacy and alternatives.     While her  was admitted in the hospital for 8 weeks, she gained a lot of weight last year. Has started to lose some, down 14 pounds. Has had weight concerns for most her left, despite diet and exercise. Referral placed for comprehensive weight management, interested in medical therapy. Does not want bariatric surgery.    Health maintenance: mammogram ordered, due this summer    Return to clinic annually or sooner if needed.    Jessica Gallardo, DO        "

## 2023-04-25 ENCOUNTER — OFFICE VISIT (OUTPATIENT)
Dept: DERMATOLOGY | Facility: CLINIC | Age: 43
End: 2023-04-25
Payer: COMMERCIAL

## 2023-04-25 DIAGNOSIS — L20.9 ATOPIC DERMATITIS, UNSPECIFIED TYPE: ICD-10-CM

## 2023-04-25 DIAGNOSIS — R22.9 SUBCUTANEOUS NODULE: Primary | ICD-10-CM

## 2023-04-25 PROCEDURE — 99214 OFFICE O/P EST MOD 30 MIN: CPT | Performed by: PHYSICIAN ASSISTANT

## 2023-04-25 RX ORDER — DUPILUMAB 300 MG/2ML
300 INJECTION, SOLUTION SUBCUTANEOUS
Qty: 4 ML | Refills: 11 | Status: SHIPPED | OUTPATIENT
Start: 2023-04-25 | End: 2024-04-30

## 2023-04-25 ASSESSMENT — PAIN SCALES - GENERAL: PAINLEVEL: NO PAIN (0)

## 2023-04-25 NOTE — PROGRESS NOTES
Lin Corona is an extremely pleasant 42 year old year old female patient here today for recheck atopic dermatitis. She restarted dupixent in February, she still has a mild flare on hands. She notes still much improved since starting dupixent. She also notes nodule on back, present for 6 months, growing in size. No pain. She notes small cyst on left helix, she will periodically express and get white material out. Patient has no other skin complaints today.  Remainder of the HPI, Meds, PMH, Allergies, FH, and SH was reviewed in chart.    Pertinent Hx: atopic dermatitis   Past Medical History:   Diagnosis Date     Anxiety and depression      Dysmenorrhea      Endometriosis      Intrinsic eczema        Past Surgical History:   Procedure Laterality Date     APPENDECTOMY  07/13/2011     BREAST SURGERY  2000    Breast reduction     COLONOSCOPY  09/2011     CYSTOSCOPY N/A 01/11/2021    Procedure: CYSTOSCOPY;  Surgeon: Jessica Gallardo DO;  Location: [facility]     GYN SURGERY  01/2012    Colposcopy exam      HYSTERECTOMY       LAPAROSCOPIC HYSTERECTOMY TOTAL, BILATERAL SALPINGO-OOPHORECTOMY, COMBINED N/A 01/11/2021    Procedure: HYSTERECTOMY, TOTAL, LAPAROSCOPIC, WITH BILATERAL SALPINGO-OOPHORECTOMY;  Surgeon: Jessica Gallardo DO;  Location: [facility] OR     SHOULDER SURGERY Right         Family History   Problem Relation Age of Onset     Arthritis Mother      Heart Disease Father      Gallbladder Disease Father      Arthritis Maternal Grandmother      Unknown/Adopted Maternal Grandmother      C.A.D. Paternal Grandmother      Ovarian Cancer Paternal Grandmother      Cancer Paternal Grandmother      C.A.D. Paternal Grandfather      Cervical Cancer Cousin      Cervical Cancer Paternal Aunt        Social History     Socioeconomic History     Marital status:      Spouse name: Not on file     Number of children: Not on file     Years of education: Not on file     Highest education level: Not on file   Occupational History      Occupation:    Tobacco Use     Smoking status: Former     Packs/day: 0.00     Types: Cigarettes     Smokeless tobacco: Never   Vaping Use     Vaping status: Never Used     Passive vaping exposure: Yes   Substance and Sexual Activity     Alcohol use: Yes     Comment: occassional     Drug use: No     Sexual activity: Yes     Partners: Male     Birth control/protection: Pill     Comment: Nuvaring   Other Topics Concern     Parent/sibling w/ CABG, MI or angioplasty before 65F 55M? Not Asked   Social History Narrative     Not on file     Social Determinants of Health     Financial Resource Strain: Not on file   Food Insecurity: Not on file   Transportation Needs: Not on file   Physical Activity: Not on file   Stress: Not on file   Social Connections: Not on file   Intimate Partner Violence: Not on file   Housing Stability: Not on file       Outpatient Encounter Medications as of 4/25/2023   Medication Sig Dispense Refill     DULoxetine (CYMBALTA) 20 MG capsule Take 1 capsule (20 mg) by mouth daily (Patient not taking: Reported on 2/15/2023) 30 capsule 1     dupilumab (DUPIXENT) 300 MG/2ML prefilled pen Inject 2 mLs (300 mg) Subcutaneous every 14 days 4 mL 2     methylPREDNISolone (MEDROL DOSEPAK) 4 MG tablet therapy pack Follow Package Directions (Patient not taking: Reported on 2/15/2023) 21 tablet 0     norgestimate-ethinyl estradiol (ORTHO-CYCLEN) 0.25-35 MG-MCG tablet Take 1 tablet by mouth daily 84 tablet 3     [DISCONTINUED] hydrOXYzine (ATARAX) 25 MG tablet Take 1-2 tablets at bedtime. 60 tablet 1     No facility-administered encounter medications on file as of 4/25/2023.             O:   NAD, WDWN, Alert & Oriented, Mood & Affect wnl, Vitals stable   Here today alone   LMP 01/06/2021    General appearance normal   Vitals stable   Alert, oriented and in no acute distress     Few pink papules on hands   Firm subcutaneous nodule on left mid back  Subcutaneous papule with open comedo on left helix         Eyes: Conjunctivae/lids:Normal     ENT: Lips normal    MSK:Normal    Pulm: Breathing Normal    Neuro/Psych: Orientation:Alert and Orientedx3 ; Mood/Affect:normal     A/P:  1. Atopic dermatitis   Continue dupixent.   Use topical steroids if needed.   2. R/O cyst vs other on left mid back  Order Ultrasound to confirm cyst.   3.  Small eic on left helix   Not bothersome, will watch.

## 2023-04-25 NOTE — NURSING NOTE
Chief Complaint   Patient presents with     Follow Up     Dupixient- flared back of hands, neck and ankles        There were no vitals filed for this visit.  Wt Readings from Last 1 Encounters:   04/13/23 97.7 kg (215 lb 6.4 oz)       Yesenia Culver LPN .................4/25/2023

## 2023-04-25 NOTE — LETTER
4/25/2023         RE: Lin Corona  [address] [place] [id number]        Dear Colleague,    Thank you for referring your patient, Lin Corona, to [facility]. Please see a copy of my visit note below.    Lin Corona is an extremely pleasant 42 year old year old female patient here today for recheck atopic dermatitis. She restarted dupixent in February, she still has a mild flare on hands. She notes still much improved since starting dupixent. She also notes nodule on back, present for 6 months, growing in size. No pain. She notes small cyst on left helix, she will periodically express and get white material out. Patient has no other skin complaints today.  Remainder of the HPI, Meds, PMH, Allergies, FH, and SH was reviewed in chart.    Pertinent Hx: atopic dermatitis   Past Medical History:   Diagnosis Date     Anxiety and depression      Dysmenorrhea      Endometriosis      Intrinsic eczema        Past Surgical History:   Procedure Laterality Date     APPENDECTOMY  07/13/2011     BREAST SURGERY  2000    Breast reduction     COLONOSCOPY  09/2011     CYSTOSCOPY N/A 01/11/2021    Procedure: CYSTOSCOPY;  Surgeon: Jessica Gallardo DO;  Location: [facility]     GYN SURGERY  01/2012    Colposcopy exam      HYSTERECTOMY       LAPAROSCOPIC HYSTERECTOMY TOTAL, BILATERAL SALPINGO-OOPHORECTOMY, COMBINED N/A 01/11/2021    Procedure: HYSTERECTOMY, TOTAL, LAPAROSCOPIC, WITH BILATERAL SALPINGO-OOPHORECTOMY;  Surgeon: Jessica Gallardo DO;  Location: [facility]     SHOULDER SURGERY Right         Family History   Problem Relation Age of Onset     Arthritis Mother      Heart Disease Father      Gallbladder Disease Father      Arthritis Maternal Grandmother      Unknown/Adopted Maternal Grandmother      C.A.D. Paternal Grandmother      Ovarian Cancer Paternal Grandmother      Cancer Paternal Grandmother      C.A.D. Paternal Grandfather      Cervical Cancer Cousin      Cervical Cancer Paternal  Aunt        Social History     Socioeconomic History     Marital status:      Spouse name: Not on file     Number of children: Not on file     Years of education: Not on file     Highest education level: Not on file   Occupational History     Occupation:    Tobacco Use     Smoking status: Former     Packs/day: 0.00     Types: Cigarettes     Smokeless tobacco: Never   Vaping Use     Vaping status: Never Used     Passive vaping exposure: Yes   Substance and Sexual Activity     Alcohol use: Yes     Comment: occassional     Drug use: No     Sexual activity: Yes     Partners: Male     Birth control/protection: Pill     Comment: Nuvaring   Other Topics Concern     Parent/sibling w/ CABG, MI or angioplasty before 65F 55M? Not Asked   Social History Narrative     Not on file     Social Determinants of Health     Financial Resource Strain: Not on file   Food Insecurity: Not on file   Transportation Needs: Not on file   Physical Activity: Not on file   Stress: Not on file   Social Connections: Not on file   Intimate Partner Violence: Not on file   Housing Stability: Not on file       Outpatient Encounter Medications as of 4/25/2023   Medication Sig Dispense Refill     DULoxetine (CYMBALTA) 20 MG capsule Take 1 capsule (20 mg) by mouth daily (Patient not taking: Reported on 2/15/2023) 30 capsule 1     dupilumab (DUPIXENT) 300 MG/2ML prefilled pen Inject 2 mLs (300 mg) Subcutaneous every 14 days 4 mL 2     methylPREDNISolone (MEDROL DOSEPAK) 4 MG tablet therapy pack Follow Package Directions (Patient not taking: Reported on 2/15/2023) 21 tablet 0     norgestimate-ethinyl estradiol (ORTHO-CYCLEN) 0.25-35 MG-MCG tablet Take 1 tablet by mouth daily 84 tablet 3     [DISCONTINUED] hydrOXYzine (ATARAX) 25 MG tablet Take 1-2 tablets at bedtime. 60 tablet 1     No facility-administered encounter medications on file as of 4/25/2023.             O:   NAD, WDWN, Alert & Oriented, Mood & Affect wnl, Vitals stable   Here  today alone   [facility] 01/06/2021    General appearance normal   Vitals stable   Alert, oriented and in no acute distress     Few pink papules on hands   Firm subcutaneous nodule on left mid back  Subcutaneous papule with open comedo on left helix        Eyes: Conjunctivae/lids:Normal     ENT: Lips normal    MSK:Normal    Pulm: Breathing Normal    Neuro/Psych: Orientation:Alert and Orientedx3 ; Mood/Affect:normal     A/P:  1. Atopic dermatitis   Continue dupixent.   Use topical steroids if needed.   2. R/O cyst vs other on left mid back  Order Ultrasound to confirm cyst.   3.  Small eic on left helix   Not bothersome, will watch.       Again, thank you for allowing me to participate in the care of your patient.        Sincerely,        Diana Lincoln PA-C

## 2023-04-29 ENCOUNTER — HEALTH MAINTENANCE LETTER (OUTPATIENT)
Age: 43
End: 2023-04-29

## 2023-05-02 ENCOUNTER — VIRTUAL VISIT (OUTPATIENT)
Dept: FAMILY MEDICINE | Facility: CLINIC | Age: 43
End: 2023-05-02
Payer: COMMERCIAL

## 2023-05-02 DIAGNOSIS — R10.13 ABDOMINAL PAIN, EPIGASTRIC: Primary | ICD-10-CM

## 2023-05-02 PROCEDURE — 99213 OFFICE O/P EST LOW 20 MIN: CPT | Mod: VID | Performed by: NURSE PRACTITIONER

## 2023-05-02 RX ORDER — OXYCODONE HYDROCHLORIDE 5 MG/1
5 TABLET ORAL
COMMUNITY
Start: 2023-04-30 | End: 2023-07-13

## 2023-05-02 RX ORDER — PANTOPRAZOLE SODIUM 20 MG/1
40 TABLET, DELAYED RELEASE ORAL
COMMUNITY
Start: 2023-05-01 | End: 2023-07-13

## 2023-05-02 NOTE — PATIENT INSTRUCTIONS
Continue with the EGD as scheduled.  Avoid NSAID's.  Will follow up in person, in clinic, after results available. Will do yearly exam as well.      Our Clinic hours are:  Mondays    7:20 am - 7 pm  Tues -  Fri  7:20 am - 5 pm    Clinic Phone: [phone]    The clinic lab opens at 7:30 am Mon - Fri and appointments are required.    [facility]  Ph. [phone]  Monday  8 am - 7pm  Tues - Fri 8 am - 5:30 pm

## 2023-05-02 NOTE — PROGRESS NOTES
"Lin is a 42 year old who is being evaluated via a billable video visit.      How would you like to obtain your AVS? MyChart  If the video visit is dropped, the invitation should be resent by: Text to cell phone: [phone]  Will anyone else be joining your video visit? No        Assessment & Plan     Abdominal pain, epigastric    After reviewing the notes from [facility], agree she should do EGD as scheduled and follow up after that.  Did not see the actual CT radiologist results, just that surgeon noted gallbladder normal, no pancreatitis, likely gastritis.  She is feeling well at present.             MED REC REQUIRED  Post Medication Reconciliation Status:   BMI:   Estimated body mass index is 38.16 kg/m  as calculated from the following:    Height as of 4/13/23: 1.6 m (5' 3\").    Weight as of 4/13/23: 97.7 kg (215 lb 6.4 oz).       See Patient Instructions  Patient Instructions   Continue with the EGD as scheduled.  Avoid NSAID's.  Will follow up in person, in clinic, after results available. Will do yearly exam as well.      Our Clinic hours are:  Mondays    7:20 am - 7 pm  Tues -  Fri  7:20 am - 5 pm    Clinic Phone: [phone]    The clinic lab opens at 7:30 am Mon - Fri and appointments are required.    [facility]  Ph. [phone]  Monday  8 am - 7pm  Tues - Fri 8 am - 5:30 pm             [facility]    Jennifer Ceballos is a 42 year old, presenting for the following health issues:  Hospital F/U and Referral (Teeth grinding )         View : No data to display.              [facility]     ED/UC Followup:    Facility:  [facility] [address]   Date of visit: 4/30/23  Reason for visit: Abdominal pain   Current Status:mild pain at a 2       Was in ED at [facility] [address] for \"intense\" abdominal pain. Initially it sounds like she was told it could be gallbladder but after consult with surgeon and repeat labs and a CT scan, it was thought it could of been " gastritis. She is scheduled for EGD tomorrow. Was not impressed with surgeon or how she was treated and wished to discuss it with me.      Review of Systems   Constitutional, HEENT, cardiovascular, pulmonary, gi and gu systems are negative, except as otherwise noted.      Objective           Vitals:  No vitals were obtained today due to virtual visit.    Physical Exam   GENERAL: Healthy, alert and no distress  EYES: Eyes grossly normal to inspection.  No discharge or erythema, or obvious scleral/conjunctival abnormalities.  RESP: No audible wheeze, cough, or visible cyanosis.  No visible retractions or increased work of breathing.    SKIN: Visible skin clear. No significant rash, abnormal pigmentation or lesions.  NEURO: Cranial nerves grossly intact.  Mentation and speech appropriate for age.  PSYCH: Mentation appears normal, affect normal/bright, judgement and insight intact, normal speech and appearance well-groomed.                Video-Visit Details    Type of service:  Video Visit     Originating Location (pt. Location): Home  Distant Location (provider location):  On-site  Platform used for Video Visit: Christy

## 2023-05-18 NOTE — TELEPHONE ENCOUNTER
Panel Management Review      Patient has the following on her problem list: None      Composite cancer screening  Chart review shows that this patient is due/due soon for the following Pap Smear  Summary:    Patient is due/failing the following:   PAP and PHYSICAL    Action needed:   Patient needs office visit for annual pe / pap.    Type of outreach:    Sent letter.    Questions for provider review:    None                                                                                                                                    Sheri Greenwood MA                  3 = A little assistance

## 2023-06-05 ENCOUNTER — TELEPHONE (OUTPATIENT)
Dept: DERMATOLOGY | Facility: CLINIC | Age: 43
End: 2023-06-05
Payer: COMMERCIAL

## 2023-06-05 NOTE — TELEPHONE ENCOUNTER
Ultrasound calling to clarify order.     Patient has 3 pm appt Tuesday June 6 th.       Does the Provider WANT A BIOPSY with US order of the Back?    US tech states: We don't usually do a biopsy until an US has already been done?    Please advise. I did advise Provider is not in the office today.     Angelita Lopez RN

## 2023-06-06 ENCOUNTER — HOSPITAL ENCOUNTER (OUTPATIENT)
Dept: ULTRASOUND IMAGING | Facility: CLINIC | Age: 43
Discharge: HOME | End: 2023-06-06
Attending: PHYSICIAN ASSISTANT | Admitting: PHYSICIAN ASSISTANT
Payer: COMMERCIAL

## 2023-06-06 DIAGNOSIS — R22.9 SUBCUTANEOUS NODULE: ICD-10-CM

## 2023-06-06 PROCEDURE — 76705 ECHO EXAM OF ABDOMEN: CPT

## 2023-06-07 ENCOUNTER — TELEPHONE (OUTPATIENT)
Dept: DERMATOLOGY | Facility: CLINIC | Age: 43
End: 2023-06-07
Payer: COMMERCIAL

## 2023-06-07 NOTE — TELEPHONE ENCOUNTER
Reason for Call:  Other     Detailed comments: Patient calling to let provider know that she was unable to respond to message sent to her on [employer]. Patient states that she would like to move forward in lipoma removal as it is bothering her. Please reach out to patient via phone with any questions.     Phone Number Patient can be reached at: Home number on file [phone] (home)    Best Time: any    Can we leave a detailed message on this number? YES    Call taken on 6/7/2023 at 10:19 AM by Rock Leach

## 2023-07-13 ENCOUNTER — TELEPHONE (OUTPATIENT)
Dept: ENDOCRINOLOGY | Facility: CLINIC | Age: 43
End: 2023-07-13

## 2023-07-13 ENCOUNTER — TELEPHONE (OUTPATIENT)
Dept: SURGERY | Facility: CLINIC | Age: 43
End: 2023-07-13

## 2023-07-13 ENCOUNTER — VIRTUAL VISIT (OUTPATIENT)
Dept: ENDOCRINOLOGY | Facility: CLINIC | Age: 43
End: 2023-07-13
Payer: COMMERCIAL

## 2023-07-13 VITALS — WEIGHT: 203.5 LBS | BODY MASS INDEX: 36.06 KG/M2 | HEIGHT: 63 IN

## 2023-07-13 DIAGNOSIS — E66.812 CLASS 2 SEVERE OBESITY WITH SERIOUS COMORBIDITY AND BODY MASS INDEX (BMI) OF 36.0 TO 36.9 IN ADULT, UNSPECIFIED OBESITY TYPE (H): Primary | ICD-10-CM

## 2023-07-13 DIAGNOSIS — E66.01 CLASS 2 SEVERE OBESITY WITH SERIOUS COMORBIDITY AND BODY MASS INDEX (BMI) OF 36.0 TO 36.9 IN ADULT, UNSPECIFIED OBESITY TYPE (H): Primary | ICD-10-CM

## 2023-07-13 DIAGNOSIS — E66.01 CLASS 2 SEVERE OBESITY WITH SERIOUS COMORBIDITY AND BODY MASS INDEX (BMI) OF 36.0 TO 36.9 IN ADULT, UNSPECIFIED OBESITY TYPE (H): ICD-10-CM

## 2023-07-13 DIAGNOSIS — E66.812 CLASS 2 SEVERE OBESITY WITH SERIOUS COMORBIDITY AND BODY MASS INDEX (BMI) OF 36.0 TO 36.9 IN ADULT, UNSPECIFIED OBESITY TYPE (H): ICD-10-CM

## 2023-07-13 DIAGNOSIS — Z71.3 NUTRITIONAL COUNSELING: Primary | ICD-10-CM

## 2023-07-13 PROCEDURE — 99215 OFFICE O/P EST HI 40 MIN: CPT | Mod: VID | Performed by: PHYSICIAN ASSISTANT

## 2023-07-13 PROCEDURE — 99207 PR NO CHARGE LOS: CPT | Mod: VID

## 2023-07-13 PROCEDURE — 97802 MEDICAL NUTRITION INDIV IN: CPT | Mod: VID

## 2023-07-13 RX ORDER — SEMAGLUTIDE 0.5 MG/.5ML
0.5 INJECTION, SOLUTION SUBCUTANEOUS WEEKLY
Qty: 2 ML | Refills: 0 | Status: SHIPPED | OUTPATIENT
Start: 2023-09-13 | End: 2023-10-23

## 2023-07-13 ASSESSMENT — PAIN SCALES - GENERAL: PAINLEVEL: NO PAIN (0)

## 2023-07-13 NOTE — TELEPHONE ENCOUNTER
PA Initiation    Medication: WEGOVY 0.25 MG/0.5ML SC SOAJ  Insurance Company: [employer] - Phone [phone] Fax [phone]  Pharmacy Filling the Rx:    Filling Pharmacy Phone:    Filling Pharmacy Fax:    Start Date: 7/13/2023    Faxed pa form to company :

## 2023-07-13 NOTE — PATIENT INSTRUCTIONS
Justice Ceballos     Follow-up with RD in 1-2 months or PRN.     Thank you,    Diana Dai, ALFRED, LD  If you would like to schedule or reschedule an appointment with the RD, please call [phone]    Nutrition Goals  1. Incorporate the plate method for more balanced meals.   2. Aim to consume at least 60 grams of protein at meal.   3. Increase exercise as able.     The Plate Method:  [email or web address]    Plate method can be used for general guidance on balanced meals/portion sizes (see link below for picture/more information)                 Make 1/2 your plate non starchy vegetables (cauliflower, broccoli, asparagus, Browning sprouts, lettuce, carrots, for example).                3+ oz lean protein sources (salmon/skinless chicken/turkey breast/pork loin/lean cuts of beef/ or non-animal protein such as black, kidney or lambert beans, tofu/edamame/tempeh)    (Note: 3 oz = deck of cards size)                1/2 to 1 cup carbohydrate choices such as whole grain starches or starchy vegetables or fruit. For example: quinoa, brown rice, barley, potatoes, sweet potatoes, winter squash, peas, corn, or fruit.                Choose ~0-2 added fat servings at a meal (avocados, nut butters, nuts or seeds, olive oil, vegetable oils).    Protein Sources   [email or web address]     Carbohydrates  [email or web address]     Mindful Eating  [email or web address]     Summary of Volumetrics Eating Plan  [email or web address]       COMPREHENSIVE WEIGHT MANAGEMENT PROGRAM  VIRTUAL SUPPORT GROUPS    For Support Group Information:      We offer support groups for patients who are working on weight loss and considering, preparing for or have had weight loss surgery.   There is no cost for this opportunity.  You are invited to attend the?Virtual Support Groups?provided by any of the following locations:    [facility] via [employer] Teams with Rebecca Paredes RN  2.   [place] via [employer]  "with Kael Peña, PhD, LP  3.   [place] via [employer] Teams with Thelma Flores RN  4.   [facility] via [employer] Teams with Thelma Cuellar [facility]    The following Support Group information can also be found on our website:  [email or web address]-groups    [email or web address]-loss-[email or web address]-groups    [facility] Weight Loss Surgery Support Group    [facility] Weight Loss Surgery Support Group  The support group is a patient-lead forum that meets monthly to share experiences, encouragement and education. It is open to those who have had weight loss surgery, are scheduled for surgery, or are considering surgery.   WHEN: This group meets on the 3rd Wednesday of each month from 5:00PM - 6:00PM virtually using Microsoft Teams.   FACILITATOR: Led by Rebecca Pineda RD, WANDA, RN, the program's Clinical Coordinator.   TO REGISTER: Please contact the clinic via Snagsta or call the nurse line directly at [phone] to inform our staff that you would like an invite sent to you and to let us know the email you would like the invite sent to. Prior to the meeting, a link with directions on how to join the meeting will be sent to you.    2023 Meetings   April 19: Guest Speaker, Jose Ramirez RD, LD, \"Maintaining Weight Loss after Bariatric Surgery\".  May 17: \"Let's Talk\" a time for the group to share.  June 21: \"Let's Talk\" a time for the group to share.  July 19  August 16  September 20  October 18  November 15  December 20    [facility] Support Groups    Connections Bariatric Care Support Group?  This is open to all pre- and post- operative bariatric surgery patients as well as their support system.   WHEN: This group meets the 2nd Tuesday of each month from 6:30 PM - 8:00 PM virtually using Microsoft Teams.   FACILITATOR: Led by Kael Peña, Ph.D " "who is a Licensed Psychologist with the [facility] Weight Management [facility].   TO REGISTER: Please send an email to Kael Peña, Ph.D., LP [email or web address]?if you would like an invitation to the group and to learn about using Microsoft Teams.    2023 Meetings  April 11: Guest speaker, Mamta Bruno MD, Bariatrician, [facility] Comprehensive Weight Management Program, \"Injectable Weight Loss Medications\".  May 9  July 13  July 11  August 8  September 12  October 10  November 14  December 12    Connections Post-Operative Bariatric Surgery Support Group  This is a support group for [facility] bariatric patients (and those external to [facility]) who have had bariatric surgery and are at least 3 months post-surgery.  WHEN: This support group meets the 4th Wednesday of the month from 11:00 AM - 12:00 PM virtually using Microsoft Teams.   FACILITATOR: Led by Certified Bariatric Nurse, Thelma Flores RN.   TO REGISTER: Please send an email to Thelma at [email or web address] if you would like an invitation to the group and to learn about using Microsoft Teams.    2023 Meetings  April 26  May 24  July 28  July 26  August 23  September 27  October 25  November 22  December 27    [facility]   Healthy Lifestyle Virtual Support Group    Healthy Lifestyle Virtual Support Group?  This is 60 minutes of small group guided discussion, support and resources. All are welcome who want a healthy lifestyle.  WHEN: This group meets monthly on a Friday from 12:30 PM - 1:30 PM virtually using Microsoft Teams.  This group will meet the first Friday of the month beginning In July  FACILITATOR: Led by National Board Certified Health and , Thelma Cuellar [facility].   TO REGISTER: Please send an email to Thelma [email or web address] to receive monthly invites to the group or if you have any questions about having a health .  Prior to " the meeting, a link with directions on how to join the meeting will be sent to you.    2023 Meetings  May 19: Let's Talk  June 9: Create Your Coaching Toolkit: Learn How to  Yourself  July 7: Let's Talk  August 4: Benefits of Fiber with ALFRED Westfall  September 1: Show and Tell (share your aps, podcasts, recipes, hacks, books)  October 6 :Let's Talk  November 3: Introduction to Mindfulness   December 1: Let's Talk

## 2023-07-13 NOTE — PROGRESS NOTES
"Lin Corona is a 42 year old female who is being evaluated via a billable video visit.      The patient has been notified of following:     \"This video visit will be conducted via a call between you and your physician/provider. We have found that certain health care needs can be provided without the need for an in-person physical exam.  This service lets us provide the care you need with a video conversation.  If a prescription is necessary we can send it directly to your pharmacy.  If lab work is needed we can place an order for that and you can then stop by our lab to have the test done at a later time.    Video visits are billed at different rates depending on your insurance coverage.  Please reach out to your insurance provider with any questions.    If during the course of the call the physician/provider feels a video visit is not appropriate, you will not be charged for this service.\"    Patient has given verbal consent for Video visit? Yes  How would you like to obtain your AVS? MyChart  If you are dropped from the video visit, the video invite should be resent to: Text to cell phone: [phone]  Will anyone else be joining your video visit? No  {If patient encounters technical issues they should call [phone]      Video-Visit Details    Type of service:  Video Visit    Video Start Time: 1:32 PM   Video End Time: 2:01 PM    Originating Location (pt. Location): Home    Distant Location (provider location): Offsite (providers home) [facility] [place]     Platform used for Video Visit: FusionStorm    During this virtual visit the patient is located in [place], patient verifies this as the location during the entirety of this visit.     New Weight Management Nutrition Consultation    Lin Corona is a 42 year old female presents today for new weight management nutrition consultation.  Patient referred by KYLIE Gillespie on July 13, 2023.    Patient with Co-morbidities of " "obesity includin/12/2023    12:26 PM   --   I have the following health issues associated with obesity None of the above   I have the following symptoms associated with obesity Depression     Anthropometrics:  Initial Consult Weight: 203 lb on 23   Estimated body mass index is 36.05 kg/m  as calculated from the following:    Height as of an earlier encounter on 23: 1.6 m (5' 3\").    Weight as of an earlier encounter on 23: 92.3 kg (203 lb 8 oz).    Medications for Weight Loss:  Wegovy - pending insurance approval    Occupation: full-time      NUTRITION HISTORY  Food allergies: NKFA  Food intolerances: None   Vitamin/Mineral Supplements: None   Previous methods of diet modification for weight loss: watching portions/calories, fasting, exercise, has tried many diets in the past.   RD before: None     Feels she is pretty knowledgeable around eating healthy. Loves cheese, likes vegetables. Recently did a HCG diet (detox, limited and then add food back in slowly). Doesn't have a lot of cravings for foods. Has been limiting fruit to 2x times a day. Cesar bread is main source of bread.     Diet recall:   Breakfast - apple and cheese stick  Lunch - green beans, hamburger arben  Dinner - no examples given  Snacks - cheese, beef stick, nuts   Hydration - coffee w/heavy whipping cream (2 tbsp), Bubbly, Emmanuel flavoring to water, Zevia or Sprite soda on occasion, decaf tea   Alcohol - Seltzers, low sugar drinks, mixed soda/vodka drink - weekends      2023    12:26 PM   Diet Recall Review with Patient   If you do eat breakfast, what types of food do you eat? Cesar toast,  eggs, sweet potatoe. Low carb cereal   If you do eat lunch, what types of food do you typically eat? HB eggs, broccoli,  cheese sticks,  beef sticks. Salads   If you do eat supper, what types of food do you typically eat? Cauliflower substitute for rice and beads.  Beef, pork,  chicken.   If you do snack, what types of " food do you typically eat? Apple, cheese sticks,  beef sticks, nuts   How many glasses of juice do you drink in a typical day? 0   How many of glasses of milk do you drink in a typical day? 0   How many 8oz glasses of sugar containing drinks such as Luis-Aid/sweet tea do you drink in a day? 0   How many cans/bottles of sugar pop/soda/tea/sports drinks do you drink in a day? 0   How many cans/bottles of diet pop/soda/tea or sports drink do you drink in a day? 0   How often do you have a drink of alcohol? 2-3 TImes a Week   If you do drink, how many drinks might you have in a day? 3-4           7/12/2023    12:26 PM   Eating Habits   Generally, my meals include foods like these bread, pasta, rice, potatoes, corn, crackers, sweet dessert, pop, or juice Never   Generally, my meals include foods like these fried meats, brats, burgers, french fries, pizza, cheese, chips, or ice cream Less Than Weekly   Eat fast food (like McDonalds, Burger Bhupendra, Taco Bell) Never   Eat at a buffet or sit-down restaurant Less Than Weekly   Eat most of my meals in front of the TV or computer Less Than Weekly   Often skip meals, eat at random times, have no regular eating times A Few Times a Week   Rarely sit down for a meal but snack or graze throughout A Few Times a Week   Eat extra snacks between meals A Few Times a Week   Eat most of my food at the end of the day Never   Eat in the middle of the night or wake up at night to eat Never   Eat extra snacks to prevent or correct low blood sugar Never   Eat to prevent acid reflux or stomach pain Never   Worry about not having enough food to eat Never   I eat when I am depressed A Few Times a Week   I eat when I am stressed A Few Times a Week   I eat when I am bored A Few Times a Week   I eat when I am anxious A Few Times a Week   I eat when I am happy or as a reward Never   I feel hungry all the time even if I just have eaten A Few Times a Week   Feeling full is important to me Never   I finish  all the food on my plate even if I am already full A Few Times a Week   I can't resist eating delicious food or walk past the good food/smell A Few Times a Week   I eat/snack without noticing that I am eating Never   I eat when I am preparing the meal A Few Times a Week   I eat more than usual when I see others eating Never   I have trouble not eating sweets, ice cream, cookies, or chips if they are around the house Less Than Weekly   I think about food all day Less Than Weekly   What foods, if any, do you crave? Cheese   Please list any other foods you crave? Depends but home style foods           7/12/2023    12:26 PM   Amount of Food   I feel out of control when eating Monthly   I eat a large amount of food, like a loaf of bread, a box of cookies, a pint/quart of ice cream, all at once Never   I eat a large amount of food even when I am not hungry Never   I eat rapidly Never   I eat alone because I feel embarrassed and do not want others to see how much I have eaten Never   I eat until I am uncomfortably full Monthly   I feel bad, disgusted, or guilty after I overeat Weekly     Physical Activity:  Active job and is on her feet all day.         7/12/2023    12:26 PM   Activity/Exercise History   How much of a typical 12 hour day do you spend sitting? Less Than Half the Day   How much of a typical 12 hour day do you spend lying down? Less Than Half the Day   How much of a typical day do you spend walking/standing? Most of the Day   How many hours (not including work) do you spend on the TV/Video Games/Computer/Tablet/Phone? 1 Hour or Less   How many times a week are you active for the purpose of exercise? Never   What keeps you from being more active? Lack of Time    Too tired   How many total minutes do you spend doing some activity for the purpose of exercising when you exercise? More Than 30 Minutes     Nutrition Prescription  Recommended energy/nutrient modification.    Nutrition Diagnosis  Obesity r/t long  history of positive energy balance aeb BMI >30.    Nutrition Intervention  Materials/education provided on hypocaloric diet for weight loss. Discussed portion control and healthy food choices (Plate Method and Volumetrics handouts), aiming for 60 grams of protein each day. Patient demonstrates understanding. Co-developed goals to work towards.    Provided pt with list of goals and resources below via [employer].     Expected Engagement: good  Follow-Up Plans: Meal and Snack Planning      Nutrition Goals  1. Incorporate the plate method for more balanced meals.   2. Aim to consume at least 60 grams of protein at meal.   3. Increase exercise as able.     The Plate Method:  [email or web address]    Plate method can be used for general guidance on balanced meals/portion sizes (see link below for picture/more information)                 Make 1/2 your plate non starchy vegetables (cauliflower, broccoli, asparagus, [place] sprouts, lettuce, carrots, for example).                3+ oz lean protein sources (salmon/skinless chicken/turkey breast/pork loin/lean cuts of beef/ or non-animal protein such as black, kidney or lambert beans, tofu/edamame/tempeh)    (Note: 3 oz = deck of cards size)                1/2 to 1 cup carbohydrate choices such as whole grain starches or starchy vegetables or fruit. For example: quinoa, brown rice, barley, potatoes, sweet potatoes, winter squash, peas, corn, or fruit.                Choose ~0-2 added fat servings at a meal (avocados, nut butters, nuts or seeds, olive oil, vegetable oils).    Protein Sources   [email or web address]     Carbohydrates  [email or web address]     Mindful Eating  [email or web address]     Summary of Volumetrics Eating Plan  [email or web address]     Follow-Up:   1-2 months or PRN    Time spent with patient: 29 minutes.  Diana Dai RD, WANDA
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
## 2023-07-13 NOTE — Clinical Note
See RD 1 months MTM 6-8 weeks Kristie 3-4 months  Health  consult if pt wants to.  First visit can be free.  After that 3 for $99

## 2023-07-13 NOTE — NURSING NOTE
Is the patient currently in the state of [place]? YES    Visit mode:VIDEO    If the visit is dropped, the patient can be reconnected by: VIDEO VISIT: Text to cell phone: [phone]    Will anyone else be joining the visit? NO      How would you like to obtain your AVS? MyChart    Are changes needed to the allergy or medication list? NO    Reason for visit: Consult      ABBEY Abernathy on 7/13/2023 at 9:15 AM
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
## 2023-07-13 NOTE — TELEPHONE ENCOUNTER
General Call      Reason for Call:      Justice Renee,    Pt would like to do the free Q&A visit to learn more, but I can't figure out scheduling.   24 WK PLAN Q&A  doesn't bring anything up.    Please let me know and I'll call her. Or if you want to give a quick call?    Could we send this information to you in [employer] or would you prefer to receive a phone call?:   Patient would prefer a phone call   Okay to leave a detailed message?: Yes at Cell number on file:    Telephone Information:   Mobile [phone]   Mobile [phone]

## 2023-07-13 NOTE — LETTER
"7/13/2023       RE: Lin Corona  [address] [address] [place] [address]     Dear Colleague,    Thank you for referring your patient, Lin Corona, to the [facility] [place] [facility]. Please see a copy of my visit note below.    Lin Corona is a 42 year old female who is being evaluated via a billable video visit.      The patient has been notified of following:     \"This video visit will be conducted via a call between you and your physician/provider. We have found that certain health care needs can be provided without the need for an in-person physical exam.  This service lets us provide the care you need with a video conversation.  If a prescription is necessary we can send it directly to your pharmacy.  If lab work is needed we can place an order for that and you can then stop by our lab to have the test done at a later time.    Video visits are billed at different rates depending on your insurance coverage.  Please reach out to your insurance provider with any questions.    If during the course of the call the physician/provider feels a video visit is not appropriate, you will not be charged for this service.\"    Patient has given verbal consent for Video visit? Yes  How would you like to obtain your AVS? MyChart  If you are dropped from the video visit, the video invite should be resent to: Text to cell phone: [phone]  Will anyone else be joining your video visit? No  {If patient encounters technical issues they should call [phone]      Video-Visit Details    Type of service:  Video Visit    Video Start Time: 1:32 PM   Video End Time: 2:01 PM    Originating Location (pt. Location): Home    Distant Location (provider location): Offsite (providers home) [facility]     Platform used for Video Visit: [employer]    During this virtual visit the patient is located in [place], " "patient verifies this as the location during the entirety of this visit.     New Weight Management Nutrition Consultation    Lin Corona is a 42 year old female presents today for new weight management nutrition consultation.  Patient referred by KYLIE Gillespie on 2023.    Patient with Co-morbidities of obesity includin/12/2023    12:26 PM   --   I have the following health issues associated with obesity None of the above   I have the following symptoms associated with obesity Depression     Anthropometrics:  Initial Consult Weight: 203 lb on 23   Estimated body mass index is 36.05 kg/m  as calculated from the following:    Height as of an earlier encounter on 23: 1.6 m (5' 3\").    Weight as of an earlier encounter on 23: 92.3 kg (203 lb 8 oz).    Medications for Weight Loss:  Wegovy - pending insurance approval    Occupation: full-time      NUTRITION HISTORY  Food allergies: NKFA  Food intolerances: None   Vitamin/Mineral Supplements: None   Previous methods of diet modification for weight loss: watching portions/calories, fasting, exercise, has tried many diets in the past.   RD before: None     Feels she is pretty knowledgeable around eating healthy. Loves cheese, likes vegetables. Recently did a HCG diet (detox, limited and then add food back in slowly). Doesn't have a lot of cravings for foods. Has been limiting fruit to 2x times a day. Cesar bread is main source of bread.     Diet recall:   Breakfast - apple and cheese stick  Lunch - green beans, hamburger arben  Dinner - no examples given  Snacks - cheese, beef stick, nuts   Hydration - coffee w/heavy whipping cream (2 tbsp), Bubbly, Emmanuel flavoring to water, Zevia or Sprite soda on occasion, decaf tea   Alcohol - Seltzers, low sugar drinks, mixed soda/vodka drink - weekends      2023    12:26 PM   Diet Recall Review with Patient   If you do eat breakfast, what types of food do you eat? Cesar aly,  " eggs, sweet potatoe. Low carb cereal   If you do eat lunch, what types of food do you typically eat? HB eggs, broccoli,  cheese sticks,  beef sticks. Salads   If you do eat supper, what types of food do you typically eat? Cauliflower substitute for rice and beads.  Beef, pork,  chicken.   If you do snack, what types of food do you typically eat? Apple, cheese sticks,  beef sticks, nuts   How many glasses of juice do you drink in a typical day? 0   How many of glasses of milk do you drink in a typical day? 0   How many 8oz glasses of sugar containing drinks such as Luis-Aid/sweet tea do you drink in a day? 0   How many cans/bottles of sugar pop/soda/tea/sports drinks do you drink in a day? 0   How many cans/bottles of diet pop/soda/tea or sports drink do you drink in a day? 0   How often do you have a drink of alcohol? 2-3 TImes a Week   If you do drink, how many drinks might you have in a day? 3-4           7/12/2023    12:26 PM   Eating Habits   Generally, my meals include foods like these bread, pasta, rice, potatoes, corn, crackers, sweet dessert, pop, or juice Never   Generally, my meals include foods like these fried meats, brats, burgers, french fries, pizza, cheese, chips, or ice cream Less Than Weekly   Eat fast food (like McDonalds, Burger Bhupendra, Taco Bell) Never   Eat at a buffet or sit-down restaurant Less Than Weekly   Eat most of my meals in front of the TV or computer Less Than Weekly   Often skip meals, eat at random times, have no regular eating times A Few Times a Week   Rarely sit down for a meal but snack or graze throughout A Few Times a Week   Eat extra snacks between meals A Few Times a Week   Eat most of my food at the end of the day Never   Eat in the middle of the night or wake up at night to eat Never   Eat extra snacks to prevent or correct low blood sugar Never   Eat to prevent acid reflux or stomach pain Never   Worry about not having enough food to eat Never   I eat when I am depressed A  Few Times a Week   I eat when I am stressed A Few Times a Week   I eat when I am bored A Few Times a Week   I eat when I am anxious A Few Times a Week   I eat when I am happy or as a reward Never   I feel hungry all the time even if I just have eaten A Few Times a Week   Feeling full is important to me Never   I finish all the food on my plate even if I am already full A Few Times a Week   I can't resist eating delicious food or walk past the good food/smell A Few Times a Week   I eat/snack without noticing that I am eating Never   I eat when I am preparing the meal A Few Times a Week   I eat more than usual when I see others eating Never   I have trouble not eating sweets, ice cream, cookies, or chips if they are around the house Less Than Weekly   I think about food all day Less Than Weekly   What foods, if any, do you crave? Cheese   Please list any other foods you crave? Depends but home style foods           7/12/2023    12:26 PM   Amount of Food   I feel out of control when eating Monthly   I eat a large amount of food, like a loaf of bread, a box of cookies, a pint/quart of ice cream, all at once Never   I eat a large amount of food even when I am not hungry Never   I eat rapidly Never   I eat alone because I feel embarrassed and do not want others to see how much I have eaten Never   I eat until I am uncomfortably full Monthly   I feel bad, disgusted, or guilty after I overeat Weekly     Physical Activity:  Active job and is on her feet all day.         7/12/2023    12:26 PM   Activity/Exercise History   How much of a typical 12 hour day do you spend sitting? Less Than Half the Day   How much of a typical 12 hour day do you spend lying down? Less Than Half the Day   How much of a typical day do you spend walking/standing? Most of the Day   How many hours (not including work) do you spend on the TV/Video Games/Computer/Tablet/Phone? 1 Hour or Less   How many times a week are you active for the purpose of  exercise? Never   What keeps you from being more active? Lack of Time    Too tired   How many total minutes do you spend doing some activity for the purpose of exercising when you exercise? More Than 30 Minutes     Nutrition Prescription  Recommended energy/nutrient modification.    Nutrition Diagnosis  Obesity r/t long history of positive energy balance aeb BMI >30.    Nutrition Intervention  Materials/education provided on hypocaloric diet for weight loss. Discussed portion control and healthy food choices (Plate Method and Volumetrics handouts), aiming for 60 grams of protein each day. Patient demonstrates understanding. Co-developed goals to work towards.    Provided pt with list of goals and resources below via Masala.     Expected Engagement: good  Follow-Up Plans: Meal and Snack Planning      Nutrition Goals  1. Incorporate the plate method for more balanced meals.   2. Aim to consume at least 60 grams of protein at meal.   3. Increase exercise as able.     The Plate Method:  [email or web address]    Plate method can be used for general guidance on balanced meals/portion sizes (see link below for picture/more information)                 Make 1/2 your plate non starchy vegetables (cauliflower, broccoli, asparagus, [place] sprouts, lettuce, carrots, for example).                3+ oz lean protein sources (salmon/skinless chicken/turkey breast/pork loin/lean cuts of beef/ or non-animal protein such as black, kidney or lambert beans, tofu/edamame/tempeh)    (Note: 3 oz = deck of cards size)                1/2 to 1 cup carbohydrate choices such as whole grain starches or starchy vegetables or fruit. For example: quinoa, brown rice, barley, potatoes, sweet potatoes, winter squash, peas, corn, or fruit.                Choose ~0-2 added fat servings at a meal (avocados, nut butters, nuts or seeds, olive oil, vegetable oils).    Protein Sources   [email or web address]      Carbohydrates  [email or web address]     Mindful Eating  [email or web address]     Summary of Volumetrics Eating Plan  [email or web address]     Follow-Up:   1-2 months or PRN    Time spent with patient: 29 minutes.  Diana Dai RD, LD

## 2023-07-21 NOTE — TELEPHONE ENCOUNTER
Prior Authorization Approval    Medication: WEGOVY 0.25 MG/0.5ML SC SOAJ  Authorization Effective Date: 6/14/2023  Authorization Expiration Date: 7/13/2024  Approved Dose/Quantity:   Reference #:     Insurance Company: [employer] - Phone [phone] Fax [phone]  Expected CoPay:       CoPay Card Available:      Financial Assistance Needed:    Which Pharmacy is filling the prescription: [facility] [place], [place] - [address]  Pharmacy Notified: Yes  Patient Notified: Yes

## 2023-08-02 ENCOUNTER — OFFICE VISIT (OUTPATIENT)
Dept: FAMILY MEDICINE | Facility: CLINIC | Age: 43
End: 2023-08-02
Payer: COMMERCIAL

## 2023-08-02 VITALS
SYSTOLIC BLOOD PRESSURE: 108 MMHG | RESPIRATION RATE: 16 BRPM | HEART RATE: 68 BPM | BODY MASS INDEX: 35 KG/M2 | TEMPERATURE: 97.3 F | WEIGHT: 205 LBS | DIASTOLIC BLOOD PRESSURE: 60 MMHG | HEIGHT: 64 IN | OXYGEN SATURATION: 98 %

## 2023-08-02 DIAGNOSIS — F45.8 BRUXISM (TEETH GRINDING): ICD-10-CM

## 2023-08-02 DIAGNOSIS — Z00.00 ROUTINE GENERAL MEDICAL EXAMINATION AT A HEALTH CARE FACILITY: Primary | ICD-10-CM

## 2023-08-02 DIAGNOSIS — E66.01 MORBID OBESITY (H): ICD-10-CM

## 2023-08-02 DIAGNOSIS — R19.8 TEETH CLENCHING: ICD-10-CM

## 2023-08-02 PROCEDURE — 99396 PREV VISIT EST AGE 40-64: CPT | Performed by: NURSE PRACTITIONER

## 2023-08-02 PROCEDURE — 99213 OFFICE O/P EST LOW 20 MIN: CPT | Mod: 25 | Performed by: NURSE PRACTITIONER

## 2023-08-02 RX ORDER — CYCLOBENZAPRINE HCL 5 MG
5 TABLET ORAL 3 TIMES DAILY PRN
Qty: 90 TABLET | Refills: 1 | Status: SHIPPED | OUTPATIENT
Start: 2023-08-02 | End: 2023-10-03 | Stop reason: DRUGHIGH

## 2023-08-02 ASSESSMENT — ENCOUNTER SYMPTOMS
COUGH: 0
NERVOUS/ANXIOUS: 1
FEVER: 0
FREQUENCY: 0
CHILLS: 0
EYE PAIN: 0
JOINT SWELLING: 0
SORE THROAT: 0
HEADACHES: 1
HEMATOCHEZIA: 0
HEMATURIA: 0
ARTHRALGIAS: 1
DIARRHEA: 0
BREAST MASS: 0
HEARTBURN: 0
PARESTHESIAS: 1
PALPITATIONS: 0
SHORTNESS OF BREATH: 0
WEAKNESS: 0
MYALGIAS: 1
DIZZINESS: 1
NAUSEA: 0
ABDOMINAL PAIN: 0
CONSTIPATION: 0
DYSURIA: 0

## 2023-08-02 ASSESSMENT — PAIN SCALES - GENERAL: PAINLEVEL: NO PAIN (0)

## 2023-08-02 NOTE — PROGRESS NOTES
SUBJECTIVE:   CC: Lin is an 43 year old who presents for preventive health visit.       2023     7:25 AM   Additional Questions   Roomed by        Healthy Habits:     Getting at least 3 servings of Calcium per day:  Yes    Bi-annual eye exam:  Yes    Dental care twice a year:  Yes    Sleep apnea or symptoms of sleep apnea:  Daytime drowsiness    Diet:  Carbohydrate counting    Frequency of exercise:  2-3 days/week    Duration of exercise:  30-45 minutes    Taking medications regularly:  Yes    Medication side effects:  None    Additional concerns today:  Yes      She is on OBCP for hot flashes post hysterectomy. States she may wean down on them, may take one every other day.  She is seeing weight loss clinic. They have orders placed for lab work and she will return fasting to do those.  Generally doing well. Main concern is she grinds her teeth and clenches her jaw. Has seen dentist and wears night splint. Has had to have extensive teeth work done.  She denies a lot of pain but would like to try muscle relaxant for that.  , no kids.  went through some health issues, she has had some concerns with anxiety.            Patient would like to discuss :    Teeth clenching and joint pain       Social History     Tobacco Use    Smoking status: Former     Packs/day: 0.00     Types: Cigarettes     Quit date:      Years since quittin.5    Smokeless tobacco: Never   Substance Use Topics    Alcohol use: Yes     Comment: occassional             2023     7:20 AM   Alcohol Use   Prescreen: >3 drinks/day or >7 drinks/week? Yes   AUDIT SCORE  7     Reviewed orders with patient.  Reviewed health maintenance and updated orders accordingly - Yes  BP Readings from Last 3 Encounters:   23 108/60   23 122/81   02/15/23 130/82    Wt Readings from Last 3 Encounters:   23 93 kg (205 lb)   23 92.3 kg (203 lb 8 oz)   23 97.7 kg (215 lb 6.4 oz)                  Patient Active  Problem List   Diagnosis    Pelvic pain in female    Dysmenorrhea    Endometriosis    Chronic pelvic pain in female    Abnormal uterine bleeding    Anxiety and depression    Epidermoid cyst of skin of back    Neuralgia    Morbid obesity (H)    Surgical menopause on hormone replacement therapy     Past Surgical History:   Procedure Laterality Date    APPENDECTOMY  2011    BREAST SURGERY  2000    Breast reduction    COLONOSCOPY  2011    CYSTOSCOPY N/A 2021    Procedure: CYSTOSCOPY;  Surgeon: Jessica Gallardo DO;  Location: [facility]    GYN SURGERY  2012    Colposcopy exam     HYSTERECTOMY      LAPAROSCOPIC HYSTERECTOMY TOTAL, BILATERAL SALPINGO-OOPHORECTOMY, COMBINED N/A 2021    Procedure: HYSTERECTOMY, TOTAL, LAPAROSCOPIC, WITH BILATERAL SALPINGO-OOPHORECTOMY;  Surgeon: Jessica Gallardo DO;  Location: [facility]    SHOULDER SURGERY Right        Social History     Tobacco Use    Smoking status: Former     Packs/day: 0.00     Types: Cigarettes     Quit date:      Years since quittin.5    Smokeless tobacco: Never   Substance Use Topics    Alcohol use: Yes     Comment: occassional     Family History   Problem Relation Age of Onset    Arthritis Mother     Heart Disease Father     Gallbladder Disease Father     Arthritis Maternal Grandmother     Unknown/Adopted Maternal Grandmother     C.A.D. Paternal Grandmother     Ovarian Cancer Paternal Grandmother     Cancer Paternal Grandmother     C.A.D. Paternal Grandfather     Cervical Cancer Cousin     Cervical Cancer Paternal Aunt          Current Outpatient Medications   Medication Sig Dispense Refill    cyclobenzaprine (FLEXERIL) 5 MG tablet Take 1 tablet (5 mg) by mouth 3 times daily as needed for muscle spasms 90 tablet 1    dupilumab (DUPIXENT) 300 MG/2ML prefilled pen Inject 2 mLs (300 mg) Subcutaneous every 14 days 4 mL 11    norgestimate-ethinyl estradiol (ORTHO-CYCLEN) 0.25-35 MG-MCG tablet Take 1 tablet by mouth daily 84 tablet 3     Semaglutide-Weight Management (WEGOVY) 0.25 MG/0.5ML pen Inject 0.25 mg Subcutaneous once a week (Patient not taking: Reported on 8/2/2023) 2 mL 0    [START ON 9/13/2023] Semaglutide-Weight Management (WEGOVY) 0.5 MG/0.5ML pen Inject 0.5 mg Subcutaneous once a week (Patient not taking: Reported on 8/2/2023) 2 mL 0     Allergies   Allergen Reactions    Gold Rash     Rash with true test    Nickel Rash     Positive for contact dermatitis, true test    Thimerosal Rash     Strong reaction with true test.      Recent Labs   Lab Test 01/19/22  0804 09/13/21  1438 07/29/21  1108 07/29/21  1108 03/16/20  0925   *  --   --   --  137*   HDL 71  --   --   --  78   TRIG 228*  --   --   --  149   ALT  --  21  --  20  --    CR 0.65 0.76   < > 0.69 0.66   GFRESTIMATED >90 >90   < > >90 >90   GFRESTBLACK  --   --   --   --  >90   POTASSIUM 4.4 3.9   < > 4.4 3.9   TSH 2.18  --   --   --  1.93    < > = values in this interval not displayed.        Breast Cancer Screening:    FHS-7:       1/19/2022     6:57 AM 7/22/2022     9:59 AM 8/2/2023     7:21 AM   Breast CA Risk Assessment (FHS-7)   Did any of your first-degree relatives have breast or ovarian cancer? Yes No Unknown   Did any of your relatives have bilateral breast cancer? Unknown Unknown Yes   Did any man in your family have breast cancer? No No No   Did any woman in your family have breast and ovarian cancer? No No Yes   Did any woman in your family have breast cancer before age 50 y? Yes Yes Yes   Do you have 2 or more relatives with breast and/or ovarian cancer? Yes Yes Yes   Do you have 2 or more relatives with breast and/or bowel cancer? Yes Yes Yes       Mammogram Screening - Offered annual screening and updated Health Maintenance for [place] plan based on risk factor consideration  Pertinent mammograms are reviewed under the imaging tab.    History of abnormal Pap smear: Status post benign hysterectomy. Health Maintenance and Surgical History updated.     Reviewed  "and updated as needed this visit by clinical staff   Tobacco  Allergies  Meds              Reviewed and updated as needed this visit by Provider                 Past Medical History:   Diagnosis Date    Anxiety and depression     Dysmenorrhea     Endometriosis     Intrinsic eczema       Past Surgical History:   Procedure Laterality Date    APPENDECTOMY  07/13/2011    BREAST SURGERY  2000    Breast reduction    COLONOSCOPY  09/2011    CYSTOSCOPY N/A 01/11/2021    Procedure: CYSTOSCOPY;  Surgeon: Jessica Gallardo DO;  Location: [facility]    GYN SURGERY  01/2012    Colposcopy exam     HYSTERECTOMY      LAPAROSCOPIC HYSTERECTOMY TOTAL, BILATERAL SALPINGO-OOPHORECTOMY, COMBINED N/A 01/11/2021    Procedure: HYSTERECTOMY, TOTAL, LAPAROSCOPIC, WITH BILATERAL SALPINGO-OOPHORECTOMY;  Surgeon: Jessica Gallardo DO;  Location: [facility]    SHOULDER SURGERY Right        Review of Systems   Constitutional:  Negative for chills and fever.   HENT:  Negative for congestion, ear pain, hearing loss and sore throat.    Eyes:  Positive for visual disturbance. Negative for pain.   Respiratory:  Negative for cough and shortness of breath.    Cardiovascular:  Negative for chest pain, palpitations and peripheral edema.   Gastrointestinal:  Negative for abdominal pain, constipation, diarrhea, heartburn, hematochezia and nausea.   Breasts:  Negative for tenderness, breast mass and discharge.   Genitourinary:  Negative for dysuria, frequency, genital sores, hematuria, pelvic pain, urgency, vaginal bleeding and vaginal discharge.   Musculoskeletal:  Positive for arthralgias and myalgias. Negative for joint swelling.   Skin:  Positive for rash.   Neurological:  Positive for dizziness, headaches and paresthesias. Negative for weakness.   Psychiatric/Behavioral:  Positive for mood changes. The patient is nervous/anxious.           OBJECTIVE:   /60   Pulse 68   Temp 97.3  F (36.3  C)   Resp 16   Ht 1.619 m (5' 3.75\")   Wt 93 kg (205 lb)   " LMP 01/06/2021   SpO2 98%   BMI 35.46 kg/m    Physical Exam  GENERAL: healthy, alert and no distress  EYES: Eyes grossly normal to inspection and conjunctivae and sclerae normal  HENT: ear canals and TM's normal, nose and mouth without ulcers or lesions  NECK: no adenopathy, no asymmetry, masses, or scars and thyroid normal to palpation  RESP: lungs clear to auscultation - no rales, rhonchi or wheezes  CV: regular rate and rhythm, normal S1 S2, no S3 or S4, no murmur, click or rub, no peripheral edema and peripheral pulses strong  ABDOMEN: soft, nontender, no hepatosplenomegaly, no masses and bowel sounds normal  MS: no gross musculoskeletal defects noted, no edema  SKIN: no suspicious lesions or rashes  NEURO: Normal strength and tone, mentation intact and speech normal  PSYCH: mentation appears normal, affect normal/bright    Diagnostic Test Results:  Labs reviewed in Epic    ASSESSMENT/PLAN:       ICD-10-CM    1. Routine general medical examination at a health care facility  Z00.00       2. Bruxism (teeth grinding)  F45.8 cyclobenzaprine (FLEXERIL) 5 MG tablet      3. Teeth clenching  R19.8 cyclobenzaprine (FLEXERIL) 5 MG tablet      4. Morbid obesity (H)  E66.01         Doing well. Labs from weight loss clinic that include routine screenings, will await those results.  Try the Flexeril for clenching. Discussed how to take the medication(s), expected outcomes, potential side effects.  She will notify if she wants or needs anything else and is otherwise is UTD on health maintenance now and doing well.    Patient Instructions   Do lab work with weight loss clinic and will await those results.  Will do mammogram next year.  Try the Flexeril at night for teeth clenching.    Preventive Health Recommendations  Female Ages 40 to 49    Yearly exam:   See your health care provider every year in order to  Review health changes.   Discuss preventive care.    Review your medicines if your doctor prescribed any.    Get a  "Pap test every three years (unless you have an abnormal result and your provider advises testing more often).    If you get Pap tests with HPV test, you only need to test every 5 years, unless you have an abnormal result. You do not need a Pap test if your uterus was removed (hysterectomy) and you have not had cancer.    You should be tested each year for STDs (sexually transmitted diseases), if you're at risk.   Ask your doctor if you should have a mammogram.    Have a colonoscopy (test for colon cancer) if someone in your family has had colon cancer or polyps before age 50.     Have a cholesterol test every 5 years.     Have a diabetes test (fasting glucose) after age 45. If you are at risk for diabetes, you should have this test every 3 years.    Shots: Get a flu shot each year. Get a tetanus shot every 10 years.     Nutrition:   Eat at least 5 servings of fruits and vegetables each day.  Eat whole-grain bread, whole-wheat pasta and brown rice instead of white grains and rice.  Get adequate Calcium and Vitamin D.      Lifestyle  Exercise at least 150 minutes a week (an average of 30 minutes a day, 5 days a week). This will help you control your weight and prevent disease.  Limit alcohol to one drink per day.  No smoking.   Wear sunscreen to prevent skin cancer.  See your dentist every six months for an exam and cleaning.        COUNSELING:  Reviewed preventive health counseling, as reflected in patient instructions       Regular exercise       Healthy diet/nutrition      BMI:   Estimated body mass index is 35.46 kg/m  as calculated from the following:    Height as of this encounter: 1.619 m (5' 3.75\").    Weight as of this encounter: 93 kg (205 lb).   Weight management plan: Continue with weight loss clinic      She reports that she quit smoking about 9 years ago. Her smoking use included cigarettes. She has never used smokeless tobacco.          TRUMAN Aguila CNP  [facility] " [place]

## 2023-08-02 NOTE — PATIENT INSTRUCTIONS
Do lab work with weight loss clinic and will await those results.  Will do mammogram next year.  Try the Flexeril at night for teeth clenching.    Preventive Health Recommendations  Female Ages 40 to 49    Yearly exam:   See your health care provider every year in order to  Review health changes.   Discuss preventive care.    Review your medicines if your doctor prescribed any.    Get a Pap test every three years (unless you have an abnormal result and your provider advises testing more often).    If you get Pap tests with HPV test, you only need to test every 5 years, unless you have an abnormal result. You do not need a Pap test if your uterus was removed (hysterectomy) and you have not had cancer.    You should be tested each year for STDs (sexually transmitted diseases), if you're at risk.   Ask your doctor if you should have a mammogram.    Have a colonoscopy (test for colon cancer) if someone in your family has had colon cancer or polyps before age 50.     Have a cholesterol test every 5 years.     Have a diabetes test (fasting glucose) after age 45. If you are at risk for diabetes, you should have this test every 3 years.    Shots: Get a flu shot each year. Get a tetanus shot every 10 years.     Nutrition:   Eat at least 5 servings of fruits and vegetables each day.  Eat whole-grain bread, whole-wheat pasta and brown rice instead of white grains and rice.  Get adequate Calcium and Vitamin D.      Lifestyle  Exercise at least 150 minutes a week (an average of 30 minutes a day, 5 days a week). This will help you control your weight and prevent disease.  Limit alcohol to one drink per day.  No smoking.   Wear sunscreen to prevent skin cancer.  See your dentist every six months for an exam and cleaning.

## 2023-08-03 ENCOUNTER — VIRTUAL VISIT (OUTPATIENT)
Dept: ENDOCRINOLOGY | Facility: CLINIC | Age: 43
End: 2023-08-03

## 2023-08-03 DIAGNOSIS — E66.9 OBESITY: Primary | ICD-10-CM

## 2023-08-03 PROCEDURE — 99207 PR NO CHARGE LOS: CPT

## 2023-08-03 NOTE — LETTER
8/3/2023       RE: Lin Corona  [id number] [address]  [place] [address]     Dear Colleague,    Thank you for referring your patient, Lin Corona, to the [facility] [place] at [facility]. Please see a copy of my visit note below.    Q and A Consult:  Non Employee  Phone visit    Sees Diana SIMON and Kristie  Explained the 3 pack and 24 wk  Explained the diff btwn therapy and coaching, pt was interested in this  Pt wants to do 3 pack; not interested in wellbeats  Send mychart wellbeing assessment and questions    Health  Visit: August 17, 9:30 am, (phone visit)    Thelma Cuellar  [facility], National Board Certified Health &   Lead Health   [facility]  [email or web address]   To schedule: [phone]   Gender pronouns: she/her

## 2023-08-03 NOTE — PROGRESS NOTES
Q and A Consult:  Non Employee  Phone visit    Sees Diana SIMON and Kristie  Explained the 3 pack and 24 wk  Explained the diff btwn therapy and coaching, pt was interested in this  Pt wants to do 3 pack; not interested in wellbeats  Send mychart wellbeing assessment and questions    Health  Visit: August 17, 9:30 am, (phone visit)    Thelma Cuellar  [facility], National Board Certified Health &   Lead Health   [facility]  [email or web address]  [email or web address]   To schedule: [phone]   Gender pronouns: she/her

## 2023-08-07 ENCOUNTER — OFFICE VISIT (OUTPATIENT)
Dept: DERMATOLOGY | Facility: CLINIC | Age: 43
End: 2023-08-07
Payer: COMMERCIAL

## 2023-08-07 DIAGNOSIS — D17.1 LIPOMA OF BACK: Primary | ICD-10-CM

## 2023-08-07 PROCEDURE — 11404 EXC TR-EXT B9+MARG 3.1-4 CM: CPT | Performed by: DERMATOLOGY

## 2023-08-07 PROCEDURE — 88304 TISSUE EXAM BY PATHOLOGIST: CPT | Performed by: DERMATOLOGY

## 2023-08-07 PROCEDURE — 13101 CMPLX RPR TRUNK 2.6-7.5 CM: CPT | Performed by: DERMATOLOGY

## 2023-08-07 ASSESSMENT — PAIN SCALES - GENERAL: PAINLEVEL: NO PAIN (0)

## 2023-08-07 NOTE — PATIENT INSTRUCTIONS
Sutured Wound Care     [facility]: [phone]    [facility]: [phone]          No strenuous activity for 48 hours. Resume moderate activity in 48 hours. No heavy exercising until you are seen for follow up in one week.     Take Tylenol as needed for discomfort.                         Do not drink alcoholic beverages for 48 hours.     Keep the pressure bandage in place for 24 hours. If the bandage becomes blood tinged or loose, reinforce it with gauze and tape.        (Refer to the reverse side of this page for management of bleeding).    Remove pressure bandage in 24 hours (White Guaze)     Leave the flat bandage (Blue Tape) in place until your follow up appointment.     Keep the bandage dry. Wash around it carefully.    If the tape becomes soiled or starts to come off, reinforce it with additional paper tape.    Do not smoke for 3 weeks; smoking is detrimental to wound healing.    It is normal to have swelling and bruising around the surgical site. The bruising will fade in approximately 10-14 days. Elevate the area to reduce swelling.    Numbness, itchiness and sensitivity to temperature changes can occur after surgery and may take up to 18 months to normalize.      POSSIBLE COMPLICATIONS    BLEEDING:    Leave the bandage in place.  Use tightly rolled up gauze or a cloth to apply direct pressure over the bandage for 20   minutes.  Reapply pressure for an additional 20 minutes if necessary  Call the office or go to the nearest emergency room if pressure fails to stop the bleeding.  Use additional gauze and tape to maintain pressure once the bleeding has stopped.        PAIN:    Post operative pain should slowly get better, never worse.  A severe increase in pain may indicate a problem. Call the office if this occurs.    In case of emergency phone:Dr Coronel [phone]

## 2023-08-07 NOTE — LETTER
8/7/2023         RE: Lin Corona  [address] [address] [place] [id number]        Dear Colleague,    Thank you for referring your patient, Lin Corona, to the [facility]. Please see a copy of my visit note below.    Lin Corona is an extremely pleasant 43 year old year old female patient here today for emo f lipoma on left back.  Patient has no other skin complaints today.  Remainder of the HPI, Meds, PMH, Allergies, FH, and SH was reviewed in chart.      Past Medical History:   Diagnosis Date     Anxiety and depression      Dysmenorrhea      Endometriosis      Intrinsic eczema        Past Surgical History:   Procedure Laterality Date     APPENDECTOMY  07/13/2011     BREAST SURGERY  2000    Breast reduction     COLONOSCOPY  09/2011     CYSTOSCOPY N/A 01/11/2021    Procedure: CYSTOSCOPY;  Surgeon: Jessica Gallardo DO;  Location: [facility]     GYN SURGERY  01/2012    Colposcopy exam      HYSTERECTOMY       LAPAROSCOPIC HYSTERECTOMY TOTAL, BILATERAL SALPINGO-OOPHORECTOMY, COMBINED N/A 01/11/2021    Procedure: HYSTERECTOMY, TOTAL, LAPAROSCOPIC, WITH BILATERAL SALPINGO-OOPHORECTOMY;  Surgeon: Jessica Gallardo DO;  Location: [facility]     SHOULDER SURGERY Right         Family History   Problem Relation Age of Onset     Arthritis Mother      Heart Disease Father      Gallbladder Disease Father      Arthritis Maternal Grandmother      Unknown/Adopted Maternal Grandmother      C.A.D. Paternal Grandmother      Ovarian Cancer Paternal Grandmother      Cancer Paternal Grandmother      C.A.D. Paternal Grandfather      Cervical Cancer Cousin      Cervical Cancer Paternal Aunt        Social History     Socioeconomic History     Marital status:      Spouse name: Not on file     Number of children: Not on file     Years of education: Not on file     Highest education level: Not on file   Occupational History     Occupation:    Tobacco Use     Smoking status: Former     Packs/day: 0.00      Types: Cigarettes     Quit date:      Years since quittin.6     Smokeless tobacco: Never   Vaping Use     Vaping Use: Never used   Substance and Sexual Activity     Alcohol use: Yes     Comment: occassional     Drug use: No     Sexual activity: Yes     Partners: Male     Birth control/protection: Pill     Comment: Nuvaring   Other Topics Concern     Parent/sibling w/ CABG, MI or angioplasty before 65F 55M? Not Asked   Social History Narrative     Not on file     Social Determinants of Health     Financial Resource Strain: Not on file   Food Insecurity: Not on file   Transportation Needs: Not on file   Physical Activity: Not on file   Stress: Not on file   Social Connections: Not on file   Intimate Partner Violence: Not on file   Housing Stability: Not on file       Outpatient Encounter Medications as of 2023   Medication Sig Dispense Refill     cyclobenzaprine (FLEXERIL) 5 MG tablet Take 1 tablet (5 mg) by mouth 3 times daily as needed for muscle spasms 90 tablet 1     dupilumab (DUPIXENT) 300 MG/2ML prefilled pen Inject 2 mLs (300 mg) Subcutaneous every 14 days 4 mL 11     norgestimate-ethinyl estradiol (ORTHO-CYCLEN) 0.25-35 MG-MCG tablet Take 1 tablet by mouth daily 84 tablet 3     Semaglutide-Weight Management (WEGOVY) 0.25 MG/0.5ML pen Inject 0.25 mg Subcutaneous once a week (Patient not taking: Reported on 2023) 2 mL 0     [START ON 2023] Semaglutide-Weight Management (WEGOVY) 0.5 MG/0.5ML pen Inject 0.5 mg Subcutaneous once a week (Patient not taking: Reported on 2023) 2 mL 0     No facility-administered encounter medications on file as of 2023.             O:   NAD, WDWN, Alert & Oriented, Mood & Affect wnl, Vitals stable   Here today alone   General appearance normal   Vitals stable   Alert, oriented and in no acute distress     L back movable 3.2cm nodule      Eyes: Conjunctivae/lids:Normal     ENT: Lips, buccal mucosa, tongue: normal    MSK:Normal    Cardiovascular:  peripheral edema none    Pulm: Breathing Normal    Neuro/Psych: Orientation:Alert and Orientedx3 ; Mood/Affect:normal       A/P:  Lipoma  Scar and recurrence discussed with patient   EXCISION OF BENIGN LESION AND COMPLEX: After thorough discussion of PGACAC, consent obtained, anesthesia and prep, the margins of the lesion were identified and an incision was made encompassing the lesion. The incisions were made through the skin and down to and including the subcutaneous tissue. The lesion was removed en bloc and submitted for perm  pathologic review. The wound edges were widely undermined greater than width of defect, until adequate tissue mobility was obtained. hemostasis was achieved. The wound edges were then closed in a layered fashion with 0 Vicryl and 5.0 Fast gut , being careful not to leave any dead space. Postoperative length was 3.3 cm.   EBL minimal; complications none; wound care routine. The patient was discharged in good condition and will return in one week for wound evaluation.  It was a pleasure speaking to Lin Corona today.      Again, thank you for allowing me to participate in the care of your patient.        Sincerely,        Fracisco Coronel MD

## 2023-08-07 NOTE — PROGRESS NOTES
Lin Corona is an extremely pleasant 43 year old year old female patient here today for emo f lipoma on left back.  Patient has no other skin complaints today.  Remainder of the HPI, Meds, PMH, Allergies, FH, and SH was reviewed in chart.      Past Medical History:   Diagnosis Date    Anxiety and depression     Dysmenorrhea     Endometriosis     Intrinsic eczema        Past Surgical History:   Procedure Laterality Date    APPENDECTOMY  2011    BREAST SURGERY  2000    Breast reduction    COLONOSCOPY  2011    CYSTOSCOPY N/A 2021    Procedure: CYSTOSCOPY;  Surgeon: Jessica Gallardo DO;  Location: [facility] OR    GYN SURGERY  2012    Colposcopy exam     HYSTERECTOMY      LAPAROSCOPIC HYSTERECTOMY TOTAL, BILATERAL SALPINGO-OOPHORECTOMY, COMBINED N/A 2021    Procedure: HYSTERECTOMY, TOTAL, LAPAROSCOPIC, WITH BILATERAL SALPINGO-OOPHORECTOMY;  Surgeon: Jessica Gallardo DO;  Location: [facility] OR    SHOULDER SURGERY Right         Family History   Problem Relation Age of Onset    Arthritis Mother     Heart Disease Father     Gallbladder Disease Father     Arthritis Maternal Grandmother     Unknown/Adopted Maternal Grandmother     C.A.D. Paternal Grandmother     Ovarian Cancer Paternal Grandmother     Cancer Paternal Grandmother     C.A.D. Paternal Grandfather     Cervical Cancer Cousin     Cervical Cancer Paternal Aunt        Social History     Socioeconomic History    Marital status:      Spouse name: Not on file    Number of children: Not on file    Years of education: Not on file    Highest education level: Not on file   Occupational History    Occupation:    Tobacco Use    Smoking status: Former     Packs/day: 0.00     Types: Cigarettes     Quit date:      Years since quittin.6    Smokeless tobacco: Never   Vaping Use    Vaping Use: Never used   Substance and Sexual Activity    Alcohol use: Yes     Comment: occassional    Drug use: No    Sexual activity: Yes     Partners: Male      Birth control/protection: Pill     Comment: Nuvaring   Other Topics Concern    Parent/sibling w/ CABG, MI or angioplasty before 65F 55M? Not Asked   Social History Narrative    Not on file     Social Determinants of Health     Financial Resource Strain: Not on file   Food Insecurity: Not on file   Transportation Needs: Not on file   Physical Activity: Not on file   Stress: Not on file   Social Connections: Not on file   Intimate Partner Violence: Not on file   Housing Stability: Not on file       Outpatient Encounter Medications as of 8/7/2023   Medication Sig Dispense Refill    cyclobenzaprine (FLEXERIL) 5 MG tablet Take 1 tablet (5 mg) by mouth 3 times daily as needed for muscle spasms 90 tablet 1    dupilumab (DUPIXENT) 300 MG/2ML prefilled pen Inject 2 mLs (300 mg) Subcutaneous every 14 days 4 mL 11    norgestimate-ethinyl estradiol (ORTHO-CYCLEN) 0.25-35 MG-MCG tablet Take 1 tablet by mouth daily 84 tablet 3    Semaglutide-Weight Management (WEGOVY) 0.25 MG/0.5ML pen Inject 0.25 mg Subcutaneous once a week (Patient not taking: Reported on 8/2/2023) 2 mL 0    [START ON 9/13/2023] Semaglutide-Weight Management (WEGOVY) 0.5 MG/0.5ML pen Inject 0.5 mg Subcutaneous once a week (Patient not taking: Reported on 8/2/2023) 2 mL 0     No facility-administered encounter medications on file as of 8/7/2023.             O:   NAD, WDWN, Alert & Oriented, Mood & Affect wnl, Vitals stable   Here today alone   General appearance normal   Vitals stable   Alert, oriented and in no acute distress     L back movable 3.2cm nodule      Eyes: Conjunctivae/lids:Normal     ENT: Lips, buccal mucosa, tongue: normal    MSK:Normal    Cardiovascular: peripheral edema none    Pulm: Breathing Normal    Neuro/Psych: Orientation:Alert and Orientedx3 ; Mood/Affect:normal       A/P:  Lipoma  Scar and recurrence discussed with patient   EXCISION OF BENIGN LESION AND COMPLEX: After thorough discussion of PGACAC, consent obtained, anesthesia and  prep, the margins of the lesion were identified and an incision was made encompassing the lesion. The incisions were made through the skin and down to and including the subcutaneous tissue. The lesion was removed en bloc and submitted for perm  pathologic review. The wound edges were widely undermined greater than width of defect, until adequate tissue mobility was obtained. hemostasis was achieved. The wound edges were then closed in a layered fashion with 0 Vicryl and 5.0 Fast gut , being careful not to leave any dead space. Postoperative length was 3.3 cm.   EBL minimal; complications none; wound care routine. The patient was discharged in good condition and will return in one week for wound evaluation.  It was a pleasure speaking to Lin Corona today.

## 2023-08-07 NOTE — NURSING NOTE
Lin Corona's chief complaint for this visit includes:  Chief Complaint   Patient presents with    Derm Problem     Lipoma- mid back     PCP: Dahlia Johns    Referring Provider:  No referring provider defined for this encounter.    [facility] 01/06/2021   No Pain (0)        Allergies   Allergen Reactions    Gold Rash     Rash with true test    Nickel Rash     Positive for contact dermatitis, true test    Thimerosal Rash     Strong reaction with true test.          Do you need any medication refills at today's visit? No    Kim East MA

## 2023-08-09 ENCOUNTER — LAB (OUTPATIENT)
Dept: LAB | Facility: CLINIC | Age: 43
End: 2023-08-09
Payer: COMMERCIAL

## 2023-08-09 ENCOUNTER — ALLIED HEALTH/NURSE VISIT (OUTPATIENT)
Dept: DERMATOLOGY | Facility: CLINIC | Age: 43
End: 2023-08-09
Payer: COMMERCIAL

## 2023-08-09 DIAGNOSIS — E66.01 CLASS 2 SEVERE OBESITY WITH SERIOUS COMORBIDITY AND BODY MASS INDEX (BMI) OF 36.0 TO 36.9 IN ADULT, UNSPECIFIED OBESITY TYPE (H): ICD-10-CM

## 2023-08-09 DIAGNOSIS — Z48.00 ATTENTION TO DRESSINGS AND SUTURES: Primary | ICD-10-CM

## 2023-08-09 DIAGNOSIS — Z13.1 ENCOUNTER FOR SCREENING EXAMINATION FOR IMPAIRED GLUCOSE REGULATION AND DIABETES MELLITUS: ICD-10-CM

## 2023-08-09 DIAGNOSIS — E03.8 SUBCLINICAL HYPOTHYROIDISM: ICD-10-CM

## 2023-08-09 DIAGNOSIS — E66.812 CLASS 2 SEVERE OBESITY WITH SERIOUS COMORBIDITY AND BODY MASS INDEX (BMI) OF 36.0 TO 36.9 IN ADULT, UNSPECIFIED OBESITY TYPE (H): ICD-10-CM

## 2023-08-09 DIAGNOSIS — Z48.02 ATTENTION TO DRESSINGS AND SUTURES: Primary | ICD-10-CM

## 2023-08-09 DIAGNOSIS — E03.8 SUBCLINICAL HYPOTHYROIDISM: Primary | ICD-10-CM

## 2023-08-09 DIAGNOSIS — Z13.220 SCREENING FOR HYPERLIPIDEMIA: ICD-10-CM

## 2023-08-09 DIAGNOSIS — R53.83 OTHER FATIGUE: ICD-10-CM

## 2023-08-09 LAB
ALBUMIN SERPL BCG-MCNC: 4.2 G/DL (ref 3.5–5.2)
ALP SERPL-CCNC: 65 U/L (ref 35–104)
ALT SERPL W P-5'-P-CCNC: 19 U/L (ref 0–50)
ANION GAP SERPL CALCULATED.3IONS-SCNC: 7 MMOL/L (ref 7–15)
AST SERPL W P-5'-P-CCNC: 20 U/L (ref 0–45)
BASOPHILS # BLD AUTO: 0 10E3/UL (ref 0–0.2)
BASOPHILS NFR BLD AUTO: 1 %
BILIRUB SERPL-MCNC: 0.3 MG/DL
BUN SERPL-MCNC: 16.1 MG/DL (ref 6–20)
CALCIUM SERPL-MCNC: 10.1 MG/DL (ref 8.6–10)
CHLORIDE SERPL-SCNC: 103 MMOL/L (ref 98–107)
CHOLEST SERPL-MCNC: 236 MG/DL
CREAT SERPL-MCNC: 0.73 MG/DL (ref 0.51–0.95)
DEPRECATED HCO3 PLAS-SCNC: 26 MMOL/L (ref 22–29)
EOSINOPHIL # BLD AUTO: 0.1 10E3/UL (ref 0–0.7)
EOSINOPHIL NFR BLD AUTO: 2 %
ERYTHROCYTE [DISTWIDTH] IN BLOOD BY AUTOMATED COUNT: 12.8 % (ref 10–15)
GFR SERPL CREATININE-BSD FRML MDRD: >90 ML/MIN/1.73M2
GLUCOSE SERPL-MCNC: 98 MG/DL (ref 70–99)
HBA1C MFR BLD: 5.4 % (ref 0–5.6)
HCT VFR BLD AUTO: 40.5 % (ref 35–47)
HDLC SERPL-MCNC: 62 MG/DL
HGB BLD-MCNC: 13.4 G/DL (ref 11.7–15.7)
IMM GRANULOCYTES # BLD: 0 10E3/UL
IMM GRANULOCYTES NFR BLD: 0 %
LDLC SERPL CALC-MCNC: 129 MG/DL
LYMPHOCYTES # BLD AUTO: 1.9 10E3/UL (ref 0.8–5.3)
LYMPHOCYTES NFR BLD AUTO: 31 %
MCH RBC QN AUTO: 30.9 PG (ref 26.5–33)
MCHC RBC AUTO-ENTMCNC: 33.1 G/DL (ref 31.5–36.5)
MCV RBC AUTO: 94 FL (ref 78–100)
MONOCYTES # BLD AUTO: 0.4 10E3/UL (ref 0–1.3)
MONOCYTES NFR BLD AUTO: 6 %
NEUTROPHILS # BLD AUTO: 3.6 10E3/UL (ref 1.6–8.3)
NEUTROPHILS NFR BLD AUTO: 60 %
NONHDLC SERPL-MCNC: 174 MG/DL
PATH REPORT.COMMENTS IMP SPEC: NORMAL
PATH REPORT.COMMENTS IMP SPEC: NORMAL
PATH REPORT.FINAL DX SPEC: NORMAL
PATH REPORT.GROSS SPEC: NORMAL
PATH REPORT.MICROSCOPIC SPEC OTHER STN: NORMAL
PATH REPORT.RELEVANT HX SPEC: NORMAL
PLATELET # BLD AUTO: 228 10E3/UL (ref 150–450)
POTASSIUM SERPL-SCNC: 5.1 MMOL/L (ref 3.4–5.3)
PROT SERPL-MCNC: 7.2 G/DL (ref 6.4–8.3)
PTH-INTACT SERPL-MCNC: 33 PG/ML (ref 15–65)
RBC # BLD AUTO: 4.33 10E6/UL (ref 3.8–5.2)
SODIUM SERPL-SCNC: 136 MMOL/L (ref 136–145)
T4 FREE SERPL-MCNC: 1.06 NG/DL (ref 0.9–1.7)
TRIGL SERPL-MCNC: 225 MG/DL
TSH SERPL DL<=0.005 MIU/L-ACNC: 5.47 UIU/ML (ref 0.3–4.2)
WBC # BLD AUTO: 6 10E3/UL (ref 4–11)

## 2023-08-09 PROCEDURE — 36415 COLL VENOUS BLD VENIPUNCTURE: CPT

## 2023-08-09 PROCEDURE — 80061 LIPID PANEL: CPT

## 2023-08-09 PROCEDURE — 84443 ASSAY THYROID STIM HORMONE: CPT

## 2023-08-09 PROCEDURE — 83036 HEMOGLOBIN GLYCOSYLATED A1C: CPT

## 2023-08-09 PROCEDURE — 99207 PR NO CHARGE NURSE ONLY: CPT

## 2023-08-09 PROCEDURE — 80053 COMPREHEN METABOLIC PANEL: CPT

## 2023-08-09 PROCEDURE — 86376 MICROSOMAL ANTIBODY EACH: CPT

## 2023-08-09 PROCEDURE — 83970 ASSAY OF PARATHORMONE: CPT

## 2023-08-09 PROCEDURE — 84439 ASSAY OF FREE THYROXINE: CPT

## 2023-08-09 PROCEDURE — 85025 COMPLETE CBC W/AUTO DIFF WBC: CPT

## 2023-08-09 PROCEDURE — 82306 VITAMIN D 25 HYDROXY: CPT

## 2023-08-09 NOTE — PROGRESS NOTES
Lin Corona comes into clinic today at the request of Dr Coronel Ordering Provider for Wound Check Action taken: See Below.    This service provided today was under the supervising provider of the day Dr Coronel, who was available if needed.    Pt returned to clinic due to bandage coming off.  Pt has no complaints, denies pain. Bandage removed from lower  back, area cleansed with normal saline. Site is healing and wound edges approximating well. Reapplied new steri strips and paper tape.    Advised to watch for signs/sx of infection; spreading redness, drainage, odor, fever. Call or report promptly to clinic. Pt given written instructions and informed to rtc as needed. Patient verbalized understanding.

## 2023-08-10 LAB
DEPRECATED CALCIDIOL+CALCIFEROL SERPL-MC: 39 UG/L (ref 20–75)
THYROPEROXIDASE AB SERPL-ACNC: 4258 IU/ML

## 2023-08-15 ENCOUNTER — ALLIED HEALTH/NURSE VISIT (OUTPATIENT)
Dept: DERMATOLOGY | Facility: CLINIC | Age: 43
End: 2023-08-15
Payer: COMMERCIAL

## 2023-08-15 DIAGNOSIS — Z48.00 ATTENTION TO DRESSINGS AND SUTURES: Primary | ICD-10-CM

## 2023-08-15 DIAGNOSIS — Z48.02 ATTENTION TO DRESSINGS AND SUTURES: Primary | ICD-10-CM

## 2023-08-15 PROCEDURE — 99207 PR NO CHARGE NURSE ONLY: CPT

## 2023-08-15 NOTE — PATIENT INSTRUCTIONS
WOUND CARE INSTRUCTIONS  for  ONE WEEK AFTER SURGERY          Leave flat bandage on your skin for one week after today s bandage change.  In one week when you remove the bandage, you may resume your regular skin care routine, including washing with mild soap and water, applying moisturizer, make-up and sunscreen.    If there are any open or bleeding areas at the incision/graft site you should begin to cover the area with a bandage daily as follows:    Clean and dry the area with plain tap water using a Q-tip or sterile gauze pad.  Apply Polysporin or Bacitracin ointment to the open area.  Cover the wound with a band-aid or a sterile non-stick gauze pad and micropore paper tape.         SIGNS OF INFECTION  - If you notice any of these signs of infection, call your doctor right away: expanding redness around the wound.  - Yellow or greenish-colored pus or cloudy wound drainage.    - Red streaking spreading from the wound.  - Increased swelling, tenderness, or pain around the wound.   - Fever.    Please remember that yellow and clear drainage from a wound can be normal and related to normal wound healing.  Isolated drainage from a wound without a combination of the above features does not indicate infection.       *Once the bandages are removed, the scar will be red and firm (especially in the lip/chin area). This is normal and will fade in time. It might take 6-12 months for this to happen.     *Massaging the area will help the scar soften and fade quicker. Begin to massage the area one month after the bandages have been removed. To massage apply pressure directly and firmly over the scar with the fingertips and move in a circular motion. Massage the area for a few minutes several times a day. Continue to massage the site for several months.    *Approximately 6-8 weeks after surgery it is not uncommon to see the formation of  tender pimple-like  bump along the scar. This is normal. As the scar continues to mature and  the stitches underneath the skin begin to dissolve, this might occur. Do not pick or squeeze, this will resolve on it s own. Should one break open producing a small amount of drainage, apply Polysporin or Bacitracin ointment a few times a day until the wound is completely healed.    *Numbness in the surgical area is expected. It might take 12-18 months for the feeling to return to normal. During this time sensations of itchiness, tingling and occasional sharp pains might be noted. These feelings are normal and will subside once the nerves have completely healed.         IN CASE OF EMERGENCY: Dr Coronel [phone]     If you were seen in [place] call: [phone]    If you were seen in [place] call: [phone]

## 2023-08-15 NOTE — PROGRESS NOTES
WOUND CARE INSTRUCTIONS  for  ONE WEEK AFTER SURGERY          Leave flat bandage on your skin for one week after today s bandage change.  In one week when you remove the bandage, you may resume your regular skin care routine, including washing with mild soap and water, applying moisturizer, make-up and sunscreen.    If there are any open or bleeding areas at the incision/graft site you should begin to cover the area with a bandage daily as follows:    Clean and dry the area with plain tap water using a Q-tip or sterile gauze pad.  Apply Polysporin or Bacitracin ointment to the open area.  Cover the wound with a band-aid or a sterile non-stick gauze pad and micropore paper tape.         SIGNS OF INFECTION  - If you notice any of these signs of infection, call your doctor right away: expanding redness around the wound.  - Yellow or greenish-colored pus or cloudy wound drainage.    - Red streaking spreading from the wound.  - Increased swelling, tenderness, or pain around the wound.   - Fever.    Please remember that yellow and clear drainage from a wound can be normal and related to normal wound healing.  Isolated drainage from a wound without a combination of the above features does not indicate infection.       *Once the bandages are removed, the scar will be red and firm (especially in the lip/chin area). This is normal and will fade in time. It might take 6-12 months for this to happen.     *Massaging the area will help the scar soften and fade quicker. Begin to massage the area one month after the bandages have been removed. To massage apply pressure directly and firmly over the scar with the fingertips and move in a circular motion. Massage the area for a few minutes several times a day. Continue to massage the site for several months.    *Approximately 6-8 weeks after surgery it is not uncommon to see the formation of  tender pimple-like  bump along the scar. This is normal. As the scar continues to mature and  the stitches underneath the skin begin to dissolve, this might occur. Do not pick or squeeze, this will resolve on it s own. Should one break open producing a small amount of drainage, apply Polysporin or Bacitracin ointment a few times a day until the wound is completely healed.    *Numbness in the surgical area is expected. It might take 12-18 months for the feeling to return to normal. During this time sensations of itchiness, tingling and occasional sharp pains might be noted. These feelings are normal and will subside once the nerves have completely healed.         IN CASE OF EMERGENCY: Dr Coronel [phone]     If you were seen in [place] call: [phone]    If you were seen in [place] call: [phone]

## 2023-09-13 ENCOUNTER — ANCILLARY PROCEDURE (OUTPATIENT)
Dept: GENERAL RADIOLOGY | Facility: CLINIC | Age: 43
End: 2023-09-13
Attending: NURSE PRACTITIONER
Payer: COMMERCIAL

## 2023-09-13 ENCOUNTER — OFFICE VISIT (OUTPATIENT)
Dept: FAMILY MEDICINE | Facility: CLINIC | Age: 43
End: 2023-09-13
Payer: COMMERCIAL

## 2023-09-13 VITALS
HEART RATE: 85 BPM | WEIGHT: 203 LBS | OXYGEN SATURATION: 99 % | DIASTOLIC BLOOD PRESSURE: 60 MMHG | TEMPERATURE: 97.7 F | HEIGHT: 64 IN | SYSTOLIC BLOOD PRESSURE: 128 MMHG | BODY MASS INDEX: 34.66 KG/M2 | RESPIRATION RATE: 16 BRPM

## 2023-09-13 DIAGNOSIS — M25.512 ACUTE PAIN OF LEFT SHOULDER: ICD-10-CM

## 2023-09-13 DIAGNOSIS — E06.3 HYPOTHYROIDISM DUE TO HASHIMOTO'S THYROIDITIS: ICD-10-CM

## 2023-09-13 DIAGNOSIS — M25.512 ACUTE PAIN OF LEFT SHOULDER: Primary | ICD-10-CM

## 2023-09-13 PROCEDURE — 73030 X-RAY EXAM OF SHOULDER: CPT | Mod: TC | Performed by: RADIOLOGY

## 2023-09-13 PROCEDURE — 99214 OFFICE O/P EST MOD 30 MIN: CPT | Performed by: NURSE PRACTITIONER

## 2023-09-13 RX ORDER — LEVOTHYROXINE SODIUM 50 UG/1
50 TABLET ORAL DAILY
Qty: 90 TABLET | Refills: 1 | Status: SHIPPED | OUTPATIENT
Start: 2023-09-13

## 2023-09-13 ASSESSMENT — PAIN SCALES - GENERAL: PAINLEVEL: NO PAIN (0)

## 2023-09-13 NOTE — PROGRESS NOTES
Assessment & Plan     Acute pain of left shoulder    - XR Shoulder Left G/E 3 Views; Future  - Physical Therapy Referral; Future    Hypothyroidism due to Hashimoto's thyroiditis    - levothyroxine (SYNTHROID/LEVOTHROID) 50 MCG tablet; Take 1 tablet (50 mcg) by mouth daily  Discussed how to take the medication(s), expected outcomes, potential side effects.  Initiate treatment, she actually has appointment scheduled with endocrinology and can further discuss at that time.           See Patient Instructions  Patient Instructions   Start the thyroid medication and follow up with endocrinology as scheduled.  Will need repeat thyroid lab tests in 2 months which he could do.  Will be notified of x ray results.  Start PT and will consider MRI or orthopedics if worsening.      Our Clinic hours are:  Mondays    7:20 am - 7 pm  Tues -  Fri  7:20 am - 5 pm    Clinic Phone: [phone]    The clinic lab opens at 7:30 am Mon - Fri and appointments are required.    [facility] [place]  Ph. [phone]  Monday  8 am - 7pm  Tues - Fri 8 am - 5:30 pm         [facility]    Jennifer Ceballos is a 43 year old, presenting for the following health issues:  Thyroid Problem (Follow up thyroid labs ) and Musculoskeletal Problem (Left shoulder pain she was thrown off her horse on 9/7/23)      9/13/2023     3:37 PM   Additional Questions   Roomed by        Musculoskeletal Problem    History of Present Illness       Reason for visit:  Test results shoulder pain  Symptom onset:  3-7 days ago    She eats 4 or more servings of fruits and vegetables daily.She consumes 0 sweetened beverage(s) daily.She exercises with enough effort to increase her heart rate 20 to 29 minutes per day.  She exercises with enough effort to increase her heart rate 3 or less days per week.   She is taking medications regularly.     She had normal TSH in 2022 and was elevated now and TPO was  "markedly elevated.  She has symptoms of difficulty losing weight, dry skin, constipation, hair falling out. Some sleep disturbances but that's not too bad.    Left shoulder pain after horse bucked her off 6 days ago. Worsening pain and decreased ROM of shoulder.        Current Outpatient Medications   Medication    cyclobenzaprine (FLEXERIL) 5 MG tablet    dupilumab (DUPIXENT) 300 MG/2ML prefilled pen    levothyroxine (SYNTHROID/LEVOTHROID) 50 MCG tablet    norgestimate-ethinyl estradiol (ORTHO-CYCLEN) 0.25-35 MG-MCG tablet    Semaglutide-Weight Management (WEGOVY) 0.25 MG/0.5ML pen    Semaglutide-Weight Management (WEGOVY) 0.5 MG/0.5ML pen     No current facility-administered medications for this visit.     Past Medical History:   Diagnosis Date    Anxiety and depression     Dysmenorrhea     Endometriosis     Intrinsic eczema          Review of Systems   Constitutional, HEENT, cardiovascular, pulmonary, gi and gu systems are negative, except as otherwise noted.      Objective    /60   Pulse 85   Temp 97.7  F (36.5  C) (Tympanic)   Resp 16   Ht 1.619 m (5' 3.75\")   Wt 92.1 kg (203 lb)   LMP 01/06/2021   SpO2 99%   BMI 35.12 kg/m    Body mass index is 35.12 kg/m .  Physical Exam   GENERAL: healthy, alert and no distress  NECK: no adenopathy, no asymmetry  RESP: lungs clear to auscultation - no rales, rhonchi or wheezes  CV: regular rate and rhythm, normal S1 S2, no S3 or S4, no murmur  ABDOMEN: soft  MS: no gross musculoskeletal defects noted, decreased ROM of left shoulder                        "

## 2023-09-13 NOTE — PATIENT INSTRUCTIONS
Start the thyroid medication and follow up with endocrinology as scheduled.  Will need repeat thyroid lab tests in 2 months which he could do.  Will be notified of x ray results.  Start PT and will consider MRI or orthopedics if worsening.      Our Clinic hours are:  Mondays    7:20 am - 7 pm  Tues -  Fri  7:20 am - 5 pm    Clinic Phone: [phone]    The clinic lab opens at 7:30 am Mon - Fri and appointments are required.    [facility]  Ph. [phone]  Monday  8 am - 7pm  Tues - Fri 8 am - 5:30 pm

## 2023-09-14 ENCOUNTER — VIRTUAL VISIT (OUTPATIENT)
Dept: PHARMACY | Facility: CLINIC | Age: 43
End: 2023-09-14
Payer: COMMERCIAL

## 2023-09-14 ENCOUNTER — TELEPHONE (OUTPATIENT)
Dept: ENDOCRINOLOGY | Facility: CLINIC | Age: 43
End: 2023-09-14
Payer: COMMERCIAL

## 2023-09-14 VITALS — BODY MASS INDEX: 35.44 KG/M2 | WEIGHT: 200 LBS | HEIGHT: 63 IN

## 2023-09-14 DIAGNOSIS — E03.9 HYPOTHYROIDISM, UNSPECIFIED TYPE: ICD-10-CM

## 2023-09-14 DIAGNOSIS — F45.8 TEETH GRINDING: ICD-10-CM

## 2023-09-14 DIAGNOSIS — E66.09 CLASS 2 OBESITY DUE TO EXCESS CALORIES IN ADULT, UNSPECIFIED BMI, UNSPECIFIED WHETHER SERIOUS COMORBIDITY PRESENT: Primary | ICD-10-CM

## 2023-09-14 DIAGNOSIS — L30.9 DERMATITIS: ICD-10-CM

## 2023-09-14 DIAGNOSIS — E89.40 SURGICAL MENOPAUSE ON HORMONE REPLACEMENT THERAPY: ICD-10-CM

## 2023-09-14 DIAGNOSIS — Z79.890 SURGICAL MENOPAUSE ON HORMONE REPLACEMENT THERAPY: ICD-10-CM

## 2023-09-14 DIAGNOSIS — E66.812 CLASS 2 OBESITY DUE TO EXCESS CALORIES IN ADULT, UNSPECIFIED BMI, UNSPECIFIED WHETHER SERIOUS COMORBIDITY PRESENT: Primary | ICD-10-CM

## 2023-09-14 PROCEDURE — 99607 MTMS BY PHARM ADDL 15 MIN: CPT | Mod: VID | Performed by: PHARMACIST

## 2023-09-14 PROCEDURE — 99605 MTMS BY PHARM NP 15 MIN: CPT | Mod: VID | Performed by: PHARMACIST

## 2023-09-14 RX ORDER — LIRAGLUTIDE 6 MG/ML
INJECTION, SOLUTION SUBCUTANEOUS
Qty: 15 ML | Refills: 2 | Status: SHIPPED | OUTPATIENT
Start: 2023-09-14 | End: 2024-02-28

## 2023-09-14 ASSESSMENT — PAIN SCALES - GENERAL: PAINLEVEL: NO PAIN (0)

## 2023-09-14 NOTE — TELEPHONE ENCOUNTER
Prior Authorization Approval    Medication: SAXENDA 18 MG/3ML SC SOPN  Authorization Effective Date: 8/15/2023  Authorization Expiration Date: 9/13/2024  Approved Dose/Quantity: 15ml/30 days   Reference #: [id number]   Insurance Company: [employer] - Phone [phone] Fax [phone]  Expected CoPay: 0.00     CoPay Card Available:      Financial Assistance Needed: no  Which Pharmacy is filling the prescription:    Pharmacy Notified:    Patient Notified:

## 2023-09-14 NOTE — TELEPHONE ENCOUNTER
PA Initiation    Medication: SAXENDA 18 MG/3ML SC SOPN  Insurance Company: [employer] - Phone [phone] Fax [phone]  Pharmacy Filling the Rx:    Filling Pharmacy Phone:    Filling Pharmacy Fax:    Start Date: 9/14/2023    Waiting on questions to load

## 2023-09-14 NOTE — PROGRESS NOTES
Medication Therapy Management (MTM) Encounter    ASSESSMENT:                            Medication Adherence/Access: See below for considerations    Weight Management:   Due to ongoing Wegovy shortage issues, could consider transitioning to Saxenda in the short term to get to tolerated higher dose then transition to available Wegovy dose such as 1 mg weekly. Patient in agreement with plan. Completed teaching of administration of Saxenda. Discussed mechanism of action, side effects, warnings, and efficacy. Discussed appropriate storage and stability.     S/P Hysterectomy:   Would benefit from stopping birth control due to with half life may actually be causing greater issues with taking every fourth day.     Hypothyroidism:   Would benefit from starting levothyroxine as planned. Would benefit from repeat thyroid labs 8 weeks after starting levothyroxine. Appears follow up labs were thought would be ordered by Endocrinology in future, the endocrinology provider patient is seeing is Kristie Olivares PA-C from [facility] so may defer repeat thyroid testing to primary care provider. Will follow up with primary care provider and ask.     Teeth Grinding:  Stable.     Dermatitis:   Stable.     PLAN:                            Start Saxenda due to current Wegovy shortages. Once you get to Saxenda 2.4 mg daily or higher for 1 month, then can transition to Wegovy 1 mg weekly.   Pharmacist to start Saxenda Prior Authorization, then once approved RX will be sent to [facility] that has Saxenda in stock.   Start Levothyroxine 50 mg daily as prescribed, take in AM at least 30-60 minutes before first meal, separate from other medications if possible  Otherwise can take at bedtime if at least 4 hours after last meal, separate from other medications if possible  Pharmacist will ask primary care provider about ordering repeat thyroid labs for 8 weeks from starting levothyroxine  "  Stop birth control    Follow-up: Return in about 3 months (around 12/14/2023) for Medication Therapy Management Pharmacist Visit, Call [phone] to schedule.    SUBJECTIVE/OBJECTIVE:                          Lin Corona is a 43 year old female contacted via secure video for an initial visit. She was referred to me from Kristie Olivares PA-C.      Reason for visit: Wegovy start follow up.    Allergies/ADRs: Reviewed in chart  Past Medical History: Reviewed in chart  Tobacco: She reports that she quit smoking about 9 years ago. Her smoking use included cigarettes. She has never used smokeless tobacco.  Alcohol: not currently using    Medication Adherence/Access: Has not been able to find Wegovy 0.25 mg to be able to start. Does not want to call around to different pharmacies to try and find.     Weight Management:   Wegovy 0.25 mg once weekly - not started    Followed by Kristie Olivares PA-C, seen for [facility]. Prescribed Wegovy, not started yet due to shortage issues. She is willing to consider alternative daily injection for short term if that will help to then transition to Wegovy available dosing later.     Current weight today: 200 lbs 0 oz  Wt Readings from Last 4 Encounters:   09/14/23 200 lb (90.7 kg)   09/13/23 203 lb (92.1 kg)   08/02/23 205 lb (93 kg)   07/13/23 203 lb 8 oz (92.3 kg)     Estimated body mass index is 35.43 kg/m  as calculated from the following:    Height as of this encounter: 5' 3\" (1.6 m).    Weight as of this encounter: 200 lb (90.7 kg).    S/P Hysterectomy:   Ortho-cyclin 1 tablet once daily, now tapering down    Issues of irritability, hot flashes, sleep issues after hysterectomy so has been on birth control ever since. Has been working to taper off due to timeframe since hysterectomy as directed by provider but was unsure how to logistically taper. Was doing self taper plan every other day for period of time, then every third day, and now taking every fourth " "day and finding that menopausal symptoms have worsened every since going to every fourth day taking it.     Hypothyroidism:   Levothyroxine 50 mcg daily - haven't started yet    Just was prescribed. Going to  from pharmacy today. Patient is having the following symptoms: hypothyroidism -  fatigue and weight gain, hair texture changes.   TSH   Date Value Ref Range Status   08/09/2023 5.47 (H) 0.30 - 4.20 uIU/mL Final   01/19/2022 2.18 0.40 - 4.00 mU/L Final   03/16/2020 1.93 0.40 - 4.00 mU/L Final     Free T4   Date Value Ref Range Status   08/09/2023 1.06 0.90 - 1.70 ng/dL Final     Lab Results   Component Value Date    TPO 4,258 (H) 08/09/2023     Teeth Grinding  Cyclobenzaprine 5 mg three times daily as needed    She will use as needed for teeth grinding, will use 2.5 mg or 5 mg nightly. 5 mg sometimes gives \"hang over\" feeling the next day so 2.5 mg usually works best. Makes it easier to wear mouth guard at night.     Dermatitis:   Dupixant 300 mg every 14 days     Finds the areas of greatest concern is embarrassing due to extent of flares at times. Largest break out spot is arms and hands. Finds the dupixant helpful to reduce dermatitis. Does get minor redness at injection site but lasts short term. Finds benefits outweighs this. Follows with Dermatology, Diana Coyne, PAC.     Today's Vitals: Ht 5' 3\" (1.6 m)   Wt 200 lb (90.7 kg)   LMP 01/06/2021   BMI 35.43 kg/m    ----------------  I spent 30 minutes with this patient today. All changes were made via collaborative practice agreement with Kristie Olivares PA-C and primary care provider Dahlia Johns CNP. A copy of the visit note was provided to the patient's provider(s).    A summary of these recommendations was sent via [employer].    Lauren Bloch, PharmD, BCACP   Medication Therapy Management Pharmacist   [facility]    Telemedicine Visit Details  Type of service:  Video Conference via [employer]  Start Time:  8:30 " AM  End Time:  9:00 AM       Medication Therapy Recommendations  Class 2 obesity due to excess calories in adult, unspecified BMI, unspecified whether serious comorbidity present    Current Medication: Semaglutide-Weight Management (WEGOVY) 0.25 MG/0.5ML pen   Rationale: Medication product not available - Adherence - Adherence   Recommendation: Change Medication - Saxenda 18 MG/3ML Sopn   Status: Accepted per CPA         Surgical menopause on hormone replacement therapy    Current Medication: norgestimate-ethinyl estradiol (ORTHO-CYCLEN) 0.25-35 MG-MCG tablet   Rationale: Undesirable effect - Adverse medication event - Safety   Recommendation: Discontinue Medication   Status: Accepted per CPA

## 2023-09-14 NOTE — NURSING NOTE
Is the patient currently in the state of [place]? YES    Visit mode:VIDEO    If the visit is dropped, the patient can be reconnected by: VIDEO VISIT: Text to cell phone:   Telephone Information:   Mobile [phone]   Mobile [phone]       Will anyone else be joining the visit? NO  (If patient encounters technical issues they should call [phone]    How would you like to obtain your AVS? MyChart    Are changes needed to the allergy or medication list? No    Reason for visit: Consult    Weston JEAN      
Yes

## 2023-09-14 NOTE — PATIENT INSTRUCTIONS
"Recommendations from today's MTM visit:                                                    MTM (medication therapy management) is a service provided by a clinical pharmacist designed to help you get the most of out of your medicines.   Today we reviewed what your medicines are for, how to know if they are working, that your medicines are safe and how to make your medicine regimen as easy as possible.      Start Saxenda due to current Wegovy shortages. Once you get to Saxenda 2.4 mg daily or higher for 1 month, then can transition to Wegovy 1 mg weekly.   Pharmacist to start Saxenda Prior Authorization, then once approved RX will be sent to [facility] that has Saxenda in stock.   Start Levothyroxine 50 mg daily as prescribed, take in AM at least 30-60 minutes before first meal, separate from other medications if possible  Otherwise can take at bedtime if at least 4 hours after last meal, separate from other medications if possible  Pharmacist will ask primary care provider about ordering repeat thyroid labs for 8 weeks from starting levothyroxine   Stop birth control    Follow-up: Return in about 3 months (around 12/14/2023) for Medication Therapy Management Pharmacist Visit, Call [phone] to schedule.    It was great speaking with you today.  I value your experience and would be very thankful for your time in providing feedback in our clinic survey. In the next few days, you may receive an email or text message from [employer] with a link to a survey related to your  clinical pharmacist.\"     To schedule another appointment with your MTM pharmacist, please call [facility] Scheduling at [phone].     My Clinical Pharmacist's contact information:                                                      Please feel free to contact me with any questions or concerns you have.      Lauren Bloch, PharmD  Medication Therapy Management Pharmacist   [facility]    Saxenda Dosing:   Start Saxenda: It is a subcutaneous injection that you inject once daily and titrate the dose slowly over time. Make sure inject the same time each day. Use a new pen needle for each injection. There are two safety caps on each pen needle, make sure you remove them both before doing your injection.   Week 1: Inject 0.6 mg once daily  Week 2: If tolerating, increase to 1.2 mg once daily   Week 3: If tolerating, increase to 1.8 mg once daily   Week 4: If tolerating, increase to 2.4 mg once daily   Week 5 & on: If tolerating, increase to 3 mg once daily   *If you are having some nausea or other side effects to where you are hesitant to move up to the next dose, stay at the same dose you are on for an additional week to see if side effect(s) improves/resolves. Make sure to take this time to hydrate and ensure you are drinking at least 64 oz water per day.     Saxenda Administration Video:   https://www.[employer]/about-saxenda/how-to-use-the-pen.html     Saxenda Storage and Stability:   Store new, unused Saxenda pens in the refrigerator. After first use, store in a refrigerator or at room temperature. Pens in use should be thrown away after 30 days even if they still have Saxenda left in them. All used Saxenda pens can be thrown away in the trash. Used pen needles need to be stored in a sharp container. A sharps container is available at any retail store or you can make your own sharps container - information in link below.   https://www.[facility].[place].us/sites/default/files/w-hhw4-67.pdf    Saxenda Common Side Effects:   Nausea, diarrhea, constipation, headache, tiredness (fatigue), dizziness, stomach upset/pain. Less commonly, Saxenda can cause low blood sugar (symptoms: shaky, dizzy, sweaty, agitation). Please reach out to the care team should you feel like this is occurring. It is important to ensure that you are eating consistent meals and not  skipping meals. Ensure you are getting at least 64 oz water daily.

## 2023-09-14 NOTE — PROGRESS NOTES
Virtual Visit Details    Type of service:  Video Visit     Originating Location (pt. Location): Home    Distant Location (provider location):  Off-site  Platform used for Video Visit: Christy

## 2023-09-14 NOTE — Clinical Note
No wegovy, going to have her do Saxenda for short term then can transition to wegovy 1 mg weekly thereafter. Sees you in 6 weeks. Angle HEREDIA

## 2023-09-14 NOTE — Clinical Note
Justice Villagomez,   What would be your thoughts on me ordering repeat thyroid labs? I saw your note. The Endo appt if we are looking at the same one is with Kristie Olivares PA-C at the [facility] that I work at, not the Endocrinology clinic. She would likely defer to you for repeat thyroid labs. But if it is a different one, nevermind. Just wanted to ensure there was a plan for repeat thyroid labs after levo start. Thank you for considering.   Thanks!  Lauren Bloch, PharmD, BCACP  Medication Therapy Management Pharmacist  [facility]

## 2023-09-14 NOTE — TELEPHONE ENCOUNTER
"Prior Authorization Retail Medication Request    Medication/Dose: Saxenda  ICD code (if different than what is on RX):    Previously Tried and Failed:  diet and exercise for at least 6 months without successful weight loss.   Rationale:  Lin Corona is a 43 year old female with a diagnosis of obesity (BMI at least 30 kg/m2). Due to Wegovy shortages, patient is unable to start Wegovy. Therefore, looking to start Saxenda for management of obesity. Currently having issues of sleep so risk of phentermine start negatively impacting sleep issues.     Estimated body mass index is 35.43 kg/m  as calculated from the following:    Height as of an earlier encounter on 9/14/23: 5' 3\" (1.6 m).    Weight as of an earlier encounter on 9/14/23: 200 lb (90.7 kg).    Insurance Name:    Insurance ID:      Pharmacy Information (if different than what is on RX)  Name:    Phone:      Lauren Bloch, PharmD, BCACP   Medication Therapy Management Pharmacist   [facility]        "

## 2023-09-26 ENCOUNTER — THERAPY VISIT (OUTPATIENT)
Dept: PHYSICAL THERAPY | Facility: CLINIC | Age: 43
End: 2023-09-26
Attending: NURSE PRACTITIONER
Payer: COMMERCIAL

## 2023-09-26 DIAGNOSIS — M25.512 ACUTE PAIN OF LEFT SHOULDER: ICD-10-CM

## 2023-09-26 PROCEDURE — 97110 THERAPEUTIC EXERCISES: CPT | Mod: GP | Performed by: PHYSICAL THERAPIST

## 2023-09-26 PROCEDURE — 97140 MANUAL THERAPY 1/> REGIONS: CPT | Mod: GP | Performed by: PHYSICAL THERAPIST

## 2023-09-26 PROCEDURE — 97161 PT EVAL LOW COMPLEX 20 MIN: CPT | Mod: GP | Performed by: PHYSICAL THERAPIST

## 2023-09-26 NOTE — PROGRESS NOTES
PHYSICAL THERAPY EVALUATION  Type of Visit: Evaluation    See electronic medical record for Abuse and Falls Screening details.    Subjective       Presenting condition or subjective complaint: Fell off horse shoulder and neck pain, has migrated into L side of neck, L ear, L side of face. N&T into L forearm. Sleeping has been the worst, is a stomach sleeper.   Date of onset: 23    Relevant medical history:   R shoulder surgery  Dates & types of surgery:      Prior diagnostic imaging/testing results: MRI; CT scan; X-ray     Prior therapy history for the same diagnosis, illness or injury: No      Living Environment  Social support: With a significant other or spouse   Type of home: House   Stairs to enter the home:         Ramp: No   Stairs inside the home: Yes 15 Is there a railing: Yes   Help at home: None  Equipment owned:       Employment: Yes   Hobbies/Interests: [employer] horses.  Dog training    Patient goals for therapy: Sleep.  Turn head. Not be in pain    Pain assessment: Pain present  Location: L shoulder/Ratin-6/10     Objective   SHOULDER EVALUATION  PAIN: Pain Level at Rest: 3/10  Pain Level with Use: 8/10  Pain Location: cervical spine, shoulder, and elbow  Pain Quality: Aching, Numb, and Tingling  Pain Frequency: intermittent  Pain is Worst: nighttime  Pain is Exacerbated By: crossbody motion, lifting/reaching, driving, sleeping  Pain is Relieved By: cold and NSAIDs  POSTURE: Sitting Posture: Rounded shoulders, Forward head  ROM:  135* shoulder abduction with pain L (R WNL), L shoulder adduction painful, flexion WNL pain at end range, L shoulder IR bra line with moderate pain   STRENGTH:  shoulder flexion 4/5 with discomfort, ,,   FLEXIBILITY: WNL  SPECIAL TESTS:    Left Right   Impingement     Neer's Negative  Negative    Hawkin's-Mick Positive Negative    Coracoid Impingement Negative  Negative    Internal impingement Negative  Negative    Labral     Anterior Slide Negative  Negative     Dovray's False positive potentially d/t AC injury Negative    Crank Negative  Negative    Instability     Apprehension (anterior) Negative  Negative    Relocation (anterior) Negative  Negative    Anterior Load & Shift Negative  Negative    AC     AC shear test & horizontal adduction Positive    Multi-Directional Instability      Sulcus Negative  Negative    Biceps      Speed's Negative  Negative    Rotator Cuff Tear     Drop Arm Positive Negative    Belly Press Negative  Negative    Lift off  Negative  Negative      PALPATION:  AC joint, SC joint, teres minor, UT, levator scap, medial border of scap, 1st rib  JOINT MOBILITY:    Left Right   Glenohumeral anterior Pain WNL   Glenohumeral posterior WNL, Pain WNL   Glenohumeral inferior Hypomobile, Pain WNL   Acromioclavicular Hypomobile, Pain WNL   Scapulothoracic Pain WNL   Sternoclavicular Hypomobile, Pain WNL   Scapulohumeral rhythm WNL WNL     CERVICAL SCREEN:   (Degrees) Left AROM Right AROM   Cervical Flexion    Cervical Extension    Side bend Moderately limited with pain on L Moderately limited with pain on L   Rotation L rotation 45* with pain on L cervical ROM - R rotation 50* with pain on L   Thoracic Flexion    Thoracic Extension    Thoracic Rotation     Thoracic Outlet Screen (Catalina, Adson) Negative  Negative       Left Right   Alar Ligament Negative    Cervical Flexion-Rotation Negative  Negative    Cervical Rot/Lateral Flex Positive Negative    Compression Negative  Negative    Distraction Negative  Negative    Spurling s Positive Negative    ULTT - ulnar Positive                  Assessment & Plan   CLINICAL IMPRESSIONS  Medical Diagnosis: Acute pain of left shoulder    Treatment Diagnosis: L shoulder pain, left sided neck pain   Impression/Assessment: Patient is a 43 year old female with L sided neck and L shoulder pain complaints consistent with AC strain and cervical muscle strain.  The following significant findings have been identified: Pain,  Decreased ROM/flexibility, Decreased joint mobility, Decreased strength, Impaired sensation, Impaired muscle performance, Decreased activity tolerance, and Impaired posture. These impairments interfere with their ability to perform self care tasks, work tasks, recreational activities, and driving  as compared to previous level of function.     Clinical Decision Making (Complexity):  Clinical Presentation: Stable/Uncomplicated  Clinical Presentation Rationale: based on medical and personal factors listed in PT evaluation  Clinical Decision Making (Complexity): Low complexity    PLAN OF CARE  Treatment Interventions:  Interventions: Manual Therapy, Neuromuscular Re-education, Therapeutic Activity, Therapeutic Exercise, Self-Care/Home Management    Long Term Goals     PT Goal 1  Goal Identifier: 1.  Goal Description: Patient will report ability to sleep without L shoulder disruption 5/7 nights in order to achieve restful recovery.  Target Date: 10/24/23  PT Goal 2  Goal Identifier: 2.  Goal Description: Patient will demonstrate >50* cervical rotation and cross body LUE movement n order to tolerate driving tasks.  Target Date: 11/21/23  PT Goal 3  Goal Identifier: 3.  Goal Description: Patient will note a 75% reduction in N&T into L forearm in order to improve QOL.  Target Date: 11/21/23  PT Goal 4  Goal Identifier: 4.  Goal Description: Patient will tolerate work and recreational activities with pain <2/10.  Target Date: 12/05/23      Frequency of Treatment: 1x/week  Duration of Treatment: 10 weeks      Education Assessment:        Risks and benefits of evaluation/treatment have been explained.   Patient/Family/caregiver agrees with Plan of Care.     Evaluation Time:          Signing Clinician: Carmen Winters, PT

## 2023-09-29 ENCOUNTER — MYC MEDICAL ADVICE (OUTPATIENT)
Dept: FAMILY MEDICINE | Facility: CLINIC | Age: 43
End: 2023-09-29
Payer: COMMERCIAL

## 2023-09-29 NOTE — TELEPHONE ENCOUNTER
Called patient and scheduled an appointment with Dahlia Johns to evaluate ongoing neck pain 10/3/23.    Patient advised to go to ER if pain worsens or new symptom occur.    Shiela Figueroa RN on 9/29/2023 at 10:36 AM

## 2023-10-03 ENCOUNTER — OFFICE VISIT (OUTPATIENT)
Dept: FAMILY MEDICINE | Facility: CLINIC | Age: 43
End: 2023-10-03
Payer: COMMERCIAL

## 2023-10-03 ENCOUNTER — LAB (OUTPATIENT)
Dept: LAB | Facility: CLINIC | Age: 43
End: 2023-10-03
Payer: COMMERCIAL

## 2023-10-03 VITALS
BODY MASS INDEX: 36.86 KG/M2 | OXYGEN SATURATION: 98 % | DIASTOLIC BLOOD PRESSURE: 70 MMHG | WEIGHT: 208 LBS | HEART RATE: 78 BPM | TEMPERATURE: 98.7 F | RESPIRATION RATE: 16 BRPM | HEIGHT: 63 IN | SYSTOLIC BLOOD PRESSURE: 128 MMHG

## 2023-10-03 DIAGNOSIS — M54.2 CERVICALGIA: ICD-10-CM

## 2023-10-03 DIAGNOSIS — M54.2 CERVICALGIA: Primary | ICD-10-CM

## 2023-10-03 LAB
BASO+EOS+MONOS # BLD AUTO: NORMAL 10*3/UL
BASO+EOS+MONOS NFR BLD AUTO: NORMAL %
BASOPHILS # BLD AUTO: 0 10E3/UL (ref 0–0.2)
BASOPHILS NFR BLD AUTO: 0 %
CRP SERPL-MCNC: 7.04 MG/L
EOSINOPHIL # BLD AUTO: 0.1 10E3/UL (ref 0–0.7)
EOSINOPHIL NFR BLD AUTO: 2 %
ERYTHROCYTE [DISTWIDTH] IN BLOOD BY AUTOMATED COUNT: 12.1 % (ref 10–15)
HCT VFR BLD AUTO: 39.5 % (ref 35–47)
HGB BLD-MCNC: 13.2 G/DL (ref 11.7–15.7)
IMM GRANULOCYTES # BLD: 0 10E3/UL
IMM GRANULOCYTES NFR BLD: 0 %
LYMPHOCYTES # BLD AUTO: 2.6 10E3/UL (ref 0.8–5.3)
LYMPHOCYTES NFR BLD AUTO: 35 %
MCH RBC QN AUTO: 30.6 PG (ref 26.5–33)
MCHC RBC AUTO-ENTMCNC: 33.4 G/DL (ref 31.5–36.5)
MCV RBC AUTO: 92 FL (ref 78–100)
MONOCYTES # BLD AUTO: 0.5 10E3/UL (ref 0–1.3)
MONOCYTES NFR BLD AUTO: 7 %
NEUTROPHILS # BLD AUTO: 4.1 10E3/UL (ref 1.6–8.3)
NEUTROPHILS NFR BLD AUTO: 56 %
PLATELET # BLD AUTO: 238 10E3/UL (ref 150–450)
RBC # BLD AUTO: 4.31 10E6/UL (ref 3.8–5.2)
WBC # BLD AUTO: 7.4 10E3/UL (ref 4–11)

## 2023-10-03 PROCEDURE — 85025 COMPLETE CBC W/AUTO DIFF WBC: CPT

## 2023-10-03 PROCEDURE — 36415 COLL VENOUS BLD VENIPUNCTURE: CPT

## 2023-10-03 PROCEDURE — 86140 C-REACTIVE PROTEIN: CPT

## 2023-10-03 PROCEDURE — 99214 OFFICE O/P EST MOD 30 MIN: CPT | Performed by: NURSE PRACTITIONER

## 2023-10-03 RX ORDER — TIZANIDINE 2 MG/1
2 TABLET ORAL 3 TIMES DAILY PRN
Qty: 20 TABLET | Refills: 0 | Status: SHIPPED | OUTPATIENT
Start: 2023-10-03 | End: 2024-02-12

## 2023-10-03 ASSESSMENT — PAIN SCALES - GENERAL: PAINLEVEL: SEVERE PAIN (6)

## 2023-10-03 NOTE — PROGRESS NOTES
Assessment & Plan     Cervicalgia    - CBC with platelets and differential; Future  - CRP, inflammation; Future  - tiZANidine (ZANAFLEX) 2 MG tablet; Take 1 tablet (2 mg) by mouth 3 times daily as needed for muscle spasms  - MR Cervical Spine w/o Contrast; Future  - Spine  Referral; Future  Discussed how to take the medication(s), expected outcomes, potential side effects.             See Patient Instructions  Patient Instructions   Try the new muscle relaxant, zanaflex.  Continue with ice/heat, massage and if able, try PT again.  Call to schedule [phone] if pain persists or worsen, can consider spine specialist as well.  Will be notified of pending labs.      Our Clinic hours are:  Mondays    7:20 am - 7 pm  Tues -  Fri  7:20 am - 5 pm    Clinic Phone: [phone]    The clinic lab opens at 7:30 am Mon - Fri and appointments are required.    [facility] [place]  Ph. [phone]  Monday  8 am - 7pm  Tues - Fri 8 am - 5:30 pm         [facility]    Jennifer Ceballos is a 43 year old, presenting for the following health issues:  Neck Pain (Recheck  patient did go to ED on 9/29/23)      10/3/2023     3:50 PM   Additional Questions   Roomed by        History of Present Illness       Reason for visit:  Neck pain shoulder    She eats 4 or more servings of fruits and vegetables daily.She consumes 0 sweetened beverage(s) daily.She exercises with enough effort to increase her heart rate 30 to 60 minutes per day.  She exercises with enough effort to increase her heart rate 3 or less days per week.   She is taking medications regularly.       She fell on horse about one month ago. Neck pain has been bothering her. She tried PT and that seemed to make it worse, ended up going to chiropractor and then went to ED. Negative cervical neck x ray.  She's been trying flexeril although it makes her very groggy. No shooting or radiating of  "pain which is worse on left side of neck.             Review of Systems   Constitutional, HEENT, cardiovascular, pulmonary, gi and gu systems are negative, except as otherwise noted.      Objective    /70   Pulse 78   Temp 98.7  F (37.1  C) (Tympanic)   Resp 16   Ht 1.6 m (5' 3\")   Wt 94.3 kg (208 lb)   LMP 01/06/2021   SpO2 98%   BMI 36.85 kg/m    Body mass index is 36.85 kg/m .  Physical Exam   GENERAL: healthy, alert and no distress  HENT: ear canals and TM's normal, pharynx without erythema  NECK: no adenopathy, no asymmetry, left sided neck pain and muscle tightness, able to do ROM with limitations due to pain, FROM of both shoulders and good strength  RESP: lungs clear to auscultation - no rales, rhonchi or wheezes  CV: regular rate and rhythm, normal S1 S2, no S3 or S4, no murmur  ABDOMEN: soft, obese  MS: no gross musculoskeletal defects noted                        "

## 2023-10-03 NOTE — PATIENT INSTRUCTIONS
Try the new muscle relaxant, zanaflex.  Continue with ice/heat, massage and if able, try PT again.  Call to schedule [phone] if pain persists or worsen, can consider spine specialist as well.  Will be notified of pending labs.      Our Clinic hours are:  Mondays    7:20 am - 7 pm  Tues -  Fri  7:20 am - 5 pm    Clinic Phone: [phone]    The clinic lab opens at 7:30 am Mon - Fri and appointments are required.    [facility] [place]  Ph. [phone]  Monday  8 am - 7pm  Tues - Fri 8 am - 5:30 pm

## 2023-10-05 ENCOUNTER — THERAPY VISIT (OUTPATIENT)
Dept: PHYSICAL THERAPY | Facility: CLINIC | Age: 43
End: 2023-10-05
Attending: NURSE PRACTITIONER
Payer: COMMERCIAL

## 2023-10-05 DIAGNOSIS — M25.512 ACUTE PAIN OF LEFT SHOULDER: Primary | ICD-10-CM

## 2023-10-05 PROCEDURE — 97140 MANUAL THERAPY 1/> REGIONS: CPT | Mod: GP | Performed by: PHYSICAL THERAPIST

## 2023-10-05 PROCEDURE — 97110 THERAPEUTIC EXERCISES: CPT | Mod: GP | Performed by: PHYSICAL THERAPIST

## 2023-10-11 ENCOUNTER — THERAPY VISIT (OUTPATIENT)
Dept: PHYSICAL THERAPY | Facility: CLINIC | Age: 43
End: 2023-10-11
Attending: NURSE PRACTITIONER
Payer: COMMERCIAL

## 2023-10-11 DIAGNOSIS — M25.512 ACUTE PAIN OF LEFT SHOULDER: Primary | ICD-10-CM

## 2023-10-11 PROCEDURE — 97140 MANUAL THERAPY 1/> REGIONS: CPT | Mod: GP | Performed by: PHYSICAL THERAPIST

## 2023-10-11 PROCEDURE — 97110 THERAPEUTIC EXERCISES: CPT | Mod: GP | Performed by: PHYSICAL THERAPIST

## 2023-10-19 ENCOUNTER — THERAPY VISIT (OUTPATIENT)
Dept: PHYSICAL THERAPY | Facility: CLINIC | Age: 43
End: 2023-10-19
Attending: NURSE PRACTITIONER
Payer: COMMERCIAL

## 2023-10-19 DIAGNOSIS — M25.512 ACUTE PAIN OF LEFT SHOULDER: Primary | ICD-10-CM

## 2023-10-19 PROCEDURE — 97110 THERAPEUTIC EXERCISES: CPT | Mod: GP | Performed by: PHYSICAL THERAPIST

## 2023-10-19 PROCEDURE — 97140 MANUAL THERAPY 1/> REGIONS: CPT | Mod: GP | Performed by: PHYSICAL THERAPIST

## 2023-10-23 ENCOUNTER — VIRTUAL VISIT (OUTPATIENT)
Dept: ENDOCRINOLOGY | Facility: CLINIC | Age: 43
End: 2023-10-23
Payer: COMMERCIAL

## 2023-10-23 VITALS — WEIGHT: 206 LBS | HEIGHT: 63 IN | BODY MASS INDEX: 36.5 KG/M2

## 2023-10-23 DIAGNOSIS — E66.812 CLASS 2 SEVERE OBESITY WITH SERIOUS COMORBIDITY AND BODY MASS INDEX (BMI) OF 36.0 TO 36.9 IN ADULT, UNSPECIFIED OBESITY TYPE (H): Primary | ICD-10-CM

## 2023-10-23 DIAGNOSIS — E66.01 CLASS 2 SEVERE OBESITY WITH SERIOUS COMORBIDITY AND BODY MASS INDEX (BMI) OF 36.0 TO 36.9 IN ADULT, UNSPECIFIED OBESITY TYPE (H): Primary | ICD-10-CM

## 2023-10-23 PROCEDURE — 99212 OFFICE O/P EST SF 10 MIN: CPT | Mod: VID | Performed by: PHYSICIAN ASSISTANT

## 2023-10-23 ASSESSMENT — PAIN SCALES - GENERAL: PAINLEVEL: NO PAIN (0)

## 2023-10-23 NOTE — PROGRESS NOTES
Return Medical Weight Management Note     Lin Corona  MRN:  [id number]  :  1980  ROSE MARIE:  10/23/2023    Dear Dahlia Johns, [facility],    I had the pleasure of seeing your patient Lin Corona. She is a 43 year old female who I am continuing to see for treatment of obesity related to:        2023    12:26 PM   --   I have the following health issues associated with obesity None of the above   I have the following symptoms associated with obesity Depression       Assessment & Plan   Problem List Items Addressed This Visit    None  Visit Diagnoses       Class 2 severe obesity with serious comorbidity and body mass index (BMI) of 36.0 to 36.9 in adult, unspecified obesity type (H)    -  Primary    Relevant Medications    Phentermine-Topiramate 7.5-46 MG CP24           Weight mgmt follow up  Difficulty starting GLP1 due to supply issues  Discussed starting qsymia   Plans to try health  visits    Follow up Kristie 3-4 mo return MWM      INTERVAL HISTORY:    Was planning to start wegovy but none available so plan to start saxenda.    Tried to switch to Saxenda but now isisues with saxenda     TSH high, just started levothyroxine     CURRENT WEIGHT:   206 lbs 0 oz    Initial Weight (lbs): 203.5 lbs  Last Visits Weight: 94.3 kg (208 lb)  Cumulative weight loss (lbs): -2.5  Weight Loss Percentage: -1.23%        10/23/2023    11:19 AM   Changes and Difficulties   I have made the following changes to my diet since my last visit: More water.  Less fruit   With regards to my diet, I am still struggling with: Weight loss   I have made the following changes to my activity/exercise since my last visit: Walking 20 minutes at least 2x a week   With regards to my activity/exercise, I am still struggling with: Time         MEDICATIONS:   Current Outpatient Medications   Medication Sig Dispense Refill    Phentermine-Topiramate 7.5-46 MG CP24 Take 1 capsule by mouth daily 30 capsule 3    dupilumab  (DUPIXENT) 300 MG/2ML prefilled pen Inject 2 mLs (300 mg) Subcutaneous every 14 days 4 mL 11    insulin pen needle (32G X 6 MM) 32G X 6 MM miscellaneous Use 1 pen needles daily or as directed with Saxenda. 100 each 1    levothyroxine (SYNTHROID/LEVOTHROID) 50 MCG tablet Take 1 tablet (50 mcg) by mouth daily 90 tablet 1    liraglutide - Weight Management (SAXENDA) 18 MG/3ML pen Inject subcutaneously. Week 1: 0.6 mg daily, week 2: 1.2 mg daily, week 3: 1.8 mg daily, week 4: 2.4 mg daily, week 5 & on: 3 mg daily 15 mL 2    tiZANidine (ZANAFLEX) 2 MG tablet Take 1 tablet (2 mg) by mouth 3 times daily as needed for muscle spasms 20 tablet 0           10/23/2023    11:19 AM   Weight Loss Medication History Reviewed With Patient   If you are not taking a weight loss medication that was prescribed to you, please indicate why: Other   Are you having any side effects from the weight loss medication that we have prescribed you? No       Lab on 10/03/2023   Component Date Value Ref Range Status    CRP Inflammation 10/03/2023 7.04 (H)  <5.00 mg/L Final    WBC Count 10/03/2023 7.4  4.0 - 11.0 10e3/uL Final    RBC Count 10/03/2023 4.31  3.80 - 5.20 10e6/uL Final    Hemoglobin 10/03/2023 13.2  11.7 - 15.7 g/dL Final    Hematocrit 10/03/2023 39.5  35.0 - 47.0 % Final    MCV 10/03/2023 92  78 - 100 fL Final    MCH 10/03/2023 30.6  26.5 - 33.0 pg Final    MCHC 10/03/2023 33.4  31.5 - 36.5 g/dL Final    RDW 10/03/2023 12.1  10.0 - 15.0 % Final    Platelet Count 10/03/2023 238  150 - 450 10e3/uL Final    % Neutrophils 10/03/2023 56  % Final    % Lymphocytes 10/03/2023 35  % Final    % Monocytes 10/03/2023 7  % Final    % Eosinophils 10/03/2023 2  % Final    % Basophils 10/03/2023 0  % Final    % Immature Granulocytes 10/03/2023 0  % Final    Absolute Neutrophils 10/03/2023 4.1  1.6 - 8.3 10e3/uL Final    Absolute Lymphocytes 10/03/2023 2.6  0.8 - 5.3 10e3/uL Final    Absolute Monocytes 10/03/2023 0.5  0.0 - 1.3 10e3/uL Final    Absolute  "Eosinophils 10/03/2023 0.1  0.0 - 0.7 10e3/uL Final    Absolute Basophils 10/03/2023 0.0  0.0 - 0.2 10e3/uL Final    Absolute Immature Granulocytes 10/03/2023 0.0  <=0.4 10e3/uL Final     PHYSICAL EXAM:  Objective    Ht 1.6 m (5' 3\")   Wt 93.4 kg (206 lb)   LMP 01/06/2021   BMI 36.49 kg/m      Vitals - Patient Reported  Pain Score: No Pain (0)        GENERAL: Healthy, alert and no distress  EYES: Eyes grossly normal to inspection.  No discharge or erythema, or obvious scleral/conjunctival abnormalities.  RESP: No audible wheeze, cough, or visible cyanosis.  No visible retractions or increased work of breathing.    SKIN: Visible skin clear. No significant rash, abnormal pigmentation or lesions.  NEURO: Cranial nerves grossly intact.  Mentation and speech appropriate for age.  PSYCH: Mentation appears normal, affect normal/bright, judgement and insight intact, normal speech and appearance well-groomed.        Sincerely,    Kristie Olivares PA-C      20 minutes spent by me on the date of the encounter doing chart review, history and exam, documentation and further activities per the note      Virtual Visit Details    Type of service:  Video Visit     Originating Location (pt. Location): Home    Distant Location (provider location):  Off-site  Platform used for Video Visit: Christy    "

## 2023-10-23 NOTE — NURSING NOTE
Is the patient currently in the state of [place]? YES    Visit mode:VIDEO    If the visit is dropped, the patient can be reconnected by: VIDEO VISIT: Text to cell phone:   Telephone Information:   Mobile [phone]       Will anyone else be joining the visit? NO  (If patient encounters technical issues they should call [phone]    How would you like to obtain your AVS? MyChart    Are changes needed to the allergy or medication list? No    Reason for visit: RECHECK    Pt states having trouble with medication availability and would like to discuss further where to fill.    Weston REYESF

## 2023-10-24 ENCOUNTER — TELEPHONE (OUTPATIENT)
Dept: SURGERY | Facility: CLINIC | Age: 43
End: 2023-10-24

## 2023-10-24 NOTE — TELEPHONE ENCOUNTER
- discussed smoking cessation given risk factors, patient at contemplating stage  - pneumovax given today   Central Prior Authorization Team   Phone: [phone]      Diana Vicente    10/24/23 11:00 AM  Note  Prior Authorization Retail Medication Request     Medication/Dose:   ICD code (if different than what is on RX):  na  Previously Tried and Failed:  na  Rationale:  na     Insurance Name:   commercial  Insurance ID:  [id number]                Pharmacy Information (if different than what is on RX)  Name:  [facility] [place]            Phone:  [phone]

## 2023-10-26 ENCOUNTER — THERAPY VISIT (OUTPATIENT)
Dept: PHYSICAL THERAPY | Facility: CLINIC | Age: 43
End: 2023-10-26
Attending: NURSE PRACTITIONER
Payer: COMMERCIAL

## 2023-10-26 DIAGNOSIS — M25.512 ACUTE PAIN OF LEFT SHOULDER: Primary | ICD-10-CM

## 2023-10-26 PROCEDURE — 97110 THERAPEUTIC EXERCISES: CPT | Mod: GP | Performed by: PHYSICAL THERAPIST

## 2023-10-26 NOTE — TELEPHONE ENCOUNTER
Central Prior Authorization Team   Phone: [phone]    PA Initiation    Medication: Phentermine-Topiramate 7.5-46 MG CP24  Insurance Company: [employer] - Phone [phone] Fax [phone]  Pharmacy Filling the Rx: [facility] [place], [place] - [address]  Filling Pharmacy Phone: [phone]  Filling Pharmacy Fax:    Start Date: 10/26/2023

## 2023-10-30 NOTE — TELEPHONE ENCOUNTER
Prior Authorization Approval    Authorization Effective Date: 9/30/2023  Authorization Expiration Date: 10/29/2024  Medication: Phentermine-Topiramate 7.5-46 MG CP24-PA APPROVED   Approved Dose/Quantity: QUANTITY LIMIT TWO CAPSULES PER DAY FOR 7.5 MG-46 MG CAPS, 11.25 MG-69 MG CAPS, 4 CAPSULES PER DAY FOR 3.75 MG-23 MG CAPS.   Reference #:     Insurance Company: [employer] - Phone [phone] Fax [phone]  Expected CoPay:       CoPay Card Available:      Foundation Assistance Needed:    Which Pharmacy is filling the prescription (Not needed for infusion/clinic administered): [facility] [place], [place] - [address]  Pharmacy Notified:  Yes- **Instructed pharmacy to notify patient when script is ready to /ship.**  Patient Notified:  Yes

## 2023-10-31 ENCOUNTER — TELEPHONE (OUTPATIENT)
Dept: ENDOCRINOLOGY | Facility: CLINIC | Age: 43
End: 2023-10-31
Payer: COMMERCIAL

## 2023-10-31 NOTE — TELEPHONE ENCOUNTER
Medication Question or Refill    Contacts         Type Contact Phone/Fax    10/31/2023 11:22 AM CDT Phone (Incoming) [place], [place] - [address] (Pharmacy) [phone]            What medication are you calling about (include dose and sig)?:     Phentermine-Topiramate 7.5-46 MG CP24     Preferred Pharmacy:  [place], [place] - [address]  [address]  [facility] [address]  Phone: [phone] Fax: [phone] Alternate Fax: [phone]        Controlled Substance Agreement on file:   CSA -- Patient Level:    CSA: None found at the patient level.       Who prescribed the medication?: Marshall        Do you have any questions or concerns?  Yes:     Pharm needs to discuss drug/drug interaction with pt's birth control med.

## 2023-10-31 NOTE — TELEPHONE ENCOUNTER
Called and spoke with pharmacist in regards to drug interaction between patient's birth control pills and Qsymia. Clarified with clinic MTM, Lauren Bloch. No concerns. Patient was made aware that 2 forms of birth control should be used while taking Qsymia (Topiramate).

## 2023-11-07 NOTE — TELEPHONE ENCOUNTER
Action    Action Taken Images in PACS     SPINE PATIENTS - NEW PROTOCOL PREVISIT    RECORDS RECEIVED FROM: Internal   REASON FOR VISIT: Cervicalgia    Date of Appt: 12/05/2023   NOTES (FOR ALL VISITS) STATUS DETAILS   OFFICE NOTE from referring provider Internal 10/03/2023 Dr Johns [facility]    OFFICE NOTE from other specialist Internal 10/26/2023 PT [facility]    DISCHARGE SUMMARY from hospital N/A    DISCHARGE REPORT from [facility]where 09/29/2023 [facility]    OPERATIVE REPORT N/A    EMG REPORT N/A    MEDICATION LIST N/A    IMAGING  (FOR ALL VISITS)     MRI (HEAD, NECK, SPINE) N/A    XRAY (SPINE) *NEUROSURGERY* Received 09/29/2023 cervical spine   CT (HEAD, NECK, SPINE) N/A

## 2023-11-09 ENCOUNTER — THERAPY VISIT (OUTPATIENT)
Dept: PHYSICAL THERAPY | Facility: CLINIC | Age: 43
End: 2023-11-09
Attending: NURSE PRACTITIONER
Payer: COMMERCIAL

## 2023-11-09 ENCOUNTER — E-VISIT (OUTPATIENT)
Dept: FAMILY MEDICINE | Facility: CLINIC | Age: 43
End: 2023-11-09
Payer: COMMERCIAL

## 2023-11-09 DIAGNOSIS — R41.0 CONFUSION: Primary | ICD-10-CM

## 2023-11-09 DIAGNOSIS — M25.512 ACUTE PAIN OF LEFT SHOULDER: Primary | ICD-10-CM

## 2023-11-09 PROCEDURE — 99207 PR NON-BILLABLE SERV PER CHARTING: CPT | Performed by: FAMILY MEDICINE

## 2023-11-09 PROCEDURE — 97110 THERAPEUTIC EXERCISES: CPT | Mod: GP | Performed by: PHYSICAL THERAPIST

## 2023-11-09 NOTE — PATIENT INSTRUCTIONS
Thank you for choosing us for your care. I think an in-clinic visit would be best next steps based on your symptoms. Please schedule a clinic appointment; you won t be charged for this eVisit.        You can schedule an appointment right here in [facility], or call [phone]

## 2023-11-17 ENCOUNTER — THERAPY VISIT (OUTPATIENT)
Dept: PHYSICAL THERAPY | Facility: CLINIC | Age: 43
End: 2023-11-17
Attending: NURSE PRACTITIONER
Payer: COMMERCIAL

## 2023-11-17 DIAGNOSIS — M25.512 ACUTE PAIN OF LEFT SHOULDER: Primary | ICD-10-CM

## 2023-11-17 PROCEDURE — 97110 THERAPEUTIC EXERCISES: CPT | Mod: GP | Performed by: PHYSICAL THERAPIST

## 2023-11-17 PROCEDURE — 97140 MANUAL THERAPY 1/> REGIONS: CPT | Mod: GP | Performed by: PHYSICAL THERAPIST

## 2023-11-21 ENCOUNTER — HOSPITAL ENCOUNTER (OUTPATIENT)
Dept: MRI IMAGING | Facility: CLINIC | Age: 43
Discharge: HOME | End: 2023-11-21
Attending: NURSE PRACTITIONER | Admitting: NURSE PRACTITIONER
Payer: COMMERCIAL

## 2023-11-21 DIAGNOSIS — M54.2 CERVICALGIA: ICD-10-CM

## 2023-11-21 PROCEDURE — 72141 MRI NECK SPINE W/O DYE: CPT

## 2023-11-22 ENCOUNTER — MYC MEDICAL ADVICE (OUTPATIENT)
Dept: FAMILY MEDICINE | Facility: CLINIC | Age: 43
End: 2023-11-22
Payer: COMMERCIAL

## 2023-11-22 DIAGNOSIS — R42 DIZZINESS: Primary | ICD-10-CM
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
## 2023-11-30 ENCOUNTER — OFFICE VISIT (OUTPATIENT)
Dept: PLASTIC SURGERY | Facility: AMBULATORY SURGERY CENTER | Age: 43
End: 2023-11-30
Attending: PHYSICIAN ASSISTANT
Payer: COMMERCIAL

## 2023-11-30 VITALS
HEIGHT: 63 IN | HEART RATE: 70 BPM | RESPIRATION RATE: 16 BRPM | BODY MASS INDEX: 36.32 KG/M2 | SYSTOLIC BLOOD PRESSURE: 118 MMHG | WEIGHT: 205 LBS | DIASTOLIC BLOOD PRESSURE: 70 MMHG

## 2023-11-30 DIAGNOSIS — E66.812 CLASS 2 SEVERE OBESITY WITH SERIOUS COMORBIDITY AND BODY MASS INDEX (BMI) OF 36.0 TO 36.9 IN ADULT, UNSPECIFIED OBESITY TYPE (H): ICD-10-CM

## 2023-11-30 DIAGNOSIS — E65 ABDOMINAL PANNUS: Primary | ICD-10-CM

## 2023-11-30 DIAGNOSIS — E66.01 CLASS 2 SEVERE OBESITY WITH SERIOUS COMORBIDITY AND BODY MASS INDEX (BMI) OF 36.0 TO 36.9 IN ADULT, UNSPECIFIED OBESITY TYPE (H): ICD-10-CM

## 2023-11-30 PROCEDURE — 99204 OFFICE O/P NEW MOD 45 MIN: CPT | Performed by: PLASTIC SURGERY

## 2023-11-30 NOTE — NURSING NOTE
Patient here today for consult for tummy tuck. The patient reports weight loss over 11 years. Patient reports highest weight of 250lbs and lowest of 170lbs. The patient is currently 200lbs and feels she has since reached the plateau. The patient does report some rashes on skin of lower abdomen. She treating with frequent showers and hers wash to prevent rashes.     Any Ruiz RN on 11/30/2023 at 9:19 AM

## 2023-11-30 NOTE — PROGRESS NOTES
Chief complaint:  Abdominal wall lipodystrophy, hanging abdominal wall pannus.     History of present illness:  This is 43 year old lady who presents with diffuse abdominal wall lipodystrophy as well as skin laxity and hanging abdominal wall pannus over the suprapubic region.  She also presents with bilateral flank lipodystrophy.  Lin has been losing weight by diet and exercise.  She has lost up to 70 pounds and in the last few months regained approximately 30 pounds.  She feels that despite all her diet and exercises as well as initiation of weight loss therapy with oral medication, she has not been able to reduce in size and weight her hanging abdominal wall pannus.  The weight of this pannus is causing pain and pulling in her lower back.  Sometimes, she also have developed intertrigo in the suprapubic region that she treats with creams.    Patient would like to discuss body contouring options, including abdominoplasty.    Past medical history:  Past Medical History:   Diagnosis Date    Anxiety and depression     Dysmenorrhea     Endometriosis     Intrinsic eczema      Hypothyroidism     Past surgical history:  Total hysterectomy with bilateral salpingo-oophorectomy, laparoscopic cholecystectomy, laparoscopic appendectomy, bilateral reduction mammoplasty, right shoulder surgery.     Allergies:  No known drug allergies     Medications:       Current Outpatient Medications:     dupilumab (DUPIXENT) 300 MG/2ML prefilled pen, Inject 2 mLs (300 mg) Subcutaneous every 14 days, Disp: 4 mL, Rfl: 11    insulin pen needle (32G X 6 MM) 32G X 6 MM miscellaneous, Use 1 pen needles daily or as directed with Saxenda., Disp: 100 each, Rfl: 1    levothyroxine (SYNTHROID/LEVOTHROID) 50 MCG tablet, Take 1 tablet (50 mcg) by mouth daily, Disp: 90 tablet, Rfl: 1    liraglutide - Weight Management (SAXENDA) 18 MG/3ML pen, Inject subcutaneously. Week 1: 0.6 mg daily, week 2: 1.2 mg daily, week 3: 1.8 mg daily, week 4: 2.4 mg daily,  "week 5 & on: 3 mg daily, Disp: 15 mL, Rfl: 2    Phentermine-Topiramate 7.5-46 MG CP24, Take 1 capsule by mouth daily, Disp: 30 capsule, Rfl: 3    tiZANidine (ZANAFLEX) 2 MG tablet, Take 1 tablet (2 mg) by mouth 3 times daily as needed for muscle spasms, Disp: 20 tablet, Rfl: 0    BIRTH CONTROL: Patient had hysterectomy     Family history:  Denies family history of venous Thromboembolism (VTE)       GYN history:  G 0, P 0     Social History:  Denies tobacco, drinks alcohol socially     Review of systems:  General ROS: No complaints or constitutional symptoms  Skin: Complains of occasional intertrigo in the suprapubic region.  She treats these conditions with creams.  Hematologic/Lymphatic: No symptoms or complaints  Psychiatric: No symptoms or complaints  Endocrine: No excessive fatigue, no hypermetabolic symptoms reported  Respiratory ROS: No cough, shortness of breath, or wheezing  Cardiovascular ROS: No chest pain or dyspnea on exertion  Breast ROS: Denies nipple discharge, denies nipple inversion, denies palpable breast masses, denies changes in the color of the skin of both breasts  Gastrointestinal ROS: No abdominal pain, nausea, diarrhea, or constipation  Musculoskeletal ROS: Occasional back pain.  Neurological ROS: No focal neurologic defects reported.       Physical exam:     /70 (BP Location: Right arm, Patient Position: Sitting)   Pulse 70   Resp 16   Ht 1.6 m (5' 3\")   Wt 93 kg (205 lb)   LMP 01/06/2021   BMI 36.31 kg/m    General: Alert, cooperative, appears stated age   Skin: Skin color, texture, turgor normal, no rashes or lesions   Lymphatic: No obvious adenopathy, no swelling   Eyes: No scleral icterus, pupils equal  HENT: No traumatic injury to the head or face, no gross abnormalities  Lungs: Normal respiratory effort, breath sounds equal bilaterally  Heart: Regular rate and rhythm  Breasts: Not examined  Abdomen: Presents with diffuse lipodystrophy, striae and cellulite throughout the " whole abdominal wall.  There is skin laxity throughout the whole anterior abdominal wall.  This skin laxity is more evident during flexion of her torso over the pelvis.  There is important amount of lipodystrophy on bilateral flank areas as well as in both supraumbilical and infraumbilical region.  There is hanging abdominal wall pannus over the suprapubic region.  The umbilicus is deformed secondary to the weight of the supraumbilical lipodystrophy.  The umbilicus is also inferiorly displaced secondary to the weight of the infra umbilical hanging pannus that is pulling the umbilicus down.  I did not see any evidence of intertrigo in the infra abdominal fold.  Patient does present with diastases recti.  I did not palpate any evidence of umbilical hernias or incisional hernias.  Abdomen is otherwise soft, non-distended and non-tender to palpation.  Extremities: Patient does not present with varicose veins.  Neurologic: Grossly intact                                        Assessment:     43 year old female with abdominal wall lipodystrophy, excessive abdominal skin laxity and hanging abdominal wall pannus with inferiorly displaced umbilicus.  She also presents with diastases recti as well as bilateral flank lipodystrophy.     Her Caprini score is as follows: one- point for her age, one-point for her BMI, two-points because any body contouring procedure will take more than 45 minutes.. Total risk factor score is 4 points.  Patient will not require postoperative chemoprophylaxis.        PLAN:       I believe that Lin will be an excellent candidate for cosmetic abdominoplasty and suction assisted liposuction (SAL) on her bilateral flanks and midline.  With this procedure, she will have rectus abdominal muscle fascia plication as well as umbilical transposition and improvement of supraumbilical and epigastric area fullness as well as improvement of the bilateral flank lipodystrophy after SAL.  Her abdominoplasty  scar will be below the bikini line.     We discussed that the panniculectomies are usually covered by insurance and the abdominoplasty with liposuction is not.     I discussed with her that this is an outpatient procedure that usually last between 4 to 5 hours.       We also discussed the potential risks for surgery.     The risks of surgery include but are not limited to scarring, wound dehiscence, wound healing problems, bleeding and hematoma, infection, seroma, contour irregularities, even a small vertical scar in the suprapubic region in order to close the abdomen, intraperitoneal injury requiring laparotomy, venous thromboembolism (VTE), risk for further surgeries.  Patient has acknowledged all these risks and is still is interested to proceed with surgery.     I told her that I will refer her to our  at the [facility], which is the location where I usually perform cosmetic surgery so that she can have a discussion about pricing.     If she decides to have surgery I will refer her to her primary care physician for preoperative evaluation and then we will schedule for surgery     Time spent with patient 45 minutes.      Saeed Smith MD , FACS   Diplomate American Board of Plastic Surgery  Diplomate American Board of Surgery  Adj. Assistant Professor of Surgery  Division of Plastic & Reconstructive Surgery   [facility]  Office: [phone]   11/30/2023 at 9:21 AM

## 2023-11-30 NOTE — LETTER
11/30/2023         RE: Lin Corona  [address] [address] [place] [id number]        Dear Colleague,    Thank you for referring your patient, Lin Corona, to the [facility] PLAST[facility]ERY [facility] [place]. Please see a copy of my visit note below.    Chief complaint:  Abdominal wall lipodystrophy, hanging abdominal wall pannus.     History of present illness:  This is 43 year old lady who presents with diffuse abdominal wall lipodystrophy as well as skin laxity and hanging abdominal wall pannus over the suprapubic region.  She also presents with bilateral flank lipodystrophy.  Lin has been losing weight by diet and exercise.  She has lost up to 70 pounds and in the last few months regained approximately 30 pounds.  She feels that despite all her diet and exercises as well as initiation of weight loss therapy with oral medication, she has not been able to reduce in size and weight her hanging abdominal wall pannus.  The weight of this pannus is causing pain and pulling in her lower back.  Sometimes, she also have developed intertrigo in the suprapubic region that she treats with creams.    Patient would like to discuss body contouring options, including abdominoplasty.    Past medical history:  Past Medical History:   Diagnosis Date     Anxiety and depression      Dysmenorrhea      Endometriosis      Intrinsic eczema      Hypothyroidism     Past surgical history:  Total hysterectomy with bilateral salpingo-oophorectomy, laparoscopic cholecystectomy, laparoscopic appendectomy, bilateral reduction mammoplasty, right shoulder surgery.     Allergies:  No known drug allergies     Medications:       Current Outpatient Medications:      dupilumab (DUPIXENT) 300 MG/2ML prefilled pen, Inject 2 mLs (300 mg) Subcutaneous every 14 days, Disp: 4 mL, Rfl: 11     insulin pen needle (32G X 6 MM) 32G X 6 MM miscellaneous, Use 1 pen needles daily or as directed with Saxenda., Disp: 100 each, Rfl: 1     levothyroxine  "(SYNTHROID/LEVOTHROID) 50 MCG tablet, Take 1 tablet (50 mcg) by mouth daily, Disp: 90 tablet, Rfl: 1     liraglutide - Weight Management (SAXENDA) 18 MG/3ML pen, Inject subcutaneously. Week 1: 0.6 mg daily, week 2: 1.2 mg daily, week 3: 1.8 mg daily, week 4: 2.4 mg daily, week 5 & on: 3 mg daily, Disp: 15 mL, Rfl: 2     Phentermine-Topiramate 7.5-46 MG CP24, Take 1 capsule by mouth daily, Disp: 30 capsule, Rfl: 3     tiZANidine (ZANAFLEX) 2 MG tablet, Take 1 tablet (2 mg) by mouth 3 times daily as needed for muscle spasms, Disp: 20 tablet, Rfl: 0    BIRTH CONTROL: Patient had hysterectomy     Family history:  Denies family history of venous Thromboembolism (VTE)       GYN history:  G 0, P 0     Social History:  Denies tobacco, drinks alcohol socially     Review of systems:  General ROS: No complaints or constitutional symptoms  Skin: Complains of occasional intertrigo in the suprapubic region.  She treats these conditions with creams.  Hematologic/Lymphatic: No symptoms or complaints  Psychiatric: No symptoms or complaints  Endocrine: No excessive fatigue, no hypermetabolic symptoms reported  Respiratory ROS: No cough, shortness of breath, or wheezing  Cardiovascular ROS: No chest pain or dyspnea on exertion  Breast ROS: Denies nipple discharge, denies nipple inversion, denies palpable breast masses, denies changes in the color of the skin of both breasts  Gastrointestinal ROS: No abdominal pain, nausea, diarrhea, or constipation  Musculoskeletal ROS: Occasional back pain.  Neurological ROS: No focal neurologic defects reported.       Physical exam:     /70 (BP Location: Right arm, Patient Position: Sitting)   Pulse 70   Resp 16   Ht 1.6 m (5' 3\")   Wt 93 kg (205 lb)   LMP 01/06/2021   BMI 36.31 kg/m    General: Alert, cooperative, appears stated age   Skin: Skin color, texture, turgor normal, no rashes or lesions   Lymphatic: No obvious adenopathy, no swelling   Eyes: No scleral icterus, pupils " equal  HENT: No traumatic injury to the head or face, no gross abnormalities  Lungs: Normal respiratory effort, breath sounds equal bilaterally  Heart: Regular rate and rhythm  Breasts: Not examined  Abdomen: Presents with diffuse lipodystrophy, striae and cellulite throughout the whole abdominal wall.  There is skin laxity throughout the whole anterior abdominal wall.  This skin laxity is more evident during flexion of her torso over the pelvis.  There is important amount of lipodystrophy on bilateral flank areas as well as in both supraumbilical and infraumbilical region.  There is hanging abdominal wall pannus over the suprapubic region.  The umbilicus is deformed secondary to the weight of the supraumbilical lipodystrophy.  The umbilicus is also inferiorly displaced secondary to the weight of the infra umbilical hanging pannus that is pulling the umbilicus down.  I did not see any evidence of intertrigo in the infra abdominal fold.  Patient does present with diastases recti.  I did not palpate any evidence of umbilical hernias or incisional hernias.  Abdomen is otherwise soft, non-distended and non-tender to palpation.  Extremities: Patient does not present with varicose veins.  Neurologic: Grossly intact                                        Assessment:     43 year old female with abdominal wall lipodystrophy, excessive abdominal skin laxity and hanging abdominal wall pannus with inferiorly displaced umbilicus.  She also presents with diastases recti as well as bilateral flank lipodystrophy.     Her Caprini score is as follows: one- point for her age, one-point for her BMI, two-points because any body contouring procedure will take more than 45 minutes.. Total risk factor score is 4 points.  Patient will not require postoperative chemoprophylaxis.        PLAN:       I believe that Lin will be an excellent candidate for cosmetic abdominoplasty and suction assisted liposuction (SAL) on her bilateral flanks  and midline.  With this procedure, she will have rectus abdominal muscle fascia plication as well as umbilical transposition and improvement of supraumbilical and epigastric area fullness as well as improvement of the bilateral flank lipodystrophy after SAL.  Her abdominoplasty scar will be below the bikini line.     We discussed that the panniculectomies are usually covered by insurance and the abdominoplasty with liposuction is not.     I discussed with her that this is an outpatient procedure that usually last between 4 to 5 hours.       We also discussed the potential risks for surgery.     The risks of surgery include but are not limited to scarring, wound dehiscence, wound healing problems, bleeding and hematoma, infection, seroma, contour irregularities, even a small vertical scar in the suprapubic region in order to close the abdomen, intraperitoneal injury requiring laparotomy, venous thromboembolism (VTE), risk for further surgeries.  Patient has acknowledged all these risks and is still is interested to proceed with surgery.     I told her that I will refer her to our  at the [facility], which is the location where I usually perform cosmetic surgery so that she can have a discussion about pricing.     If she decides to have surgery I will refer her to her primary care physician for preoperative evaluation and then we will schedule for surgery     Time spent with patient 45 minutes.      Saeed Smith MD , FACS   Diplomate American Board of Plastic Surgery  Diplomate American Board of Surgery  Adj. Assistant Professor of Surgery  Division of Plastic & Reconstructive Surgery   [facility]  Office: [phone]   11/30/2023 at 9:21 AM       Again, thank you for allowing me to participate in the care of your patient.        Sincerely,        Saeed Smith MD

## 2023-12-04 ENCOUNTER — MYC MEDICAL ADVICE (OUTPATIENT)
Dept: ADMINISTRATIVE | Facility: CLINIC | Age: 43
End: 2023-12-04
Payer: COMMERCIAL

## 2023-12-05 ENCOUNTER — OFFICE VISIT (OUTPATIENT)
Dept: NEUROSURGERY | Facility: CLINIC | Age: 43
End: 2023-12-05
Attending: NURSE PRACTITIONER
Payer: COMMERCIAL

## 2023-12-05 ENCOUNTER — PRE VISIT (OUTPATIENT)
Dept: NEUROSURGERY | Facility: CLINIC | Age: 43
End: 2023-12-05

## 2023-12-05 VITALS
WEIGHT: 205 LBS | BODY MASS INDEX: 36.32 KG/M2 | HEART RATE: 79 BPM | SYSTOLIC BLOOD PRESSURE: 150 MMHG | DIASTOLIC BLOOD PRESSURE: 94 MMHG | HEIGHT: 63 IN

## 2023-12-05 DIAGNOSIS — S06.0X0A CONCUSSION WITHOUT LOSS OF CONSCIOUSNESS, INITIAL ENCOUNTER: ICD-10-CM

## 2023-12-05 DIAGNOSIS — M79.18 MYOFASCIAL PAIN: Primary | ICD-10-CM

## 2023-12-05 DIAGNOSIS — F07.81 POSTCONCUSSION SYNDROME: ICD-10-CM

## 2023-12-05 DIAGNOSIS — M54.2 CERVICALGIA: ICD-10-CM

## 2023-12-05 DIAGNOSIS — M25.812 IMPINGEMENT OF LEFT SHOULDER: ICD-10-CM

## 2023-12-05 PROCEDURE — 99204 OFFICE O/P NEW MOD 45 MIN: CPT | Performed by: PREVENTIVE MEDICINE

## 2023-12-05 ASSESSMENT — PAIN SCALES - GENERAL: PAINLEVEL: MODERATE PAIN (4)

## 2023-12-05 NOTE — PATIENT INSTRUCTIONS
I am glad you came in to see me Lin.  I am very reassured by your examination.  I do not get too anxious about the symptoms.  They will continue for a while but they will gradually fade away.  The cortisone shot in your shoulder should help your left upper back relax but if necessary consider doing some dry needling of those muscles.  I am not worried at all about your neck or your brain at this time and that should all settle down.  Come back and see me if things are getting better.  See the assessment and plan below for further details of our discussion today and hope you have a happy holiday season    ASSESSMENT:     Lin Corona is a 43 year old female who presents today for new patient evaluation of   Fall off of horse striking left occiput  Upper quadrant myofascial pain  Left shoulder impingement syndrome with likely traumatic bursitis  Postconcussion syndrome primarily manifesting as intermittent cognitive deficits  Probable sleep positioning ulnar neuritis at the elbows  Neurologically intact      DISCUSSION/PLAN:  Folow up with PCP re: CRP - warned.  Reassurance offered.  Her postconcussion syndrome symptoms should gradually fade away over the course of the next 3 to 6 months.  She is to pay attention when she wakes up at night with tingling hands to determine whether her elbows or wrists are at an awkward position in which fingers are involved.  After she makes that determination, I expect that she will notice it is the ring and fifth finger and her elbows are flexed and the treatment is towel splints at night to keep her self from flexing her elbows more than 90 degrees.  Left subacromial space steroid injection.  Finish up her PT.  A few more chiropractic visits would be fine but I would not over adjust her neck because she has full range of motion that is greater than expected.  She has a reassuringly normal neurologic exam and she has essentially normal x-rays and MRIs of her neck.  I do not  think we need to do imaging of her brain but if her cognitive deficits are not improving that would be the next step.  She does have a neurology appointment at the end of January and it is up to her if she wants to keep it but I do not think it is critical.

## 2023-12-05 NOTE — NURSING NOTE
"Reason For Visit:   Chief Complaint   Patient presents with    Consult     Neck pain         Occupation:   Currently working? Yes.  Work status?  Full time.    Sports: n  Activities: dog shows             BP (!) 150/94   Pulse 79   Ht 1.6 m (5' 3\")   Wt 93 kg (205 lb)   LMP 01/06/2021   BMI 36.31 kg/m        Allergies   Allergen Reactions    Gold Rash     Rash with true test    Nickel Rash     Positive for contact dermatitis, true test    Thimerosal (Thiomersal) Rash     Strong reaction with true test.        Current Outpatient Medications   Medication    dupilumab (DUPIXENT) 300 MG/2ML prefilled pen    insulin pen needle (32G X 6 MM) 32G X 6 MM miscellaneous    levothyroxine (SYNTHROID/LEVOTHROID) 50 MCG tablet    liraglutide - Weight Management (SAXENDA) 18 MG/3ML pen    Phentermine-Topiramate 7.5-46 MG CP24    tiZANidine (ZANAFLEX) 2 MG tablet     No current facility-administered medications for this visit.         Darla Severin-Brown, LPN   "
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
## 2023-12-12 ENCOUNTER — OFFICE VISIT (OUTPATIENT)
Dept: ORTHOPEDICS | Facility: CLINIC | Age: 43
End: 2023-12-12
Payer: COMMERCIAL

## 2023-12-12 VITALS
DIASTOLIC BLOOD PRESSURE: 85 MMHG | HEIGHT: 63 IN | WEIGHT: 205 LBS | BODY MASS INDEX: 36.32 KG/M2 | SYSTOLIC BLOOD PRESSURE: 123 MMHG

## 2023-12-12 DIAGNOSIS — M25.812 IMPINGEMENT OF LEFT SHOULDER: ICD-10-CM

## 2023-12-12 PROCEDURE — 20611 DRAIN/INJ JOINT/BURSA W/US: CPT | Mod: LT | Performed by: FAMILY MEDICINE

## 2023-12-12 RX ORDER — ROPIVACAINE HYDROCHLORIDE 5 MG/ML
2 INJECTION, SOLUTION EPIDURAL; INFILTRATION; PERINEURAL
Status: DISCONTINUED | OUTPATIENT
Start: 2023-12-12 | End: 2024-01-11

## 2023-12-12 RX ORDER — TRIAMCINOLONE ACETONIDE 40 MG/ML
40 INJECTION, SUSPENSION INTRA-ARTICULAR; INTRAMUSCULAR
Status: DISCONTINUED | OUTPATIENT
Start: 2023-12-12 | End: 2024-01-11

## 2023-12-12 RX ADMIN — TRIAMCINOLONE ACETONIDE 40 MG: 40 INJECTION, SUSPENSION INTRA-ARTICULAR; INTRAMUSCULAR at 11:30

## 2023-12-12 RX ADMIN — ROPIVACAINE HYDROCHLORIDE 2 ML: 5 INJECTION, SOLUTION EPIDURAL; INFILTRATION; PERINEURAL at 11:30

## 2023-12-12 NOTE — PROGRESS NOTES
Lin Corona  :  1980  DOS: 2023  MRN: [id number]    Sports Medicine Clinic Procedure    Ultrasound Guided Left Shoulder Subacromial Bursa Injection    Clinical History: Patient notes radiating posterior lateral left shoulder pain with numbness/tingling to her hand following a fall from a horse in 2023.  She has been doing physical therapy with only mild improvement.    Diagnosis:   1. Impingement of left shoulder      Referring Physician: Brittany Oro MD  Large Joint Injection/Arthocentesis: L subacromial bursa    Date/Time: 2023 11:30 AM    Performed by: Weston Mcnally DO  Authorized by: Weston Mcnally DO    Indications:  Pain  Needle Size:  22 G  Guidance: ultrasound    Approach:  Lateral  Location:  Shoulder      Site:  L subacromial bursa  Medications:  40 mg triamcinolone 40 MG/ML; 2 mL ROPivacaine 5 MG/ML  Outcome:  Tolerated well, no immediate complications  Procedure discussed: discussed risks, benefits, and alternatives    Consent Given by:  Patient  Timeout: timeout called immediately prior to procedure    Prep: patient was prepped and draped in usual sterile fashion     Ultrasound images of procedure were permanently stored.         Impression:  Successful Left shoulder corticosteroid injection, subacromial space, US guided    Plan:  Follow up with me in 3 weeks if incomplete relief, RTC > AC joint >> GH joint on exam today   Expectations and goals of CSI reviewed  Often 2-3 days for steroid effect, and can take up to two weeks for maximum effect  We discussed modified progressive pain-free activity as tolerated  Do not overuse in first two weeks if feeling better due to concern for vulnerability while steroid is working  Supportive care reviewed  All questions were answered today  Contact us with additional questions or concerns  Signs and sx of concern reviewed      Weston Mcnally DO, CAQ  Primary Care Sports Medicine  [facility]

## 2023-12-12 NOTE — LETTER
2023         RE: Lin Corona  [address] [address] [place] [id number]        Dear Colleague,    Thank you for referring your patient, Lin Corona, to the [facility] SPORTS MEDICINE CLINIC [place]. Please see a copy of my visit note below.    Lin Corona  :  1980  DOS: 2023  MRN: [id number]    Sports Medicine Clinic Procedure    Ultrasound Guided Left Shoulder Subacromial Bursa Injection    Clinical History: Patient notes radiating posterior lateral left shoulder pain with numbness/tingling to her hand following a fall from a horse in 2023.  She has been doing physical therapy with only mild improvement.    Diagnosis:   1. Impingement of left shoulder      Referring Physician: Brittany Oro MD  Large Joint Injection/Arthocentesis: L subacromial bursa    Date/Time: 2023 11:30 AM    Performed by: Weston Mcnally DO  Authorized by: Weston Mcnally DO    Indications:  Pain  Needle Size:  22 G  Guidance: ultrasound    Approach:  Lateral  Location:  Shoulder      Site:  L subacromial bursa  Medications:  40 mg triamcinolone 40 MG/ML; 2 mL ROPivacaine 5 MG/ML  Outcome:  Tolerated well, no immediate complications  Procedure discussed: discussed risks, benefits, and alternatives    Consent Given by:  Patient  Timeout: timeout called immediately prior to procedure    Prep: patient was prepped and draped in usual sterile fashion     Ultrasound images of procedure were permanently stored.         Impression:  Successful Left shoulder corticosteroid injection, subacromial space, US guided    Plan:  Follow up with me in 3 weeks if incomplete relief, RTC > AC joint >> GH joint on exam today   Expectations and goals of CSI reviewed  Often 2-3 days for steroid effect, and can take up to two weeks for maximum effect  We discussed modified progressive pain-free activity as tolerated  Do not overuse in first two weeks if feeling better due to concern for vulnerability while  steroid is working  Supportive care reviewed  All questions were answered today  Contact us with additional questions or concerns  Signs and sx of concern reviewed      Weston Mcnally DO, CAQ  Primary Care Sports Medicine  [facility]       Again, thank you for allowing me to participate in the care of your patient.        Sincerely,        Weston Mcnally DO

## 2023-12-28 DIAGNOSIS — M25.812 IMPINGEMENT OF LEFT SHOULDER: ICD-10-CM

## 2023-12-28 DIAGNOSIS — M79.18 MYOFASCIAL PAIN: Primary | ICD-10-CM
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
## 2024-01-11 ENCOUNTER — OFFICE VISIT (OUTPATIENT)
Dept: ORTHOPEDICS | Facility: CLINIC | Age: 44
End: 2024-01-11
Payer: COMMERCIAL

## 2024-01-11 ENCOUNTER — HOSPITAL ENCOUNTER (OUTPATIENT)
Dept: MRI IMAGING | Facility: CLINIC | Age: 44
Discharge: HOME | End: 2024-01-11
Attending: PREVENTIVE MEDICINE | Admitting: PREVENTIVE MEDICINE
Payer: COMMERCIAL

## 2024-01-11 VITALS
HEIGHT: 63 IN | WEIGHT: 204 LBS | BODY MASS INDEX: 36.14 KG/M2 | SYSTOLIC BLOOD PRESSURE: 130 MMHG | DIASTOLIC BLOOD PRESSURE: 91 MMHG

## 2024-01-11 DIAGNOSIS — M25.812 IMPINGEMENT OF LEFT SHOULDER: ICD-10-CM

## 2024-01-11 DIAGNOSIS — M25.619 LIMITED RANGE OF MOTION (ROM) OF SHOULDER: ICD-10-CM

## 2024-01-11 DIAGNOSIS — M25.512 ACUTE PAIN OF LEFT SHOULDER: Primary | ICD-10-CM

## 2024-01-11 DIAGNOSIS — M79.18 MYOFASCIAL PAIN: ICD-10-CM

## 2024-01-11 PROCEDURE — 99213 OFFICE O/P EST LOW 20 MIN: CPT | Mod: 25 | Performed by: FAMILY MEDICINE

## 2024-01-11 PROCEDURE — 20611 DRAIN/INJ JOINT/BURSA W/US: CPT | Mod: LT | Performed by: FAMILY MEDICINE

## 2024-01-11 PROCEDURE — 73221 MRI JOINT UPR EXTREM W/O DYE: CPT | Mod: 26 | Performed by: RADIOLOGY

## 2024-01-11 PROCEDURE — 73221 MRI JOINT UPR EXTREM W/O DYE: CPT | Mod: LT

## 2024-01-11 RX ORDER — TRIAMCINOLONE ACETONIDE 40 MG/ML
40 INJECTION, SUSPENSION INTRA-ARTICULAR; INTRAMUSCULAR
Status: DISCONTINUED | OUTPATIENT
Start: 2024-01-11 | End: 2024-04-25

## 2024-01-11 RX ORDER — ROPIVACAINE HYDROCHLORIDE 5 MG/ML
3 INJECTION, SOLUTION EPIDURAL; INFILTRATION; PERINEURAL
Status: DISCONTINUED | OUTPATIENT
Start: 2024-01-11 | End: 2024-04-25

## 2024-01-11 RX ADMIN — TRIAMCINOLONE ACETONIDE 40 MG: 40 INJECTION, SUSPENSION INTRA-ARTICULAR; INTRAMUSCULAR at 11:00

## 2024-01-11 RX ADMIN — ROPIVACAINE HYDROCHLORIDE 3 ML: 5 INJECTION, SOLUTION EPIDURAL; INFILTRATION; PERINEURAL at 11:00

## 2024-01-11 NOTE — PROGRESS NOTES
Lin Corona  :  1980  DOS: 2024  MRN: [id number]    Sports Medicine Clinic Visit    PCP: Dahlia Johns    Lin Corona is a 43 year old Right hand dominant female who is seen in follow-up presenting with left shoulder pain.    Interim History - 2024  Since last visit on 2023 patient has moderate left deep anterior shoulder, glenohumeral joint pain with radiating pain to left trapezius muscle.  Left shoulder subacromial steroid injection completed on 23 provided ~ 60% relief for 1 - 2 weeks.  She notes that pain going through posterior shoulder & upper trapezius improved, but still have significant pain remaining.  Patient completed MRI earlier today, results are not available at this time.  No new injury in the interim.      Social History: currently employed as an     Review of Systems  Musculoskeletal: as above  Remainder of review of systems is negative including constitutional, CV, pulmonary, GI, Skin and Neurologic except as noted in HPI or medical history.    Past Medical History:   Diagnosis Date    Anxiety and depression     Dysmenorrhea     Endometriosis     Intrinsic eczema      Past Surgical History:   Procedure Laterality Date    APPENDECTOMY  2011    BREAST SURGERY      Breast reduction    COLONOSCOPY  2011    CYSTOSCOPY N/A 2021    Procedure: CYSTOSCOPY;  Surgeon: Jessica Gallardo DO;  Location: [facility] OR    GYN SURGERY  2012    Colposcopy exam     HYSTERECTOMY      LAPAROSCOPIC HYSTERECTOMY TOTAL, BILATERAL SALPINGO-OOPHORECTOMY, COMBINED N/A 2021    Procedure: HYSTERECTOMY, TOTAL, LAPAROSCOPIC, WITH BILATERAL SALPINGO-OOPHORECTOMY;  Surgeon: Jessica Gallardo DO;  Location: [facility] OR    SHOULDER SURGERY Right      Family History   Problem Relation Age of Onset    Arthritis Mother     Heart Disease Father     Gallbladder Disease Father     Arthritis Maternal Grandmother     Unknown/Adopted Maternal Grandmother      "SAM Paternal Grandmother     Ovarian Cancer Paternal Grandmother     Cancer Paternal Grandmother     MERLE. Paternal Grandfather     Cervical Cancer Cousin     Cervical Cancer Paternal Aunt        Objective  BP (!) 130/91   Ht 1.6 m (5' 3\")   Wt 92.5 kg (204 lb)   LMP 01/06/2021   BMI 36.14 kg/m      General: healthy, alert and in no distress    HEENT: no scleral icterus or conjunctival erythema   Skin: no suspicious lesions or rash. No jaundice.   CV: regular rhythm by palpation, 2+ distal pulses, no pedal edema    Resp: normal respiratory effort without conversational dyspnea   Psych: normal mood and affect    Gait: nonantalgic, appropriate coordination and balance   Neuro: normal light touch sensory exam of the extremities. Motor strength as noted below     Left Shoulder exam    ROM:        Limited terminal active and passive ROM with flexion, extension, abduction, internal and external rotation.    Tender:        acromioclavicular joint       subacromial space       GH joint most focal    Non Tender:       remainder of shoulder       sternoclavicular joint       acromioclavicular joint    Strength:        abduction 5/5       internal rotation 5/5       external rotation 5/5       adduction 5/5    Impingement testing:       painful Neer       painful Magallanes       positive (+) empty can       positive (+) crossover       positive (+) crank    Stability testing:       neg (-) anterior glide       neg (-) sulcus sign    Skin:       no visible deformities       well perfused       capillary refill brisk    Sensation:        normal sensation over shoulder and upper extremity       Radiology  Recent Results (from the past 744 hour(s))   MR Shoulder Left w/o Contrast    Narrative    EXAM: MR left shoulder without  contrast 1/11/2024 8:47 AM    TECHNIQUE: Multiplanar, multisequence imaging of the left shoulder  were obtained without administration of intravenous or intra-articular  gadolinium contrast using " routine protocol.    History: Impingement syndrome.  Recent subacromial injection.;  Myofascial pain; Impingement of left shoulder     Additional Clinical information from EMR: Fall off of horse in  September with neck pain. Numbness and paresthesias to bilateral ulnar  3 digits. Physical exam concerning for shoulder impingement.     Comparison: Left shoulder radiographs 9/13/2023    Findings:    ROTATOR CUFF and ASSOCIATED STRUCTURES  Rotator cuff:   Supraspinatus tendinosis without tear.     Infraspinatus is intact.     Mild subscapularis tendinosis without tearing.     Teres minor is intact.    Bursa: No subacromial or subdeltoid bursal fluid.    Musculature: Muscle bulk of rotator cuff is preserved.  Deltoid muscle  bulk is also preserved.  No muscle edema.    Acromioclavicular joint  There are mild degenerative changes of the acromioclavicular joint  with mild capsular hypertrophy. Acromion is type 2 in sagittal  morphology.  Coracoacromial ligament is not thickened.    OSSEOUS STRUCTURES  No fracture, marrow contusion or marrow infiltration. Glenoid bone  island.    LONG BICIPITAL TENDON  The long head of the biceps tendon is normally situated within the  bicipital groove. No complete or partial biceps tendon tear is  present.    GLENOHUMERAL JOINT  Joint fluid: Physiologic amount of joint fluid is  present.    Cartilage and subarticular bone:  No focal hyaline cartilage defects  are noted. No Hill-Sachs, reverse Hill-Sachs, or bony Bankart lesions  are seen.    Labrum: Limited assessment on this study with relative lack of joint  distention shows no labral tear.    ANCILLARY FINDINGS:  Partial effacement of the subcoracoid fat and rotator cuff interval  with thickened appearance of the inferior glenohumeral ligament.      Impression    Impression:    1. No evidence for subacromial impingement or subacromial/subdeltoid  bursitis.    2. Supraspinatus and subscapularis tendinosis without tearing.    3. Query  clinical entity of adhesive capsulitis with effacement of the  subcoracoid and rotator cuff interval fat with thickened appearance of  the inferior glenohumeral ligament.    I have personally reviewed the examination and initial interpretation  and I agree with the findings.    NAUN TUCKER MD (Joe)         SYSTEM ID:  [id number]       Large Joint Injection/Arthocentesis: L glenohumeral joint    Date/Time: 1/11/2024 11:00 AM    Performed by: Weston Mcnally DO  Authorized by: Weston Mcnally DO    Indications:  Pain  Needle Size:  21 G  Guidance: ultrasound    Approach:  Posterolateral  Location:  Shoulder      Site:  L glenohumeral joint  Medications:  40 mg triamcinolone 40 MG/ML; 3 mL ROPivacaine 5 MG/ML  Outcome:  Tolerated well, no immediate complications  Procedure discussed: discussed risks, benefits, and alternatives    Consent Given by:  Patient  Timeout: timeout called immediately prior to procedure    Prep: patient was prepped and draped in usual sterile fashion     Ultrasound images of procedure were permanently stored.       Assessment:  1. Acute pain of left shoulder    2. Limited range of motion (ROM) of shoulder        Plan:  Discussed the assessment with the patient.  Follow up: 3 weeks if limited relief from injection today  Subacromial CSI ordered by PMR provider was somewhat helpful but incomplete relief  I invited her to return to my clinic if pain was not resolved  MRI ordered by outside provider reviewed today after visit, reassuring relative to structural pathology, does have signs of possible adhesive capsulitis  Trial of GH joint injection today which should be helpful  Can still consider alternative locations for diagnostic and therapeutic value, but also reviewed goal of limiting CSI where possible  Work on gentle ROM exercises, PT options and strategies reviewed  MR and XR images independently visualized and reviewed with patient today in clinic  Expectations and  goals of CSI reviewed  Often 2-3 days for steroid effect, and can take up to two weeks for maximum effect  We discussed modified progressive pain-free activity as tolerated  Do not overuse in first two weeks if feeling better due to concern for vulnerability while steroid is working  Supportive care reviewed  All questions were answered today  Contact us with additional questions or concerns  Signs and sx of concern reviewed      Weston Mcnally DO, JOSEPH  Sports Medicine Physician  [facility]        Disclaimer: This note consists of symbols derived from keyboarding, dictation and/or voice recognition software. As a result, there may be errors in the script that have gone undetected. Please consider this when interpreting information found in this chart.

## 2024-01-11 NOTE — LETTER
2024         RE: Lin Corona  [address] [address] [place] [id number]        Dear Colleague,    Thank you for referring your patient, Lin Corona, to the [facility] [place]. Please see a copy of my visit note below.    Lin Corona  :  1980  DOS: 2024  MRN: [id number]    Sports Medicine Clinic Visit    PCP: Dahlia Johns    Lin Corona is a 43 year old Right hand dominant female who is seen in follow-up presenting with left shoulder pain.    Interim History - 2024  Since last visit on 2023 patient has moderate left deep anterior shoulder, glenohumeral joint pain with radiating pain to left trapezius muscle.  Left shoulder subacromial steroid injection completed on 23 provided ~ 60% relief for 1 - 2 weeks.  She notes that pain going through posterior shoulder & upper trapezius improved, but still have significant pain remaining.  Patient completed MRI earlier today, results are not available at this time.  No new injury in the interim.      Social History: currently employed as an     Review of Systems  Musculoskeletal: as above  Remainder of review of systems is negative including constitutional, CV, pulmonary, GI, Skin and Neurologic except as noted in HPI or medical history.    Past Medical History:   Diagnosis Date     Anxiety and depression      Dysmenorrhea      Endometriosis      Intrinsic eczema      Past Surgical History:   Procedure Laterality Date     APPENDECTOMY  2011     BREAST SURGERY      Breast reduction     COLONOSCOPY  2011     CYSTOSCOPY N/A 2021    Procedure: CYSTOSCOPY;  Surgeon: Jessica Gallardo DO;  Location: [facility]     GYN SURGERY  2012    Colposcopy exam      HYSTERECTOMY       LAPAROSCOPIC HYSTERECTOMY TOTAL, BILATERAL SALPINGO-OOPHORECTOMY, COMBINED N/A 2021    Procedure: HYSTERECTOMY, TOTAL, LAPAROSCOPIC, WITH BILATERAL SALPINGO-OOPHORECTOMY;  Surgeon:  "Kaylan GallardoitDO landon;  Location: [facility] OR     SHOULDER SURGERY Right      Family History   Problem Relation Age of Onset     Arthritis Mother      Heart Disease Father      Gallbladder Disease Father      Arthritis Maternal Grandmother      Unknown/Adopted Maternal Grandmother      MERLE. Paternal Grandmother      Ovarian Cancer Paternal Grandmother      Cancer Paternal Grandmother      MERLE. Paternal Grandfather      Cervical Cancer Cousin      Cervical Cancer Paternal Aunt        Objective  BP (!) 130/91   Ht 1.6 m (5' 3\")   Wt 92.5 kg (204 lb)   LMP 01/06/2021   BMI 36.14 kg/m      General: healthy, alert and in no distress    HEENT: no scleral icterus or conjunctival erythema   Skin: no suspicious lesions or rash. No jaundice.   CV: regular rhythm by palpation, 2+ distal pulses, no pedal edema    Resp: normal respiratory effort without conversational dyspnea   Psych: normal mood and affect    Gait: nonantalgic, appropriate coordination and balance   Neuro: normal light touch sensory exam of the extremities. Motor strength as noted below     Left Shoulder exam    ROM:        Limited terminal active and passive ROM with flexion, extension, abduction, internal and external rotation.    Tender:        acromioclavicular joint       subacromial space       GH joint most focal    Non Tender:       remainder of shoulder       sternoclavicular joint       acromioclavicular joint    Strength:        abduction 5/5       internal rotation 5/5       external rotation 5/5       adduction 5/5    Impingement testing:       painful Neer       painful Magallanes       positive (+) empty can       positive (+) crossover       positive (+) crank    Stability testing:       neg (-) anterior glide       neg (-) sulcus sign    Skin:       no visible deformities       well perfused       capillary refill brisk    Sensation:        normal sensation over shoulder and upper extremity       Radiology  Recent Results (from the past 744 " hour(s))   MR Shoulder Left w/o Contrast    Narrative    EXAM: MR left shoulder without  contrast 1/11/2024 8:47 AM    TECHNIQUE: Multiplanar, multisequence imaging of the left shoulder  were obtained without administration of intravenous or intra-articular  gadolinium contrast using routine protocol.    History: Impingement syndrome.  Recent subacromial injection.;  Myofascial pain; Impingement of left shoulder     Additional Clinical information from EMR: Fall off of horse in  September with neck pain. Numbness and paresthesias to bilateral ulnar  3 digits. Physical exam concerning for shoulder impingement.     Comparison: Left shoulder radiographs 9/13/2023    Findings:    ROTATOR CUFF and ASSOCIATED STRUCTURES  Rotator cuff:   Supraspinatus tendinosis without tear.     Infraspinatus is intact.     Mild subscapularis tendinosis without tearing.     Teres minor is intact.    Bursa: No subacromial or subdeltoid bursal fluid.    Musculature: Muscle bulk of rotator cuff is preserved.  Deltoid muscle  bulk is also preserved.  No muscle edema.    Acromioclavicular joint  There are mild degenerative changes of the acromioclavicular joint  with mild capsular hypertrophy. Acromion is type 2 in sagittal  morphology.  Coracoacromial ligament is not thickened.    OSSEOUS STRUCTURES  No fracture, marrow contusion or marrow infiltration. Glenoid bone  island.    LONG BICIPITAL TENDON  The long head of the biceps tendon is normally situated within the  bicipital groove. No complete or partial biceps tendon tear is  present.    GLENOHUMERAL JOINT  Joint fluid: Physiologic amount of joint fluid is  present.    Cartilage and subarticular bone:  No focal hyaline cartilage defects  are noted. No Hill-Sachs, reverse Hill-Sachs, or bony Bankart lesions  are seen.    Labrum: Limited assessment on this study with relative lack of joint  distention shows no labral tear.    ANCILLARY FINDINGS:  Partial effacement of the subcoracoid fat  and rotator cuff interval  with thickened appearance of the inferior glenohumeral ligament.      Impression    Impression:    1. No evidence for subacromial impingement or subacromial/subdeltoid  bursitis.    2. Supraspinatus and subscapularis tendinosis without tearing.    3. Query clinical entity of adhesive capsulitis with effacement of the  subcoracoid and rotator cuff interval fat with thickened appearance of  the inferior glenohumeral ligament.    I have personally reviewed the examination and initial interpretation  and I agree with the findings.    NAUN TUCKER MD (Joe)         SYSTEM ID:  [id number]       Large Joint Injection/Arthocentesis: L glenohumeral joint    Date/Time: 1/11/2024 11:00 AM    Performed by: Weston Mcnally DO  Authorized by: Weston Mcnally DO    Indications:  Pain  Needle Size:  21 G  Guidance: ultrasound    Approach:  Posterolateral  Location:  Shoulder      Site:  L glenohumeral joint  Medications:  40 mg triamcinolone 40 MG/ML; 3 mL ROPivacaine 5 MG/ML  Outcome:  Tolerated well, no immediate complications  Procedure discussed: discussed risks, benefits, and alternatives    Consent Given by:  Patient  Timeout: timeout called immediately prior to procedure    Prep: patient was prepped and draped in usual sterile fashion     Ultrasound images of procedure were permanently stored.       Assessment:  1. Acute pain of left shoulder    2. Limited range of motion (ROM) of shoulder        Plan:  Discussed the assessment with the patient.  Follow up: 3 weeks if limited relief from injection today  Subacromial CSI ordered by PMR provider was somewhat helpful but incomplete relief  I invited her to return to my clinic if pain was not resolved  MRI ordered by outside provider reviewed today after visit, reassuring relative to structural pathology, does have signs of possible adhesive capsulitis  Trial of GH joint injection today which should be helpful  Can still consider  alternative locations for diagnostic and therapeutic value, but also reviewed goal of limiting CSI where possible  Work on gentle ROM exercises, PT options and strategies reviewed  MR and XR images independently visualized and reviewed with patient today in clinic  Expectations and goals of CSI reviewed  Often 2-3 days for steroid effect, and can take up to two weeks for maximum effect  We discussed modified progressive pain-free activity as tolerated  Do not overuse in first two weeks if feeling better due to concern for vulnerability while steroid is working  Supportive care reviewed  All questions were answered today  Contact us with additional questions or concerns  Signs and sx of concern reviewed      Weston Mcnally DO, JOSEPH  Sports Medicine Physician  [facility]        Disclaimer: This note consists of symbols derived from keyboarding, dictation and/or voice recognition software. As a result, there may be errors in the script that have gone undetected. Please consider this when interpreting information found in this chart.      Again, thank you for allowing me to participate in the care of your patient.        Sincerely,        Weston Mcnally DO

## 2024-01-16 ENCOUNTER — OFFICE VISIT (OUTPATIENT)
Dept: NEUROSURGERY | Facility: CLINIC | Age: 44
End: 2024-01-16
Payer: COMMERCIAL

## 2024-01-16 VITALS
HEIGHT: 63 IN | DIASTOLIC BLOOD PRESSURE: 85 MMHG | BODY MASS INDEX: 35.44 KG/M2 | WEIGHT: 200 LBS | SYSTOLIC BLOOD PRESSURE: 139 MMHG | HEART RATE: 77 BPM

## 2024-01-16 DIAGNOSIS — T88.7XXA MEDICATION SIDE EFFECT: ICD-10-CM

## 2024-01-16 DIAGNOSIS — M79.18 MYOFASCIAL PAIN: Primary | ICD-10-CM

## 2024-01-16 DIAGNOSIS — M75.02 ADHESIVE CAPSULITIS OF LEFT SHOULDER: ICD-10-CM

## 2024-01-16 PROCEDURE — 99214 OFFICE O/P EST MOD 30 MIN: CPT | Performed by: PREVENTIVE MEDICINE

## 2024-01-16 RX ORDER — OXYCODONE HYDROCHLORIDE 5 MG/1
5 TABLET ORAL EVERY 6 HOURS PRN
Qty: 12 TABLET | Refills: 0 | Status: SHIPPED | OUTPATIENT
Start: 2024-01-16 | End: 2024-01-19

## 2024-01-16 ASSESSMENT — PAIN SCALES - GENERAL: PAINLEVEL: SEVERE PAIN (7)
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
## 2024-01-16 NOTE — NURSING NOTE
"Reason For Visit:   Chief Complaint   Patient presents with    RECHECK     MRI results       Occupation:   Currently working? Yes.  Work status?  Full time.     Sports: n  Activities: dog shows               /85   Pulse 77   Ht 1.6 m (5' 3\")   Wt 90.7 kg (200 lb)   LMP 01/06/2021   BMI 35.43 kg/m        Allergies   Allergen Reactions    Gold Rash     Rash with true test    Nickel Rash     Positive for contact dermatitis, true test    Thimerosal (Thiomersal) Rash     Strong reaction with true test.        Current Outpatient Medications   Medication    dupilumab (DUPIXENT) 300 MG/2ML prefilled pen    insulin pen needle (32G X 6 MM) 32G X 6 MM miscellaneous    levothyroxine (SYNTHROID/LEVOTHROID) 50 MCG tablet    liraglutide - Weight Management (SAXENDA) 18 MG/3ML pen    Phentermine-Topiramate 7.5-46 MG CP24    tiZANidine (ZANAFLEX) 2 MG tablet     Current Facility-Administered Medications   Medication    3 mL ropivacaine (NAROPIN) injection 5 mg/mL    triamcinolone (KENALOG-40) injection 40 mg         Darla Severin-Brown, LPN    "

## 2024-01-16 NOTE — LETTER
January 16, 2024      Lin AMADOR Cj  [address] [address] [place] [id number]        To Whom It May Concern,     Lin has a left frozen shoulder and has restrictions of no use of the left arm until follow-up in 6 weeks.      Sincerely,        Sigifredo Oro MD

## 2024-01-16 NOTE — PATIENT INSTRUCTIONS
Lin I am sorry this is so distressing and painful for you.  I am pretty sure that Dr. Mcnally is correct that this is just a frozen shoulder and you are having a bad reaction to the injection.  That should settle down within the next few days and in the meantime I will give you some narcotic pain pills to help you with the symptoms and try sleeping in a recliner until it settles down.  Time and relatively painful physical therapy is the best treatment for this.  We try and avoid manipulation under anesthesia unless you fail all of the treatments.  I will keep you off work in the meantime and I will plan to see back in 6 weeks.  I do not see a good way that you can work like this.  The assessment and plan below for further details of our discussion today    Assessment:    Lin Corona is a 43 year old y.o. female with   Fall off of horse striking left occiput  Upper quadrant myofascial pain  Left shoulder pain with likely adhesive capsulitis  12/12/2023 left subacromial space injection: Nontherapeutic  1/11/2024 left glenohumeral joint injection with likely steroid flare and thus far nontherapeutic  Postconcussion syndrome primarily manifesting as intermittent cognitive deficits  Probable sleep positioning ulnar neuritis at the elbows  Neurologically intact    Plan:  Oxycodone 5 #10 for current flare of pain.  I recommend sleeping in a recliner until this settles down.  The mainstay of treatment here is time and prolonged physical therapy that is often quite painful.  Unfortunately I do not have a good fix to allow her to get back to work soon.  Follow-up with me in approximately 6 weeks and continue with physical therapy once the steroid flare settles down.  I have low suspicion that this is a joint infection from the injection.

## 2024-01-21 ENCOUNTER — MYC MEDICAL ADVICE (OUTPATIENT)
Dept: NEUROSURGERY | Facility: CLINIC | Age: 44
End: 2024-01-21
Payer: COMMERCIAL

## 2024-01-22 ENCOUNTER — TELEPHONE (OUTPATIENT)
Dept: DERMATOLOGY | Facility: CLINIC | Age: 44
End: 2024-01-22
Payer: COMMERCIAL

## 2024-01-22 NOTE — TELEPHONE ENCOUNTER
PA Initiation    Medication: DUPIXENT 300 MG/2ML SC SOPN  Insurance Company: [employer] - Phone [phone] Fax [phone]  Pharmacy Filling the Rx:    Filling Pharmacy Phone:    Filling Pharmacy Fax:    Start Date: 1/22/2024    [email or web address] Roe [facility] [email or web address]  Phone: [phone]  Fax: [phone]

## 2024-01-29 ENCOUNTER — TRANSFERRED RECORDS (OUTPATIENT)
Dept: HEALTH INFORMATION MANAGEMENT | Facility: CLINIC | Age: 44
End: 2024-01-29
Payer: COMMERCIAL

## 2024-01-31 NOTE — TELEPHONE ENCOUNTER
Prior Authorization Approval    Medication: DUPIXENT 300 MG/2ML SC SOPN  Authorization Effective Date: 1/22/2024  Authorization Expiration Date: 3/1/2025  Approved Dose/Quantity: 4ml per 28 days  Reference #: [id number]   Insurance Company: [employer] - Phone [phone] Fax [phone]  Expected CoPay: $    CoPay Card Available:      Financial Assistance Needed: na  Which Pharmacy is filling the prescription:    Pharmacy Notified: no  Patient Notified: no        Tata Roe [facility] [email or web address]  [email or web address]  Phone: [phone]  Fax: [phone]

## 2024-02-05 ENCOUNTER — TRANSFERRED RECORDS (OUTPATIENT)
Dept: HEALTH INFORMATION MANAGEMENT | Facility: CLINIC | Age: 44
End: 2024-02-05
Payer: COMMERCIAL

## 2024-02-12 ENCOUNTER — MYC REFILL (OUTPATIENT)
Dept: FAMILY MEDICINE | Facility: CLINIC | Age: 44
End: 2024-02-12
Payer: COMMERCIAL

## 2024-02-12 DIAGNOSIS — M54.2 CERVICALGIA: ICD-10-CM

## 2024-02-12 RX ORDER — TIZANIDINE 2 MG/1
2 TABLET ORAL 3 TIMES DAILY PRN
Qty: 20 TABLET | Refills: 0 | Status: SHIPPED | OUTPATIENT
Start: 2024-02-12

## 2024-02-28 ENCOUNTER — MYC MEDICAL ADVICE (OUTPATIENT)
Dept: ENDOCRINOLOGY | Facility: CLINIC | Age: 44
End: 2024-02-28
Payer: COMMERCIAL

## 2024-02-28 DIAGNOSIS — E66.01 CLASS 2 SEVERE OBESITY WITH SERIOUS COMORBIDITY AND BODY MASS INDEX (BMI) OF 36.0 TO 36.9 IN ADULT, UNSPECIFIED OBESITY TYPE (H): Primary | ICD-10-CM

## 2024-02-28 DIAGNOSIS — E66.812 CLASS 2 SEVERE OBESITY WITH SERIOUS COMORBIDITY AND BODY MASS INDEX (BMI) OF 36.0 TO 36.9 IN ADULT, UNSPECIFIED OBESITY TYPE (H): Primary | ICD-10-CM

## 2024-02-29 ENCOUNTER — OFFICE VISIT (OUTPATIENT)
Dept: NEUROSURGERY | Facility: CLINIC | Age: 44
End: 2024-02-29
Payer: COMMERCIAL

## 2024-02-29 VITALS
DIASTOLIC BLOOD PRESSURE: 83 MMHG | HEIGHT: 63 IN | HEART RATE: 73 BPM | SYSTOLIC BLOOD PRESSURE: 119 MMHG | BODY MASS INDEX: 35.44 KG/M2 | WEIGHT: 200 LBS

## 2024-02-29 DIAGNOSIS — M75.02 ADHESIVE CAPSULITIS OF LEFT SHOULDER: ICD-10-CM

## 2024-02-29 DIAGNOSIS — T88.7XXA MEDICATION SIDE EFFECT: ICD-10-CM

## 2024-02-29 DIAGNOSIS — M54.2 CERVICALGIA: ICD-10-CM

## 2024-02-29 DIAGNOSIS — M79.18 MYOFASCIAL PAIN: Primary | ICD-10-CM

## 2024-02-29 DIAGNOSIS — G56.22 ULNAR NEUROPATHY AT ELBOW OF LEFT UPPER EXTREMITY: ICD-10-CM

## 2024-02-29 PROCEDURE — 99214 OFFICE O/P EST MOD 30 MIN: CPT | Performed by: PREVENTIVE MEDICINE

## 2024-02-29 RX ORDER — TOPIRAMATE 50 MG/1
50 TABLET, FILM COATED ORAL DAILY
Qty: 30 TABLET | Refills: 3 | Status: SHIPPED | OUTPATIENT
Start: 2024-02-29 | End: 2024-03-28

## 2024-02-29 RX ORDER — PHENTERMINE HYDROCHLORIDE 15 MG/1
15 CAPSULE ORAL EVERY MORNING
Qty: 30 CAPSULE | Refills: 3 | Status: SHIPPED | OUTPATIENT
Start: 2024-02-29 | End: 2024-03-01

## 2024-02-29 NOTE — PATIENT INSTRUCTIONS
Lin I think you are doing great all things considered even though I know you are still in pain and frustrated.  See the assessment and plan below for further details of our discussion today.    Assessment:  This patient is  a 43 year old female  who presents with   Fall off of horse striking left occiput  Upper quadrant myofascial pain with active trigger points radiating down the left arm  Left shoulder pain with likely adhesive capsulitis-great progress with PT in a short period of time  12/12/2023 left subacromial space injection: Nontherapeutic  1/11/2024 left glenohumeral joint injection with likely steroid flare: I think this was therapeutic postconcussion syndrome primarily manifesting as intermittent cognitive deficits  Probable sleep positioning ulnar neuritis at the elbows  Neurologically intact  Improving left ulnar neuropathy at the elbow likely related to sleep positioning    Discussion/Plan:    Continue positioning the elbow so it does not fully flex at night.  Continue with the radial shockwave therapy with her chiropractor as long as that improves her ability to tolerate PT and her home exercises.  Dr. Weston Mcnally is managing her shoulder.  He does not require work restrictions at this point and she can gradually increase her activities as tolerated.  Do recommend buying a Thera cane and even a Thera gun for myofascial trigger point treatments of the left upper quadrant in conjunction with neck stretching simultaneously.  I am quite encouraged by her progress and I think she will have a good outcome eventually but the shoulder and myofascial pain may take up to a year.  Follow-up in 4 months.

## 2024-02-29 NOTE — NURSING NOTE
"Reason For Visit:   Chief Complaint   Patient presents with    RECHECK     6 week follow up         Occupation:   Currently working? Yes.  Work status?  Full time.     Sports: n  Activities: dog shows             /83   Pulse 73   Ht 1.6 m (5' 3\")   Wt 90.7 kg (200 lb)   LMP 01/06/2021   BMI 35.43 kg/m        Allergies   Allergen Reactions    Gold Rash     Rash with true test    Nickel Rash     Positive for contact dermatitis, true test    Thimerosal (Thiomersal) Rash     Strong reaction with true test.        Current Outpatient Medications   Medication    dupilumab (DUPIXENT) 300 MG/2ML prefilled pen    insulin pen needle (32G X 6 MM) 32G X 6 MM miscellaneous    levothyroxine (SYNTHROID/LEVOTHROID) 50 MCG tablet    phentermine 15 MG capsule    tiZANidine (ZANAFLEX) 2 MG tablet    topiramate (TOPAMAX) 50 MG tablet     Current Facility-Administered Medications   Medication    3 mL ropivacaine (NAROPIN) injection 5 mg/mL    triamcinolone (KENALOG-40) injection 40 mg         Darla Severin-Brown, LPN   "
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
## 2024-03-01 DIAGNOSIS — E66.01 CLASS 2 SEVERE OBESITY WITH SERIOUS COMORBIDITY AND BODY MASS INDEX (BMI) OF 36.0 TO 36.9 IN ADULT, UNSPECIFIED OBESITY TYPE (H): ICD-10-CM

## 2024-03-01 DIAGNOSIS — E66.812 CLASS 2 SEVERE OBESITY WITH SERIOUS COMORBIDITY AND BODY MASS INDEX (BMI) OF 36.0 TO 36.9 IN ADULT, UNSPECIFIED OBESITY TYPE (H): ICD-10-CM

## 2024-03-01 NOTE — TELEPHONE ENCOUNTER
Patient was looking for this medication, and we did not receive it. It looks like it was a local print. Could you resend one to us electronically?    Thank you,    Lesley Huizar  Certified Pharmacy Technician II, [facility]  Ph: [phone]  Fx: [phone]

## 2024-03-04 RX ORDER — PHENTERMINE HYDROCHLORIDE 15 MG/1
15 CAPSULE ORAL EVERY MORNING
Qty: 30 CAPSULE | Refills: 3 | Status: SHIPPED | OUTPATIENT
Start: 2024-03-04 | End: 2024-03-28

## 2024-03-11 ENCOUNTER — VIRTUAL VISIT (OUTPATIENT)
Dept: FAMILY MEDICINE | Facility: CLINIC | Age: 44
End: 2024-03-11
Payer: COMMERCIAL

## 2024-03-11 ENCOUNTER — NURSE TRIAGE (OUTPATIENT)
Dept: FAMILY MEDICINE | Facility: CLINIC | Age: 44
End: 2024-03-11

## 2024-03-11 ENCOUNTER — TRANSFERRED RECORDS (OUTPATIENT)
Dept: HEALTH INFORMATION MANAGEMENT | Facility: CLINIC | Age: 44
End: 2024-03-11

## 2024-03-11 DIAGNOSIS — J32.9 SINUSITIS, UNSPECIFIED CHRONICITY, UNSPECIFIED LOCATION: Primary | ICD-10-CM

## 2024-03-11 DIAGNOSIS — E66.01 MORBID OBESITY (H): ICD-10-CM

## 2024-03-11 PROCEDURE — 99442 PR PHYSICIAN TELEPHONE EVALUATION 11-20 MIN: CPT | Mod: 93 | Performed by: NURSE PRACTITIONER

## 2024-03-11 RX ORDER — FLUTICASONE PROPIONATE 50 MCG
1 SPRAY, SUSPENSION (ML) NASAL DAILY
Qty: 16 G | Refills: 0 | Status: SHIPPED | OUTPATIENT
Start: 2024-03-11 | End: 2024-03-25

## 2024-03-11 NOTE — TELEPHONE ENCOUNTER
Dahlia Johns APRN [facility] Pool4 minutes ago (1:14 PM)     CV  I have one virtual visit open today, would recommend she schedules that for further discussion of her present complaints.  AN Longo

## 2024-03-11 NOTE — PATIENT INSTRUCTIONS
Take antibiotics as prescribed and the nasal spray.  Continue with rest, fluids, humidify the air.  Follow up in person if symptoms persist or worsen.      Our Clinic hours are:  Mondays    7:20 am - 7 pm  Tues -  Fri  7:20 am - 5 pm    Clinic Phone: [phone]    The clinic lab opens at 7:30 am Mon - Fri and appointments are required.    [facility]. [phone]  Monday  8 am - 7pm  Tues - Fri 8 am - 5:30 pm

## 2024-03-11 NOTE — TELEPHONE ENCOUNTER
"Nurse Triage SBAR    Is this a 2nd Level Triage? YES, LICENSED PRACTITIONER REVIEW IS REQUIRED    Situation: Patient calling about ongoing sinus/head cold and respiratory symptoms.    Background: Patient reports being sick for awhile now. She did get better briefly but is now sick again. Today her symptoms are getting worse since yesterday. Last month patient and her family had the flu, and she was also exposed to someone at work last Monday with an illness that didn't show up until later. Patient has been taking OTC medications but nothing been effective.    Assessment: Patient reports having sinus pressure, headache, sore throat cough that's sometimes productive, \"dry feeling in chest\" and feeling like it's difficult to take a deep breath. She states being unable to really cough up any phlegm. Patient denies any fever, chest pain that is constant or sharp, shortness of breath at rest, wheezing, dizziness, nausea, or emesis.    Recommendation: Patient was advised to be seen today in ADS/UC/ED for further evaluation. However, patient would like to schedule an in person visit with PCP if able.     Routing to provider to review and advise. Please have your staff follow-up with patient on next steps.    CODY Joseph  [facility] Triage      Does the patient meet one of the following criteria for ADS visit consideration? 16+ years old, with an [facility] PCP     TIP  Providers, please consider if this condition is appropriate for management at one of our Acute and Diagnostic Services sites.     If patient is a good candidate, please use dotphrase <dot>triageresponse and select Refer to ADS to document.      Answer Assessment - Initial Assessment Questions  1. LOCATION: \"Where does it hurt?\"       Head cold and sinus pain  2. ONSET: \"When did the sinus pain start?\"  (e.g., hours, days)       Ongoing symptoms for awhile, got better and then got sick again. Today symptoms are worse than yesterday  3. SEVERITY: " "\"How bad is the pain?\"   (Scale 1-10; mild, moderate or severe)    - MILD (1-3): doesn't interfere with normal activities     - MODERATE (4-7): interferes with normal activities (e.g., work or school) or awakens from sleep    - SEVERE (8-10): excruciating pain and patient unable to do any normal activities         Moderate  4. RECURRENT SYMPTOM: \"Have you ever had sinus problems before?\" If so, ask: \"When was the last time?\" and \"What happened that time?\"       Yes has had sinus infections before but not like this  5. NASAL CONGESTION: \"Is the nose blocked?\" If so, ask, \"Can you open it or must you breathe through the mouth?\"      Congestion present  6. NASAL DISCHARGE: \"Do you have discharge from your nose?\" If so ask, \"What color?\"      No  7. FEVER: \"Do you have a fever?\" If so, ask: \"What is it, how was it measured, and when did it start?\"       No  8. OTHER SYMPTOMS: \"Do you have any other symptoms?\" (e.g., sore throat, cough, earache, difficulty breathing)      Cough, somewhat productive but unable to cough anything out, feeling short of breath at times, and chest tightness  9. PREGNANCY: \"Is there any chance you are pregnant?\" \"When was your last menstrual period?\"      No    Protocols used: Sinus Pain and Congestion-A-[place]    "

## 2024-03-11 NOTE — PROGRESS NOTES
"Lin is a 43 year old who is being evaluated via a billable telephone visit.      What phone number would you like to be contacted at? [phone]  How would you like to obtain your AVS? Chani  Originating Location (pt. Location): Home    Distant Location (provider location):  On-site    Assessment & Plan     Sinusitis, unspecified chronicity, unspecified location    - amoxicillin-clavulanate (AUGMENTIN) 875-125 MG tablet; Take 1 tablet by mouth 2 times daily  - fluticasone (FLONASE) 50 MCG/ACT nasal spray; Spray 1 spray into both nostrils daily for 14 days  Discussed how to take the medication(s), expected outcomes, potential side effects.  Will treat and if no improvement or any worsening or other concerns, she will need to be seen in person.    Morbid obesity (H)    Continue health diet and increased exercise           BMI  Estimated body mass index is 35.43 kg/m  as calculated from the following:    Height as of 2/29/24: 1.6 m (5' 3\").    Weight as of 2/29/24: 90.7 kg (200 lb).         See Patient Instructions  Patient Instructions   Take antibiotics as prescribed and the nasal spray.  Continue with rest, fluids, humidify the air.  Follow up in person if symptoms persist or worsen.      Our Clinic hours are:  Mondays    7:20 am - 7 pm  Tues -  Fri  7:20 am - 5 pm    Clinic Phone: [phone]    The clinic lab opens at 7:30 am Mon - Fri and appointments are required.    [facility]. [phone]  Monday  8 am - 7pm  Tues - Fri 8 am - 5:30 pm         Jennifer Ceballos is a 43 year old, presenting for the following health issues:  URI (And sinus )      3/11/2024     1:27 PM   Additional Questions   Roomed by      URI    History of Present Illness       She eats 4 or more servings of fruits and vegetables daily.She consumes 0 sweetened beverage(s) daily.She exercises with enough effort to increase her heart rate 30 to 60 minutes per day.  She exercises with enough effort to " increase her heart rate 3 or less days per week.   She is taking medications regularly.       Acute Illness  Acute illness concerns: sinus pressure   Onset/Duration: x 1 1/2 weeks     Symptoms:  Fever: No  Chills/Sweats: No  Headache (location?): YES  Sinus Pressure: YES  Conjunctivitis:  No  Ear Pain: no  Rhinorrhea: No  Congestion: YES  Sore Throat: YES  Cough: YES-non-productive  Wheeze: No  Decreased Appetite: YES  Nausea: No  Vomiting: No  Diarrhea: No  Dysuria/Freq.: No  Dysuria or Hematuria: No  Fatigue/Achiness: No  Sick/Strep Exposure: No  Therapies tried and outcome: sinus relief       States she's had a cold for weeks and now it's in her sinuses and in her chest.   Denies chest pain does have mild shortness of breath with activity.       Review of Systems  Constitutional, HEENT, cardiovascular, pulmonary, gi and gu systems are negative, except as otherwise noted.      Objective           Vitals:  No vitals were obtained today due to virtual visit.    Physical Exam   General: Alert and no distress //Respiratory: No audible wheeze, cough, or shortness of breath // Psychiatric:  Appropriate affect, tone, and pace of words            Phone call duration: 12 minutes  Signed Electronically by: TRUMAN Aguila CNP

## 2024-03-28 ENCOUNTER — VIRTUAL VISIT (OUTPATIENT)
Dept: ENDOCRINOLOGY | Facility: CLINIC | Age: 44
End: 2024-03-28
Payer: COMMERCIAL

## 2024-03-28 VITALS — BODY MASS INDEX: 34.55 KG/M2 | HEIGHT: 63 IN | WEIGHT: 195 LBS

## 2024-03-28 DIAGNOSIS — E66.01 CLASS 2 SEVERE OBESITY WITH SERIOUS COMORBIDITY AND BODY MASS INDEX (BMI) OF 36.0 TO 36.9 IN ADULT, UNSPECIFIED OBESITY TYPE (H): ICD-10-CM

## 2024-03-28 DIAGNOSIS — E66.812 CLASS 2 SEVERE OBESITY WITH SERIOUS COMORBIDITY AND BODY MASS INDEX (BMI) OF 36.0 TO 36.9 IN ADULT, UNSPECIFIED OBESITY TYPE (H): ICD-10-CM

## 2024-03-28 PROCEDURE — 99212 OFFICE O/P EST SF 10 MIN: CPT | Mod: 95 | Performed by: PHYSICIAN ASSISTANT

## 2024-03-28 RX ORDER — PHENTERMINE HYDROCHLORIDE 15 MG/1
15 CAPSULE ORAL EVERY MORNING
Qty: 30 CAPSULE | Refills: 5 | Status: SHIPPED | OUTPATIENT
Start: 2024-03-28

## 2024-03-28 RX ORDER — TOPIRAMATE 50 MG/1
50 TABLET, FILM COATED ORAL DAILY
Qty: 30 TABLET | Refills: 5 | Status: SHIPPED | OUTPATIENT
Start: 2024-03-28

## 2024-03-28 ASSESSMENT — PAIN SCALES - GENERAL: PAINLEVEL: NO PAIN (0)

## 2024-03-28 NOTE — NURSING NOTE
Is the patient currently in the state of [place]? YES    Visit mode:VIDEO    If the visit is dropped, the patient can be reconnected by: VIDEO VISIT: Text to cell phone:   Telephone Information:   Mobile [phone]       Will anyone else be joining the visit? NO  (If patient encounters technical issues they should call [phone]    How would you like to obtain your AVS? MyChart    Are changes needed to the allergy or medication list? No, Pt stated no changes to allergies, and Pt stated no med changes    Reason for visit: RECHECK (AMA)    Ana JEAN

## 2024-03-28 NOTE — PROGRESS NOTES
Return Medical Weight Management Note     Lin Corona  MRN:  [id number]  :  1980  ROSE MARIE:  3/28/2024    Dear Dahlia Johns, TRUMAN TORRE,    I had the pleasure of seeing your patient Lin Corona. She is a 43 year old female who I am continuing to see for treatment of obesity related to:        2023    12:26 PM   --   I have the following health issues associated with obesity None of the above   I have the following symptoms associated with obesity Depression       Assessment & Plan   Problem List Items Addressed This Visit    None  Visit Diagnoses       Class 2 severe obesity with serious comorbidity and body mass index (BMI) of 36.0 to 36.9 in adult, unspecified obesity type (H)               Weight mgmt follow up  Lost from 270 to around 200 lbs prior to working with us  Has lost from 203 to 195 in total since working with our team  Wasn't able to get GLP1 due to supply but then insurance no longer covers after 24  Has been taking phentermine/topiramate, helping with hunger. Tolerating both. Some dry mouth, has increased water intake.   Seeing plastic surgery for possible abdominoplasty but has to pay out of pocket  Continue phentermine 15mg daily   Continue topiramate 50mg daily    Continue HC visits prn  Follow up with therapist    Follow up 4-5 months Kristie      INTERVAL HISTORY:      Anti-obesity medication history    Current:   Phentermine 15mg daily  Topiramate 50mg    Recent diet changes: smaller portions    Recent stressors:  mother and mother in law  health issues    Recent sleep changes: no changes in sleep, sometimes thoughts racing at night due to stress with family health issues.    CURRENT WEIGHT:   195 lbs 0 oz    Initial Weight (lbs): 203.5 lbs  Last Visits Weight: 90.7 kg (200 lb)  Cumulative weight loss (lbs): 8.5  Weight Loss Percentage: 4.18%        3/28/2024     9:02 AM   Changes and Difficulties   I have made the following changes to my diet since my last visit:  None   With regards to my diet, I am still struggling with: None   I have made the following changes to my activity/exercise since my last visit: None   With regards to my activity/exercise, I am still struggling with: None         MEDICATIONS:   Current Outpatient Medications   Medication Sig Dispense Refill    amoxicillin-clavulanate (AUGMENTIN) 875-125 MG tablet Take 1 tablet by mouth 2 times daily 14 tablet 0    dupilumab (DUPIXENT) 300 MG/2ML prefilled pen Inject 2 mLs (300 mg) Subcutaneous every 14 days 4 mL 11    insulin pen needle (32G X 6 MM) 32G X 6 MM miscellaneous Use 1 pen needles daily or as directed with Saxenda. 100 each 1    levothyroxine (SYNTHROID/LEVOTHROID) 50 MCG tablet Take 1 tablet (50 mcg) by mouth daily 90 tablet 1    phentermine 15 MG capsule Take 1 capsule (15 mg) by mouth every morning 30 capsule 3    tiZANidine (ZANAFLEX) 2 MG tablet Take 1 tablet (2 mg) by mouth 3 times daily as needed for muscle spasms 20 tablet 0    topiramate (TOPAMAX) 50 MG tablet Take 1 tablet (50 mg) by mouth daily 30 tablet 3           3/28/2024     9:02 AM   Weight Loss Medication History Reviewed With Patient   Which weight loss medications are you currently taking on a regular basis? Phentermine    Metformin   Are you having any side effects from the weight loss medication that we have prescribed you? No       Lab on 10/03/2023   Component Date Value Ref Range Status    CRP Inflammation 10/03/2023 7.04 (H)  <5.00 mg/L Final    WBC Count 10/03/2023 7.4  4.0 - 11.0 10e3/uL Final    RBC Count 10/03/2023 4.31  3.80 - 5.20 10e6/uL Final    Hemoglobin 10/03/2023 13.2  11.7 - 15.7 g/dL Final    Hematocrit 10/03/2023 39.5  35.0 - 47.0 % Final    MCV 10/03/2023 92  78 - 100 fL Final    MCH 10/03/2023 30.6  26.5 - 33.0 pg Final    MCHC 10/03/2023 33.4  31.5 - 36.5 g/dL Final    RDW 10/03/2023 12.1  10.0 - 15.0 % Final    Platelet Count 10/03/2023 238  150 - 450 10e3/uL Final    % Neutrophils 10/03/2023 56  % Final    %  "Lymphocytes 10/03/2023 35  % Final    % Monocytes 10/03/2023 7  % Final    % Eosinophils 10/03/2023 2  % Final    % Basophils 10/03/2023 0  % Final    % Immature Granulocytes 10/03/2023 0  % Final    Absolute Neutrophils 10/03/2023 4.1  1.6 - 8.3 10e3/uL Final    Absolute Lymphocytes 10/03/2023 2.6  0.8 - 5.3 10e3/uL Final    Absolute Monocytes 10/03/2023 0.5  0.0 - 1.3 10e3/uL Final    Absolute Eosinophils 10/03/2023 0.1  0.0 - 0.7 10e3/uL Final    Absolute Basophils 10/03/2023 0.0  0.0 - 0.2 10e3/uL Final    Absolute Immature Granulocytes 10/03/2023 0.0  <=0.4 10e3/uL Final       PHYSICAL EXAM:  Objective    Ht 1.6 m (5' 2.99\")   Wt 88.5 kg (195 lb)   LMP 01/06/2021   BMI 34.55 kg/m      Vitals - Patient Reported  Pain Score: No Pain (0)        GENERAL: alert and no distress  EYES: Eyes grossly normal to inspection.  No discharge or erythema, or obvious scleral/conjunctival abnormalities.  RESP: No audible wheeze, cough, or visible cyanosis.    SKIN: Visible skin clear. No significant rash, abnormal pigmentation or lesions.  NEURO: Cranial nerves grossly intact.  Mentation and speech appropriate for age.  PSYCH: Appropriate affect, tone, and pace of words        Sincerely,    Kristie Olivares PA-C      10 minutes spent by me on the date of the encounter doing chart review, history and exam, documentation and further activities per the note    Virtual Visit Details    Type of service:  Video Visit     Originating Location (pt. Location): Home    Distant Location (provider location):  Off-site  Platform used for Video Visit: Christy  "

## 2024-03-28 NOTE — LETTER
3/28/2024       RE: Lin Corona  [address] [address] [place] [id number]     Dear Colleague,    Thank you for referring your patient, Lin Corona, to the [facility]NAG[facility] [place] at [facility]. Please see a copy of my visit note below.      Return Medical Weight Management Note     Lin Corona  MRN:  [id number]  :  1980  ROSE MARIE:  3/28/2024    Dear Dahlia Johns, TRUAMN CNP,    I had the pleasure of seeing your patient Lin Corona. She is a 43 year old female who I am continuing to see for treatment of obesity related to:        2023    12:26 PM   --   I have the following health issues associated with obesity None of the above   I have the following symptoms associated with obesity Depression       Assessment & Plan  Problem List Items Addressed This Visit    None  Visit Diagnoses       Class 2 severe obesity with serious comorbidity and body mass index (BMI) of 36.0 to 36.9 in adult, unspecified obesity type (H)               Weight mgmt follow up  Lost from 270 to around 200 lbs prior to working with us  Has lost from 203 to 195 in total since working with our team  Wasn't able to get GLP1 due to supply but then insurance no longer covers after 24  Has been taking phentermine/topiramate, helping with hunger. Tolerating both. Some dry mouth, has increased water intake.   Seeing plastic surgery for possible abdominoplasty but has to pay out of pocket  Continue phentermine 15mg daily   Continue topiramate 50mg daily    Continue HC visits prn  Follow up with therapist    Follow up 4-5 months Kristie      INTERVAL HISTORY:      Anti-obesity medication history    Current:   Phentermine 15mg daily  Topiramate 50mg    Recent diet changes: smaller portions    Recent stressors:  mother and mother in law  health issues    Recent sleep changes: no changes in sleep, sometimes thoughts racing at night due to stress with  family health issues.    CURRENT WEIGHT:   195 lbs 0 oz    Initial Weight (lbs): 203.5 lbs  Last Visits Weight: 90.7 kg (200 lb)  Cumulative weight loss (lbs): 8.5  Weight Loss Percentage: 4.18%        3/28/2024     9:02 AM   Changes and Difficulties   I have made the following changes to my diet since my last visit: None   With regards to my diet, I am still struggling with: None   I have made the following changes to my activity/exercise since my last visit: None   With regards to my activity/exercise, I am still struggling with: None         MEDICATIONS:   Current Outpatient Medications   Medication Sig Dispense Refill    amoxicillin-clavulanate (AUGMENTIN) 875-125 MG tablet Take 1 tablet by mouth 2 times daily 14 tablet 0    dupilumab (DUPIXENT) 300 MG/2ML prefilled pen Inject 2 mLs (300 mg) Subcutaneous every 14 days 4 mL 11    insulin pen needle (32G X 6 MM) 32G X 6 MM miscellaneous Use 1 pen needles daily or as directed with Saxenda. 100 each 1    levothyroxine (SYNTHROID/LEVOTHROID) 50 MCG tablet Take 1 tablet (50 mcg) by mouth daily 90 tablet 1    phentermine 15 MG capsule Take 1 capsule (15 mg) by mouth every morning 30 capsule 3    tiZANidine (ZANAFLEX) 2 MG tablet Take 1 tablet (2 mg) by mouth 3 times daily as needed for muscle spasms 20 tablet 0    topiramate (TOPAMAX) 50 MG tablet Take 1 tablet (50 mg) by mouth daily 30 tablet 3           3/28/2024     9:02 AM   Weight Loss Medication History Reviewed With Patient   Which weight loss medications are you currently taking on a regular basis? Phentermine    Metformin   Are you having any side effects from the weight loss medication that we have prescribed you? No       Lab on 10/03/2023   Component Date Value Ref Range Status    CRP Inflammation 10/03/2023 7.04 (H)  <5.00 mg/L Final    WBC Count 10/03/2023 7.4  4.0 - 11.0 10e3/uL Final    RBC Count 10/03/2023 4.31  3.80 - 5.20 10e6/uL Final    Hemoglobin 10/03/2023 13.2  11.7 - 15.7 g/dL Final     "Hematocrit 10/03/2023 39.5  35.0 - 47.0 % Final    MCV 10/03/2023 92  78 - 100 fL Final    MCH 10/03/2023 30.6  26.5 - 33.0 pg Final    MCHC 10/03/2023 33.4  31.5 - 36.5 g/dL Final    RDW 10/03/2023 12.1  10.0 - 15.0 % Final    Platelet Count 10/03/2023 238  150 - 450 10e3/uL Final    % Neutrophils 10/03/2023 56  % Final    % Lymphocytes 10/03/2023 35  % Final    % Monocytes 10/03/2023 7  % Final    % Eosinophils 10/03/2023 2  % Final    % Basophils 10/03/2023 0  % Final    % Immature Granulocytes 10/03/2023 0  % Final    Absolute Neutrophils 10/03/2023 4.1  1.6 - 8.3 10e3/uL Final    Absolute Lymphocytes 10/03/2023 2.6  0.8 - 5.3 10e3/uL Final    Absolute Monocytes 10/03/2023 0.5  0.0 - 1.3 10e3/uL Final    Absolute Eosinophils 10/03/2023 0.1  0.0 - 0.7 10e3/uL Final    Absolute Basophils 10/03/2023 0.0  0.0 - 0.2 10e3/uL Final    Absolute Immature Granulocytes 10/03/2023 0.0  <=0.4 10e3/uL Final       PHYSICAL EXAM:  Objective   Ht 1.6 m (5' 2.99\")   Wt 88.5 kg (195 lb)   LMP 01/06/2021   BMI 34.55 kg/m      Vitals - Patient Reported  Pain Score: No Pain (0)        GENERAL: alert and no distress  EYES: Eyes grossly normal to inspection.  No discharge or erythema, or obvious scleral/conjunctival abnormalities.  RESP: No audible wheeze, cough, or visible cyanosis.    SKIN: Visible skin clear. No significant rash, abnormal pigmentation or lesions.  NEURO: Cranial nerves grossly intact.  Mentation and speech appropriate for age.  PSYCH: Appropriate affect, tone, and pace of words    Sincerely,    Kristie Olivares PA-C    10 minutes spent by me on the date of the encounter doing chart review, history and exam, documentation and further activities per the note    Virtual Visit Details    Type of service:  Video Visit   Originating Location (pt. Location): Home  Distant Location (provider location):  Off-site  Platform used for Video Visit: Christy"

## 2024-04-03 ENCOUNTER — TELEPHONE (OUTPATIENT)
Dept: ENDOCRINOLOGY | Facility: CLINIC | Age: 44
End: 2024-04-03
Payer: COMMERCIAL

## 2024-04-03 NOTE — TELEPHONE ENCOUNTER
Left Voicemail (1st Attempt) and Sent Mychart (1st Attempt) for the patient to call back and schedule the following:    Appointment type: [facility]   Provider: Kristie Olivares PA-C  Return date: 4-5 mos   Specialty phone number: [phone]  Additional appointment(s) needed: no   Additonal Notes: no

## 2024-04-04 ENCOUNTER — OFFICE VISIT (OUTPATIENT)
Dept: ORTHOPEDICS | Facility: CLINIC | Age: 44
End: 2024-04-04
Payer: COMMERCIAL

## 2024-04-04 VITALS — BODY MASS INDEX: 35.88 KG/M2 | WEIGHT: 195 LBS | HEIGHT: 62 IN

## 2024-04-04 DIAGNOSIS — G89.29 CHRONIC PAIN OF BOTH SHOULDERS: ICD-10-CM

## 2024-04-04 DIAGNOSIS — M79.18 MYALGIA, OTHER SITE: ICD-10-CM

## 2024-04-04 DIAGNOSIS — R20.2 PARESTHESIA OF ARM: ICD-10-CM

## 2024-04-04 DIAGNOSIS — M25.512 CHRONIC PAIN OF BOTH SHOULDERS: ICD-10-CM

## 2024-04-04 DIAGNOSIS — G56.01 CARPAL TUNNEL SYNDROME ON RIGHT: ICD-10-CM

## 2024-04-04 DIAGNOSIS — M25.511 CHRONIC PAIN OF BOTH SHOULDERS: ICD-10-CM

## 2024-04-04 DIAGNOSIS — M25.812 IMPINGEMENT OF LEFT SHOULDER: Primary | ICD-10-CM

## 2024-04-04 DIAGNOSIS — G56.23 CUBITAL TUNNEL SYNDROME, BILATERAL: ICD-10-CM

## 2024-04-04 PROCEDURE — 99214 OFFICE O/P EST MOD 30 MIN: CPT | Mod: 25 | Performed by: FAMILY MEDICINE

## 2024-04-04 PROCEDURE — 20553 NJX 1/MLT TRIGGER POINTS 3/>: CPT | Performed by: FAMILY MEDICINE

## 2024-04-04 NOTE — LETTER
"    2024         RE: Lin Corona  [address] [address] [place] [id number]        Dear Colleague,    Thank you for referring your patient, Lin Corona, to the [facility] SP[facility] MEDIC[facility] [place]. Please see a copy of my visit note below.    Lin Corona  :  1980  DOS: 24  MRN: [id number]    Sports Medicine Clinic Visit    PCP: Dahlia Johns    Lin Corona is a 43 year old Right hand dominant female who is seen in follow-up presenting with left shoulder pain.    Interim History - 2024  Since last visit on 2023 patient has moderate left deep anterior shoulder, glenohumeral joint pain with radiating pain to left trapezius muscle.  Left shoulder subacromial steroid injection completed on 23 provided ~ 60% relief for 1 - 2 weeks.  She notes that pain going through posterior shoulder & upper trapezius improved, but still have significant pain remaining.  Patient completed MRI earlier today, results are not available at this time.  No new injury in the interim.    Interim History - 2024  Since last visit on 2024 patient has noted that she is still having numbness in the right hand. Has stopped going to PT but is doing HEP. Shoulders bilaterally are described as \"very tight\". Reports that she had an EMG done at [facility]. Reports that EMG said she had mild carpal tunnel. L glenohumeral joint steroid injection provided relief for 2 months.  No interim injury.  Has seen Dr. Oro on 24.      Social History: currently employed as an     Review of Systems  Musculoskeletal: as above  Remainder of review of systems is negative including constitutional, CV, pulmonary, GI, Skin and Neurologic except as noted in HPI or medical history.    Past Medical History:   Diagnosis Date     Anxiety and depression      Dysmenorrhea      Endometriosis      Intrinsic eczema      Past Surgical History:   Procedure Laterality Date     APPENDECTOMY  " "07/13/2011     BREAST SURGERY  2000    Breast reduction     COLONOSCOPY  09/2011     CYSTOSCOPY N/A 01/11/2021    Procedure: CYSTOSCOPY;  Surgeon: Jessica Gallardo DO;  Location: [facility] OR     GYN SURGERY  01/2012    Colposcopy exam      HYSTERECTOMY       LAPAROSCOPIC HYSTERECTOMY TOTAL, BILATERAL SALPINGO-OOPHORECTOMY, COMBINED N/A 01/11/2021    Procedure: HYSTERECTOMY, TOTAL, LAPAROSCOPIC, WITH BILATERAL SALPINGO-OOPHORECTOMY;  Surgeon: Jessica Gallardo DO;  Location: [facility] OR     SHOULDER SURGERY Right      Family History   Problem Relation Age of Onset     Arthritis Mother      Heart Disease Father      Gallbladder Disease Father      Arthritis Maternal Grandmother      Unknown/Adopted Maternal Grandmother      C.A.D. Paternal Grandmother      Ovarian Cancer Paternal Grandmother      Cancer Paternal Grandmother      C.A.D. Paternal Grandfather      Cervical Cancer Cousin      Cervical Cancer Paternal Aunt        Objective  Ht 1.575 m (5' 2\")   Wt 88.5 kg (195 lb)   LMP 01/06/2021   BMI 35.67 kg/m      General: healthy, alert and in no distress    HEENT: no scleral icterus or conjunctival erythema   Skin: no suspicious lesions or rash. No jaundice.   CV: regular rhythm by palpation, 2+ distal pulses, no pedal edema    Resp: normal respiratory effort without conversational dyspnea   Psych: normal mood and affect    Gait: nonantalgic, appropriate coordination and balance   Neuro: normal light touch sensory exam of the extremities. Motor strength as noted below     Bilateral Shoulder exam    ROM:        Very mildly limited terminal active and passive ROM with flexion, extension, abduction, internal and external rotation b/l, improved compared to previous on the left    Tender:        subacromial space left       GH joint mild b/l    Non Tender:       remainder of shoulder       sternoclavicular joint       acromioclavicular joint       LHBT    Strength:        abduction 5/5       internal rotation 5/5       " external rotation 5/5       adduction 5/5    Impingement testing:       painful Neer       painful Magallanes       positive (+) empty can left       Neg crossover       Neg crank    Stability testing:       neg (-) anterior glide       neg (-) sulcus sign    Skin:       no visible deformities       well perfused       capillary refill brisk    Sensation:        normal sensation over shoulder and upper extremity     Neg Tinel's and Phalen's on the left, mild + Tinel's on the right  Neg Spurling's and Adson's\  TTP and spasm of b/l upper trapezius, levator, infraspinatus    Procedure  After risks, benefits and complications of trigger point injection were discussed with the patient, a consent form was signed and the patient elected to proceed.  Using sterile technique, the area was first prepped with betadyne and an alcohol swab.  A 27 gauge  needle was used to inject a mixture of 0.5 ml of 0.5% marcaine and 0.5 ml of 1% lidocaine into 24 separate trigger point in the bilateral upper trapezius, levator, medial rhomboids, supraspinatus, infraspinatus and left deltoid. The patient tolerated the procedure well without complications.     Radiology  Recent Results (from the past 744 hour(s))   MR Shoulder Left w/o Contrast    Narrative    EXAM: MR left shoulder without  contrast 1/11/2024 8:47 AM    TECHNIQUE: Multiplanar, multisequence imaging of the left shoulder  were obtained without administration of intravenous or intra-articular  gadolinium contrast using routine protocol.    History: Impingement syndrome.  Recent subacromial injection.;  Myofascial pain; Impingement of left shoulder     Additional Clinical information from EMR: Fall off of horse in  September with neck pain. Numbness and paresthesias to bilateral ulnar  3 digits. Physical exam concerning for shoulder impingement.     Comparison: Left shoulder radiographs 9/13/2023    Findings:    ROTATOR CUFF and ASSOCIATED STRUCTURES  Rotator cuff:   Supraspinatus  tendinosis without tear.     Infraspinatus is intact.     Mild subscapularis tendinosis without tearing.     Teres minor is intact.    Bursa: No subacromial or subdeltoid bursal fluid.    Musculature: Muscle bulk of rotator cuff is preserved.  Deltoid muscle  bulk is also preserved.  No muscle edema.    Acromioclavicular joint  There are mild degenerative changes of the acromioclavicular joint  with mild capsular hypertrophy. Acromion is type 2 in sagittal  morphology.  Coracoacromial ligament is not thickened.    OSSEOUS STRUCTURES  No fracture, marrow contusion or marrow infiltration. Glenoid bone  island.    LONG BICIPITAL TENDON  The long head of the biceps tendon is normally situated within the  bicipital groove. No complete or partial biceps tendon tear is  present.    GLENOHUMERAL JOINT  Joint fluid: Physiologic amount of joint fluid is  present.    Cartilage and subarticular bone:  No focal hyaline cartilage defects  are noted. No Hill-Sachs, reverse Hill-Sachs, or bony Bankart lesions  are seen.    Labrum: Limited assessment on this study with relative lack of joint  distention shows no labral tear.    ANCILLARY FINDINGS:  Partial effacement of the subcoracoid fat and rotator cuff interval  with thickened appearance of the inferior glenohumeral ligament.      Impression    Impression:    1. No evidence for subacromial impingement or subacromial/subdeltoid  bursitis.    2. Supraspinatus and subscapularis tendinosis without tearing.    3. Query clinical entity of adhesive capsulitis with effacement of the  subcoracoid and rotator cuff interval fat with thickened appearance of  the inferior glenohumeral ligament.    I have personally reviewed the examination and initial interpretation  and I agree with the findings.    NAUN TUCKER MD (Joe)         SYSTEM ID:  [id number]     Assessment:  1. Impingement of left shoulder    2. Chronic pain of both shoulders    3. Paresthesia of arm    4. Myalgia, other  site    5. Carpal tunnel syndrome on right    6. Cubital tunnel syndrome, bilateral        Plan:  Discussed the assessment with the patient.  Follow up: 2-3 weeks if limited relief or recurrent pain after injection today  Subacromial CSI ordered by PMR provider was somewhat helpful but incomplete relief  I invited her to return to my clinic if pain continues to worsen around RTC, currently more painful on the left  Prior MRIs reviewed, as well as recent EMG showing mild right CTS  Trial of GH joint injection last visit was helpful but had a steroid flare initially before it helped with pain and ROM  Not clearly adhesive capsulitis at this point  Recreated arm paresthesias today with muscle palpation over the posterior and upper shoulder  Continue theracane and theragun as directed by others, trial of TPI today, can be repeated if helpful  PT options and strategies reviewed  Could consider diagnostic CTS injection in the future if needed, defer for now  We discussed modified progressive pain-free activity as tolerated  Supportive care reviewed  All questions were answered today  Contact us with additional questions or concerns  Signs and sx of concern reviewed      Weston Mcnally DO, CAQ  Sports Medicine Physician  [facility]    Time spent in chart review, one-on-one evaluation, discussion with patient regarding: nature of problem, clinical course, prior treatments, therapeutic options, shared-decision making, potential procedures and referrals, and charting related to the visit: 32 minutes.  If applicable, time does not include time spent performing any procedure.          Disclaimer: This note consists of symbols derived from keyboarding, dictation and/or voice recognition software. As a result, there may be errors in the script that have gone undetected. Please consider this when interpreting information found in this chart.      Again, thank you for allowing me to participate in  the care of your patient.        Sincerely,        Weston Mcnally, DO

## 2024-04-04 NOTE — PROGRESS NOTES
"Lin Corona  :  1980  DOS: 24  MRN: [id number]    Sports Medicine Clinic Visit    PCP: Dahlia Johns    Lin Corona is a 43 year old Right hand dominant female who is seen in follow-up presenting with left shoulder pain.    Interim History - 2024  Since last visit on 2023 patient has moderate left deep anterior shoulder, glenohumeral joint pain with radiating pain to left trapezius muscle.  Left shoulder subacromial steroid injection completed on 23 provided ~ 60% relief for 1 - 2 weeks.  She notes that pain going through posterior shoulder & upper trapezius improved, but still have significant pain remaining.  Patient completed MRI earlier today, results are not available at this time.  No new injury in the interim.    Interim History - 2024  Since last visit on 2024 patient has noted that she is still having numbness in the right hand. Has stopped going to PT but is doing HEP. Shoulders bilaterally are described as \"very tight\". Reports that she had an EMG done at [facility]. Reports that EMG said she had mild carpal tunnel. L glenohumeral joint steroid injection provided relief for 2 months.  No interim injury.  Has seen Dr. Oor on 24.      Social History: currently employed as an     Review of Systems  Musculoskeletal: as above  Remainder of review of systems is negative including constitutional, CV, pulmonary, GI, Skin and Neurologic except as noted in HPI or medical history.    Past Medical History:   Diagnosis Date    Anxiety and depression     Dysmenorrhea     Endometriosis     Intrinsic eczema      Past Surgical History:   Procedure Laterality Date    APPENDECTOMY  2011    BREAST SURGERY      Breast reduction    COLONOSCOPY  2011    CYSTOSCOPY N/A 2021    Procedure: CYSTOSCOPY;  Surgeon: Jessica Gallardo DO;  Location: [facility] OR    GYN SURGERY  2012    Colposcopy exam     HYSTERECTOMY      LAPAROSCOPIC " "HYSTERECTOMY TOTAL, BILATERAL SALPINGO-OOPHORECTOMY, COMBINED N/A 01/11/2021    Procedure: HYSTERECTOMY, TOTAL, LAPAROSCOPIC, WITH BILATERAL SALPINGO-OOPHORECTOMY;  Surgeon: Jessica Gallardo DO;  Location: [facility] OR    SHOULDER SURGERY Right      Family History   Problem Relation Age of Onset    Arthritis Mother     Heart Disease Father     Gallbladder Disease Father     Arthritis Maternal Grandmother     Unknown/Adopted Maternal Grandmother     C.A.D. Paternal Grandmother     Ovarian Cancer Paternal Grandmother     Cancer Paternal Grandmother     C.A.D. Paternal Grandfather     Cervical Cancer Cousin     Cervical Cancer Paternal Aunt        Objective  Ht 1.575 m (5' 2\")   Wt 88.5 kg (195 lb)   LMP 01/06/2021   BMI 35.67 kg/m      General: healthy, alert and in no distress    HEENT: no scleral icterus or conjunctival erythema   Skin: no suspicious lesions or rash. No jaundice.   CV: regular rhythm by palpation, 2+ distal pulses, no pedal edema    Resp: normal respiratory effort without conversational dyspnea   Psych: normal mood and affect    Gait: nonantalgic, appropriate coordination and balance   Neuro: normal light touch sensory exam of the extremities. Motor strength as noted below     Bilateral Shoulder exam    ROM:        Very mildly limited terminal active and passive ROM with flexion, extension, abduction, internal and external rotation b/l, improved compared to previous on the left    Tender:        subacromial space left       GH joint mild b/l    Non Tender:       remainder of shoulder       sternoclavicular joint       acromioclavicular joint       LHBT    Strength:        abduction 5/5       internal rotation 5/5       external rotation 5/5       adduction 5/5    Impingement testing:       painful Neer       painful Magallanes       positive (+) empty can left       Neg crossover       Neg crank    Stability testing:       neg (-) anterior glide       neg (-) sulcus sign    Skin:       no visible " deformities       well perfused       capillary refill brisk    Sensation:        normal sensation over shoulder and upper extremity     Neg Tinel's and Phalen's on the left, mild + Tinel's on the right  Neg Spurling's and Adson's\  TTP and spasm of b/l upper trapezius, levator, infraspinatus    Procedure  After risks, benefits and complications of trigger point injection were discussed with the patient, a consent form was signed and the patient elected to proceed.  Using sterile technique, the area was first prepped with betadyne and an alcohol swab.  A 27 gauge  needle was used to inject a mixture of 0.5 ml of 0.5% marcaine and 0.5 ml of 1% lidocaine into 24 separate trigger point in the bilateral upper trapezius, levator, medial rhomboids, supraspinatus, infraspinatus and left deltoid. The patient tolerated the procedure well without complications.     Radiology  Recent Results (from the past 744 hour(s))   MR Shoulder Left w/o Contrast    Narrative    EXAM: MR left shoulder without  contrast 1/11/2024 8:47 AM    TECHNIQUE: Multiplanar, multisequence imaging of the left shoulder  were obtained without administration of intravenous or intra-articular  gadolinium contrast using routine protocol.    History: Impingement syndrome.  Recent subacromial injection.;  Myofascial pain; Impingement of left shoulder     Additional Clinical information from EMR: Fall off of horse in  September with neck pain. Numbness and paresthesias to bilateral ulnar  3 digits. Physical exam concerning for shoulder impingement.     Comparison: Left shoulder radiographs 9/13/2023    Findings:    ROTATOR CUFF and ASSOCIATED STRUCTURES  Rotator cuff:   Supraspinatus tendinosis without tear.     Infraspinatus is intact.     Mild subscapularis tendinosis without tearing.     Teres minor is intact.    Bursa: No subacromial or subdeltoid bursal fluid.    Musculature: Muscle bulk of rotator cuff is preserved.  Deltoid muscle  bulk is also  preserved.  No muscle edema.    Acromioclavicular joint  There are mild degenerative changes of the acromioclavicular joint  with mild capsular hypertrophy. Acromion is type 2 in sagittal  morphology.  Coracoacromial ligament is not thickened.    OSSEOUS STRUCTURES  No fracture, marrow contusion or marrow infiltration. Glenoid bone  island.    LONG BICIPITAL TENDON  The long head of the biceps tendon is normally situated within the  bicipital groove. No complete or partial biceps tendon tear is  present.    GLENOHUMERAL JOINT  Joint fluid: Physiologic amount of joint fluid is  present.    Cartilage and subarticular bone:  No focal hyaline cartilage defects  are noted. No Hill-Sachs, reverse Hill-Sachs, or bony Bankart lesions  are seen.    Labrum: Limited assessment on this study with relative lack of joint  distention shows no labral tear.    ANCILLARY FINDINGS:  Partial effacement of the subcoracoid fat and rotator cuff interval  with thickened appearance of the inferior glenohumeral ligament.      Impression    Impression:    1. No evidence for subacromial impingement or subacromial/subdeltoid  bursitis.    2. Supraspinatus and subscapularis tendinosis without tearing.    3. Query clinical entity of adhesive capsulitis with effacement of the  subcoracoid and rotator cuff interval fat with thickened appearance of  the inferior glenohumeral ligament.    I have personally reviewed the examination and initial interpretation  and I agree with the findings.    NAUN TUCKER MD (Joe)         SYSTEM ID:  [id number]     Assessment:  1. Impingement of left shoulder    2. Chronic pain of both shoulders    3. Paresthesia of arm    4. Myalgia, other site    5. Carpal tunnel syndrome on right    6. Cubital tunnel syndrome, bilateral        Plan:  Discussed the assessment with the patient.  Follow up: 2-3 weeks if limited relief or recurrent pain after injection today  Subacromial CSI ordered by PMR provider was somewhat  helpful but incomplete relief  I invited her to return to my clinic if pain continues to worsen around RTC, currently more painful on the left  Prior MRIs reviewed, as well as recent EMG showing mild right CTS  Trial of GH joint injection last visit was helpful but had a steroid flare initially before it helped with pain and ROM  Not clearly adhesive capsulitis at this point  Recreated arm paresthesias today with muscle palpation over the posterior and upper shoulder  Continue theracane and theragun as directed by others, trial of TPI today, can be repeated if helpful  PT options and strategies reviewed  Could consider diagnostic CTS injection in the future if needed, defer for now  We discussed modified progressive pain-free activity as tolerated  Supportive care reviewed  All questions were answered today  Contact us with additional questions or concerns  Signs and sx of concern reviewed      Weston Mcnally DO, JOSEPH  Sports Medicine Physician  [facility]    Time spent in chart review, one-on-one evaluation, discussion with patient regarding: nature of problem, clinical course, prior treatments, therapeutic options, shared-decision making, potential procedures and referrals, and charting related to the visit: 32 minutes.  If applicable, time does not include time spent performing any procedure.          Disclaimer: This note consists of symbols derived from keyboarding, dictation and/or voice recognition software. As a result, there may be errors in the script that have gone undetected. Please consider this when interpreting information found in this chart.

## 2024-04-05 RX ORDER — LIDOCAINE HYDROCHLORIDE 10 MG/ML
5 INJECTION, SOLUTION INFILTRATION; PERINEURAL ONCE
Status: DISCONTINUED | OUTPATIENT
Start: 2024-04-05 | End: 2024-04-25

## 2024-04-05 RX ORDER — BUPIVACAINE HYDROCHLORIDE 5 MG/ML
5 INJECTION, SOLUTION PERINEURAL ONCE
Status: DISCONTINUED | OUTPATIENT
Start: 2024-04-05 | End: 2024-04-25

## 2024-04-25 ENCOUNTER — OFFICE VISIT (OUTPATIENT)
Dept: ORTHOPEDICS | Facility: CLINIC | Age: 44
End: 2024-04-25
Payer: COMMERCIAL

## 2024-04-25 VITALS
WEIGHT: 195 LBS | HEIGHT: 62 IN | SYSTOLIC BLOOD PRESSURE: 138 MMHG | BODY MASS INDEX: 35.88 KG/M2 | DIASTOLIC BLOOD PRESSURE: 92 MMHG

## 2024-04-25 DIAGNOSIS — R94.6 NONSPECIFIC ABNORMAL RESULTS OF FUNCTION STUDY OF THYROID: ICD-10-CM

## 2024-04-25 DIAGNOSIS — G56.01 CARPAL TUNNEL SYNDROME ON RIGHT: ICD-10-CM

## 2024-04-25 DIAGNOSIS — M79.18 MYALGIA, OTHER SITE: Primary | ICD-10-CM

## 2024-04-25 DIAGNOSIS — R76.8 ANTI-TPO ANTIBODIES PRESENT: ICD-10-CM

## 2024-04-25 DIAGNOSIS — R20.2 PARESTHESIA OF ARM: ICD-10-CM

## 2024-04-25 PROCEDURE — 99214 OFFICE O/P EST MOD 30 MIN: CPT | Performed by: FAMILY MEDICINE

## 2024-04-25 NOTE — PROGRESS NOTES
"Lin Corona  :  1980  DOS: 24  MRN: [id number]    Sports Medicine Clinic Visit    PCP: Dahlia Johns    Lin Corona is a 43 year old Right hand dominant female who is seen in follow-up presenting with left shoulder pain.    Interim History - 2024  Since last visit on 2023 patient has moderate left deep anterior shoulder, glenohumeral joint pain with radiating pain to left trapezius muscle.  Left shoulder subacromial steroid injection completed on 23 provided ~ 60% relief for 1 - 2 weeks.  She notes that pain going through posterior shoulder & upper trapezius improved, but still have significant pain remaining.  Patient completed MRI earlier today, results are not available at this time.  No new injury in the interim.    Interim History - 2024  Since last visit on 2024 patient has noted that she is still having numbness in the right hand. Has stopped going to PT but is doing HEP. Shoulders bilaterally are described as \"very tight\". Reports that she had an EMG done at [facility]. Reports that EMG said she had mild carpal tunnel. L glenohumeral joint steroid injection provided relief for 2 months.  No interim injury.  Has seen Dr. Oro on 24.    Interim History - 2024  Since last visit on 2024 patient has continued generalized bilateral hand numbness/tingling, right>>>left.  Trigger Point  injections completed on 24 provided relief of upper back issues, but did not change her hand numbness.  Patient feels like she is having some decreased  strength.  No new injury in the interim.    Social History: currently employed as an     Review of Systems  Musculoskeletal: as above  Remainder of review of systems is negative including constitutional, CV, pulmonary, GI, Skin and Neurologic except as noted in HPI or medical history.    Past Medical History:   Diagnosis Date    Anxiety and depression     Dysmenorrhea     " "Endometriosis     Intrinsic eczema      Past Surgical History:   Procedure Laterality Date    APPENDECTOMY  07/13/2011    BREAST SURGERY  2000    Breast reduction    COLONOSCOPY  09/2011    CYSTOSCOPY N/A 01/11/2021    Procedure: CYSTOSCOPY;  Surgeon: Jessica Gallardo DO;  Location: [facility] OR    GYN SURGERY  01/2012    Colposcopy exam     HYSTERECTOMY      LAPAROSCOPIC HYSTERECTOMY TOTAL, BILATERAL SALPINGO-OOPHORECTOMY, COMBINED N/A 01/11/2021    Procedure: HYSTERECTOMY, TOTAL, LAPAROSCOPIC, WITH BILATERAL SALPINGO-OOPHORECTOMY;  Surgeon: Jessica Gallardo DO;  Location: [facility] OR    SHOULDER SURGERY Right      Family History   Problem Relation Age of Onset    Arthritis Mother     Heart Disease Father     Gallbladder Disease Father     Arthritis Maternal Grandmother     Unknown/Adopted Maternal Grandmother     C.A.D. Paternal Grandmother     Ovarian Cancer Paternal Grandmother     Cancer Paternal Grandmother     C.A.D. Paternal Grandfather     Cervical Cancer Cousin     Cervical Cancer Paternal Aunt        Objective  BP (!) 138/92   Ht 1.575 m (5' 2\")   Wt 88.5 kg (195 lb)   LMP 01/06/2021   BMI 35.67 kg/m      General: healthy, alert and in no distress    HEENT: no scleral icterus or conjunctival erythema   Skin: no suspicious lesions or rash. No jaundice.   CV: regular rhythm by palpation, 2+ distal pulses, no pedal edema    Resp: normal respiratory effort without conversational dyspnea   Psych: normal mood and affect    Gait: nonantalgic, appropriate coordination and balance   Neuro: normal light touch sensory exam of the extremities. Motor strength as noted below     Bilateral Shoulder exam    ROM:        Very mildly limited terminal active and passive ROM with flexion, extension, abduction, internal and external rotation b/l, mildly improved compared to previous on the left    Tender:        subacromial space left mild       GH joint very mild b/l    Non Tender:       remainder of shoulder       " sternoclavicular joint       acromioclavicular joint       LHBT    Strength:        abduction 5/5       internal rotation 5/5       external rotation 5/5       adduction 5/5    Impingement testing:       painful Neer       painful Magallanes       positive (+) empty can left > right       Neg crossover       Neg crank    Stability testing:       neg (-) anterior glide       neg (-) sulcus sign    Skin:       no visible deformities       well perfused       capillary refill brisk    Sensation:        normal sensation over shoulder and upper extremity     Neg Tinel's and Phalen's on the left, mild + Tinel's on the right  Neg Spurling's and Adson's\  TTP and spasm of b/l upper trapezius, levator, infraspinatus    Radiology  Recent Results (from the past 744 hour(s))   MR Shoulder Left w/o Contrast    Narrative    EXAM: MR left shoulder without  contrast 1/11/2024 8:47 AM    TECHNIQUE: Multiplanar, multisequence imaging of the left shoulder  were obtained without administration of intravenous or intra-articular  gadolinium contrast using routine protocol.    History: Impingement syndrome.  Recent subacromial injection.;  Myofascial pain; Impingement of left shoulder     Additional Clinical information from EMR: Fall off of horse in  September with neck pain. Numbness and paresthesias to bilateral ulnar  3 digits. Physical exam concerning for shoulder impingement.     Comparison: Left shoulder radiographs 9/13/2023    Findings:    ROTATOR CUFF and ASSOCIATED STRUCTURES  Rotator cuff:   Supraspinatus tendinosis without tear.     Infraspinatus is intact.     Mild subscapularis tendinosis without tearing.     Teres minor is intact.    Bursa: No subacromial or subdeltoid bursal fluid.    Musculature: Muscle bulk of rotator cuff is preserved.  Deltoid muscle  bulk is also preserved.  No muscle edema.    Acromioclavicular joint  There are mild degenerative changes of the acromioclavicular joint  with mild capsular hypertrophy.  Acromion is type 2 in sagittal  morphology.  Coracoacromial ligament is not thickened.    OSSEOUS STRUCTURES  No fracture, marrow contusion or marrow infiltration. Glenoid bone  island.    LONG BICIPITAL TENDON  The long head of the biceps tendon is normally situated within the  bicipital groove. No complete or partial biceps tendon tear is  present.    GLENOHUMERAL JOINT  Joint fluid: Physiologic amount of joint fluid is  present.    Cartilage and subarticular bone:  No focal hyaline cartilage defects  are noted. No Hill-Sachs, reverse Hill-Sachs, or bony Bankart lesions  are seen.    Labrum: Limited assessment on this study with relative lack of joint  distention shows no labral tear.    ANCILLARY FINDINGS:  Partial effacement of the subcoracoid fat and rotator cuff interval  with thickened appearance of the inferior glenohumeral ligament.      Impression    Impression:    1. No evidence for subacromial impingement or subacromial/subdeltoid  bursitis.    2. Supraspinatus and subscapularis tendinosis without tearing.    3. Query clinical entity of adhesive capsulitis with effacement of the  subcoracoid and rotator cuff interval fat with thickened appearance of  the inferior glenohumeral ligament.    I have personally reviewed the examination and initial interpretation  and I agree with the findings.    NAUN TUCKER MD (Joe)         SYSTEM ID:  [id number]     Assessment:  1. Myalgia, other site    2. Carpal tunnel syndrome on right    3. Paresthesia of arm    4. Nonspecific abnormal results of function study of thyroid    5. Anti-TPO antibodies present        Plan:  Discussed the assessment with the patient.  Follow up: prn with me based on short term progress and ongoing workup/referrals  Subacromial CSI ordered by PMR provider was somewhat helpful but incomplete relief  I invited her to return to my clinic if pain continues to worsen around RTC, currently more painful on the left but no worse than last  visit, also intermittent  Prior MRIs reviewed, as well as recent EMG showing mild right CTS  Clinically she does not have convincing signs of CTS, although we reviewed the option for diagnostic injection, defer for today  Trial of GH joint injection last visit was helpful but had a steroid flare initially before it helped with pain and ROM  Not clearly adhesive capsulitis at this point, reviewed again today  Recreated arm paresthesias today with muscle palpation over the posterior and upper shoulder, muscle soreness improved s/p TPI but now having generalized aches and ongoing paresthesias  Continue theracane and theragun as directed by others, TPI can be repeated if helpful, defer repeat for today  PT options and strategies reviewed  Long discussion today about how her pains do not fit any clear pattern for nerve impingement or structural shoulder pathology  Concerning to me are the ongoing systemic/constitutional sx as well, reviewed medical hx again today  She has been on a levothyroxine related to prior abnormal thyroid function testing, this has not been rechecked since August 2023 initial testing  Repeat thyroid function testing ordered today, should follow up with PCP  Endocrinology referral placed as well for further mgmt, specifically related to concern for autoimmune process given impressively high TPO antibody testing, advised specialty perspective for further diagnostic insight and tx plan  Reviewed that abnormal thyroid function could account for many of her systemic and orthopedic issues, desire to rule this in/out before ongoing orthopedic plan  We discussed modified progressive pain-free activity as tolerated  Supportive care reviewed  All questions were answered today  Contact us with additional questions or concerns  Signs and sx of concern reviewed      Weston Mcnally DO, JOSEPH  Sports Medicine Physician  [facility]    Time spent in chart review, one-on-one  evaluation, discussion with patient regarding: nature of problem, clinical course, prior treatments, therapeutic options, shared-decision making, potential procedures and referrals, and charting related to the visit: 37 minutes.  If applicable, time does not include time spent performing any procedure.          Disclaimer: This note consists of symbols derived from keyboarding, dictation and/or voice recognition software. As a result, there may be errors in the script that have gone undetected. Please consider this when interpreting information found in this chart.

## 2024-04-25 NOTE — LETTER
"    2024         RE: Lin Corona  [address] [address] [place] [id number]        Dear Colleague,    Thank you for referring your patient, Lin Corona, to the [facility] [place]. Please see a copy of my visit note below.    Lin Corona  :  1980  DOS: 24  MRN: [id number]    Sports Medicine Clinic Visit    PCP: Dahlia Johns    Lin Corona is a 43 year old Right hand dominant female who is seen in follow-up presenting with left shoulder pain.    Interim History - 2024  Since last visit on 2023 patient has moderate left deep anterior shoulder, glenohumeral joint pain with radiating pain to left trapezius muscle.  Left shoulder subacromial steroid injection completed on 23 provided ~ 60% relief for 1 - 2 weeks.  She notes that pain going through posterior shoulder & upper trapezius improved, but still have significant pain remaining.  Patient completed MRI earlier today, results are not available at this time.  No new injury in the interim.    Interim History - 2024  Since last visit on 2024 patient has noted that she is still having numbness in the right hand. Has stopped going to PT but is doing HEP. Shoulders bilaterally are described as \"very tight\". Reports that she had an EMG done at [facility]. Reports that EMG said she had mild carpal tunnel. L glenohumeral joint steroid injection provided relief for 2 months.  No interim injury.  Has seen Dr. Oro on 24.    Interim History - 2024  Since last visit on 2024 patient has continued generalized bilateral hand numbness/tingling, right>>>left.  Trigger Point  injections completed on 24 provided relief of upper back issues, but did not change her hand numbness.  Patient feels like she is having some decreased  strength.  No new injury in the interim.    Social History: currently employed as an     Review of Systems  Musculoskeletal: " "as above  Remainder of review of systems is negative including constitutional, CV, pulmonary, GI, Skin and Neurologic except as noted in HPI or medical history.    Past Medical History:   Diagnosis Date     Anxiety and depression      Dysmenorrhea      Endometriosis      Intrinsic eczema      Past Surgical History:   Procedure Laterality Date     APPENDECTOMY  07/13/2011     BREAST SURGERY  2000    Breast reduction     COLONOSCOPY  09/2011     CYSTOSCOPY N/A 01/11/2021    Procedure: CYSTOSCOPY;  Surgeon: Jessica Gallardo DO;  Location: [facility] OR     GYN SURGERY  01/2012    Colposcopy exam      HYSTERECTOMY       LAPAROSCOPIC HYSTERECTOMY TOTAL, BILATERAL SALPINGO-OOPHORECTOMY, COMBINED N/A 01/11/2021    Procedure: HYSTERECTOMY, TOTAL, LAPAROSCOPIC, WITH BILATERAL SALPINGO-OOPHORECTOMY;  Surgeon: Jessica Gallardo DO;  Location: [facility] OR     SHOULDER SURGERY Right      Family History   Problem Relation Age of Onset     Arthritis Mother      Heart Disease Father      Gallbladder Disease Father      Arthritis Maternal Grandmother      Unknown/Adopted Maternal Grandmother      C.A.D. Paternal Grandmother      Ovarian Cancer Paternal Grandmother      Cancer Paternal Grandmother      C.A.D. Paternal Grandfather      Cervical Cancer Cousin      Cervical Cancer Paternal Aunt        Objective  BP (!) 138/92   Ht 1.575 m (5' 2\")   Wt 88.5 kg (195 lb)   LMP 01/06/2021   BMI 35.67 kg/m      General: healthy, alert and in no distress    HEENT: no scleral icterus or conjunctival erythema   Skin: no suspicious lesions or rash. No jaundice.   CV: regular rhythm by palpation, 2+ distal pulses, no pedal edema    Resp: normal respiratory effort without conversational dyspnea   Psych: normal mood and affect    Gait: nonantalgic, appropriate coordination and balance   Neuro: normal light touch sensory exam of the extremities. Motor strength as noted below     Bilateral Shoulder exam    ROM:        Very mildly limited terminal active and " passive ROM with flexion, extension, abduction, internal and external rotation b/l, mildly improved compared to previous on the left    Tender:        subacromial space left mild       GH joint very mild b/l    Non Tender:       remainder of shoulder       sternoclavicular joint       acromioclavicular joint       LHBT    Strength:        abduction 5/5       internal rotation 5/5       external rotation 5/5       adduction 5/5    Impingement testing:       painful Neer       painful Magallanes       positive (+) empty can left > right       Neg crossover       Neg crank    Stability testing:       neg (-) anterior glide       neg (-) sulcus sign    Skin:       no visible deformities       well perfused       capillary refill brisk    Sensation:        normal sensation over shoulder and upper extremity     Neg Tinel's and Phalen's on the left, mild + Tinel's on the right  Neg Spurling's and Adson's\  TTP and spasm of b/l upper trapezius, levator, infraspinatus    Radiology  Recent Results (from the past 744 hour(s))   MR Shoulder Left w/o Contrast    Narrative    EXAM: MR left shoulder without  contrast 1/11/2024 8:47 AM    TECHNIQUE: Multiplanar, multisequence imaging of the left shoulder  were obtained without administration of intravenous or intra-articular  gadolinium contrast using routine protocol.    History: Impingement syndrome.  Recent subacromial injection.;  Myofascial pain; Impingement of left shoulder     Additional Clinical information from EMR: Fall off of horse in  September with neck pain. Numbness and paresthesias to bilateral ulnar  3 digits. Physical exam concerning for shoulder impingement.     Comparison: Left shoulder radiographs 9/13/2023    Findings:    ROTATOR CUFF and ASSOCIATED STRUCTURES  Rotator cuff:   Supraspinatus tendinosis without tear.     Infraspinatus is intact.     Mild subscapularis tendinosis without tearing.     Teres minor is intact.    Bursa: No subacromial or subdeltoid  bursal fluid.    Musculature: Muscle bulk of rotator cuff is preserved.  Deltoid muscle  bulk is also preserved.  No muscle edema.    Acromioclavicular joint  There are mild degenerative changes of the acromioclavicular joint  with mild capsular hypertrophy. Acromion is type 2 in sagittal  morphology.  Coracoacromial ligament is not thickened.    OSSEOUS STRUCTURES  No fracture, marrow contusion or marrow infiltration. Glenoid bone  island.    LONG BICIPITAL TENDON  The long head of the biceps tendon is normally situated within the  bicipital groove. No complete or partial biceps tendon tear is  present.    GLENOHUMERAL JOINT  Joint fluid: Physiologic amount of joint fluid is  present.    Cartilage and subarticular bone:  No focal hyaline cartilage defects  are noted. No Hill-Sachs, reverse Hill-Sachs, or bony Bankart lesions  are seen.    Labrum: Limited assessment on this study with relative lack of joint  distention shows no labral tear.    ANCILLARY FINDINGS:  Partial effacement of the subcoracoid fat and rotator cuff interval  with thickened appearance of the inferior glenohumeral ligament.      Impression    Impression:    1. No evidence for subacromial impingement or subacromial/subdeltoid  bursitis.    2. Supraspinatus and subscapularis tendinosis without tearing.    3. Query clinical entity of adhesive capsulitis with effacement of the  subcoracoid and rotator cuff interval fat with thickened appearance of  the inferior glenohumeral ligament.    I have personally reviewed the examination and initial interpretation  and I agree with the findings.    NAUN TUCKER MD (Joe)         SYSTEM ID:  [id number]     Assessment:  1. Myalgia, other site    2. Carpal tunnel syndrome on right    3. Paresthesia of arm    4. Nonspecific abnormal results of function study of thyroid    5. Anti-TPO antibodies present        Plan:  Discussed the assessment with the patient.  Follow up: prn with me based on short term  progress and ongoing workup/referrals  Subacromial CSI ordered by PMR provider was somewhat helpful but incomplete relief  I invited her to return to my clinic if pain continues to worsen around RTC, currently more painful on the left but no worse than last visit, also intermittent  Prior MRIs reviewed, as well as recent EMG showing mild right CTS  Clinically she does not have convincing signs of CTS, although we reviewed the option for diagnostic injection, defer for today  Trial of GH joint injection last visit was helpful but had a steroid flare initially before it helped with pain and ROM  Not clearly adhesive capsulitis at this point, reviewed again today  Recreated arm paresthesias today with muscle palpation over the posterior and upper shoulder, muscle soreness improved s/p TPI but now having generalized aches and ongoing paresthesias  Continue theracane and theragun as directed by others, TPI can be repeated if helpful, defer repeat for today  PT options and strategies reviewed  Long discussion today about how her pains do not fit any clear pattern for nerve impingement or structural shoulder pathology  Concerning to me are the ongoing systemic/constitutional sx as well, reviewed medical hx again today  She has been on a levothyroxine related to prior abnormal thyroid function testing, this has not been rechecked since August 2023 initial testing  Repeat thyroid function testing ordered today, should follow up with PCP  Endocrinology referral placed as well for further mgmt, specifically related to concern for autoimmune process given impressively high TPO antibody testing, advised specialty perspective for further diagnostic insight and tx plan  Reviewed that abnormal thyroid function could account for many of her systemic and orthopedic issues, desire to rule this in/out before ongoing orthopedic plan  We discussed modified progressive pain-free activity as tolerated  Supportive care reviewed  All  questions were answered today  Contact us with additional questions or concerns  Signs and sx of concern reviewed      Weston Mcnally DO, CAQ  Sports Medicine Physician  [facility]    Time spent in chart review, one-on-one evaluation, discussion with patient regarding: nature of problem, clinical course, prior treatments, therapeutic options, shared-decision making, potential procedures and referrals, and charting related to the visit: 37 minutes.  If applicable, time does not include time spent performing any procedure.          Disclaimer: This note consists of symbols derived from keyboarding, dictation and/or voice recognition software. As a result, there may be errors in the script that have gone undetected. Please consider this when interpreting information found in this chart.      Again, thank you for allowing me to participate in the care of your patient.        Sincerely,        Weston Mcnally DO

## 2024-04-29 ENCOUNTER — LAB (OUTPATIENT)
Dept: LAB | Facility: CLINIC | Age: 44
End: 2024-04-29
Payer: COMMERCIAL

## 2024-04-29 DIAGNOSIS — R20.2 PARESTHESIA OF ARM: ICD-10-CM

## 2024-04-29 DIAGNOSIS — M79.18 MYALGIA, OTHER SITE: ICD-10-CM

## 2024-04-29 DIAGNOSIS — R94.6 NONSPECIFIC ABNORMAL RESULTS OF FUNCTION STUDY OF THYROID: ICD-10-CM

## 2024-04-29 LAB — TSH SERPL DL<=0.005 MIU/L-ACNC: 2.89 UIU/ML (ref 0.3–4.2)

## 2024-04-29 PROCEDURE — 86200 CCP ANTIBODY: CPT

## 2024-04-29 PROCEDURE — 84443 ASSAY THYROID STIM HORMONE: CPT

## 2024-04-29 PROCEDURE — 36415 COLL VENOUS BLD VENIPUNCTURE: CPT

## 2024-04-29 PROCEDURE — 86140 C-REACTIVE PROTEIN: CPT

## 2024-04-29 PROCEDURE — 84100 ASSAY OF PHOSPHORUS: CPT

## 2024-04-30 ENCOUNTER — E-CONSULT (OUTPATIENT)
Dept: ENDOCRINOLOGY | Facility: CLINIC | Age: 44
End: 2024-04-30
Payer: COMMERCIAL

## 2024-04-30 DIAGNOSIS — L20.9 ATOPIC DERMATITIS, UNSPECIFIED TYPE: ICD-10-CM

## 2024-04-30 LAB
CRP SERPL-MCNC: <3 MG/L
PHOSPHATE SERPL-MCNC: 4.3 MG/DL (ref 2.5–4.5)

## 2024-04-30 PROCEDURE — 99451 NTRPROF PH1/NTRNET/EHR 5/>: CPT

## 2024-04-30 NOTE — PROGRESS NOTES
4/30/2024     E-Consult has been accepted.    Interprofessional consultation requested by:  Weston Mcnally DO      Clinical Question/Purpose: MY CLINICAL QUESTION IS: very high TPO antibodies, multiple systemic sx without clear orthopedic cause, review for urgency and/or need for endocrine perspective/workup  Extracted from chat message:   I am working with this patient on various MSK issues at the request of her PCP. Unfortunately her symptoms do not fall into any specific orthopedic patterns, and her MSK workup has not shown any convincing structural pathology. She also has constitutional sx of fatigue, fogginess, malaise. She is quite frustrated. She was evaluated for thyroid and started on Levothyroxine from her PCP for mildly abnormal levels in August 2023. I was concerned and impressed by her TPO antibodies which were over 4,000. Not sure I have seen that high before. I ordered an endocrinology referral and she is scheduled for late October. My question for you is how concerned should I be for these high antibodies levels relative to additional workup especially with these diffuse MSK and systemic symptoms that are not being helped by conservative measures from an orthopedic standpoint.     Patient assessment and information reviewed:   1/24/12 TSh 1.19  3/16/2020 TSH 1.93  8/27/21 RH 7, CRP inflammation 12.6  1/19/22 weight 218, BMI 38.62, TSH 2.18  4/18/22 COVID   8/2/23 thyroid normal to palpation on exam   8/9/23 TSH 5.47, free T4 1.06, TPO 4258, HgbA1c 5.4%, Ca 10.1 , PTH 33, 25OHD 39, alk phos 65  9/13/23 start Levothyroxine   9/29/23 ED visit with left neck pain following fall off horse   mychart noting she isn't sleeping   10/3/23 CRP inflammation 7.04  12/12/23 and 1/11/24 triamcinolone 40 mg gvven   4/25/94 weight 195#, BMI 35.67  4/29/24 TSH 2.89-- mychart note indicating she isn't sleeping    Imaging  4/28/21 CT chest: thyroid is partially visualized and appears to be normal  size    Current Outpatient Medications   Medication Sig Dispense Refill    dupilumab (DUPIXENT) 300 MG/2ML prefilled pen Inject 2 mLs (300 mg) Subcutaneous every 14 days 4 mL 11    insulin pen needle (32G X 6 MM) 32G X 6 MM miscellaneous Use 1 pen needles daily or as directed with Saxenda. 100 each 1    levothyroxine (SYNTHROID/LEVOTHROID) 50 MCG tablet Take 1 tablet (50 mcg) by mouth daily 90 tablet 1    phentermine 15 MG capsule Take 1 capsule (15 mg) by mouth every morning 30 capsule 5    tiZANidine (ZANAFLEX) 2 MG tablet Take 1 tablet (2 mg) by mouth 3 times daily as needed for muscle spasms 20 tablet 0    topiramate (TOPAMAX) 50 MG tablet Take 1 tablet (50 mg) by mouth daily 30 tablet 5       Impression:   High TPO with subclinical hypothyroidism noted 8/9/23.  RCT tell us it is unlikely she would feel symptomatic difference with levothyroxine treatment.  High thyroid peroxidase antibody ( TPO) is a marker for autoimmune thyroid disease  (AITD). It is also seen in about 13% of the normal population.    https://pubmed.ncbi.nlm.nih.gov/28625208/.  High TPO  can be associated with any thyroid state (ie, euthyroid, hypothyroid or thyrotoxicosis).  It can be seen up to 40 years prior to development of any thyroid dysfunction and thryoid dysfunction is not inevitable.    Generally, we don't put a great deal of significance on the level of TPO nor do we try to fix it or follow it.    2.  High CRP is unlikely due to the thyroid -- could this be a clue to her symptoms?   3.  AITD is not thought to be a systemic condition and it is not believed to cause systemic symptoms independent of that dictated by the thyroid hormone blood levels.  However, perhaps our views on this are changing .  There was an intriguing study a number of years ago which showed improved quality of life and fatigue in patients with AITD who were treated with thyroidectomy.  In that study, all patients had TPO > 1000.     https://pubmed.ncbi.nlm.nih.gov/87741080/.  Thyroidectomy would be a dramatic treatment for nonspecific symptoms treated in  a single study that has not been replicated. There are no guarantees it would work in her and it should be reserved for last resort consideration.   4. Fatigue is a nonspecific symptom which could be due to any organ system, sleep/lifestyle, etc.  The mychart notes she has been sending indicate she is not sleeping.    5.  Hypercalcemia was noted on the 8/9/23 labs - this requires follow up labs testing  6.  History of covid 4/2022 - we are still learning about the long term consequences of this.  How might this be relevant to the current symptoms?   7.  History of high dose steroid injection x 2 , December 2023 and 1/2024. Did this relieve the MSK pain?     Recommendations:   The antibodies (TPO and anti thyroglobulin ) do not need to be followed and the levels do not raise particular concern (see above impression).  She should have physical exam of the thyroid to assess for goiter or change in goiter.  This could be with her primary care provider or you, it doesn't require urgent endocrine consultation.  The past imaging suggests there was no goiter in 2021.   Recommend she see her PCP in there interim prior to endocrine consultation. It is unlikely endocrine has anything to offer to relieve her symptoms which are likely multifactorial including due to poor sleep  Follow up labs might include calcium, phosphorus, PTH , CRP . If the calcium is high again perhaps it is relevant.  then she should collect 24 hour urine for calcium and creatinine .    The recommendations provided in this E-Consult are based on a review of clinical data pertinent to the clinical question presented, without a review of the patient's complete medical record or, the benefit of a comprehensive in-person or virtual patient evaluation. This consultation should not replace the clinical judgement and evaluation of the  provider ordering this E-Consult. Any new clinical issues, or changes in patient status since the filing of this E-Consult will need to be taken into account when assessing these recommendations. Please contact me if you have further questions.    My total time spent reviewing clinical information and formulating assessment was 30 minutes.        Esther Condon MD

## 2024-04-30 NOTE — TELEPHONE ENCOUNTER
Requested Prescriptions        Last Written Prescription Date:  4/25/2023  Last Fill Quantity: 4 ml,  # refills: 11   Last office visit: 8/7/2023 ; last virtual visit: Visit date not found with prescribing provider:  Viji Coyne PA-C   Future Office Visit:  none    Sharon Kenyon on 4/30/2024 at 2:59 PM

## 2024-05-02 ENCOUNTER — LAB (OUTPATIENT)
Dept: LAB | Facility: CLINIC | Age: 44
End: 2024-05-02
Payer: COMMERCIAL

## 2024-05-02 DIAGNOSIS — R20.2 PARESTHESIA OF ARM: ICD-10-CM

## 2024-05-02 DIAGNOSIS — M79.18 MYALGIA, OTHER SITE: ICD-10-CM

## 2024-05-02 DIAGNOSIS — R94.6 NONSPECIFIC ABNORMAL RESULTS OF FUNCTION STUDY OF THYROID: ICD-10-CM

## 2024-05-02 LAB
CALCIUM SERPL-MCNC: 9.4 MG/DL (ref 8.6–10)
ERYTHROCYTE [SEDIMENTATION RATE] IN BLOOD BY WESTERGREN METHOD: 10 MM/HR (ref 0–20)
PTH-INTACT SERPL-MCNC: 30 PG/ML (ref 15–65)

## 2024-05-02 PROCEDURE — 82310 ASSAY OF CALCIUM: CPT

## 2024-05-02 PROCEDURE — 36415 COLL VENOUS BLD VENIPUNCTURE: CPT

## 2024-05-02 PROCEDURE — 85652 RBC SED RATE AUTOMATED: CPT

## 2024-05-02 PROCEDURE — 86038 ANTINUCLEAR ANTIBODIES: CPT

## 2024-05-02 PROCEDURE — 83970 ASSAY OF PARATHORMONE: CPT

## 2024-05-02 RX ORDER — DUPILUMAB 300 MG/2ML
300 INJECTION, SOLUTION SUBCUTANEOUS
Qty: 4 ML | Refills: 6 | Status: SHIPPED | OUTPATIENT
Start: 2024-05-02

## 2024-05-02 NOTE — TELEPHONE ENCOUNTER
Medication not on nurse protocol. Sent to provider for review.  Huma Taveras RN BSN PHN    LOV 4/2023 for atopic dermitits and was seen for Lipoma 8/7/23

## 2024-05-03 LAB — ANA SER QL IF: NEGATIVE

## 2024-05-05 LAB — CCP AB SER IA-ACNC: 0.8 U/ML

## 2024-05-15 ENCOUNTER — OFFICE VISIT (OUTPATIENT)
Dept: FAMILY MEDICINE | Facility: CLINIC | Age: 44
End: 2024-05-15
Payer: COMMERCIAL

## 2024-05-15 VITALS
DIASTOLIC BLOOD PRESSURE: 68 MMHG | BODY MASS INDEX: 36.44 KG/M2 | RESPIRATION RATE: 16 BRPM | TEMPERATURE: 97.8 F | SYSTOLIC BLOOD PRESSURE: 110 MMHG | WEIGHT: 198 LBS | HEIGHT: 62 IN | HEART RATE: 82 BPM | OXYGEN SATURATION: 99 %

## 2024-05-15 DIAGNOSIS — R53.81 MALAISE: ICD-10-CM

## 2024-05-15 DIAGNOSIS — M79.641 PAIN IN BOTH HANDS: ICD-10-CM

## 2024-05-15 DIAGNOSIS — M79.642 PAIN IN BOTH HANDS: ICD-10-CM

## 2024-05-15 DIAGNOSIS — R20.2 NUMBNESS AND TINGLING IN BOTH HANDS: Primary | ICD-10-CM

## 2024-05-15 DIAGNOSIS — R42 DIZZINESS: ICD-10-CM

## 2024-05-15 DIAGNOSIS — R20.0 NUMBNESS AND TINGLING IN BOTH HANDS: Primary | ICD-10-CM

## 2024-05-15 DIAGNOSIS — R68.89 FORGETFULNESS: ICD-10-CM

## 2024-05-15 PROCEDURE — 99214 OFFICE O/P EST MOD 30 MIN: CPT | Performed by: NURSE PRACTITIONER

## 2024-05-15 RX ORDER — SERTRALINE HYDROCHLORIDE 25 MG/1
25 TABLET, FILM COATED ORAL DAILY
COMMUNITY
Start: 2024-04-23

## 2024-05-15 ASSESSMENT — PAIN SCALES - GENERAL: PAINLEVEL: MILD PAIN (3)

## 2024-05-15 NOTE — PATIENT INSTRUCTIONS
Call to schedule -[phone].  Continue care with neurology and endocrinology.  See rheumatology for their input.      Our Clinic hours are:  Mondays    7:20 am - 7 pm  Tues -  Fri  7:20 am - 5 pm    Clinic Phone: [phone]    The clinic lab opens at 7:30 am Mon - Fri and appointments are required.    [facility]. [phone]  Monday  8 am - 7pm  Tues - Fri 8 am - 5:30 pm

## 2024-05-15 NOTE — PROGRESS NOTES
"  Assessment & Plan     Numbness and tingling in both hands    - Adult Rheumatology  Referral; Future  - MR Brain w/o & w Contrast; Future  Unsure of true etiology of her multiple concerns, after discussion will go ahead with MRI of brain.  Briefly also discussed anxiety or depression, she is declining any concerns with that.  Will see if rheumatology has any input as well.    Pain in both hands    - Adult Rheumatology  Referral; Future    Malaise    - Adult Rheumatology  Referral; Future  - MR Brain w/o & w Contrast; Future    Dizziness    - MR Brain w/o & w Contrast; Future    Forgetfulness    - MR Brain w/o & w Contrast; Future          BMI  Estimated body mass index is 36.21 kg/m  as calculated from the following:    Height as of this encounter: 1.575 m (5' 2\").    Weight as of this encounter: 89.8 kg (198 lb).         See Patient Instructions  Patient Instructions   Call to schedule [phone].  Continue care with neurology and endocrinology.  See rheumatology for their input.      Our Clinic hours are:  Mondays    7:20 am - 7 pm  Tues -  Fri  7:20 am - 5 pm    Clinic Phone: [phone]    The clinic lab opens at 7:30 am Mon - Fri and appointments are required.    [facility]  Ph. [phone]  Monday  8 am - 7pm  Tues - Fri 8 am - 5:30 pm         Jennifer Ceballos is a 43 year old, presenting for the following health issues:  RECHECK (Go over lab work )      5/15/2024     7:27 AM   Additional Questions   Roomed by      History of Present Illness       Reason for visit:  Lab workfollow up pain in hands arms numbness not feeling right    She eats 4 or more servings of fruits and vegetables daily.She consumes 0 sweetened beverage(s) daily.She exercises with enough effort to increase her heart rate 30 to 60 minutes per day.  She exercises with enough effort to increase her heart rate 3 or less days per week.   She is taking medications regularly.       She " "fell on horse in September and ever since then she has been struggling. She has seen orthopedics, negative scans, she has been to chiropractor. She is on muscle relaxants.  She is not feeling any better. She has seen neurology, \"mild carpal tunnel\"   She offers a lot of general concerns but when asked what the worst symptom is she said bilateral hand pain and numbness. She reports pain up and around her chest and neck. She generally feels \"off\" \"not herself\" She reports dizziness and some forgetfulness.  She is very frustrated as she isn't getting any answer.  Elevated TPO and was started on levothyroxine, and is seeing endocrinology.            Review of Systems  Constitutional, HEENT, cardiovascular, pulmonary, gi and gu systems are negative, except as otherwise noted.      Objective    /68   Pulse 82   Temp 97.8  F (36.6  C) (Tympanic)   Resp 16   Ht 1.575 m (5' 2\")   Wt 89.8 kg (198 lb)   LMP 01/06/2021   SpO2 99%   BMI 36.21 kg/m    Body mass index is 36.21 kg/m .  Physical Exam   GENERAL: healthy, alert and no distress  NECK: no asymmetry  RESP: lungs clear   CV: regular rate and rhythm  ABDOMEN: soft  MS: no gross musculoskeletal defects noted  PSYCH: tearful at times              Signed Electronically by: TRUMAN Aguila CNP    "

## 2024-05-28 ENCOUNTER — HOSPITAL ENCOUNTER (OUTPATIENT)
Dept: MRI IMAGING | Facility: CLINIC | Age: 44
Discharge: HOME | End: 2024-05-28
Attending: NURSE PRACTITIONER | Admitting: NURSE PRACTITIONER
Payer: COMMERCIAL

## 2024-05-28 ENCOUNTER — MYC MEDICAL ADVICE (OUTPATIENT)
Dept: FAMILY MEDICINE | Facility: CLINIC | Age: 44
End: 2024-05-28
Payer: COMMERCIAL

## 2024-05-28 DIAGNOSIS — R42 DIZZINESS: ICD-10-CM

## 2024-05-28 DIAGNOSIS — R53.81 MALAISE: ICD-10-CM

## 2024-05-28 DIAGNOSIS — R20.0 NUMBNESS AND TINGLING IN BOTH HANDS: ICD-10-CM

## 2024-05-28 DIAGNOSIS — R20.2 NUMBNESS AND TINGLING IN BOTH HANDS: ICD-10-CM

## 2024-05-28 DIAGNOSIS — R68.89 FORGETFULNESS: ICD-10-CM

## 2024-05-28 PROCEDURE — 70553 MRI BRAIN STEM W/O & W/DYE: CPT

## 2024-05-28 PROCEDURE — A9585 GADOBUTROL INJECTION: HCPCS | Performed by: NURSE PRACTITIONER

## 2024-05-28 PROCEDURE — 255N000002 HC RX 255 OP 636: Performed by: NURSE PRACTITIONER

## 2024-05-28 RX ORDER — GADOBUTROL 604.72 MG/ML
8 INJECTION INTRAVENOUS ONCE
Status: COMPLETED | OUTPATIENT
Start: 2024-05-28 | End: 2024-05-28

## 2024-05-28 RX ADMIN — GADOBUTROL 8 ML: 604.72 INJECTION INTRAVENOUS at 20:38
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
## 2024-06-24 ENCOUNTER — PATIENT OUTREACH (OUTPATIENT)
Dept: CARE COORDINATION | Facility: CLINIC | Age: 44
End: 2024-06-24
Payer: COMMERCIAL

## 2024-06-26 ENCOUNTER — OFFICE VISIT (OUTPATIENT)
Dept: NEUROSURGERY | Facility: CLINIC | Age: 44
End: 2024-06-26
Payer: COMMERCIAL

## 2024-06-26 VITALS
HEIGHT: 62 IN | WEIGHT: 198 LBS | BODY MASS INDEX: 36.44 KG/M2 | SYSTOLIC BLOOD PRESSURE: 128 MMHG | DIASTOLIC BLOOD PRESSURE: 89 MMHG | HEART RATE: 83 BPM

## 2024-06-26 DIAGNOSIS — T88.7XXA MEDICATION SIDE EFFECT: ICD-10-CM

## 2024-06-26 DIAGNOSIS — G56.01 CARPAL TUNNEL SYNDROME OF RIGHT WRIST: ICD-10-CM

## 2024-06-26 DIAGNOSIS — M54.2 CERVICALGIA: ICD-10-CM

## 2024-06-26 DIAGNOSIS — M25.812 IMPINGEMENT OF LEFT SHOULDER: ICD-10-CM

## 2024-06-26 DIAGNOSIS — S06.0X0A CONCUSSION WITHOUT LOSS OF CONSCIOUSNESS, INITIAL ENCOUNTER: ICD-10-CM

## 2024-06-26 DIAGNOSIS — M25.50 DIFFUSE ARTHRALGIA: Primary | ICD-10-CM

## 2024-06-26 DIAGNOSIS — F07.81 POSTCONCUSSION SYNDROME: ICD-10-CM

## 2024-06-26 DIAGNOSIS — G56.22 ULNAR NEUROPATHY AT ELBOW OF LEFT UPPER EXTREMITY: ICD-10-CM

## 2024-06-26 DIAGNOSIS — M75.02 ADHESIVE CAPSULITIS OF LEFT SHOULDER: ICD-10-CM

## 2024-06-26 DIAGNOSIS — M79.18 MYOFASCIAL PAIN: ICD-10-CM

## 2024-06-26 PROCEDURE — 99215 OFFICE O/P EST HI 40 MIN: CPT | Performed by: PREVENTIVE MEDICINE
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
## 2024-06-26 NOTE — PATIENT INSTRUCTIONS
Lin I think your hands are sore because you are using too much at work and your body is trying to tell you that maybe it is time to think about a career change.  I will order the Lyme titer and send you a MyChart message but I suspect it is not going to be your answer.  You already have all the other appointments in place that are appropriate.  I am afraid I do not have a lot more I need to treat for the neck and upper back as that looks pretty good so I am happy about that.  See the assessment and plan below for further details of our conversation today and I wish you all the best.    Assessment:    Lin Corona is a 43 year old y.o. female with   Fall off of horse striking left occiput  Upper quadrant myofascial pain-much better and she has a good home program to manage this left shoulder pain with likely adhesive capsulitis: Markedly improved 12/12/2023 left subacromial space injection: Nontherapeutic  1/11/2024 left glenohumeral joint injection with likely steroid flare  Probable sleep positioning ulnar neuritis at the elbows, much improved with different positioning  Neurologically intact  EMG evidence of right carpal tunnel syndrome  Bilateral upper quadrant anesthetic TPI 4/4/2024 with some improvement  Bilateral hand overuse with her  work resulting in left thumb and right index trigger fingers    Plan:  Lyme titer and I will send her a MyChart message.  If it is positive I will refer her to her PCP for treatment.  She has an appointment with rheumatology and endocrinology and with a hand surgeon all of which are appropriate.  The most likely cause of her hand symptoms is her overuse and her aging body.  It may be that she needs to get out of fieldwork for  work or just very sporadically doing it.  Beyond that the thing that I was originally test with seeing her for, which is her myofascial pain and neck pain has markedly improved.  No further follow-up here is planned.

## 2024-06-26 NOTE — NURSING NOTE
"Reason For Visit:   Chief Complaint   Patient presents with    RECHECK     Myofacial pain  shoulder is better/ bilateral numbness and tingling / ring finger Right locks          Occupation:     Currently working? Yes.  Work status?  Full time.    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        Current Outpatient Medications   Medication Sig Dispense Refill    dupilumab (DUPIXENT) 300 MG/2ML prefilled pen Inject 2 mLs (300 mg) Subcutaneous every 14 days 4 mL 6    levothyroxine (SYNTHROID/LEVOTHROID) 50 MCG tablet Take 1 tablet (50 mcg) by mouth daily 90 tablet 1    phentermine 15 MG capsule Take 1 capsule (15 mg) by mouth every morning 30 capsule 5    sertraline (ZOLOFT) 25 MG tablet Take 25 mg by mouth daily      tiZANidine (ZANAFLEX) 2 MG tablet Take 1 tablet (2 mg) by mouth 3 times daily as needed for muscle spasms 20 tablet 0    topiramate (TOPAMAX) 50 MG tablet Take 1 tablet (50 mg) by mouth daily 30 tablet 5    insulin pen needle (32G X 6 MM) 32G X 6 MM miscellaneous Use 1 pen needles daily or as directed with Saxenda. (Patient not taking: Reported on 5/15/2024) 100 each 1     No current facility-administered medications for this visit.         Sadiq Lozoya MA      "

## 2024-07-03 ENCOUNTER — PATIENT OUTREACH (OUTPATIENT)
Dept: CARE COORDINATION | Facility: CLINIC | Age: 44
End: 2024-07-03
Payer: COMMERCIAL

## 2024-07-17 ENCOUNTER — PATIENT OUTREACH (OUTPATIENT)
Dept: CARE COORDINATION | Facility: CLINIC | Age: 44
End: 2024-07-17
Payer: COMMERCIAL

## 2024-09-04 ENCOUNTER — TELEPHONE (OUTPATIENT)
Dept: DERMATOLOGY | Facility: CLINIC | Age: 44
End: 2024-09-04
Payer: COMMERCIAL

## 2024-09-04 NOTE — TELEPHONE ENCOUNTER
Hello,    [facility] has been unable to reach this patient to refill their medication.  According to our records, the patient is overdue to refill and adherence may be an issue at this point.    Medication and strength: Dupixent 300mg/ 2 ml sopn  Last fill date and day supply: 06/20/2024 28 day supply     If the medication is on hold, been changed, discontinued, sent to a different pharmacy, or any other information is available, please reply or contact us at [phone].    Thank you,    [facility]

## 2024-09-04 NOTE — TELEPHONE ENCOUNTER
Sent patient a [employer] message asking for an update with José Miguel.     Diana Viveros RN on 9/4/2024 at 12:33 PM

## 2024-09-04 NOTE — TELEPHONE ENCOUNTER
Please send patient mychart message stating pharmacy is trying to get a hold of patient for dupixent.   Otherwise we will see pt next month.

## 2024-09-11 NOTE — PROGRESS NOTES
Rheumatology Clinic Visit  [facility]  SILVERIO Torres     Lin Corona MRN# [id number]   YOB: 1980 Age: 44 year old   Date of Visit: 9/16/2024  Primary care provider: Dahlia Johns          Assessment and Plan:     1.  Pain in both hands  2.  Numbness and tingling in hands   3.  Malaise    Patient presents today for an initial evaluation.  She states that symptoms that started in her left shoulder.  She had an MRI and was told that she had frozen shoulder.  She states that the pain migrated to her right shoulder.  She was getting injections for this.  She then started to notice a burning and itching sensation in her hands.  Orthopedics recommended that she no longer do injection therapy but have this evaluated.  She states that she feels her  strength has been decreasing.  She gets a burning and itching sensation across the palm of her hand.  Physical examination today did not show any active synovitis or dactylitis.  She had full fist formation and normal  strength.  Previous laboratory evaluations and imaging studies were reviewed, results below.  I did discuss the specialty of rheumatology today.  Also reviewed the previous results that the patient had.  X-rays of her hands were normal/negative.  Would recommend starting with an MRI of her right hand.  Her CCP antibody was negative.  Will recheck her rheumatoid factor.  If the MRI is normal/negative and the labs are normal may want to consider getting an EMG.  I will contact patient with the results of the evaluation once it is complete.    Plan:     Schedule follow-up with Rach Pascual PA-C depending on results of the MRI   Imaging: MRI right hand  Labs: rheumatoid factor    SILVERIO Torres  Rheumatology         History of Present Illness:   Lin Corona presents for evaluation of hand pain, numbness and tingling in both hands, malaise.     She states that her symptoms started in her left shoulder. She  had a MRI and was told she had frozen shoulder. She states that then the pain migrated to the right shoulder. She had injections. She states that she had a burning itching in her hands. It was recommended to not do further injections. She states that she gets a burning in her hands. Her  in the right hand is worse. She can move her fingers, but if she grabs something with weight she feels that she cannot hold it. She feels the the tendons are tight. She has pain in her knuckles, wrists and elbows. She works as an  and is trying to find something out of the field. She feels that she has inflammation. She tries to eat clean and has not been able to pinpoint it to the a specific trigger. She has eczema. She has been put on dupixent. Occasional dry eyes and dry mouth. She will have times of good spurts. Her hands do feel like they are getting worse the more she moves her hands.     Grandmother with psoriasis.          Review of Systems:     Constitutional: negative  Skin: negative  Eyes: negative  Ears/Nose/Throat: negative  Respiratory: No shortness of breath, dyspnea on exertion, cough, or hemoptysis  Cardiovascular: negative  Gastrointestinal: negative  Genitourinary: negative  Musculoskeletal: as above  Neurologic: negative  Psychiatric: negative  Hematologic/Lymphatic/Immunologic: negative  Endocrine: negative         Active Problem List:     Patient Active Problem List    Diagnosis Date Noted    Acute pain of left shoulder 10/05/2023     Priority: Medium    Surgical menopause on hormone replacement therapy 04/13/2023     Priority: Medium    Morbid obesity (H) 02/15/2023     Priority: Medium    Epidermoid cyst of skin of back 09/15/2022     Priority: Medium    Neuralgia 09/15/2022     Priority: Medium    Anxiety and depression 01/19/2022     Priority: Medium    Chronic pelvic pain in female 11/18/2020     Priority: Medium     Added automatically from request for surgery [id number]      Abnormal uterine  bleeding 11/18/2020     Priority: Medium     Added automatically from request for surgery [id number]      Endometriosis 10/28/2020     Priority: Medium    Pelvic pain in female 02/02/2012     Priority: Medium    Dysmenorrhea 02/02/2012     Priority: Medium            Past Medical History:     Past Medical History:   Diagnosis Date    Anxiety and depression     Dysmenorrhea     Endometriosis     Intrinsic eczema      Past Surgical History:   Procedure Laterality Date    APPENDECTOMY  07/13/2011    BREAST SURGERY  2000    Breast reduction    COLONOSCOPY  09/2011    CYSTOSCOPY N/A 01/11/2021    Procedure: CYSTOSCOPY;  Surgeon: Jessica Gallardo DO;  Location: [facility] OR    GYN SURGERY  01/2012    Colposcopy exam     HYSTERECTOMY      LAPAROSCOPIC HYSTERECTOMY TOTAL, BILATERAL SALPINGO-OOPHORECTOMY, COMBINED N/A 01/11/2021    Procedure: HYSTERECTOMY, TOTAL, LAPAROSCOPIC, WITH BILATERAL SALPINGO-OOPHORECTOMY;  Surgeon: Jessica Gallardo DO;  Location: [facility] OR    SHOULDER SURGERY Right             Social History:     Social History     Socioeconomic History    Marital status:      Spouse name: Not on file    Number of children: Not on file    Years of education: Not on file    Highest education level: Not on file   Occupational History    Occupation:    Tobacco Use    Smoking status: Former     Current packs/day: 0.00     Types: Cigars, Cigarettes     Quit date: 2014     Years since quitting: 10.7    Smokeless tobacco: Never   Vaping Use    Vaping status: Never Used   Substance and Sexual Activity    Alcohol use: Yes     Comment: occassional    Drug use: No    Sexual activity: Yes     Partners: Male     Birth control/protection: Pill     Comment: Nuvaring   Other Topics Concern    Parent/sibling w/ CABG, MI or angioplasty before 65F 55M? Not Asked   Social History Narrative    Not on file     Social Determinants of Health     Financial Resource Strain: Low Risk  (10/3/2023)    Financial Resource Strain      Within the past 12 months, have you or your family members you live with been unable to get utilities (heat, electricity) when it was really needed?: No   Food Insecurity: Low Risk  (10/3/2023)    Food Insecurity     Within the past 12 months, did you worry that your food would run out before you got money to buy more?: No     Within the past 12 months, did the food you bought just not last and you didn t have money to get more?: No   Transportation Needs: Low Risk  (10/3/2023)    Transportation Needs     Within the past 12 months, has lack of transportation kept you from medical appointments, getting your medicines, non-medical meetings or appointments, work, or from getting things that you need?: No   Physical Activity: Not on file   Stress: Not on file   Social Connections: Unknown (5/8/2023)    Received from [facility], [facility]    Social Connections     Frequency of Communication with Friends and Family: Not on file   Interpersonal Safety: Low Risk  (5/15/2024)    Interpersonal Safety     Do you feel physically and emotionally safe where you currently live?: Yes     Within the past 12 months, have you been hit, slapped, kicked or otherwise physically hurt by someone?: No     Within the past 12 months, have you been humiliated or emotionally abused in other ways by your partner or ex-partner?: No   Housing Stability: Low Risk  (10/3/2023)    Housing Stability     Do you have housing? : Yes     Are you worried about losing your housing?: No          Family History:     Family History   Problem Relation Age of Onset    Arthritis Mother     Heart Disease Father     Gallbladder Disease Father     Arthritis Maternal Grandmother     Unknown/Adopted Maternal Grandmother     C.A.D. Paternal Grandmother     Ovarian Cancer Paternal Grandmother     Cancer Paternal Grandmother     C.A.D. Paternal Grandfather     Cervical Cancer Cousin     Cervical Cancer  "Paternal Aunt             Allergies:     Allergies   Allergen Reactions    Gold Rash     Rash with true test    Nickel Rash     Positive for contact dermatitis, true test    Thimerosal (Thiomersal) Rash     Strong reaction with true test.             Medications:     Current Outpatient Medications   Medication Sig Dispense Refill    dupilumab (DUPIXENT) 300 MG/2ML prefilled pen Inject 2 mLs (300 mg) Subcutaneous every 14 days 4 mL 6    Iodine, Kelp, (KELP PO) Take by mouth daily.      levothyroxine (SYNTHROID/LEVOTHROID) 50 MCG tablet Take 1 tablet (50 mcg) by mouth daily 90 tablet 1    phentermine 15 MG capsule Take 1 capsule (15 mg) by mouth every morning 30 capsule 5    SELENIUM PO Take by mouth daily.      sertraline (ZOLOFT) 25 MG tablet Take 25 mg by mouth daily      tiZANidine (ZANAFLEX) 2 MG tablet Take 1 tablet (2 mg) by mouth 3 times daily as needed for muscle spasms 20 tablet 0    topiramate (TOPAMAX) 50 MG tablet Take 1 tablet (50 mg) by mouth daily 30 tablet 5            Physical Exam:   Blood pressure 122/85, pulse 90, temperature 97.5  F (36.4  C), temperature source Tympanic, resp. rate 18, height 1.575 m (5' 2\"), weight 87.2 kg (192 lb 3.2 oz), last menstrual period 01/06/2021, SpO2 99%, not currently breastfeeding.  Wt Readings from Last 6 Encounters:   06/26/24 89.8 kg (198 lb)   05/15/24 89.8 kg (198 lb)   04/25/24 88.5 kg (195 lb)   04/04/24 88.5 kg (195 lb)   03/28/24 88.5 kg (195 lb)   02/29/24 90.7 kg (200 lb)     Constitutional: well-developed, appearing stated age; cooperative  Eyes: nl conjunctiva, sclera  ENT: nl external ears, nose, hearing, lips, teeth  Neck: no mass or thyroid enlargement  Resp: no shortness of breath with normal conversation  Lymph: no cervical, supraclavicular or epitrochlear nodes  MS: No active synovitis or dactylitis.  Full fist formation and normal  strength.  No tenderness to palpation over her SI joints.  Skin: no nail pitting, alopecia, rash, nodules or " lesions.   Psych: nl judgement, orientation, memory, affect.           Data:   Imaging:  MRI cervical spine 11/21/2023  IMPRESSION:  1. Minimal, diffuse degenerative change of the cervical spine as  detailed above.  2. No high-grade spinal canal or high-grade neural foraminal narrowing  at any level of the cervical spine.     MRI brain 05/28/2024  IMPRESSION:  1.  No finding for intracranial hemorrhage, mass, or acute infarct. No finding to suggest demyelinating disease.  2.  7 mm focus of T2 prolongation left paramedian posterior superior nasopharynx, nonspecific but compatible with a small mucosal cyst.    Xray left hand 06/27/2024  IMPRESSION: Normal joint spaces and alignment. No fracture.     Xray right hand 06/27/2024  IMPRESSION: Normal joint spaces and alignment. No fracture.     Laboratory:  4/29/2024  Phosphorus 4.3  CRP <3.00  CCP antibody 0.8  TSH 2.89    5/2/2024  Sed Rate 10  SAADIA negative

## 2024-09-11 NOTE — NURSING NOTE
Rheumatological History:  History of atopic dermatitis/ psoriasis (sees Diana Coyne PA-C) and history of Hashimoto's. Family history of psoriasis.  Previous Rheumatologist(s):   none  Current Treatment(s):   Dupixent 300mg q14 days  Past Treatment(s):  Methotrexate 15mg weekly- 2021- hair loss, wt gain  Abnormal Labs:   Elevated TPA 8/9/23

## 2024-09-14 ENCOUNTER — HEALTH MAINTENANCE LETTER (OUTPATIENT)
Age: 44
End: 2024-09-14

## 2024-09-16 ENCOUNTER — OFFICE VISIT (OUTPATIENT)
Dept: RHEUMATOLOGY | Facility: CLINIC | Age: 44
End: 2024-09-16
Payer: COMMERCIAL

## 2024-09-16 VITALS
TEMPERATURE: 97.5 F | OXYGEN SATURATION: 99 % | WEIGHT: 192.2 LBS | HEIGHT: 62 IN | DIASTOLIC BLOOD PRESSURE: 85 MMHG | HEART RATE: 90 BPM | RESPIRATION RATE: 18 BRPM | BODY MASS INDEX: 35.37 KG/M2 | SYSTOLIC BLOOD PRESSURE: 122 MMHG

## 2024-09-16 DIAGNOSIS — R53.81 MALAISE: ICD-10-CM

## 2024-09-16 DIAGNOSIS — R20.2 NUMBNESS AND TINGLING IN BOTH HANDS: ICD-10-CM

## 2024-09-16 DIAGNOSIS — R20.0 NUMBNESS AND TINGLING IN BOTH HANDS: ICD-10-CM

## 2024-09-16 DIAGNOSIS — M79.641 PAIN IN BOTH HANDS: ICD-10-CM

## 2024-09-16 DIAGNOSIS — M79.642 PAIN IN BOTH HANDS: ICD-10-CM

## 2024-09-16 DIAGNOSIS — M25.50 DIFFUSE ARTHRALGIA: ICD-10-CM

## 2024-09-16 PROCEDURE — 86618 LYME DISEASE ANTIBODY: CPT | Performed by: PHYSICIAN ASSISTANT

## 2024-09-16 PROCEDURE — 99204 OFFICE O/P NEW MOD 45 MIN: CPT | Performed by: PHYSICIAN ASSISTANT

## 2024-09-16 PROCEDURE — 36415 COLL VENOUS BLD VENIPUNCTURE: CPT | Performed by: PHYSICIAN ASSISTANT

## 2024-09-16 PROCEDURE — 86431 RHEUMATOID FACTOR QUANT: CPT | Performed by: PHYSICIAN ASSISTANT

## 2024-09-16 ASSESSMENT — PAIN SCALES - GENERAL: PAINLEVEL: MODERATE PAIN (4)

## 2024-09-16 NOTE — PATIENT INSTRUCTIONS
After Visit Instructions:     Thank you for coming to [facility] for your care. It is my goal to partner with you to help you reach your optimal state of health.       Plan:     Schedule follow-up with Rach Pascual PA-C depending on results of the MRI   Imaging: MRI right hand  Labs: rheumatoid factor    Rach Pascual PA-C  [facility]  [facility]    Contact information: [facility]  Clinic Number:  [phone]  Please call or send a [employer] message with any questions about your care

## 2024-09-17 LAB
B BURGDOR IGG+IGM SER QL: 0.2
RHEUMATOID FACT SERPL-ACNC: <10 IU/ML

## 2024-10-02 ENCOUNTER — HOSPITAL ENCOUNTER (OUTPATIENT)
Dept: MRI IMAGING | Facility: CLINIC | Age: 44
Discharge: HOME | End: 2024-10-02
Attending: PHYSICIAN ASSISTANT | Admitting: PHYSICIAN ASSISTANT
Payer: COMMERCIAL

## 2024-10-02 DIAGNOSIS — M79.642 PAIN IN BOTH HANDS: ICD-10-CM

## 2024-10-02 DIAGNOSIS — M79.641 PAIN IN BOTH HANDS: ICD-10-CM

## 2024-10-02 DIAGNOSIS — R53.81 MALAISE: ICD-10-CM

## 2024-10-02 PROCEDURE — 73220 MRI UPPR EXTREMITY W/O&W/DYE: CPT | Mod: 26 | Performed by: RADIOLOGY

## 2024-10-02 PROCEDURE — A9585 GADOBUTROL INJECTION: HCPCS | Performed by: PHYSICIAN ASSISTANT

## 2024-10-02 PROCEDURE — 73220 MRI UPPR EXTREMITY W/O&W/DYE: CPT | Mod: RT

## 2024-10-02 PROCEDURE — 255N000002 HC RX 255 OP 636: Performed by: PHYSICIAN ASSISTANT

## 2024-10-02 RX ORDER — GADOBUTROL 604.72 MG/ML
8.5 INJECTION INTRAVENOUS ONCE
Status: COMPLETED | OUTPATIENT
Start: 2024-10-02 | End: 2024-10-02

## 2024-10-02 RX ADMIN — GADOBUTROL 8.5 ML: 604.72 INJECTION INTRAVENOUS at 10:55

## 2024-10-03 ENCOUNTER — PATIENT OUTREACH (OUTPATIENT)
Dept: CARE COORDINATION | Facility: CLINIC | Age: 44
End: 2024-10-03
Payer: COMMERCIAL

## 2024-10-09 ENCOUNTER — OFFICE VISIT (OUTPATIENT)
Dept: ORTHOPEDICS | Facility: CLINIC | Age: 44
End: 2024-10-09
Attending: PHYSICIAN ASSISTANT
Payer: COMMERCIAL

## 2024-10-09 DIAGNOSIS — M65.311 TRIGGER FINGER OF RIGHT THUMB: ICD-10-CM

## 2024-10-09 PROCEDURE — 99214 OFFICE O/P EST MOD 30 MIN: CPT | Performed by: PEDIATRICS

## 2024-10-09 NOTE — LETTER
10/9/2024      Lin Corona  [address] [place] [id number]      Dear Colleague,    Thank you for referring your patient, Lin Corona, to the [facility] [place]. Please see a copy of my visit note below.    ASSESSMENT & PLAN    Lin was seen today for numbness, extremity weakness and pain.    Diagnoses and all orders for this visit:    Trigger finger of right thumb  -     Orthopedic  Referral  -     Orthopedic  Referral; Future      This issue is chronic and Unchanged.      ICD-10-CM    1. Trigger finger of right thumb  M65.311 Orthopedic  Referral     Orthopedic  Referral          Discussed the potential causes of trigger finger including direct trauma and repetitive trauma or activity such as gripping/grasping.  Treatment consists of avoiding the offending activity.  Using padded gloves for gripping/grasping activity.  Discussed benefits of splinting finger to prevent triggering and overuse.   - Given severity, patient would like to discuss options with orthopedic surgery.    Some symptoms raise concern for carpal tunnel, prior EMG was negative, could repeat if numbness and tingling is more prominent.    Plan:  - Today's Plan of Care:  Referral to an Orthopedic Surgeon    Trial of oval 8 splint    Discussed activity considerations and other supportive care including Ice/Heat, OTC and other topical medications as needed.    -We also discussed other future treatment options:  Referral to Occupational Therapy  Consideration of injections  Referral for EMG testing    Follow Up: as needed    Concerning signs and symptoms were reviewed and all questions were answered at this time.    Thanks for the opportunity to participate in the care of this patient, I will keep you updated on their progress.    CC: Rach Jewell MD [facility]  Sports Medicine Physician  [facility]    -----  Chief Complaint    Patient presents with     Right Hand - Numbness, Extremity Weakness, Pain       SUBJECTIVE  Lin Corona is a/an 44 year old female who is seen in consultation at the request of  Rach Pascual PA-C for evaluation of RAQUEL hand pain, right worse than left.     The patient is seen by themselves.  The patient is Right handed    Onset: 1 years(s) ago, no new injury . Patient describes injury as unsure, but she fell off her horse last fall 2024. She got that hand caught in the horses reigns as well.   Location of Pain: right thumb; thenar eminence, volar aspect of the hand  Worsened by: using the bathroom, grabbing a door knob, pulling  Better with: nothing really, resting  Treatments tried: ice, heat, previous imaging (MRI 10/2/24 and xray 6/27/24 @ [facility], EMG @ [facility] 2/4/24), She had a CSI of Left thumb and Right ring finger 6/27/24 A1 [place] that provided minimal relief  Associated symptoms: swelling, numbness, tingling, weakness of right hand, and radiating pain to the elbow, burning/itching , in the volar aspect of the hand    Orthopedic/Surgical history: YES - Date: frozen shoulder seen by Dr. Mcnally for L shoulder 4/24/24, she has been seen by Rheumatology as well  Social History/Occupation: she is an        REVIEW OF SYSTEMS:  Review of Systems    OBJECTIVE:  LMP 01/06/2021    General: healthy, alert and in no distress  Skin: no suspicious lesions or rash.  CV: distal perfusion intact   Resp: normal respiratory effort without conversational dyspnea   Psych: normal mood and affect  Gait: NORMAL  Neuro: Normal light sensory exam of upper extremity    Bilateral Wrist and Hand exam    Inspection:       Swelling: right thumb thenar aspect    Tender:       nodular swelling over the A-1 pulley of the 1st digit(s) right    Non Tender:       Remainder of the Wrist and Hand bilateral    ROM:       Decreased ROM right thumb    Strength:       Decreased strength right thumb    Special Tests:        neg (-)  Tinel's test bilateral and       neg (-) Phalen's test bilateral    Neurovascular:       2+ radial pulses bilaterally with brisk capillary refill and      normal sensation to light touch in the radial, median and ulnar nerve distributions     RADIOLOGY:  Final results and radiologist's interpretation, available in the [facility] health record.  Images were reviewed with the patient in the office today.  My personal interpretation of the performed imaging:     Reviewed right hand MRI from 10/2/2024 - flexor pollicis longus tendinosis at level of first metacarpal, likely ganglion cyst over scaphoid      Exam: MR HAND RIGHT W/O & W CONTRAST  History: Pain in both hands. Malaise.  Additional history from EMR: Burning and itching sensation across the  palm of her hand.  Techniques: Multiplanar multisequence imaging through the right hand  was obtained before and after administration of intravenous gadolinium  contrast  using routine protocol.  Contrast: 8.5 mL Gadavist  Comparison: Right forearm radiograph 9/29/2020.  Findings:  Bones: No fracture, marrow contusion or infiltrative change. No  findings to suggest osteitis. No erosion. Tiny nonspecific cystlike  change at the lunate and capitate.  Joints: Physiologic amount of joint fluid are present in the  visualized joints. No synovitis.  Tendons: Segmental severe flexor pollicis longus tendinosis at the  level of first metacarpal with associated tenosynovitis.   Partially visualized extensor carpi ulnaris partial intrasubstance  tear and associated tenosynovitis. Mild tenosynovitis extensor digiti  minimi adjacently.  Otherwise, visualized courses of the flexor and extensor tendons  appear intact.  Muscles: No muscle strain or atrophy.  FINDINGS: The visualized course of the median nerve, ulnar nerve,  Guyon's canal, carpal tunnel are unremarkable.   A large approximately 2.5 x 1.6 x 1.0 cm (mediolateral x dorsopalmar x  proximodistal) internally septated, peripherally  enhancing cystic mass  dorsally over the scaphoid and capitate deep to the extensor  compartment tendons but appears superficial to dorsal intercarpal  ligament, most consistent with ganglion/synovial cyst.                                                      IMPRESSION:  1. Segmental severe flexor pollicis longus tendinosis at the level of  first metacarpal with associated tenosynovitis.  2. Partially visualized extensor carpi ulnaris partial intrasubstance  tear and associated tenosynovitis.   *  Mild tenosynovitis extensor digiti minimi adjacently.  3. approximately 2.5 x 1.6 x 1.0 cm internally septated, peripherally  enhancing cystic mass dorsally over the scaphoid and capitate deep to  the extensor compartment tendons but appears superficial to dorsal  intercarpal ligament, most consistent with ganglion/synovial cyst.  DESTINI PHILLIPS            Review of prior external note(s) from - Plastic Surgery, Rheumatology  Review of the result(s) of each unique test - MRI and EMG             Again, thank you for allowing me to participate in the care of your patient.        Sincerely,        Tata Jewell MD

## 2024-10-09 NOTE — PROGRESS NOTES
ASSESSMENT & PLAN    Lin was seen today for numbness, extremity weakness and pain.    Diagnoses and all orders for this visit:    Trigger finger of right thumb  -     Orthopedic  Referral  -     Orthopedic  Referral; Future      This issue is chronic and Unchanged.      ICD-10-CM    1. Trigger finger of right thumb  M65.311 Orthopedic  Referral     Orthopedic  Referral          Discussed the potential causes of trigger finger including direct trauma and repetitive trauma or activity such as gripping/grasping.  Treatment consists of avoiding the offending activity.  Using padded gloves for gripping/grasping activity.  Discussed benefits of splinting finger to prevent triggering and overuse.   - Given severity, patient would like to discuss options with orthopedic surgery.    Some symptoms raise concern for carpal tunnel, prior EMG was negative, could repeat if numbness and tingling is more prominent.    Plan:  - Today's Plan of Care:  Referral to an Orthopedic Surgeon    Trial of oval 8 splint    Discussed activity considerations and other supportive care including Ice/Heat, OTC and other topical medications as needed.    -We also discussed other future treatment options:  Referral to Occupational Therapy  Consideration of injections  Referral for EMG testing    Follow Up: as needed    Concerning signs and symptoms were reviewed and all questions were answered at this time.    Thanks for the opportunity to participate in the care of this patient, I will keep you updated on their progress.    CC: Rach Jewell MD [facility]  Sports Medicine Physician  [facility]    -----  Chief Complaint   Patient presents with    Right Hand - Numbness, Extremity Weakness, Pain       SUBJECTIVE  Lin Corona is a/an 44 year old female who is seen in consultation at the request of  Rach Pascual PA-C for evaluation of RAQUEL hand pain, right  worse than left.     The patient is seen by themselves.  The patient is Right handed    Onset: 1 years(s) ago, no new injury . Patient describes injury as unsure, but she fell off her horse last fall 2024. She got that hand caught in the horses reigns as well.   Location of Pain: right thumb; thenar eminence, volar aspect of the hand  Worsened by: using the bathroom, grabbing a door knob, pulling  Better with: nothing really, resting  Treatments tried: ice, heat, previous imaging (MRI 10/2/24 and xray 6/27/24 @ [facility], EMG @ [facility] 2/4/24), She had a CSI of Left thumb and Right ring finger 6/27/24 [facility] that provided minimal relief  Associated symptoms: swelling, numbness, tingling, weakness of right hand, and radiating pain to the elbow, burning/itching , in the volar aspect of the hand    Orthopedic/Surgical history: YES - Date: frozen shoulder seen by Dr. Mcnally for L shoulder 4/24/24, she has been seen by Rheumatology as well  Social History/Occupation: she is an        REVIEW OF SYSTEMS:  Review of Systems    OBJECTIVE:  LMP 01/06/2021    General: healthy, alert and in no distress  Skin: no suspicious lesions or rash.  CV: distal perfusion intact   Resp: normal respiratory effort without conversational dyspnea   Psych: normal mood and affect  Gait: NORMAL  Neuro: Normal light sensory exam of upper extremity    Bilateral Wrist and Hand exam    Inspection:       Swelling: right thumb thenar aspect    Tender:       nodular swelling over the A-1 pulley of the 1st digit(s) right    Non Tender:       Remainder of the Wrist and Hand bilateral    ROM:       Decreased ROM right thumb    Strength:       Decreased strength right thumb    Special Tests:        neg (-) Tinel's test bilateral and       neg (-) Phalen's test bilateral    Neurovascular:       2+ radial pulses bilaterally with brisk capillary refill and      normal sensation to light touch in the radial, median and ulnar nerve distributions      RADIOLOGY:  Final results and radiologist's interpretation, available in the [facility] health record.  Images were reviewed with the patient in the office today.  My personal interpretation of the performed imaging:     Reviewed right hand MRI from 10/2/2024 - flexor pollicis longus tendinosis at level of first metacarpal, likely ganglion cyst over scaphoid      Exam: MR HAND RIGHT W/O & W CONTRAST  History: Pain in both hands. Malaise.  Additional history from EMR: Burning and itching sensation across the  palm of her hand.  Techniques: Multiplanar multisequence imaging through the right hand  was obtained before and after administration of intravenous gadolinium  contrast  using routine protocol.  Contrast: 8.5 mL Gadavist  Comparison: Right forearm radiograph 9/29/2020.  Findings:  Bones: No fracture, marrow contusion or infiltrative change. No  findings to suggest osteitis. No erosion. Tiny nonspecific cystlike  change at the lunate and capitate.  Joints: Physiologic amount of joint fluid are present in the  visualized joints. No synovitis.  Tendons: Segmental severe flexor pollicis longus tendinosis at the  level of first metacarpal with associated tenosynovitis.   Partially visualized extensor carpi ulnaris partial intrasubstance  tear and associated tenosynovitis. Mild tenosynovitis extensor digiti  minimi adjacently.  Otherwise, visualized courses of the flexor and extensor tendons  appear intact.  Muscles: No muscle strain or atrophy.  FINDINGS: The visualized course of the median nerve, ulnar nerve,  Guyon's canal, carpal tunnel are unremarkable.   A large approximately 2.5 x 1.6 x 1.0 cm (mediolateral x dorsopalmar x  proximodistal) internally septated, peripherally enhancing cystic mass  dorsally over the scaphoid and capitate deep to the extensor  compartment tendons but appears superficial to dorsal intercarpal  ligament, most consistent with ganglion/synovial cyst.                                                       IMPRESSION:  1. Segmental severe flexor pollicis longus tendinosis at the level of  first metacarpal with associated tenosynovitis.  2. Partially visualized extensor carpi ulnaris partial intrasubstance  tear and associated tenosynovitis.   *  Mild tenosynovitis extensor digiti minimi adjacently.  3. approximately 2.5 x 1.6 x 1.0 cm internally septated, peripherally  enhancing cystic mass dorsally over the scaphoid and capitate deep to  the extensor compartment tendons but appears superficial to dorsal  intercarpal ligament, most consistent with ganglion/synovial cyst.  DESTINI PHILLIPS            Review of prior external note(s) from - Plastic Surgery, Rheumatology  Review of the result(s) of each unique test - MRI and EMG

## 2024-10-09 NOTE — PATIENT INSTRUCTIONS
Discussed the potential causes of trigger finger including direct trauma and repetitive trauma or activity such as gripping/grasping.  Treatment consists of avoiding the offending activity.  Using padded gloves for gripping/grasping activity.  Discussed benefits of splinting finger to prevent triggering and overuse.   - Given severity, patient would like to discuss options with orthopedic surgery.    Some symptoms raise concern for carpal tunnel, prior EMG was negative, could repeat if numbness and tingling is more prominent.    Plan:  - Today's Plan of Care:  Referral to an Orthopedic Surgeon    Trial of oval 8 splint    Discussed activity considerations and other supportive care including Ice/Heat, OTC and other topical medications as needed.    -We also discussed other future treatment options:  Referral to Occupational Therapy  Consideration of injections  Referral for EMG testing    Follow Up: as needed    If you have any further questions for your physician or physician s care team you can call [phone].

## 2024-10-14 ENCOUNTER — MYC REFILL (OUTPATIENT)
Dept: ENDOCRINOLOGY | Facility: CLINIC | Age: 44
End: 2024-10-14
Payer: COMMERCIAL

## 2024-10-14 DIAGNOSIS — E66.01 CLASS 2 SEVERE OBESITY WITH SERIOUS COMORBIDITY AND BODY MASS INDEX (BMI) OF 36.0 TO 36.9 IN ADULT, UNSPECIFIED OBESITY TYPE (H): ICD-10-CM

## 2024-10-14 DIAGNOSIS — E66.812 CLASS 2 SEVERE OBESITY WITH SERIOUS COMORBIDITY AND BODY MASS INDEX (BMI) OF 36.0 TO 36.9 IN ADULT, UNSPECIFIED OBESITY TYPE (H): ICD-10-CM

## 2024-10-15 NOTE — PROGRESS NOTES
CHIEF COMPLAINT:   Chief Complaint   Patient presents with    Right Thumb - Pain     Right trigger thumb. The right thumb does click and lock. Patient has tried oval 8 splint. Patient also has a history of carpal tunnel syndrome. She does use night splints bilaterally. Pain has been present since the spring of this year. She has tried an injection in the past. She is an , pain today is 8/10, she is right handed.        HISTORY:  Lin Corona is a 44 year old female , Right -hand dominant who is seen in consultation at the request of Tata Jewell ,for Right hand numbness and tingling and pain and catching since this past Spring.  History of carpal tunnel syndrome. Uses night splints bilateral.    she has numbness and tingling in radial 3 digits most nights, sometimes the ring/small fingers.       All finger are sore, stiff, swollen. Burning pain in fingers.    Had left trigger thumb and right ring finger trigger injection 6/27/2024 with [facility]. Helped with catching, still sore.    Works as an .      Suspected cause: Due to unknown factors.    Pain severity: 8/10  Pain quality: sharp and burning  Frequency of symptoms: are constant.        Other PMH:  has a past medical history of Anxiety and depression, Dysmenorrhea, Endometriosis, and Intrinsic eczema.    She has no past medical history of Basal cell carcinoma, Breast cancer (H), Malignant melanoma (H), Malignant neoplasm of ovary (H), Need for prophylactic hormone replacement therapy (postmenopausal), or Squamous cell carcinoma of skin, unspecified.  Patient Active Problem List   Diagnosis    Pelvic pain in female    Dysmenorrhea    Endometriosis    Chronic pelvic pain in female    Abnormal uterine bleeding    Anxiety and depression    Epidermoid cyst of skin of back    Neuralgia    Morbid obesity (H)    Surgical menopause on hormone replacement therapy    Acute pain of left shoulder       Surgical Hx:  has a past surgical  history that includes appendectomy (07/13/2011); Breast surgery (2000); colonoscopy (09/2011); GYN surgery (01/2012); shoulder surgery (Right); Laparoscopic hysterectomy total, bilateral salpingo-oophorectomy, combined (N/A, 01/11/2021); Cystoscopy (N/A, 01/11/2021); and Hysterectomy.    Medications:   Current Outpatient Medications:     dupilumab (DUPIXENT) 300 MG/2ML prefilled pen, Inject 2 mLs (300 mg) Subcutaneous every 14 days, Disp: 4 mL, Rfl: 6    Iodine, Kelp, (KELP PO), Take by mouth daily., Disp: , Rfl:     levothyroxine (SYNTHROID/LEVOTHROID) 50 MCG tablet, Take 1 tablet (50 mcg) by mouth daily, Disp: 90 tablet, Rfl: 1    phentermine 15 MG capsule, Take 1 capsule (15 mg) by mouth every morning, Disp: 30 capsule, Rfl: 5    SELENIUM PO, Take by mouth daily., Disp: , Rfl:     sertraline (ZOLOFT) 25 MG tablet, Take 25 mg by mouth daily, Disp: , Rfl:     tiZANidine (ZANAFLEX) 2 MG tablet, Take 1 tablet (2 mg) by mouth 3 times daily as needed for muscle spasms, Disp: 20 tablet, Rfl: 0    topiramate (TOPAMAX) 50 MG tablet, Take 1 tablet (50 mg) by mouth daily, Disp: 30 tablet, Rfl: 5    Allergies:   Allergies   Allergen Reactions    Gold Rash     Rash with true test    Nickel Rash     Positive for contact dermatitis, true test    Thimerosal (Thiomersal) Rash     Strong reaction with true test.        Social Hx: .   reports that she quit smoking about 10 years ago. Her smoking use included cigars and cigarettes. She has never used smokeless tobacco. She reports current alcohol use. She reports that she does not use drugs.    Family Hx: family history includes Arthritis in her maternal grandmother and mother; C.A.D. in her paternal grandfather and paternal grandmother; Cancer in her paternal grandmother; Cervical Cancer in her cousin and paternal aunt; Gallbladder Disease in her father; Heart Disease in her father; Ovarian Cancer in her paternal grandmother; Unknown/Adopted in her maternal grandmother..  "    REVIEW OF SYSTEMS: 10 point ROS neg other than the symptoms noted above in the HPI and PMH. Notables include  CONSTITUTIONAL:NEGATIVE for fever, chills, change in weight  INTEGUMENTARY/SKIN: NEGATIVE for worrisome rashes, moles or lesions  MUSCULOSKELETAL:See HPI above  Neurology: see HPI above.      EXAM:  /86   Pulse 78   Resp 18   Ht 1.575 m (5' 2\")   Wt 87.1 kg (192 lb)   LMP 01/06/2021   BMI 35.12 kg/m      GENERAL APPEARANCE: healthy, alert and no distress   GAIT: NORMAL  SKIN: no suspicious lesions or rashes  RESPIRATORY: No increased work of breathing.  NEURO:  strength: slight decreased , thenar fasiculations:negative;  Thenar atrophy: negative . Sensation diminished in  radial 3 digits, reflexes normal in upper extremities.   PSYCH:  mentation appears normal and affect normal/bright, not anxious.    MUSCULOSKELETAL:    RIGHT HAND/FINGERS:   Skin intact. Normal wear pattern, color and tone.   No clubbing or nail pitting.  Range of Motion All Normal  Special tests wrist:    Tinel's positive,    Phalen's positive.   Flexion/compression test positive   Finkelstein's test: negative    Special tests medial elbow ulnar nerve:    Tinel's negative,    Flexion/compression test negative.    Special tests median nerve proximal forearm:    Tinel's negative.    Good capillary refill to all fingers. 2+ radial pulse.    Triggering noted: none  Tenderness over A1 pulley of the thumb, ring finger.    No observable or palpable masses of the fingers or palm or wrist.  No observable or palpable cords or nodules of the fingers or palm.       Intact EPL, FPL, FDP, EDC, wrist flexion/extension, biceps/triceps  Intact sensation to light touch in median, radial and ulnar nerve distributions.      LEFT HAND/FINGERS:   Skin intact. Normal wear pattern, color and tone.   No clubbing or nail pitting.  Range of Motion All Normal  Special tests wrist:    Tinel's positive,    Phalen's positive.   Flexion/compression " test positive   Finkelstein's test: negative    Special tests medial elbow ulnar nerve:    Tinel's negative,    Flexion/compression test negative.    Special tests median nerve proximal forearm:    Tinel's negative.    Good capillary refill to all fingers. 2+ radial pulse.    Triggering noted: none  Tenderness over A1 pulley of the thumb.    No observable or palpable masses of the fingers or palm or wrist.  No observable or palpable cords or nodules of the fingers or palm.       Intact EPL, FPL, FDP, EDC, wrist flexion/extension, biceps/triceps  Intact sensation to light touch in median, radial and ulnar nerve distributions.      XRAYS: none.    EMG [facility] 2/5/2024  Mild right carpal tunnel syndrome with evidence of focal demyelination but no axonal loss. Normal left upper extremity EMG.      MRI right hand 6/27/2024  1. Segmental severe flexor pollicis longus tendinosis at the level of  first metacarpal with associated tenosynovitis.  2. Partially visualized extensor carpi ulnaris partial intrasubstance  tear and associated tenosynovitis.   *  Mild tenosynovitis extensor digiti minimi adjacently.  3. approximately 2.5 x 1.6 x 1.0 cm internally septated, peripherally  enhancing cystic mass dorsally over the scaphoid and capitate deep to  the extensor compartment tendons but appears superficial to dorsal  intercarpal ligament, most consistent with ganglion/synovial cyst.        ASSESSMENT: 43yo RHD female with:  1) bilateral trigger thumb, right more symptomatic than the left.  2) bilateral carpal tunnel syndrome, confirmed on EMG right, more clinical and not confirmed with EMG on the left.        PLAN:     Numbness/tingling  * discussed with patient signs and symptoms consistent with carpal tunnel syndrome. Carpal tunnel syndrome is compression or pinching of the median nerve at the wrist, as it enters the hand. There are many different causes, and in most cases, multifactorial.    * An indepth discussion was had  with her about the options for treatment, which included activity modification to avoid aggravating activities, taking breaks during activities that cause symptoms, stretching, NSAIDS to help decrease inflammation and swelling within the carpal tunnel, night splinting, corticosteroid injections, and carpal tunnel release.   * depending upon severity and duration of symptoms, nonoperative treatment is usually initiated, starting with least invasive modalities such as activity modification and a trial of night splints and NSAIDs.  * Cortisone injections are considered to decrease swelling and inflammation within the carpal tunnel and compression of the nerve.   * Lastly, carpal tunnel release should symptoms persist despite trial of nonoperative treatment, or in cases of severe carpal tunnel syndrome.    THUMBS  We talked about the options: taping the IP, splinting the IP joint, corticosteroid injections, hand therapy and surgery. I explained the risks and benefits of each, including recurrence. I have explained the nature of the surgical procedure, the risks and recovery time with the patient.         * at this time, will proceed with right carpal tunnel release. Right trigger thumb release. MAC with local.  * return to clinic 2 weeks postoperative for wound check, suture removal..  * all patient's questions addressed and answered today.     Luis Márquez M.D., M.S.  Dept. of Orthopaedic Surgery  [facility]

## 2024-10-16 ENCOUNTER — OFFICE VISIT (OUTPATIENT)
Dept: ORTHOPEDICS | Facility: CLINIC | Age: 44
End: 2024-10-16
Payer: COMMERCIAL

## 2024-10-16 ENCOUNTER — TELEPHONE (OUTPATIENT)
Dept: SURGERY | Facility: CLINIC | Age: 44
End: 2024-10-16

## 2024-10-16 VITALS
WEIGHT: 192 LBS | BODY MASS INDEX: 35.33 KG/M2 | SYSTOLIC BLOOD PRESSURE: 132 MMHG | DIASTOLIC BLOOD PRESSURE: 86 MMHG | HEART RATE: 78 BPM | HEIGHT: 62 IN | RESPIRATION RATE: 18 BRPM

## 2024-10-16 DIAGNOSIS — M65.311 TRIGGER FINGER OF RIGHT THUMB: Primary | ICD-10-CM

## 2024-10-16 DIAGNOSIS — G56.03 BILATERAL CARPAL TUNNEL SYNDROME: ICD-10-CM

## 2024-10-16 DIAGNOSIS — M65.312 TRIGGER THUMB OF LEFT HAND: ICD-10-CM

## 2024-10-16 PROCEDURE — 99203 OFFICE O/P NEW LOW 30 MIN: CPT | Performed by: ORTHOPAEDIC SURGERY

## 2024-10-16 ASSESSMENT — PAIN SCALES - GENERAL: PAINLEVEL: EXTREME PAIN (8)

## 2024-10-16 NOTE — LETTER
10/16/2024      Lin Corona  [address] [place] [id number]      Dear Colleague,    Thank you for referring your patient, Lin Corona, to the [facility] [place]. Please see a copy of my visit note below.    CHIEF COMPLAINT:   Chief Complaint   Patient presents with     Right Thumb - Pain     Right trigger thumb. The right thumb does click and lock. Patient has tried oval 8 splint. Patient also has a history of carpal tunnel syndrome. She does use night splints bilaterally. Pain has been present since the spring of this year. She has tried an injection in the past. She is an , pain today is 8/10, she is right handed.        HISTORY:  Lin Corona is a 44 year old female , Right -hand dominant who is seen in consultation at the request of Tata Jewell ,for Right hand numbness and tingling and pain and catching since this past Spring.  History of carpal tunnel syndrome. Uses night splints bilateral.    she has numbness and tingling in radial 3 digits most nights, sometimes the ring/small fingers.       All finger are sore, stiff, swollen. Burning pain in fingers.    Had left trigger thumb and right ring finger trigger injection 6/27/2024 with [facility]. Helped with catching, still sore.    Works as an .      Suspected cause: Due to unknown factors.    Pain severity: 8/10  Pain quality: sharp and burning  Frequency of symptoms: are constant.        Other PMH:  has a past medical history of Anxiety and depression, Dysmenorrhea, Endometriosis, and Intrinsic eczema.    She has no past medical history of Basal cell carcinoma, Breast cancer (H), Malignant melanoma (H), Malignant neoplasm of ovary (H), Need for prophylactic hormone replacement therapy (postmenopausal), or Squamous cell carcinoma of skin, unspecified.  Patient Active Problem List   Diagnosis     Pelvic pain in female     Dysmenorrhea     Endometriosis     Chronic pelvic pain in female      Abnormal uterine bleeding     Anxiety and depression     Epidermoid cyst of skin of back     Neuralgia     Morbid obesity (H)     Surgical menopause on hormone replacement therapy     Acute pain of left shoulder       Surgical Hx:  has a past surgical history that includes appendectomy (07/13/2011); Breast surgery (2000); colonoscopy (09/2011); GYN surgery (01/2012); shoulder surgery (Right); Laparoscopic hysterectomy total, bilateral salpingo-oophorectomy, combined (N/A, 01/11/2021); Cystoscopy (N/A, 01/11/2021); and Hysterectomy.    Medications:   Current Outpatient Medications:      dupilumab (DUPIXENT) 300 MG/2ML prefilled pen, Inject 2 mLs (300 mg) Subcutaneous every 14 days, Disp: 4 mL, Rfl: 6     Iodine, Kelp, (KELP PO), Take by mouth daily., Disp: , Rfl:      levothyroxine (SYNTHROID/LEVOTHROID) 50 MCG tablet, Take 1 tablet (50 mcg) by mouth daily, Disp: 90 tablet, Rfl: 1     phentermine 15 MG capsule, Take 1 capsule (15 mg) by mouth every morning, Disp: 30 capsule, Rfl: 5     SELENIUM PO, Take by mouth daily., Disp: , Rfl:      sertraline (ZOLOFT) 25 MG tablet, Take 25 mg by mouth daily, Disp: , Rfl:      tiZANidine (ZANAFLEX) 2 MG tablet, Take 1 tablet (2 mg) by mouth 3 times daily as needed for muscle spasms, Disp: 20 tablet, Rfl: 0     topiramate (TOPAMAX) 50 MG tablet, Take 1 tablet (50 mg) by mouth daily, Disp: 30 tablet, Rfl: 5    Allergies:   Allergies   Allergen Reactions     Gold Rash     Rash with true test     Nickel Rash     Positive for contact dermatitis, true test     Thimerosal (Thiomersal) Rash     Strong reaction with true test.        Social Hx: .   reports that she quit smoking about 10 years ago. Her smoking use included cigars and cigarettes. She has never used smokeless tobacco. She reports current alcohol use. She reports that she does not use drugs.    Family Hx: family history includes Arthritis in her maternal grandmother and mother; C.A.D. in her paternal grandfather  "and paternal grandmother; Cancer in her paternal grandmother; Cervical Cancer in her cousin and paternal aunt; Gallbladder Disease in her father; Heart Disease in her father; Ovarian Cancer in her paternal grandmother; Unknown/Adopted in her maternal grandmother..     REVIEW OF SYSTEMS: 10 point ROS neg other than the symptoms noted above in the HPI and PMH. Notables include  CONSTITUTIONAL:NEGATIVE for fever, chills, change in weight  INTEGUMENTARY/SKIN: NEGATIVE for worrisome rashes, moles or lesions  MUSCULOSKELETAL:See HPI above  Neurology: see HPI above.      EXAM:  /86   Pulse 78   Resp 18   Ht 1.575 m (5' 2\")   Wt 87.1 kg (192 lb)   LMP 01/06/2021   BMI 35.12 kg/m      GENERAL APPEARANCE: healthy, alert and no distress   GAIT: NORMAL  SKIN: no suspicious lesions or rashes  RESPIRATORY: No increased work of breathing.  NEURO:  strength: slight decreased , thenar fasiculations:negative;  Thenar atrophy: negative . Sensation diminished in  radial 3 digits, reflexes normal in upper extremities.   PSYCH:  mentation appears normal and affect normal/bright, not anxious.    MUSCULOSKELETAL:    RIGHT HAND/FINGERS:   Skin intact. Normal wear pattern, color and tone.   No clubbing or nail pitting.  Range of Motion All Normal  Special tests wrist:    Tinel's positive,    Phalen's positive.   Flexion/compression test positive   Finkelstein's test: negative    Special tests medial elbow ulnar nerve:    Tinel's negative,    Flexion/compression test negative.    Special tests median nerve proximal forearm:    Tinel's negative.    Good capillary refill to all fingers. 2+ radial pulse.    Triggering noted: none  Tenderness over A1 pulley of the thumb, ring finger.    No observable or palpable masses of the fingers or palm or wrist.  No observable or palpable cords or nodules of the fingers or palm.       Intact EPL, FPL, FDP, EDC, wrist flexion/extension, biceps/triceps  Intact sensation to light touch in " median, radial and ulnar nerve distributions.      LEFT HAND/FINGERS:   Skin intact. Normal wear pattern, color and tone.   No clubbing or nail pitting.  Range of Motion All Normal  Special tests wrist:    Tinel's positive,    Phalen's positive.   Flexion/compression test positive   Finkelstein's test: negative    Special tests medial elbow ulnar nerve:    Tinel's negative,    Flexion/compression test negative.    Special tests median nerve proximal forearm:    Tinel's negative.    Good capillary refill to all fingers. 2+ radial pulse.    Triggering noted: none  Tenderness over A1 pulley of the thumb.    No observable or palpable masses of the fingers or palm or wrist.  No observable or palpable cords or nodules of the fingers or palm.       Intact EPL, FPL, FDP, EDC, wrist flexion/extension, biceps/triceps  Intact sensation to light touch in median, radial and ulnar nerve distributions.      XRAYS: none.    EMG [place] 2/5/2024  Mild right carpal tunnel syndrome with evidence of focal demyelination but no axonal loss. Normal left upper extremity EMG.      MRI right hand 6/27/2024  1. Segmental severe flexor pollicis longus tendinosis at the level of  first metacarpal with associated tenosynovitis.  2. Partially visualized extensor carpi ulnaris partial intrasubstance  tear and associated tenosynovitis.   *  Mild tenosynovitis extensor digiti minimi adjacently.  3. approximately 2.5 x 1.6 x 1.0 cm internally septated, peripherally  enhancing cystic mass dorsally over the scaphoid and capitate deep to  the extensor compartment tendons but appears superficial to dorsal  intercarpal ligament, most consistent with ganglion/synovial cyst.        ASSESSMENT: 43yo RHD female with:  1) bilateral trigger thumb, right more symptomatic than the left.  2) bilateral carpal tunnel syndrome, confirmed on EMG right, more clinical and not confirmed with EMG on the left.        PLAN:     Numbness/tingling  * discussed with patient  signs and symptoms consistent with carpal tunnel syndrome. Carpal tunnel syndrome is compression or pinching of the median nerve at the wrist, as it enters the hand. There are many different causes, and in most cases, multifactorial.    * An indepth discussion was had with her about the options for treatment, which included activity modification to avoid aggravating activities, taking breaks during activities that cause symptoms, stretching, NSAIDS to help decrease inflammation and swelling within the carpal tunnel, night splinting, corticosteroid injections, and carpal tunnel release.   * depending upon severity and duration of symptoms, nonoperative treatment is usually initiated, starting with least invasive modalities such as activity modification and a trial of night splints and NSAIDs.  * Cortisone injections are considered to decrease swelling and inflammation within the carpal tunnel and compression of the nerve.   * Lastly, carpal tunnel release should symptoms persist despite trial of nonoperative treatment, or in cases of severe carpal tunnel syndrome.    THUMBS  We talked about the options: taping the IP, splinting the IP joint, corticosteroid injections, hand therapy and surgery. I explained the risks and benefits of each, including recurrence. I have explained the nature of the surgical procedure, the risks and recovery time with the patient.         * at this time, will proceed with right carpal tunnel release. Right trigger thumb release. MAC with local.  * return to clinic 2 weeks postoperative for wound check, suture removal..  * all patient's questions addressed and answered today.     Luis Márquez M.D., M.S.  Dept. of Orthopaedic Surgery  [facility]       Again, thank you for allowing me to participate in the care of your patient.        Sincerely,        Luis Márquez MD

## 2024-10-17 RX ORDER — PHENTERMINE HYDROCHLORIDE 15 MG/1
15 CAPSULE ORAL EVERY MORNING
Qty: 30 CAPSULE | Refills: 5 | Status: SHIPPED | OUTPATIENT
Start: 2024-10-17

## 2024-10-17 RX ORDER — TOPIRAMATE 50 MG/1
50 TABLET, FILM COATED ORAL DAILY
Qty: 30 TABLET | Refills: 5 | Status: SHIPPED | OUTPATIENT
Start: 2024-10-17

## 2024-10-17 NOTE — TELEPHONE ENCOUNTER
Type of surgery: RELEASE, TRIGGER, right THUMB , Release carpal tunnel right   Location of surgery: [place] [facility]  Date and time of surgery: 11/11/2024  Surgeon: Willi   Pre-Op Appt Date: 11/06/2024  Post-Op Appt Date: 11/27/2024   Packet sent out: Yes  Pre-cert/Authorization completed:  No  Date:

## 2024-10-24 ENCOUNTER — OFFICE VISIT (OUTPATIENT)
Dept: DERMATOLOGY | Facility: CLINIC | Age: 44
End: 2024-10-24
Payer: COMMERCIAL

## 2024-10-24 DIAGNOSIS — L20.9 ATOPIC DERMATITIS, UNSPECIFIED TYPE: ICD-10-CM

## 2024-10-24 PROCEDURE — 99213 OFFICE O/P EST LOW 20 MIN: CPT | Performed by: PHYSICIAN ASSISTANT

## 2024-10-24 RX ORDER — DUPILUMAB 300 MG/2ML
300 INJECTION, SOLUTION SUBCUTANEOUS
Qty: 4 ML | Refills: 11 | Status: SHIPPED | OUTPATIENT
Start: 2024-10-24

## 2024-10-24 RX ORDER — TRIAMCINOLONE ACETONIDE 1 MG/G
OINTMENT TOPICAL
Qty: 453.6 G | Refills: 1 | Status: SHIPPED | OUTPATIENT
Start: 2024-10-24

## 2024-10-24 ASSESSMENT — PAIN SCALES - GENERAL: PAINLEVEL_OUTOF10: NO PAIN (0)

## 2024-10-24 NOTE — LETTER
10/24/2024      Lin Corona  [address] [place] [id number]      Dear Colleague,    Thank you for referring your patient, Lin Corona, to the [facility]. Please see a copy of my visit note below.    Lin Corona is a pleasant 44 year old year old female patient here today for recheck atopic dermatitis. She notes skin is doing well on dupixent, occasionally has to use triamcinolone if flaring. She has TMJ and has had issues with grinding teeth causing dental abscesses in the past. She is interested in possible botox for TMJ. patient has no other skin complaints today.  Remainder of the HPI, Meds, PMH, Allergies, FH, and SH was reviewed in chart.    Pertinent Hx: atopic dermatitis   Past Medical History:   Diagnosis Date     Anxiety and depression      Dysmenorrhea      Endometriosis      Intrinsic eczema        Past Surgical History:   Procedure Laterality Date     APPENDECTOMY  07/13/2011     BREAST SURGERY  2000    Breast reduction     COLONOSCOPY  09/2011     CYSTOSCOPY N/A 01/11/2021    Procedure: CYSTOSCOPY;  Surgeon: Jessica Gallardo DO;  Location: [facility]     GYN SURGERY  01/2012    Colposcopy exam      HYSTERECTOMY       LAPAROSCOPIC HYSTERECTOMY TOTAL, BILATERAL SALPINGO-OOPHORECTOMY, COMBINED N/A 01/11/2021    Procedure: HYSTERECTOMY, TOTAL, LAPAROSCOPIC, WITH BILATERAL SALPINGO-OOPHORECTOMY;  Surgeon: Jessica Gallardo DO;  Location: [facility] OR     SHOULDER SURGERY Right         Family History   Problem Relation Age of Onset     Arthritis Mother      Heart Disease Father      Gallbladder Disease Father      Arthritis Maternal Grandmother      Unknown/Adopted Maternal Grandmother      C.A.D. Paternal Grandmother      Ovarian Cancer Paternal Grandmother      Cancer Paternal Grandmother      C.A.D. Paternal Grandfather      Cervical Cancer Cousin      Cervical Cancer Paternal Aunt        Social History     Socioeconomic History     Marital status:      Spouse name: Not on  file     Number of children: Not on file     Years of education: Not on file     Highest education level: Not on file   Occupational History     Occupation:    Tobacco Use     Smoking status: Former     Current packs/day: 0.00     Types: Cigars, Cigarettes     Quit date: 2014     Years since quitting: 10.8     Smokeless tobacco: Never   Vaping Use     Vaping status: Never Used   Substance and Sexual Activity     Alcohol use: Yes     Comment: occassional     Drug use: No     Sexual activity: Yes     Partners: Male     Birth control/protection: Pill     Comment: Nuvaring   Other Topics Concern     Parent/sibling w/ CABG, MI or angioplasty before 65F 55M? Not Asked   Social History Narrative     Not on file     Social Drivers of Health     Financial Resource Strain: Low Risk  (10/3/2023)    Financial Resource Strain      Within the past 12 months, have you or your family members you live with been unable to get utilities (heat, electricity) when it was really needed?: No   Food Insecurity: Low Risk  (10/3/2023)    Food Insecurity      Within the past 12 months, did you worry that your food would run out before you got money to buy more?: No      Within the past 12 months, did the food you bought just not last and you didn t have money to get more?: No   Transportation Needs: Low Risk  (10/3/2023)    Transportation Needs      Within the past 12 months, has lack of transportation kept you from medical appointments, getting your medicines, non-medical meetings or appointments, work, or from getting things that you need?: No   Physical Activity: Not on file   Stress: Not on file   Social Connections: Unknown (5/8/2023)    Received from [facility], [facility]    Social Connections      Frequency of Communication with Friends and Family: Not on file   Interpersonal Safety: Low Risk  (5/15/2024)    Interpersonal Safety      Do you feel physically  and emotionally safe where you currently live?: Yes      Within the past 12 months, have you been hit, slapped, kicked or otherwise physically hurt by someone?: No      Within the past 12 months, have you been humiliated or emotionally abused in other ways by your partner or ex-partner?: No   Housing Stability: Low Risk  (10/3/2023)    Housing Stability      Do you have housing? : Yes      Are you worried about losing your housing?: No       Outpatient Encounter Medications as of 10/24/2024   Medication Sig Dispense Refill     dupilumab (DUPIXENT) 300 MG/2ML prefilled pen Inject 2 mLs (300 mg) subcutaneously every 14 days. 4 mL 11     triamcinolone (KENALOG) 0.1 % external ointment Apply sparingly twice daily as needed. 453.6 g 1     Iodine, Kelp, (KELP PO) Take by mouth daily.       levothyroxine (SYNTHROID/LEVOTHROID) 50 MCG tablet Take 1 tablet (50 mcg) by mouth daily 90 tablet 1     phentermine 15 MG capsule Take 1 capsule (15 mg) by mouth every morning. 30 capsule 5     SELENIUM PO Take by mouth daily.       sertraline (ZOLOFT) 25 MG tablet Take 25 mg by mouth daily       tiZANidine (ZANAFLEX) 2 MG tablet Take 1 tablet (2 mg) by mouth 3 times daily as needed for muscle spasms 20 tablet 0     topiramate (TOPAMAX) 50 MG tablet Take 1 tablet (50 mg) by mouth daily. 30 tablet 5     [DISCONTINUED] dupilumab (DUPIXENT) 300 MG/2ML prefilled pen Inject 2 mLs (300 mg) Subcutaneous every 14 days 4 mL 6     No facility-administered encounter medications on file as of 10/24/2024.             O:   NAD, WDWN, Alert & Oriented, Mood & Affect wnl, Vitals stable   Here today alone   LMP 01/06/2021    General appearance normal   Vitals stable   Alert, oriented and in no acute distress     Few pink papules on hands   Firm subcutaneous nodule on left mid back  Subcutaneous papule with open comedo on left helix        Eyes: Conjunctivae/lids:Normal     ENT: Lips normal    MSK:Normal    Pulm: Breathing Normal    Neuro/Psych:  Orientation:Alert and Orientedx3 ; Mood/Affect:normal     A/P:  1. Atopic dermatitis   Continue dupixent.   Use topical steroids if needed.   2. TMJ  Will check with Dr. Coronel if he does botox for this diagnosis or if ENT does this.       Again, thank you for allowing me to participate in the care of your patient.        Sincerely,        Diana Lincoln PA-C

## 2024-10-25 NOTE — PROGRESS NOTES
Lin Corona is a pleasant 44 year old year old female patient here today for recheck atopic dermatitis. She notes skin is doing well on dupixent, occasionally has to use triamcinolone if flaring. She has TMJ and has had issues with grinding teeth causing dental abscesses in the past. She is interested in possible botox for TMJ. patient has no other skin complaints today.  Remainder of the HPI, Meds, PMH, Allergies, FH, and SH was reviewed in chart.    Pertinent Hx: atopic dermatitis   Past Medical History:   Diagnosis Date    Anxiety and depression     Dysmenorrhea     Endometriosis     Intrinsic eczema        Past Surgical History:   Procedure Laterality Date    APPENDECTOMY  07/13/2011    BREAST SURGERY  2000    Breast reduction    COLONOSCOPY  09/2011    CYSTOSCOPY N/A 01/11/2021    Procedure: CYSTOSCOPY;  Surgeon: Jessica Gallardo DO;  Location: [facility]    GYN SURGERY  01/2012    Colposcopy exam     HYSTERECTOMY      LAPAROSCOPIC HYSTERECTOMY TOTAL, BILATERAL SALPINGO-OOPHORECTOMY, COMBINED N/A 01/11/2021    Procedure: HYSTERECTOMY, TOTAL, LAPAROSCOPIC, WITH BILATERAL SALPINGO-OOPHORECTOMY;  Surgeon: Jessica Gallardo DO;  Location: [facility] OR    SHOULDER SURGERY Right         Family History   Problem Relation Age of Onset    Arthritis Mother     Heart Disease Father     Gallbladder Disease Father     Arthritis Maternal Grandmother     Unknown/Adopted Maternal Grandmother     C.A.D. Paternal Grandmother     Ovarian Cancer Paternal Grandmother     Cancer Paternal Grandmother     C.A.D. Paternal Grandfather     Cervical Cancer Cousin     Cervical Cancer Paternal Aunt        Social History     Socioeconomic History    Marital status:      Spouse name: Not on file    Number of children: Not on file    Years of education: Not on file    Highest education level: Not on file   Occupational History    Occupation:    Tobacco Use    Smoking status: Former     Current packs/day: 0.00     Types: Cigars,  Cigarettes     Quit date: 2014     Years since quitting: 10.8    Smokeless tobacco: Never   Vaping Use    Vaping status: Never Used   Substance and Sexual Activity    Alcohol use: Yes     Comment: occassional    Drug use: No    Sexual activity: Yes     Partners: Male     Birth control/protection: Pill     Comment: Nuvaring   Other Topics Concern    Parent/sibling w/ CABG, MI or angioplasty before 65F 55M? Not Asked   Social History Narrative    Not on file     Social Drivers of Health     Financial Resource Strain: Low Risk  (10/3/2023)    Financial Resource Strain     Within the past 12 months, have you or your family members you live with been unable to get utilities (heat, electricity) when it was really needed?: No   Food Insecurity: Low Risk  (10/3/2023)    Food Insecurity     Within the past 12 months, did you worry that your food would run out before you got money to buy more?: No     Within the past 12 months, did the food you bought just not last and you didn t have money to get more?: No   Transportation Needs: Low Risk  (10/3/2023)    Transportation Needs     Within the past 12 months, has lack of transportation kept you from medical appointments, getting your medicines, non-medical meetings or appointments, work, or from getting things that you need?: No   Physical Activity: Not on file   Stress: Not on file   Social Connections: Unknown (5/8/2023)    Received from [facility], [facility]    Social Connections     Frequency of Communication with Friends and Family: Not on file   Interpersonal Safety: Low Risk  (5/15/2024)    Interpersonal Safety     Do you feel physically and emotionally safe where you currently live?: Yes     Within the past 12 months, have you been hit, slapped, kicked or otherwise physically hurt by someone?: No     Within the past 12 months, have you been humiliated or emotionally abused in other ways by your partner  or ex-partner?: No   Housing Stability: Low Risk  (10/3/2023)    Housing Stability     Do you have housing? : Yes     Are you worried about losing your housing?: No       Outpatient Encounter Medications as of 10/24/2024   Medication Sig Dispense Refill    dupilumab (DUPIXENT) 300 MG/2ML prefilled pen Inject 2 mLs (300 mg) subcutaneously every 14 days. 4 mL 11    triamcinolone (KENALOG) 0.1 % external ointment Apply sparingly twice daily as needed. 453.6 g 1    Iodine, Kelp, (KELP PO) Take by mouth daily.      levothyroxine (SYNTHROID/LEVOTHROID) 50 MCG tablet Take 1 tablet (50 mcg) by mouth daily 90 tablet 1    phentermine 15 MG capsule Take 1 capsule (15 mg) by mouth every morning. 30 capsule 5    SELENIUM PO Take by mouth daily.      sertraline (ZOLOFT) 25 MG tablet Take 25 mg by mouth daily      tiZANidine (ZANAFLEX) 2 MG tablet Take 1 tablet (2 mg) by mouth 3 times daily as needed for muscle spasms 20 tablet 0    topiramate (TOPAMAX) 50 MG tablet Take 1 tablet (50 mg) by mouth daily. 30 tablet 5    [DISCONTINUED] dupilumab (DUPIXENT) 300 MG/2ML prefilled pen Inject 2 mLs (300 mg) Subcutaneous every 14 days 4 mL 6     No facility-administered encounter medications on file as of 10/24/2024.             O:   NAD, WDWN, Alert & Oriented, Mood & Affect wnl, Vitals stable   Here today alone   LMP 01/06/2021    General appearance normal   Vitals stable   Alert, oriented and in no acute distress     Few pink papules on hands   Firm subcutaneous nodule on left mid back  Subcutaneous papule with open comedo on left helix        Eyes: Conjunctivae/lids:Normal     ENT: Lips normal    MSK:Normal    Pulm: Breathing Normal    Neuro/Psych: Orientation:Alert and Orientedx3 ; Mood/Affect:normal     A/P:  1. Atopic dermatitis   Continue dupixent.   Use topical steroids if needed.   2. TMJ  Will check with Dr. Coronel if he does botox for this diagnosis or if ENT does this.

## 2024-11-06 ENCOUNTER — OFFICE VISIT (OUTPATIENT)
Dept: FAMILY MEDICINE | Facility: CLINIC | Age: 44
End: 2024-11-06
Payer: COMMERCIAL

## 2024-11-06 ENCOUNTER — ANESTHESIA EVENT (OUTPATIENT)
Dept: SURGERY | Facility: CLINIC | Age: 44
End: 2024-11-06
Payer: COMMERCIAL

## 2024-11-06 VITALS
WEIGHT: 192 LBS | HEART RATE: 78 BPM | SYSTOLIC BLOOD PRESSURE: 134 MMHG | HEIGHT: 62 IN | BODY MASS INDEX: 35.33 KG/M2 | DIASTOLIC BLOOD PRESSURE: 80 MMHG | TEMPERATURE: 97.8 F | RESPIRATION RATE: 16 BRPM | OXYGEN SATURATION: 98 %

## 2024-11-06 DIAGNOSIS — M65.30 TRIGGER FINGER, ACQUIRED: ICD-10-CM

## 2024-11-06 DIAGNOSIS — G56.01 CARPAL TUNNEL SYNDROME OF RIGHT WRIST: ICD-10-CM

## 2024-11-06 DIAGNOSIS — Z01.818 PREOP GENERAL PHYSICAL EXAM: Primary | ICD-10-CM

## 2024-11-06 LAB
ANION GAP SERPL CALCULATED.3IONS-SCNC: 9 MMOL/L (ref 7–15)
BUN SERPL-MCNC: 16.4 MG/DL (ref 6–20)
CALCIUM SERPL-MCNC: 9.5 MG/DL (ref 8.8–10.4)
CHLORIDE SERPL-SCNC: 107 MMOL/L (ref 98–107)
CREAT SERPL-MCNC: 0.73 MG/DL (ref 0.51–0.95)
EGFRCR SERPLBLD CKD-EPI 2021: >90 ML/MIN/1.73M2
ERYTHROCYTE [DISTWIDTH] IN BLOOD BY AUTOMATED COUNT: 12.2 % (ref 10–15)
GLUCOSE SERPL-MCNC: 104 MG/DL (ref 70–99)
HCO3 SERPL-SCNC: 24 MMOL/L (ref 22–29)
HCT VFR BLD AUTO: 40.1 % (ref 35–47)
HGB BLD-MCNC: 13.5 G/DL (ref 11.7–15.7)
MCH RBC QN AUTO: 30.8 PG (ref 26.5–33)
MCHC RBC AUTO-ENTMCNC: 33.7 G/DL (ref 31.5–36.5)
MCV RBC AUTO: 91 FL (ref 78–100)
PLATELET # BLD AUTO: 247 10E3/UL (ref 150–450)
POTASSIUM SERPL-SCNC: 4 MMOL/L (ref 3.4–5.3)
RBC # BLD AUTO: 4.39 10E6/UL (ref 3.8–5.2)
SODIUM SERPL-SCNC: 140 MMOL/L (ref 135–145)
WBC # BLD AUTO: 6.7 10E3/UL (ref 4–11)

## 2024-11-06 PROCEDURE — 99214 OFFICE O/P EST MOD 30 MIN: CPT | Performed by: NURSE PRACTITIONER

## 2024-11-06 PROCEDURE — 80048 BASIC METABOLIC PNL TOTAL CA: CPT | Performed by: NURSE PRACTITIONER

## 2024-11-06 PROCEDURE — 36415 COLL VENOUS BLD VENIPUNCTURE: CPT | Performed by: NURSE PRACTITIONER

## 2024-11-06 PROCEDURE — 85027 COMPLETE CBC AUTOMATED: CPT | Performed by: NURSE PRACTITIONER

## 2024-11-06 ASSESSMENT — PAIN SCALES - GENERAL: PAINLEVEL_OUTOF10: NO PAIN (0)

## 2024-11-06 NOTE — PROGRESS NOTES
Preoperative Evaluation  [facility]  [address]  [facility] [address]  Phone: [phone]  Primary Provider: TRUMAN Aguila CNP  Pre-op Performing Provider: TRUMAN Aguila CNP  Nov 6, 2024 11/6/2024   Surgical Information   What procedure is being done? carpal tunnel release, trigger finger release    Facility or Hospital where procedure/surgery will be performed: [facility]    Who is doing the procedure / surgery? Dr Márquez    Date of surgery / procedure: 11 11 24    Time of surgery / procedure: 2pm    Where do you plan to recover after surgery? at home with family        Patient-reported     Fax number for surgical facility: Note does not need to be faxed, will be available electronically in Epic.    Assessment & Plan     The proposed surgical procedure is considered INTERMEDIATE risk.    Preop general physical exam      Carpal tunnel syndrome of right wrist    - CBC with platelets; Future  - Basic metabolic panel  (Ca, Cl, CO2, Creat, Gluc, K, Na, BUN); Future  - CBC with platelets  - Basic metabolic panel  (Ca, Cl, CO2, Creat, Gluc, K, Na, BUN)    Trigger finger, acquired    - CBC with platelets; Future  - Basic metabolic panel  (Ca, Cl, CO2, Creat, Gluc, K, Na, BUN); Future  - CBC with platelets  - Basic metabolic panel  (Ca, Cl, CO2, Creat, Gluc, K, Na, BUN)           Risks and Recommendations  The patient has the following additional risks and recommendations for perioperative complications:   - Consult Hospitalist / IM to assist with post-op medical management    Antiplatelet or Anticoagulation Medication Instructions   - Patient is on no antiplatelet or anticoagulation medications.    Additional Medication Instructions  Take all scheduled medications on the day of surgery    Recommendation  Approval given to proceed with proposed procedure, without further diagnostic evaluation.    Jennifer Ceballos is a 44 year old,  presenting for the following:  Pre-Op Exam          11/6/2024    10:23 AM   Additional Questions   Roomed by      HPI related to upcoming procedure: She has had worsening pain and decreased strength of right hand.  Scheduled for thumb trigger finger release and carpal tunnel release of right.          11/6/2024   Pre-Op Questionnaire   Have you ever had a heart attack or stroke? No    Have you ever had surgery on your heart or blood vessels, such as a stent placement, a coronary artery bypass, or surgery on an artery in your head, neck, heart, or legs? No    Do you have chest pain with activity? No    Do you have a history of heart failure? No    Do you currently have a cold, bronchitis or symptoms of other infection? No    Do you have a cough, shortness of breath, or wheezing? No    Do you or anyone in your family have previous history of blood clots? No    Do you or does anyone in your family have a serious bleeding problem such as prolonged bleeding following surgeries or cuts? No    Have you ever had problems with anemia or been told to take iron pills? No    Have you had any abnormal blood loss such as black, tarry or bloody stools, or abnormal vaginal bleeding? No    Have you ever had a blood transfusion? No    Are you willing to have a blood transfusion if it is medically needed before, during, or after your surgery? Yes    Have you or any of your relatives ever had problems with anesthesia? (!) UNKNOWN     Do you have sleep apnea, excessive snoring or daytime drowsiness? No    Do you have any artifical heart valves or other implanted medical devices like a pacemaker, defibrillator, or continuous glucose monitor? No    Do you have artificial joints? No    Are you allergic to latex? No        Patient-reported     Health Care Directive  Patient does not have a Health Care Directive:     Preoperative Review of         Status of Chronic Conditions:  See problem list for active medical problems.  Problems all  longstanding and stable, except as noted/documented.  See ROS for pertinent symptoms related to these conditions.    Patient Active Problem List    Diagnosis Date Noted    Acute pain of left shoulder 10/05/2023     Priority: Medium    Surgical menopause on hormone replacement therapy 04/13/2023     Priority: Medium    Morbid obesity (H) 02/15/2023     Priority: Medium    Epidermoid cyst of skin of back 09/15/2022     Priority: Medium    Neuralgia 09/15/2022     Priority: Medium    Anxiety and depression 01/19/2022     Priority: Medium    Chronic pelvic pain in female 11/18/2020     Priority: Medium     Added automatically from request for surgery [id number]      Abnormal uterine bleeding 11/18/2020     Priority: Medium     Added automatically from request for surgery [id number]      Endometriosis 10/28/2020     Priority: Medium    Pelvic pain in female 02/02/2012     Priority: Medium    Dysmenorrhea 02/02/2012     Priority: Medium      Past Medical History:   Diagnosis Date    Anxiety and depression     Dysmenorrhea     Endometriosis     Intrinsic eczema      Past Surgical History:   Procedure Laterality Date    APPENDECTOMY  07/13/2011    BREAST SURGERY  2000    Breast reduction    COLONOSCOPY  09/2011    CYSTOSCOPY N/A 01/11/2021    Procedure: CYSTOSCOPY;  Surgeon: Jessica Gallardo DO;  Location: [facility] OR    GYN SURGERY  01/2012    Colposcopy exam     HYSTERECTOMY      LAPAROSCOPIC HYSTERECTOMY TOTAL, BILATERAL SALPINGO-OOPHORECTOMY, COMBINED N/A 01/11/2021    Procedure: HYSTERECTOMY, TOTAL, LAPAROSCOPIC, WITH BILATERAL SALPINGO-OOPHORECTOMY;  Surgeon: Jessica Gallardo DO;  Location: [facility] OR    SHOULDER SURGERY Right      Current Outpatient Medications   Medication Sig Dispense Refill    dupilumab (DUPIXENT) 300 MG/2ML prefilled pen Inject 2 mLs (300 mg) subcutaneously every 14 days. 4 mL 11    Iodine, Kelp, (KELP PO) Take by mouth daily.      levothyroxine (SYNTHROID/LEVOTHROID) 50 MCG tablet Take 1 tablet (50 mcg) by  "mouth daily 90 tablet 1    phentermine 15 MG capsule Take 1 capsule (15 mg) by mouth every morning. 30 capsule 5    SELENIUM PO Take by mouth daily.      sertraline (ZOLOFT) 25 MG tablet Take 25 mg by mouth daily      tiZANidine (ZANAFLEX) 2 MG tablet Take 1 tablet (2 mg) by mouth 3 times daily as needed for muscle spasms 20 tablet 0    topiramate (TOPAMAX) 50 MG tablet Take 1 tablet (50 mg) by mouth daily. 30 tablet 5    triamcinolone (KENALOG) 0.1 % external ointment Apply sparingly twice daily as needed. 453.6 g 1       Allergies   Allergen Reactions    Gold Rash     Rash with true test    Nickel Rash     Positive for contact dermatitis, true test    Thimerosal (Thiomersal) Rash     Strong reaction with true test.         Social History     Tobacco Use    Smoking status: Former     Current packs/day: 0.00     Types: Cigars, Cigarettes     Quit date: 2014     Years since quitting: 10.8    Smokeless tobacco: Never   Substance Use Topics    Alcohol use: Yes     Comment: occassional     Family History   Problem Relation Age of Onset    Arthritis Mother     Heart Disease Father     Gallbladder Disease Father     Arthritis Maternal Grandmother     Unknown/Adopted Maternal Grandmother     C.A.D. Paternal Grandmother     Ovarian Cancer Paternal Grandmother     Cancer Paternal Grandmother     C.A.D. Paternal Grandfather     Cervical Cancer Cousin     Cervical Cancer Paternal Aunt      History   Drug Use No             Review of Systems  Constitutional, HEENT, cardiovascular, pulmonary, gi and gu systems are negative, except as otherwise noted.    Objective    /80   Pulse 78   Temp 97.8  F (36.6  C) (Tympanic)   Resp 16   Ht 1.575 m (5' 2\")   Wt 87.1 kg (192 lb)   LMP 01/06/2021   SpO2 98%   BMI 35.12 kg/m     Estimated body mass index is 35.12 kg/m  as calculated from the following:    Height as of this encounter: 1.575 m (5' 2\").    Weight as of this encounter: 87.1 kg (192 lb).  Physical Exam  GENERAL: " "alert and no distress  EYES: Eyes grossly normal to inspection and conjunctivae and sclerae normal  HENT: mouth without ulcers or lesions  NECK: no adenopathy, no asymmetry, masses, or scars  RESP: lungs clear to auscultation - no rales, rhonchi or wheezes  CV: regular rate and rhythm, normal S1 S2, no S3 or S4, no murmur, click or rub, no peripheral edema  ABDOMEN: soft, nontender, no hepatosplenomegaly, no masses and bowel sounds normal  MS: no gross musculoskeletal defects noted, no edema  SKIN: no suspicious lesions or rashes  NEURO: Normal strength and tone, mentation intact and speech normal  PSYCH: mentation appears normal, affect normal/bright    No results for input(s): \"HGB\", \"PLT\", \"INR\", \"NA\", \"POTASSIUM\", \"CR\", \"A1C\" in the last 8760 hours.     Diagnostics  Recent Results (from the past 24 hours)   CBC with platelets    Collection Time: 11/06/24 10:47 AM   Result Value Ref Range    WBC Count 6.7 4.0 - 11.0 10e3/uL    RBC Count 4.39 3.80 - 5.20 10e6/uL    Hemoglobin 13.5 11.7 - 15.7 g/dL    Hematocrit 40.1 35.0 - 47.0 %    MCV 91 78 - 100 fL    MCH 30.8 26.5 - 33.0 pg    MCHC 33.7 31.5 - 36.5 g/dL    RDW 12.2 10.0 - 15.0 %    Platelet Count 247 150 - 450 10e3/uL      No EKG required for low risk surgery (cataract, skin procedure, breast biopsy, etc).    Revised Cardiac Risk Index (RCRI)  The patient has the following serious cardiovascular risks for perioperative complications:   - No serious cardiac risks = 0 points     RCRI Interpretation: 0 points: Class I (very low risk - 0.4% complication rate)         Signed Electronically by: TRUMAN Aguila CNP  A copy of this evaluation report is provided to the requesting physician.         "

## 2024-11-06 NOTE — PATIENT INSTRUCTIONS
How to Take Your Medication Before Surgery  Preoperative Medication Instructions   Antiplatelet or Anticoagulation Medication Instructions   - Patient is on no antiplatelet or anticoagulation medications.    Additional Medication Instructions  Take all scheduled medications on the day of surgery       Patient Education   Preparing for Your Surgery  For Adults  Getting started  In most cases, a nurse will call to review your health history and instructions. They will give you an arrival time based on your scheduled surgery time. Please be ready to share:  Your doctor's clinic name and phone number  Your medical, surgical, and anesthesia history  A list of allergies and sensitivities  A list of medicines, including herbal treatments and over-the-counter drugs  Whether the patient has a legal guardian (ask how to send us the papers in advance)  Note: You may not receive a call if you were seen at our PAC (Preoperative Assessment Center).  Please tell us if you're pregnant--or if there's any chance you might be pregnant. Some surgeries may injure a fetus (unborn baby), so they require a pregnancy test. Surgeries that are safe for a fetus don't always need a test, and you can choose whether to have one.   Preparing for surgery  Within 10 to 30 days of surgery: Have a pre-op exam (sometimes called an H&P, or History and Physical). This can be done at a clinic or pre-operative center.  If you're having a , you may not need this exam. Talk to your care team.  At your pre-op exam, talk to your care team about all medicines you take. (This includes CBD oil and any drugs, such as THC, marijuana, and other forms of cannabis.) If you need to stop any medicine before surgery, ask when to start taking it again.  This is for your safety. Many medicines and drugs can make you bleed too much during surgery. Some change how well surgery (anesthesia) drugs work.  Call your insurance company to let them know you're having  surgery. (If you don't have insurance, call [phone].)  Call your clinic if there's any change in your health. This includes a scrape or scratch near the surgery site, or any signs of a cold (sore throat, runny nose, cough, rash, fever).  Eating and drinking guidelines  For your safety: Unless your surgeon tells you otherwise, follow the guidelines below.  Eat and drink as normal until 8 hours before you arrive for surgery. After that, no food or milk. You can spit out gum when you arrive.  Drink clear liquids until 2 hours before you arrive. These are liquids you can see through, like water, Gatorade, and Propel Water. They also include plain black coffee and tea (no cream or milk).  No alcohol for 24 hours before you arrive. The night before surgery, stop any drinks that contain THC.  If your care team tells you to take medicine on the morning of surgery, it's okay to take it with a sip of water. No other medicines or drugs are allowed (including CBD oil)--follow your care team's instructions.  If you have questions the day of surgery, call your hospital or surgery center.   Preventing infection  Shower or bathe the night before and the morning of surgery. Follow the instructions your clinic gave you. (If no instructions, use regular soap.)  Don't shave or clip hair near your surgery site. We'll remove the hair if needed.  Don't smoke or vape the morning of surgery. No chewing tobacco for 6 hours before you arrive. A nicotine patch is okay. You may spit out nicotine gum when you arrive.  For some surgeries, the surgeon will tell you to fully quit smoking and nicotine.  We will make every effort to keep you safe from infection. We will:  Clean our hands often with soap and water (or an alcohol-based hand rub).  Clean the skin at your surgery site with a special soap that kills germs.  Give you a special gown to keep you warm. (Cold raises the risk of infection.)  Wear hair covers, masks, gowns, and gloves  during surgery.  Give antibiotic medicine, if prescribed. Not all surgeries need this medicine.  What to bring on the day of surgery  Photo ID and insurance card  Copy of your health care directive, if you have one  Glasses and hearing aids (bring cases)  You can't wear contacts during surgery  Inhaler and eye drops, if you use them (tell us about these when you arrive)  CPAP machine or breathing device, if you use them  A few personal items, if spending the night  If you have . . .  A pacemaker, ICD (cardiac defibrillator), or other implant: Bring the ID card.  An implanted stimulator: Bring the remote control.  A legal guardian: Bring a copy of the certified (court-stamped) guardianship papers.  Please remove any jewelry, including body piercings. Leave jewelry and other valuables at home.  If you're going home the day of surgery  You must have a responsible adult drive you home. They should stay with you overnight as well.  If you don't have someone to stay with you, and you aren't safe to go home alone, we may keep you overnight. Insurance often won't pay for this.  After surgery  If it's hard to control your pain or you need more pain medicine, please call your surgeon's office.  Questions?   If you have any questions for your care team, list them here:   ____________________________________________________________________________________________________________________________________________________________________________________________________________________________________________________________  For informational purposes only. Not to replace the advice of your health care provider. Copyright   2003, 2019 [facility]. All rights reserved. Clinically reviewed by Isai Nichols MD. [employer] [id number] - REV 08/24.

## 2024-11-06 NOTE — H&P (VIEW-ONLY)
Preoperative Evaluation  [facility]  [address]  [facility] [address]  Phone: [phone]  Primary Provider: TRUMAN Aguila CNP  Pre-op Performing Provider: TRUMAN Aguila CNP  Nov 6, 2024 11/6/2024   Surgical Information   What procedure is being done? carpal tunnel release, trigger finger release    Facility or Hospital where procedure/surgery will be performed: [facility]    Who is doing the procedure / surgery? Dr Márquez    Date of surgery / procedure: 11 11 24    Time of surgery / procedure: 2pm    Where do you plan to recover after surgery? at home with family        Patient-reported     Fax number for surgical facility: Note does not need to be faxed, will be available electronically in Epic.    Assessment & Plan     The proposed surgical procedure is considered INTERMEDIATE risk.    Preop general physical exam      Carpal tunnel syndrome of right wrist    - CBC with platelets; Future  - Basic metabolic panel  (Ca, Cl, CO2, Creat, Gluc, K, Na, BUN); Future  - CBC with platelets  - Basic metabolic panel  (Ca, Cl, CO2, Creat, Gluc, K, Na, BUN)    Trigger finger, acquired    - CBC with platelets; Future  - Basic metabolic panel  (Ca, Cl, CO2, Creat, Gluc, K, Na, BUN); Future  - CBC with platelets  - Basic metabolic panel  (Ca, Cl, CO2, Creat, Gluc, K, Na, BUN)           Risks and Recommendations  The patient has the following additional risks and recommendations for perioperative complications:   - Consult Hospitalist / IM to assist with post-op medical management    Antiplatelet or Anticoagulation Medication Instructions   - Patient is on no antiplatelet or anticoagulation medications.    Additional Medication Instructions  Take all scheduled medications on the day of surgery    Recommendation  Approval given to proceed with proposed procedure, without further diagnostic evaluation.    Jennifer Ceballos is a 44 year old,  presenting for the following:  Pre-Op Exam          11/6/2024    10:23 AM   Additional Questions   Roomed by      HPI related to upcoming procedure: She has had worsening pain and decreased strength of right hand.  Scheduled for thumb trigger finger release and carpal tunnel release of right.          11/6/2024   Pre-Op Questionnaire   Have you ever had a heart attack or stroke? No    Have you ever had surgery on your heart or blood vessels, such as a stent placement, a coronary artery bypass, or surgery on an artery in your head, neck, heart, or legs? No    Do you have chest pain with activity? No    Do you have a history of heart failure? No    Do you currently have a cold, bronchitis or symptoms of other infection? No    Do you have a cough, shortness of breath, or wheezing? No    Do you or anyone in your family have previous history of blood clots? No    Do you or does anyone in your family have a serious bleeding problem such as prolonged bleeding following surgeries or cuts? No    Have you ever had problems with anemia or been told to take iron pills? No    Have you had any abnormal blood loss such as black, tarry or bloody stools, or abnormal vaginal bleeding? No    Have you ever had a blood transfusion? No    Are you willing to have a blood transfusion if it is medically needed before, during, or after your surgery? Yes    Have you or any of your relatives ever had problems with anesthesia? (!) UNKNOWN     Do you have sleep apnea, excessive snoring or daytime drowsiness? No    Do you have any artifical heart valves or other implanted medical devices like a pacemaker, defibrillator, or continuous glucose monitor? No    Do you have artificial joints? No    Are you allergic to latex? No        Patient-reported     Health Care Directive  Patient does not have a Health Care Directive:     Preoperative Review of         Status of Chronic Conditions:  See problem list for active medical problems.  Problems all  longstanding and stable, except as noted/documented.  See ROS for pertinent symptoms related to these conditions.    Patient Active Problem List    Diagnosis Date Noted    Acute pain of left shoulder 10/05/2023     Priority: Medium    Surgical menopause on hormone replacement therapy 04/13/2023     Priority: Medium    Morbid obesity (H) 02/15/2023     Priority: Medium    Epidermoid cyst of skin of back 09/15/2022     Priority: Medium    Neuralgia 09/15/2022     Priority: Medium    Anxiety and depression 01/19/2022     Priority: Medium    Chronic pelvic pain in female 11/18/2020     Priority: Medium     Added automatically from request for surgery [id number]      Abnormal uterine bleeding 11/18/2020     Priority: Medium     Added automatically from request for surgery [id number]      Endometriosis 10/28/2020     Priority: Medium    Pelvic pain in female 02/02/2012     Priority: Medium    Dysmenorrhea 02/02/2012     Priority: Medium      Past Medical History:   Diagnosis Date    Anxiety and depression     Dysmenorrhea     Endometriosis     Intrinsic eczema      Past Surgical History:   Procedure Laterality Date    APPENDECTOMY  07/13/2011    BREAST SURGERY  2000    Breast reduction    COLONOSCOPY  09/2011    CYSTOSCOPY N/A 01/11/2021    Procedure: CYSTOSCOPY;  Surgeon: Jessica Gallardo DO;  Location: [facility] OR    GYN SURGERY  01/2012    Colposcopy exam     HYSTERECTOMY      LAPAROSCOPIC HYSTERECTOMY TOTAL, BILATERAL SALPINGO-OOPHORECTOMY, COMBINED N/A 01/11/2021    Procedure: HYSTERECTOMY, TOTAL, LAPAROSCOPIC, WITH BILATERAL SALPINGO-OOPHORECTOMY;  Surgeon: Jessica Gallardo DO;  Location: [facility] OR    SHOULDER SURGERY Right      Current Outpatient Medications   Medication Sig Dispense Refill    dupilumab (DUPIXENT) 300 MG/2ML prefilled pen Inject 2 mLs (300 mg) subcutaneously every 14 days. 4 mL 11    Iodine, Kelp, (KELP PO) Take by mouth daily.      levothyroxine (SYNTHROID/LEVOTHROID) 50 MCG tablet Take 1 tablet (50 mcg) by  "mouth daily 90 tablet 1    phentermine 15 MG capsule Take 1 capsule (15 mg) by mouth every morning. 30 capsule 5    SELENIUM PO Take by mouth daily.      sertraline (ZOLOFT) 25 MG tablet Take 25 mg by mouth daily      tiZANidine (ZANAFLEX) 2 MG tablet Take 1 tablet (2 mg) by mouth 3 times daily as needed for muscle spasms 20 tablet 0    topiramate (TOPAMAX) 50 MG tablet Take 1 tablet (50 mg) by mouth daily. 30 tablet 5    triamcinolone (KENALOG) 0.1 % external ointment Apply sparingly twice daily as needed. 453.6 g 1       Allergies   Allergen Reactions    Gold Rash     Rash with true test    Nickel Rash     Positive for contact dermatitis, true test    Thimerosal (Thiomersal) Rash     Strong reaction with true test.         Social History     Tobacco Use    Smoking status: Former     Current packs/day: 0.00     Types: Cigars, Cigarettes     Quit date: 2014     Years since quitting: 10.8    Smokeless tobacco: Never   Substance Use Topics    Alcohol use: Yes     Comment: occassional     Family History   Problem Relation Age of Onset    Arthritis Mother     Heart Disease Father     Gallbladder Disease Father     Arthritis Maternal Grandmother     Unknown/Adopted Maternal Grandmother     C.A.D. Paternal Grandmother     Ovarian Cancer Paternal Grandmother     Cancer Paternal Grandmother     C.A.D. Paternal Grandfather     Cervical Cancer Cousin     Cervical Cancer Paternal Aunt      History   Drug Use No             Review of Systems  Constitutional, HEENT, cardiovascular, pulmonary, gi and gu systems are negative, except as otherwise noted.    Objective    /80   Pulse 78   Temp 97.8  F (36.6  C) (Tympanic)   Resp 16   Ht 1.575 m (5' 2\")   Wt 87.1 kg (192 lb)   LMP 01/06/2021   SpO2 98%   BMI 35.12 kg/m     Estimated body mass index is 35.12 kg/m  as calculated from the following:    Height as of this encounter: 1.575 m (5' 2\").    Weight as of this encounter: 87.1 kg (192 lb).  Physical Exam  GENERAL: " "alert and no distress  EYES: Eyes grossly normal to inspection and conjunctivae and sclerae normal  HENT: mouth without ulcers or lesions  NECK: no adenopathy, no asymmetry, masses, or scars  RESP: lungs clear to auscultation - no rales, rhonchi or wheezes  CV: regular rate and rhythm, normal S1 S2, no S3 or S4, no murmur, click or rub, no peripheral edema  ABDOMEN: soft, nontender, no hepatosplenomegaly, no masses and bowel sounds normal  MS: no gross musculoskeletal defects noted, no edema  SKIN: no suspicious lesions or rashes  NEURO: Normal strength and tone, mentation intact and speech normal  PSYCH: mentation appears normal, affect normal/bright    No results for input(s): \"HGB\", \"PLT\", \"INR\", \"NA\", \"POTASSIUM\", \"CR\", \"A1C\" in the last 8760 hours.     Diagnostics  Recent Results (from the past 24 hours)   CBC with platelets    Collection Time: 11/06/24 10:47 AM   Result Value Ref Range    WBC Count 6.7 4.0 - 11.0 10e3/uL    RBC Count 4.39 3.80 - 5.20 10e6/uL    Hemoglobin 13.5 11.7 - 15.7 g/dL    Hematocrit 40.1 35.0 - 47.0 %    MCV 91 78 - 100 fL    MCH 30.8 26.5 - 33.0 pg    MCHC 33.7 31.5 - 36.5 g/dL    RDW 12.2 10.0 - 15.0 %    Platelet Count 247 150 - 450 10e3/uL      No EKG required for low risk surgery (cataract, skin procedure, breast biopsy, etc).    Revised Cardiac Risk Index (RCRI)  The patient has the following serious cardiovascular risks for perioperative complications:   - No serious cardiac risks = 0 points     RCRI Interpretation: 0 points: Class I (very low risk - 0.4% complication rate)         Signed Electronically by: TRUMAN Aguila CNP  A copy of this evaluation report is provided to the requesting physician.         "

## 2024-11-11 ENCOUNTER — HOSPITAL ENCOUNTER (OUTPATIENT)
Facility: CLINIC | Age: 44
Discharge: HOME | End: 2024-11-11
Attending: ORTHOPAEDIC SURGERY | Admitting: ORTHOPAEDIC SURGERY
Payer: COMMERCIAL

## 2024-11-11 ENCOUNTER — ANESTHESIA (OUTPATIENT)
Dept: SURGERY | Facility: CLINIC | Age: 44
End: 2024-11-11
Payer: COMMERCIAL

## 2024-11-11 VITALS
RESPIRATION RATE: 16 BRPM | TEMPERATURE: 98.1 F | HEART RATE: 76 BPM | OXYGEN SATURATION: 95 % | DIASTOLIC BLOOD PRESSURE: 76 MMHG | SYSTOLIC BLOOD PRESSURE: 117 MMHG | HEIGHT: 62 IN | BODY MASS INDEX: 35.33 KG/M2 | WEIGHT: 192 LBS

## 2024-11-11 DIAGNOSIS — G56.01 RIGHT CARPAL TUNNEL SYNDROME: Primary | ICD-10-CM

## 2024-11-11 PROCEDURE — 272N000001 HC OR GENERAL SUPPLY STERILE: Performed by: ORTHOPAEDIC SURGERY

## 2024-11-11 PROCEDURE — 250N000011 HC RX IP 250 OP 636: Performed by: NURSE ANESTHETIST, CERTIFIED REGISTERED

## 2024-11-11 PROCEDURE — 370N000017 HC ANESTHESIA TECHNICAL FEE, PER MIN: Performed by: ORTHOPAEDIC SURGERY

## 2024-11-11 PROCEDURE — 250N000009 HC RX 250: Performed by: NURSE ANESTHETIST, CERTIFIED REGISTERED

## 2024-11-11 PROCEDURE — 360N000075 HC SURGERY LEVEL 2, PER MIN: Performed by: ORTHOPAEDIC SURGERY

## 2024-11-11 PROCEDURE — 258N000003 HC RX IP 258 OP 636: Performed by: NURSE ANESTHETIST, CERTIFIED REGISTERED

## 2024-11-11 PROCEDURE — 250N000011 HC RX IP 250 OP 636: Performed by: ORTHOPAEDIC SURGERY

## 2024-11-11 PROCEDURE — 999N000141 HC STATISTIC PRE-PROCEDURE NURSING ASSESSMENT: Performed by: ORTHOPAEDIC SURGERY

## 2024-11-11 PROCEDURE — 64721 CARPAL TUNNEL SURGERY: CPT | Mod: 51 | Performed by: ORTHOPAEDIC SURGERY

## 2024-11-11 PROCEDURE — 250N000009 HC RX 250: Performed by: ORTHOPAEDIC SURGERY

## 2024-11-11 PROCEDURE — 710N000012 HC RECOVERY PHASE 2, PER MINUTE: Performed by: ORTHOPAEDIC SURGERY

## 2024-11-11 PROCEDURE — 250N000013 HC RX MED GY IP 250 OP 250 PS 637: Performed by: NURSE ANESTHETIST, CERTIFIED REGISTERED

## 2024-11-11 PROCEDURE — 26055 INCISE FINGER TENDON SHEATH: CPT | Mod: F5 | Performed by: ORTHOPAEDIC SURGERY

## 2024-11-11 RX ORDER — KETOROLAC TROMETHAMINE 30 MG/ML
INJECTION, SOLUTION INTRAMUSCULAR; INTRAVENOUS PRN
Status: DISCONTINUED | OUTPATIENT
Start: 2024-11-11 | End: 2024-11-11

## 2024-11-11 RX ORDER — HYDROCODONE BITARTRATE AND ACETAMINOPHEN 5; 325 MG/1; MG/1
1-2 TABLET ORAL EVERY 6 HOURS PRN
Qty: 10 TABLET | Refills: 0 | Status: SHIPPED | OUTPATIENT
Start: 2024-11-11

## 2024-11-11 RX ORDER — LIDOCAINE HYDROCHLORIDE 20 MG/ML
INJECTION, SOLUTION INFILTRATION; PERINEURAL PRN
Status: DISCONTINUED | OUTPATIENT
Start: 2024-11-11 | End: 2024-11-11

## 2024-11-11 RX ORDER — PROPOFOL 10 MG/ML
INJECTION, EMULSION INTRAVENOUS CONTINUOUS PRN
Status: DISCONTINUED | OUTPATIENT
Start: 2024-11-11 | End: 2024-11-11

## 2024-11-11 RX ORDER — CEFAZOLIN SODIUM/WATER 2 G/20 ML
2 SYRINGE (ML) INTRAVENOUS
Status: COMPLETED | OUTPATIENT
Start: 2024-11-11 | End: 2024-11-11

## 2024-11-11 RX ORDER — CEFAZOLIN SODIUM/WATER 2 G/20 ML
2 SYRINGE (ML) INTRAVENOUS SEE ADMIN INSTRUCTIONS
Status: DISCONTINUED | OUTPATIENT
Start: 2024-11-11 | End: 2024-11-11 | Stop reason: HOSPADM

## 2024-11-11 RX ORDER — ACETAMINOPHEN 325 MG/1
975 TABLET ORAL ONCE
Status: COMPLETED | OUTPATIENT
Start: 2024-11-11 | End: 2024-11-11

## 2024-11-11 RX ORDER — OXYCODONE HYDROCHLORIDE 5 MG/1
5 TABLET ORAL
Status: DISCONTINUED | OUTPATIENT
Start: 2024-11-11 | End: 2024-11-11 | Stop reason: HOSPADM

## 2024-11-11 RX ORDER — DIPHENHYDRAMINE HYDROCHLORIDE 50 MG/ML
INJECTION INTRAMUSCULAR; INTRAVENOUS PRN
Status: DISCONTINUED | OUTPATIENT
Start: 2024-11-11 | End: 2024-11-11

## 2024-11-11 RX ORDER — PROPOFOL 10 MG/ML
INJECTION, EMULSION INTRAVENOUS PRN
Status: DISCONTINUED | OUTPATIENT
Start: 2024-11-11 | End: 2024-11-11

## 2024-11-11 RX ORDER — GABAPENTIN 300 MG/1
300 CAPSULE ORAL
Status: COMPLETED | OUTPATIENT
Start: 2024-11-11 | End: 2024-11-11

## 2024-11-11 RX ORDER — BUPIVACAINE HYDROCHLORIDE 2.5 MG/ML
INJECTION, SOLUTION INFILTRATION; PERINEURAL PRN
Status: DISCONTINUED | OUTPATIENT
Start: 2024-11-11 | End: 2024-11-11 | Stop reason: HOSPADM

## 2024-11-11 RX ORDER — SODIUM CHLORIDE, SODIUM LACTATE, POTASSIUM CHLORIDE, CALCIUM CHLORIDE 600; 310; 30; 20 MG/100ML; MG/100ML; MG/100ML; MG/100ML
INJECTION, SOLUTION INTRAVENOUS CONTINUOUS
Status: DISCONTINUED | OUTPATIENT
Start: 2024-11-11 | End: 2024-11-11 | Stop reason: HOSPADM

## 2024-11-11 RX ORDER — ONDANSETRON 4 MG/1
4 TABLET, ORALLY DISINTEGRATING ORAL EVERY 30 MIN PRN
Status: DISCONTINUED | OUTPATIENT
Start: 2024-11-11 | End: 2024-11-11 | Stop reason: HOSPADM

## 2024-11-11 RX ORDER — ONDANSETRON 2 MG/ML
INJECTION INTRAMUSCULAR; INTRAVENOUS PRN
Status: DISCONTINUED | OUTPATIENT
Start: 2024-11-11 | End: 2024-11-11

## 2024-11-11 RX ORDER — DEXAMETHASONE SODIUM PHOSPHATE 4 MG/ML
INJECTION, SOLUTION INTRA-ARTICULAR; INTRALESIONAL; INTRAMUSCULAR; INTRAVENOUS; SOFT TISSUE PRN
Status: DISCONTINUED | OUTPATIENT
Start: 2024-11-11 | End: 2024-11-11

## 2024-11-11 RX ORDER — ONDANSETRON 2 MG/ML
4 INJECTION INTRAMUSCULAR; INTRAVENOUS EVERY 30 MIN PRN
Status: DISCONTINUED | OUTPATIENT
Start: 2024-11-11 | End: 2024-11-11 | Stop reason: HOSPADM

## 2024-11-11 RX ORDER — NALOXONE HYDROCHLORIDE 0.4 MG/ML
0.1 INJECTION, SOLUTION INTRAMUSCULAR; INTRAVENOUS; SUBCUTANEOUS
Status: DISCONTINUED | OUTPATIENT
Start: 2024-11-11 | End: 2024-11-11 | Stop reason: HOSPADM

## 2024-11-11 RX ORDER — LIDOCAINE 40 MG/G
CREAM TOPICAL
Status: DISCONTINUED | OUTPATIENT
Start: 2024-11-11 | End: 2024-11-11 | Stop reason: HOSPADM

## 2024-11-11 RX ORDER — ONDANSETRON 4 MG/1
4 TABLET, ORALLY DISINTEGRATING ORAL
Status: DISCONTINUED | OUTPATIENT
Start: 2024-11-11 | End: 2024-11-11 | Stop reason: HOSPADM

## 2024-11-11 RX ORDER — LIDOCAINE HYDROCHLORIDE 10 MG/ML
INJECTION, SOLUTION EPIDURAL; INFILTRATION; INTRACAUDAL; PERINEURAL PRN
Status: DISCONTINUED | OUTPATIENT
Start: 2024-11-11 | End: 2024-11-11 | Stop reason: HOSPADM

## 2024-11-11 RX ORDER — AMOXICILLIN 250 MG
1-2 CAPSULE ORAL 2 TIMES DAILY
Qty: 30 TABLET | Refills: 0 | Status: SHIPPED | OUTPATIENT
Start: 2024-11-11

## 2024-11-11 RX ORDER — HYDROXYZINE HYDROCHLORIDE 25 MG/1
25 TABLET, FILM COATED ORAL
Status: DISCONTINUED | OUTPATIENT
Start: 2024-11-11 | End: 2024-11-11 | Stop reason: HOSPADM

## 2024-11-11 RX ADMIN — DIPHENHYDRAMINE HYDROCHLORIDE 12.5 MG: 50 INJECTION, SOLUTION INTRAMUSCULAR; INTRAVENOUS at 14:29

## 2024-11-11 RX ADMIN — DEXAMETHASONE SODIUM PHOSPHATE 4 MG: 4 INJECTION, SOLUTION INTRA-ARTICULAR; INTRALESIONAL; INTRAMUSCULAR; INTRAVENOUS; SOFT TISSUE at 14:23

## 2024-11-11 RX ADMIN — LIDOCAINE HYDROCHLORIDE 80 MG: 20 INJECTION, SOLUTION INFILTRATION; PERINEURAL at 14:18

## 2024-11-11 RX ADMIN — LIDOCAINE HYDROCHLORIDE 0.1 ML: 10 INJECTION, SOLUTION EPIDURAL; INFILTRATION; INTRACAUDAL; PERINEURAL at 13:31

## 2024-11-11 RX ADMIN — Medication 2 G: at 14:13

## 2024-11-11 RX ADMIN — PROPOFOL 20 MG: 10 INJECTION, EMULSION INTRAVENOUS at 14:50

## 2024-11-11 RX ADMIN — GABAPENTIN 300 MG: 300 CAPSULE ORAL at 13:13

## 2024-11-11 RX ADMIN — PROPOFOL 60 MG: 10 INJECTION, EMULSION INTRAVENOUS at 14:20

## 2024-11-11 RX ADMIN — KETOROLAC TROMETHAMINE 15 MG: 30 INJECTION, SOLUTION INTRAMUSCULAR at 14:42

## 2024-11-11 RX ADMIN — PROPOFOL 20 MG: 10 INJECTION, EMULSION INTRAVENOUS at 14:16

## 2024-11-11 RX ADMIN — ACETAMINOPHEN 975 MG: 325 TABLET ORAL at 13:13

## 2024-11-11 RX ADMIN — SODIUM CHLORIDE, POTASSIUM CHLORIDE, SODIUM LACTATE AND CALCIUM CHLORIDE 1000 ML: 600; 310; 30; 20 INJECTION, SOLUTION INTRAVENOUS at 13:31

## 2024-11-11 RX ADMIN — PROPOFOL 150 MCG/KG/MIN: 10 INJECTION, EMULSION INTRAVENOUS at 14:20

## 2024-11-11 RX ADMIN — PROPOFOL 30 MG: 10 INJECTION, EMULSION INTRAVENOUS at 14:18

## 2024-11-11 RX ADMIN — ONDANSETRON 4 MG: 2 INJECTION INTRAMUSCULAR; INTRAVENOUS at 14:23

## 2024-11-11 ASSESSMENT — ACTIVITIES OF DAILY LIVING (ADL)
ADLS_ACUITY_SCORE: 0

## 2024-11-11 NOTE — OP NOTE
OPERATIVE REPORT    DATE OF SERVICE:  11/11/2024      PREOPERATIVE DIAGNOSIS  Right carpal tunnel syndrome.   Right trigger thumb     POSTOPERATIVE DIAGNOSIS  Right carpal tunnel syndrome.   Right trigger thumb     NAME OF OPERATION  Open right carpal tunnel release.  2. Right trigger thumb release.     SURGEON  Luis Márquez MD     FIRST ASSISTANT  DAVID Kaiser     ANESTHESIA: MAC with local    ESTIMATED BLOOD LOSS: 2mL    ANTIBIOTICS: cefazolin 2g iv , prior to incision    IVF: 400 ml LR.    FINDINGS: thick transverse carpal ligament, flattened median nerve, hyperemic changes of median nerve, thick A1 pulley.    Tourniquet time: 9 minutes at 200 mmHg.    Complications: None apparent.    SPECIMENS: none    DRAINS: none    IMPLANTS: none    INDICATIONS  Lin Corona is a 44 year old female with right carpal tunnel syndrome, confirmed on EMG. Also right trigger thumb.    The symptoms have been quite bothersome.  Conservative treatment has failed, including night splints, therapy, NSAIDs and injections. Risks of surgery, including but not limited to: bleeding, infection, pain, scar, damage to adjacent structures (nerves, vessels), temporary vs permanent nerve damage, failure to relieve symptoms, recurrence of symptoms, need for further surgery, risks of anesthesia, and death were discussed. All questions were addressed to patient satisfaction. The patient elected to proceed with this surgery understanding the risks and perceived benefits. Informed consent was obtained for the procedure.       PROCEDURE IN DETAIL  The patient was identified in the preoperative holding area. The correct procedure and procedural site was confirmed with the patient. Consent was reviewed. The correct surgical site was marked with an indelible marker by the attending surgeon, Luis Márquez MD.  The patient was then taken to the operating room and placed on the operating table in the supine position.  Tourniquet was placed  around the proximal arm. All bony prominences well padded. The limb was prepped and draped in the usual sterile fashion.  IV sedation was given by Anesthesia.  Local anesthetic using a combination of 0.25% Marcaine and 1% lidocaine was injected in the anticipated incision site.  The limb was exsanguinated and tourniquet inflated to 200 mmHg.    I started with the carpal tunnel release. A longitudinal incision was made in the usual location at the base of the palm just ulnar to midline and the palmaris longus.  The incision was taken down through the skin and subcutaneous tissue carefully to the level of the palmar fascia.  The palmar fascia was then carefully incised, and the transverse carpal ligament was then exposed. The transverse carpal ligament was incised from distal to proximal under direct vision while protecting underlying structures with an elevator.  The transverse carpal ligament was tight and thick.  Proximally, we protected the underlying structures using a clamp, and then completed the ligament incision using a tenotomy scissors.  The distal volar forearm fascia was also incised longitudinally for a segment of approximately 3cm. The median nerve was completely exposed and was noted to have flattened, hyperemic appearance. Once I felt that the transverse carpal ligament was released completely, I used my small finger as a probe to make sure that it was in fact completely released, as it was. The wound was copiously irrigated with normal saline.    A transverse incision was made in the metacarpal phalangeal joint  flexion crease of the thumb over the A1 pulley of the thumb flexor tendon. Once through the dermal layer, spreading technique was used and retractors inserted to avoid any neurovascular injury. I then incised the fascia and identified the flexor tendon sheath. I identified the A1 pulley and incised it longitudinally with a #15 blade. I used a Selene to make sure that it had been   completely, and then removed the loose flaps of the A1 pulley.     The wound was then copiously irrigated and the tourniquet deflated.    Hemostasis was achieved with electrocautery.  Then, 4-0 nylon sutures placed in a horizontal mattress fashion were used to close the skin of both surgical wounds, and a sterile dressing was applied followed by a volar splint. The patient was then transferred to the hospital cart and to the recovery area in stable condition. There were no apparent complications.    Postop plan will be partial weight-bearing of right upper extremity. Splint to be in place at all times until follow-up in 10-14 days. Oral pain medications ( Norco) will be provided, versus over the counter pain medication use encouraged. Elevation of right upper extremity at all times to reduce swelling. Finger range of motion.    Luis Márquez M.D., M.S.  Dept. of Orthopaedic Surgery  [facility]

## 2024-11-11 NOTE — ANESTHESIA POSTPROCEDURE EVALUATION
Patient: Lin Corona    Procedure: Procedure(s):  RELEASE, right CARPAL TUNNEL  RELEASE, TRIGGER, right THUMB       Anesthesia Type:  MAC    Note:  Disposition: Outpatient   Postop Pain Control: Uneventful            Sign Out: Well controlled pain   PONV: No   Neuro/Psych: Uneventful            Sign Out: Acceptable/Baseline neuro status   Airway/Respiratory: Uneventful            Sign Out: Acceptable/Baseline resp. status   CV/Hemodynamics: Uneventful            Sign Out: Acceptable CV status; No obvious hypovolemia; No obvious fluid overload   Other NRE: NONE   DID A NON-ROUTINE EVENT OCCUR? No    Event details/Postop Comments:  The patient is fully awake and participating in her discharge and recovery instructions.            Last vitals:  Vitals:    11/11/24 1302   BP: 114/72   Resp: 16   Temp: 36.8  C (98.2  F)   SpO2: 98%       Electronically Signed By: TRUMAN Peterson CRNA  November 11, 2024  3:05 PM

## 2024-11-11 NOTE — ANESTHESIA PREPROCEDURE EVALUATION
Anesthesia Pre-Procedure Evaluation    Patient: Lin Corona   MRN: [id number] : 1980        Procedure : Procedure(s):  RELEASE, right CARPAL TUNNEL  RELEASE, TRIGGER, right THUMB          Past Medical History:   Diagnosis Date    Anxiety and depression     Dysmenorrhea     Endometriosis     Intrinsic eczema       Past Surgical History:   Procedure Laterality Date    APPENDECTOMY  2011    BREAST SURGERY  2000    Breast reduction    COLONOSCOPY  2011    CYSTOSCOPY N/A 2021    Procedure: CYSTOSCOPY;  Surgeon: Jessica Gallardo DO;  Location: [facility] OR    GYN SURGERY  2012    Colposcopy exam     HYSTERECTOMY      LAPAROSCOPIC HYSTERECTOMY TOTAL, BILATERAL SALPINGO-OOPHORECTOMY, COMBINED N/A 2021    Procedure: HYSTERECTOMY, TOTAL, LAPAROSCOPIC, WITH BILATERAL SALPINGO-OOPHORECTOMY;  Surgeon: Jessica Gallardo DO;  Location: [facility] OR    SHOULDER SURGERY Right       Allergies   Allergen Reactions    Gold Rash     Rash with true test    Nickel Rash     Positive for contact dermatitis, true test    Thimerosal (Thiomersal) Rash     Strong reaction with true test.       Social History     Tobacco Use    Smoking status: Former     Current packs/day: 0.00     Types: Cigars, Cigarettes     Quit date:      Years since quitting: 10.8    Smokeless tobacco: Never   Substance Use Topics    Alcohol use: Yes     Comment: occassional      Wt Readings from Last 1 Encounters:   24 87.1 kg (192 lb)        Anesthesia Evaluation   Pt has had prior anesthetic. Type: MAC and General.        ROS/MED HX  ENT/Pulmonary:  - neg pulmonary ROS     Neurologic:  - neg neurologic ROS     Cardiovascular:  - neg cardiovascular ROS     METS/Exercise Tolerance:     Hematologic:       Musculoskeletal:  - neg musculoskeletal ROS     GI/Hepatic:  - neg GI/hepatic ROS     Renal/Genitourinary:  - neg Renal ROS     Endo:     (+)               Obesity,       Psychiatric/Substance Use:     (+) psychiatric history anxiety  "and depression       Infectious Disease: Comment: Intrinsic eczema      Malignancy:  - neg malignancy ROS     Other:            Physical Exam    Airway  airway exam normal      Mallampati: II   TM distance: > 3 FB   Neck ROM: full   Mouth opening: > 3 cm    Respiratory Devices and Support         Dental       (+) Completely normal teeth      Cardiovascular   cardiovascular exam normal       Rhythm and rate: regular and normal     Pulmonary   pulmonary exam normal        breath sounds clear to auscultation           OUTSIDE LABS:  CBC:   Lab Results   Component Value Date    WBC 6.7 11/06/2024    WBC 7.4 10/03/2023    HGB 13.5 11/06/2024    HGB 13.2 10/03/2023    HCT 40.1 11/06/2024    HCT 39.5 10/03/2023     11/06/2024     10/03/2023     BMP:   Lab Results   Component Value Date     11/06/2024     08/09/2023    POTASSIUM 4.0 11/06/2024    POTASSIUM 5.1 08/09/2023    CHLORIDE 107 11/06/2024    CHLORIDE 103 08/09/2023    CO2 24 11/06/2024    CO2 26 08/09/2023    BUN 16.4 11/06/2024    BUN 16.1 08/09/2023    CR 0.73 11/06/2024    CR 0.73 08/09/2023     (H) 11/06/2024    GLC 98 08/09/2023     COAGS: No results found for: \"PTT\", \"INR\", \"FIBR\"  POC:   Lab Results   Component Value Date    HCG Negative 01/11/2021     HEPATIC:   Lab Results   Component Value Date    ALBUMIN 4.2 08/09/2023    PROTTOTAL 7.2 08/09/2023    ALT 19 08/09/2023    AST 20 08/09/2023    ALKPHOS 65 08/09/2023    BILITOTAL 0.3 08/09/2023     OTHER:   Lab Results   Component Value Date    A1C 5.4 08/09/2023    JOLYNN 9.5 11/06/2024    PHOS 4.3 04/29/2024    LIPASE 28 01/25/2012    TSH 2.89 04/29/2024    T4 1.06 08/09/2023    CRP 12.6 (H) 08/27/2021    SED 10 05/02/2024       Anesthesia Plan    ASA Status:  2    NPO Status:  NPO Appropriate    Anesthesia Type: MAC.     - Reason for MAC: straight local not clinically adequate   Induction: Propofol.           Consents    Anesthesia Plan(s) and associated risks, benefits, and " "realistic alternatives discussed. Questions answered and patient/representative(s) expressed understanding.     - Discussed: Risks, Benefits and Alternatives for BOTH SEDATION and the PROCEDURE were discussed     - Discussed with:  Patient      - Extended Intubation/Ventilatory Support Discussed: No.      - Patient is DNR/DNI Status: No     Use of blood products discussed: No .     Postoperative Care    Pain management: IV analgesics.   PONV prophylaxis: Ondansetron (or other 5HT-3), Dexamethasone or Solumedrol, Background Propofol Infusion     Comments:               TRUMAN Peterson CRNA    I have reviewed the pertinent notes and labs in the chart from the past 30 days and (re)examined the patient.  Any updates or changes from those notes are reflected in this note.                         # Obesity: Estimated body mass index is 35.12 kg/m  as calculated from the following:    Height as of 11/6/24: 1.575 m (5' 2\").    Weight as of 11/6/24: 87.1 kg (192 lb).             "

## 2024-11-11 NOTE — DISCHARGE INSTRUCTIONS
Same Day Surgery Discharge Instructions  Special Precautions After Surgery - Adult    It is not unusual to feel lightheaded or faint, up to 24 hours after surgery or while taking pain medication.  If you have these symptoms; sit for a few minutes before standing and have someone assist you when getting up.  You should rest and relax for the next 24 hours and must have someone stay with you for at least 24 hours after your discharge.  DO NOT DRIVE any vehicle or operate mechanical equipment for 24 hours following the end of your surgery.  DO NOT DRIVE while taking narcotic pain medications that have been prescribed by your physician.  If you had a limb operated on, you must be able to use it fully to drive.  DO NOT drink alcoholic beverages for 24 hours following surgery or while taking prescription pain medication.  Drink clear liquids (apple juice, ginger ale, broth, 7-Up, etc.).  Progress to your regular diet as you feel able.  Any questions call your physician and do not make important decisions for 24 hours.    Nausea and Vomiting: Nausea and vomiting can occur any time after receiving anesthesia. If you experience nausea and vomiting we encourage you to move to a clear liquid diet and advance your diet as tolerated. If nausea and vomiting do not improve within 12 hours please call the surgeon or present to the Emergency department.     Break-through Bleeding: If your experience bleeding from your surgical site apply pressure and additional dressing per nurse instruction. For simple problems such as a saturated dressing, you may need to reinforce the dressing with more gauze and tape and put slight pressure on the site. If bleeding does not subside contact the surgeon or present to the Emergency Department.    Post-op Infection: If you develop a fever of 100.4 or greater, have pus like drainage, redness, swelling or severe pain at the surgical site not alleviated with pain medications; please  contact the surgeon or present to the Emergency Department.     __________________________________________________________________________________________________________________________________  IMPORTANT NUMBERS:    [facility] Main Number:  [phone], [phone]  Pharmacy:  [phone]  Same Day Surgery:  [phone], for general post-op questions call Monday - Thursday until 8:30 p.m., Fridays until 6:00 p.m.   Mental Health Mobile Crisis line: [phone]                                                                      [facility], podiatry:  [phone]  [facility] Orth[facility]:  [phone]     [facility] Clinic:  [phone]   General Surgery:  [phone]  Urology: [phone]  Specialty Access Center: [phone]

## 2024-11-11 NOTE — INTERVAL H&P NOTE
"I have reviewed the surgical (or preoperative) H&P that is linked to this encounter, and examined the patient. There are no significant changes    Clinical Conditions Present on Arrival:  Clinically Significant Risk Factors Present on Admission                       # Obesity: Estimated body mass index is 35.12 kg/m  as calculated from the following:    Height as of 11/6/24: 1.575 m (5' 2\").    Weight as of 11/6/24: 87.1 kg (192 lb).       The History and Physical on patient's chart was personally reviewed today with the patient. there have been no interval changes in patient's history since H+P performed.    History:  Lin Corona is a 44 year old female , Right -hand dominant with  Right hand numbness and tingling and pain and catching since this past Spring.  History of carpal tunnel syndrome. Uses night splints bilateral.    she has numbness and tingling in radial 3 digits most nights, sometimes the ring/small fingers.        All finger are sore, stiff, swollen. Burning pain in fingers.     Had left trigger thumb and right ring finger trigger injection 6/27/2024 with [facility]. Helped with catching, still sore.     Works as an .    EMG [facility] 2/5/2024  Mild right carpal tunnel syndrome with evidence of focal demyelination but no axonal loss. Normal left upper extremity EMG.        MRI right hand 6/27/2024  1. Segmental severe flexor pollicis longus tendinosis at the level of  first metacarpal with associated tenosynovitis.  2. Partially visualized extensor carpi ulnaris partial intrasubstance  tear and associated tenosynovitis.   *  Mild tenosynovitis extensor digiti minimi adjacently.  3. approximately 2.5 x 1.6 x 1.0 cm internally septated, peripherally  enhancing cystic mass dorsally over the scaphoid and capitate deep to  the extensor compartment tendons but appears superficial to dorsal  intercarpal ligament, most consistent with ganglion/synovial cyst.           ASSESSMENT: 43yo RHD " female with:  1) bilateral trigger thumb, right more symptomatic than the left.  2) bilateral carpal tunnel syndrome, confirmed on EMG right, more clinical and not confirmed with EMG on the left.           PLAN:      Numbness/tingling  * discussed with patient signs and symptoms consistent with carpal tunnel syndrome. Carpal tunnel syndrome is compression or pinching of the median nerve at the wrist, as it enters the hand. There are many different causes, and in most cases, multifactorial.     * An indepth discussion was had with her about the options for treatment, which included activity modification to avoid aggravating activities, taking breaks during activities that cause symptoms, stretching, NSAIDS to help decrease inflammation and swelling within the carpal tunnel, night splinting, corticosteroid injections, and carpal tunnel release.   * depending upon severity and duration of symptoms, nonoperative treatment is usually initiated, starting with least invasive modalities such as activity modification and a trial of night splints and NSAIDs.  * Cortisone injections are considered to decrease swelling and inflammation within the carpal tunnel and compression of the nerve.   * Lastly, carpal tunnel release should symptoms persist despite trial of nonoperative treatment, or in cases of severe carpal tunnel syndrome.     THUMBS  We talked about the options: taping the IP, splinting the IP joint, corticosteroid injections, hand therapy and surgery. I explained the risks and benefits of each, including recurrence. I have explained the nature of the surgical procedure, the risks and recovery time with the patient.            * at this time, will proceed with right carpal tunnel release. Right trigger thumb release. MAC with local.    Patient elects to proceed with planned procedure. Right carpal tunnel release. Right trigger thumb release.    Risks and perceived benefits of surgery again discussed with patient.  Patient's questions addressed and answered. Written informed consent obtained and reviewed. Surgical site marked with indelible marker with patient's participation after confirming site with patient.      Luis Márquez M.D., M.S.  Dept. of Orthopaedic Surgery  [facility]

## 2024-11-11 NOTE — ANESTHESIA CARE TRANSFER NOTE
Patient: Lin Corona    Procedure: Procedure(s):  RELEASE, right CARPAL TUNNEL  RELEASE, TRIGGER, right THUMB       Diagnosis: Trigger finger of right thumb [M65.311]  Bilateral carpal tunnel syndrome [G56.03]  Diagnosis Additional Information: No value filed.    Anesthesia Type:   MAC     Note:    Oropharynx: oropharynx clear of all foreign objects  Level of Consciousness: awake  Oxygen Supplementation: room air    Independent Airway: airway patency satisfactory and stable  Dentition: dentition unchanged  Vital Signs Stable: post-procedure vital signs reviewed and stable  Report to RN Given: handoff report given  Patient transferred to: Phase II    Handoff Report: Identifed the Patient, Identified the Reponsible Provider, Reviewed the pertinent medical history, Discussed the surgical course, Reviewed Intra-OP anesthesia mangement and issues during anesthesia, Set expectations for post-procedure period and Allowed opportunity for questions and acknowledgement of understanding      Vitals:  Vitals Value Taken Time   BP     Temp     Pulse     Resp     SpO2         Electronically Signed By: TRUMAN Peterson CRNA  November 11, 2024  3:03 PM

## 2024-11-18 ENCOUNTER — MYC MEDICAL ADVICE (OUTPATIENT)
Dept: FAMILY MEDICINE | Facility: CLINIC | Age: 44
End: 2024-11-18
Payer: COMMERCIAL

## 2024-11-19 NOTE — TELEPHONE ENCOUNTER
Please let patient know that Dahlia is out until December. This is something that should come from PCP if she feels it is appropriate.   TRUMAN Mitchell CNP

## 2024-11-21 PROBLEM — M25.512 ACUTE PAIN OF LEFT SHOULDER: Status: RESOLVED | Noted: 2023-10-05 | Resolved: 2024-11-21

## 2024-11-21 NOTE — PROGRESS NOTES
Chief Complaint   Patient presents with    Right Wrist - Surgical Followup     Carpal tunnel release and right thumb trigger finger release. DOS 11/11/24, 2 wk s/p. Patient notes her wrist is doing ok, it is uncomfortable. She took the splint off last night but put a thumb spica brace. Denies N/T in the fingers. She would like to discuss the left wrist and a carpal tunnel release.        SURGERY: right carpal tunnel release. Right trigger thumb release.  DATE OF SURGERY: 11/11/2024      HPI: Lin Corona is a 44 year old female , who presents for post-surgical follow-up of a right carpal tunnel release, right trigger thumb release.    It has now been 2 weeks. since surgery. She took the splint off last night and put on thumb spica brace. Denies fevers, chills or night sweats. There have been no wound problems. She has been doing active/passive finger range of motion exercises. She reports having mild pain/discomfort around the surgical site. She states that since surgery, preop symptoms have resolved.     She'd like to discuss left carpal tunnel release surgery. Numbness and tingling in the radial 3.5 digits, seems worse now since having surgery on the right side. At our initial visit, on clinical exam, left side consistent clinically with carpal tunnel syndrome as well.      PAST MEDICAL HISTORY:   Past Medical History:   Diagnosis Date    Anxiety and depression     Dysmenorrhea     Endometriosis     Intrinsic eczema        PAST SURGICAL HISTORY:   Past Surgical History:   Procedure Laterality Date    APPENDECTOMY  07/13/2011    BREAST SURGERY  2000    Breast reduction    COLONOSCOPY  09/2011    CYSTOSCOPY N/A 01/11/2021    Procedure: CYSTOSCOPY;  Surgeon: Jessica Gallardo DO;  Location: [facility]    GYN SURGERY  01/2012    Colposcopy exam     HYSTERECTOMY      LAPAROSCOPIC HYSTERECTOMY TOTAL, BILATERAL SALPINGO-OOPHORECTOMY, COMBINED N/A 01/11/2021    Procedure: HYSTERECTOMY, TOTAL, LAPAROSCOPIC, WITH  BILATERAL SALPINGO-OOPHORECTOMY;  Surgeon: Jessica Gallardo DO;  Location: [facility]    RELEASE CARPAL TUNNEL Right 11/11/2024    Procedure: RELEASE, right CARPAL TUNNEL;  Surgeon: Luis Márquez MD;  Location: [facility]    RELEASE TRIGGER FINGER Right 11/11/2024    Procedure: RELEASE, TRIGGER, right THUMB;  Surgeon: Luis Márquez MD;  Location: [facility] OR    SHOULDER SURGERY Right        MEDICATIONS:   Current Outpatient Medications:     dupilumab (DUPIXENT) 300 MG/2ML prefilled pen, Inject 2 mLs (300 mg) subcutaneously every 14 days., Disp: 4 mL, Rfl: 11    HYDROcodone-acetaminophen (NORCO) 5-325 MG tablet, Take 1-2 tablets by mouth every 6 hours as needed for moderate to severe pain., Disp: 10 tablet, Rfl: 0    Iodine, Kelp, (KELP PO), Take by mouth daily., Disp: , Rfl:     levothyroxine (SYNTHROID/LEVOTHROID) 50 MCG tablet, Take 1 tablet (50 mcg) by mouth daily, Disp: 90 tablet, Rfl: 1    phentermine 15 MG capsule, Take 1 capsule (15 mg) by mouth every morning., Disp: 30 capsule, Rfl: 5    SELENIUM PO, Take by mouth daily., Disp: , Rfl:     senna-docusate (SENOKOT-S/PERICOLACE) 8.6-50 MG tablet, Take 1-2 tablets by mouth 2 times daily., Disp: 30 tablet, Rfl: 0    tiZANidine (ZANAFLEX) 2 MG tablet, Take 1 tablet (2 mg) by mouth 3 times daily as needed for muscle spasms, Disp: 20 tablet, Rfl: 0    topiramate (TOPAMAX) 50 MG tablet, Take 1 tablet (50 mg) by mouth daily., Disp: 30 tablet, Rfl: 5    triamcinolone (KENALOG) 0.1 % external ointment, Apply sparingly twice daily as needed., Disp: 453.6 g, Rfl: 1     ALLERGIES:   Allergies   Allergen Reactions    Gold Rash     Rash with true test    Nickel Rash     Positive for contact dermatitis, true test    Thimerosal (Thiomersal) Rash     Strong reaction with true test.          PHYSICAL EXAM  GENERAL APPEARANCE: healthy, alert, no distress.   SKIN: no suspicious lesions or rashes  RESPIRATORY: No increased work of breathing.  NEURO: Normal strength and tone, mentation  "intact and speech normal  PSYCH:  mentation appears normal and affect normal/bright. Not anxious.        /86   Pulse 82   Ht 1.575 m (5' 2\")   Wt 87.1 kg (192 lb)   LMP 01/06/2021   BMI 35.12 kg/m       RIGHT HAND / WRIST EXAM:    The brace was removed.  Volar incision healing well with skin edges well approximated and everted.  Thumb incision healing well.  Sutures in place.  No erythema. No drainage.    There is mild swelling in the palm, incisional area of the wrist.  There is mild tenderness in the palm, incisional area of the wrist.  There is resolving ecchymosis.  There is no erythema of the surrounding skin.  There is no maceration of the skin.  There is no deformity in the area.  Range of motion: decreased  and (any)movements are painful.    Intact sensation to light touch in the distribution of the median, radial, and ulnar nerves of the hand. Intact sensation of the radial and ulnar digital nerves of the thumb, index, long (middle), ring, and small finger.    Brisk capillary refill to all fingers, warm and well-perfused.   Palpable radial pulse.    ASSESSMENT: 44 year old female:  1)  2 weeks status post right carpal tunnel release and right trigger thumb release , doing well.  2)  left carpal tunnel syndrome.      PLAN: routine postoperative of carpal tunnel release.    Remove sutures today, soft dressing applied  May wash hands, no aggressive scrubbing for another week  Continue with hand and wrist exercises for motion.  Scar massage and desensitization discussed and demonstrated.  Gradual return to light use of hands for ADLs. No aggressive hand use for another 4 weeks.  Return to clinic as needed.  It may take 6 months or longer for symptoms to resolve, and in cases of severe carpal tunnel syndrome, symptoms may not completely improve.    We can work to schedule left carpal tunnel syndrome surgery if she desires, MAC with local. Again will need preop H+P from primary care provider and we " will see her postoperative 2 weeks.      * All questions were addressed and answered prior to discharge from clinic today. The patient acknowledges an understanding of and agreement with the plan set forth during today's visit. Patient was advised to call our office or MyChart us if any further questions arise upon leaving our office today.      Luis Márquez M.D., M.S.  Dept. of Orthopaedic Surgery  [facility]

## 2024-11-23 ENCOUNTER — HEALTH MAINTENANCE LETTER (OUTPATIENT)
Age: 44
End: 2024-11-23

## 2024-11-27 ENCOUNTER — OFFICE VISIT (OUTPATIENT)
Dept: ORTHOPEDICS | Facility: CLINIC | Age: 44
End: 2024-11-27
Payer: COMMERCIAL

## 2024-11-27 ENCOUNTER — TELEPHONE (OUTPATIENT)
Dept: SURGERY | Facility: CLINIC | Age: 44
End: 2024-11-27

## 2024-11-27 VITALS
HEART RATE: 82 BPM | HEIGHT: 62 IN | BODY MASS INDEX: 35.33 KG/M2 | DIASTOLIC BLOOD PRESSURE: 86 MMHG | SYSTOLIC BLOOD PRESSURE: 120 MMHG | WEIGHT: 192 LBS

## 2024-11-27 DIAGNOSIS — Z98.890 S/P CARPAL TUNNEL RELEASE: Primary | ICD-10-CM

## 2024-11-27 DIAGNOSIS — Z98.890 S/P TRIGGER FINGER RELEASE: ICD-10-CM

## 2024-11-27 DIAGNOSIS — G56.02 LEFT CARPAL TUNNEL SYNDROME: ICD-10-CM

## 2024-11-27 ASSESSMENT — PAIN SCALES - GENERAL: PAINLEVEL_OUTOF10: MILD PAIN (3)

## 2024-11-27 NOTE — LETTER
11/27/2024      Lin Coroan  [address] [place] [id number]      Dear Colleague,    Thank you for referring your patient, Lin Corona, to the [facility] [place]. Please see a copy of my visit note below.      Chief Complaint   Patient presents with     Right Wrist - Surgical Followup     Carpal tunnel release and right thumb trigger finger release. DOS 11/11/24, 2 wk s/p. Patient notes her wrist is doing ok, it is uncomfortable. She took the splint off last night but put a thumb spica brace. Denies N/T in the fingers. She would like to discuss the left wrist and a carpal tunnel release.        SURGERY: right carpal tunnel release. Right trigger thumb release.  DATE OF SURGERY: 11/11/2024      HPI: Lin Corona is a 44 year old female , who presents for post-surgical follow-up of a right carpal tunnel release, right trigger thumb release.    It has now been 2 weeks. since surgery. She took the splint off last night and put on thumb spica brace. Denies fevers, chills or night sweats. There have been no wound problems. She has been doing active/passive finger range of motion exercises. She reports having mild pain/discomfort around the surgical site. She states that since surgery, preop symptoms have resolved.     She'd like to discuss left carpal tunnel release surgery. Numbness and tingling in the radial 3.5 digits, seems worse now since having surgery on the right side. At our initial visit, on clinical exam, left side consistent clinically with carpal tunnel syndrome as well.      PAST MEDICAL HISTORY:   Past Medical History:   Diagnosis Date     Anxiety and depression      Dysmenorrhea      Endometriosis      Intrinsic eczema        PAST SURGICAL HISTORY:   Past Surgical History:   Procedure Laterality Date     APPENDECTOMY  07/13/2011     BREAST SURGERY  2000    Breast reduction     COLONOSCOPY  09/2011     CYSTOSCOPY N/A 01/11/2021    Procedure: CYSTOSCOPY;  Surgeon:  Jessica Gallardo DO;  Location: [facility]     GYN SURGERY  01/2012    Colposcopy exam      HYSTERECTOMY       LAPAROSCOPIC HYSTERECTOMY TOTAL, BILATERAL SALPINGO-OOPHORECTOMY, COMBINED N/A 01/11/2021    Procedure: HYSTERECTOMY, TOTAL, LAPAROSCOPIC, WITH BILATERAL SALPINGO-OOPHORECTOMY;  Surgeon: Jessica Gallardo DO;  Location: [facility]     RELEASE CARPAL TUNNEL Right 11/11/2024    Procedure: RELEASE, right CARPAL TUNNEL;  Surgeon: Luis Márquez MD;  Location: [facility]     RELEASE TRIGGER FINGER Right 11/11/2024    Procedure: RELEASE, TRIGGER, right THUMB;  Surgeon: Luis Márquez MD;  Location: [facility]     SHOULDER SURGERY Right        MEDICATIONS:   Current Outpatient Medications:      dupilumab (DUPIXENT) 300 MG/2ML prefilled pen, Inject 2 mLs (300 mg) subcutaneously every 14 days., Disp: 4 mL, Rfl: 11     HYDROcodone-acetaminophen (NORCO) 5-325 MG tablet, Take 1-2 tablets by mouth every 6 hours as needed for moderate to severe pain., Disp: 10 tablet, Rfl: 0     Iodine, Kelp, (KELP PO), Take by mouth daily., Disp: , Rfl:      levothyroxine (SYNTHROID/LEVOTHROID) 50 MCG tablet, Take 1 tablet (50 mcg) by mouth daily, Disp: 90 tablet, Rfl: 1     phentermine 15 MG capsule, Take 1 capsule (15 mg) by mouth every morning., Disp: 30 capsule, Rfl: 5     SELENIUM PO, Take by mouth daily., Disp: , Rfl:      senna-docusate (SENOKOT-S/PERICOLACE) 8.6-50 MG tablet, Take 1-2 tablets by mouth 2 times daily., Disp: 30 tablet, Rfl: 0     tiZANidine (ZANAFLEX) 2 MG tablet, Take 1 tablet (2 mg) by mouth 3 times daily as needed for muscle spasms, Disp: 20 tablet, Rfl: 0     topiramate (TOPAMAX) 50 MG tablet, Take 1 tablet (50 mg) by mouth daily., Disp: 30 tablet, Rfl: 5     triamcinolone (KENALOG) 0.1 % external ointment, Apply sparingly twice daily as needed., Disp: 453.6 g, Rfl: 1     ALLERGIES:   Allergies   Allergen Reactions     Gold Rash     Rash with true test     Nickel Rash     Positive for contact dermatitis, true test      "Thimerosal (Thiomersal) Rash     Strong reaction with true test.          PHYSICAL EXAM  GENERAL APPEARANCE: healthy, alert, no distress.   SKIN: no suspicious lesions or rashes  RESPIRATORY: No increased work of breathing.  NEURO: Normal strength and tone, mentation intact and speech normal  PSYCH:  mentation appears normal and affect normal/bright. Not anxious.        /86   Pulse 82   Ht 1.575 m (5' 2\")   Wt 87.1 kg (192 lb)   LMP 01/06/2021   BMI 35.12 kg/m       RIGHT HAND / WRIST EXAM:    The brace was removed.  Volar incision healing well with skin edges well approximated and everted.  Thumb incision healing well.  Sutures in place.  No erythema. No drainage.    There is mild swelling in the palm, incisional area of the wrist.  There is mild tenderness in the palm, incisional area of the wrist.  There is resolving ecchymosis.  There is no erythema of the surrounding skin.  There is no maceration of the skin.  There is no deformity in the area.  Range of motion: decreased  and (any)movements are painful.    Intact sensation to light touch in the distribution of the median, radial, and ulnar nerves of the hand. Intact sensation of the radial and ulnar digital nerves of the thumb, index, long (middle), ring, and small finger.    Brisk capillary refill to all fingers, warm and well-perfused.   Palpable radial pulse.    ASSESSMENT: 44 year old female:  1)  2 weeks status post right carpal tunnel release and right trigger thumb release , doing well.  2)  left carpal tunnel syndrome.      PLAN: routine postoperative of carpal tunnel release.    Remove sutures today, soft dressing applied  May wash hands, no aggressive scrubbing for another week  Continue with hand and wrist exercises for motion.  Scar massage and desensitization discussed and demonstrated.  Gradual return to light use of hands for ADLs. No aggressive hand use for another 4 weeks.  Return to clinic as needed.  It may take 6 months or longer " for symptoms to resolve, and in cases of severe carpal tunnel syndrome, symptoms may not completely improve.    We can work to schedule left carpal tunnel syndrome surgery if she desires, MAC with local. Again will need preop H+P from primary care provider and we will see her postoperative 2 weeks.      * All questions were addressed and answered prior to discharge from clinic today. The patient acknowledges an understanding of and agreement with the plan set forth during today's visit. Patient was advised to call our office or MyChart us if any further questions arise upon leaving our office today.      Luis Márquez M.D., M.S.  Dept. of Orthopaedic Surgery  [facility]       Again, thank you for allowing me to participate in the care of your patient.        Sincerely,        Luis Márquez MD

## 2024-12-04 NOTE — TELEPHONE ENCOUNTER
Is this something she is still interested in? Can it wait until her appointment next month?  Thanks.  AN Longo

## 2024-12-04 NOTE — TELEPHONE ENCOUNTER
See my chart message  Returned message to discuss at visit 1/14    Elle Babin RN on 12/4/2024 at 12:21 PM

## 2025-01-14 ENCOUNTER — OFFICE VISIT (OUTPATIENT)
Dept: FAMILY MEDICINE | Facility: CLINIC | Age: 45
End: 2025-01-14
Payer: COMMERCIAL

## 2025-01-14 VITALS
BODY MASS INDEX: 35.61 KG/M2 | DIASTOLIC BLOOD PRESSURE: 80 MMHG | WEIGHT: 201 LBS | HEART RATE: 81 BPM | RESPIRATION RATE: 16 BRPM | OXYGEN SATURATION: 98 % | TEMPERATURE: 98.3 F | SYSTOLIC BLOOD PRESSURE: 112 MMHG | HEIGHT: 63 IN

## 2025-01-14 DIAGNOSIS — E66.01 MORBID OBESITY (H): ICD-10-CM

## 2025-01-14 DIAGNOSIS — Z12.31 VISIT FOR SCREENING MAMMOGRAM: ICD-10-CM

## 2025-01-14 DIAGNOSIS — M26.609 TMJ (TEMPOROMANDIBULAR JOINT SYNDROME): ICD-10-CM

## 2025-01-14 DIAGNOSIS — Z13.1 SCREENING FOR DIABETES MELLITUS: ICD-10-CM

## 2025-01-14 DIAGNOSIS — Z00.00 ROUTINE GENERAL MEDICAL EXAMINATION AT A HEALTH CARE FACILITY: Primary | ICD-10-CM

## 2025-01-14 DIAGNOSIS — E06.3 HYPOTHYROIDISM DUE TO HASHIMOTO'S THYROIDITIS: ICD-10-CM

## 2025-01-14 DIAGNOSIS — M54.2 CERVICALGIA: ICD-10-CM

## 2025-01-14 LAB
ANION GAP SERPL CALCULATED.3IONS-SCNC: 12 MMOL/L (ref 7–15)
BUN SERPL-MCNC: 15.6 MG/DL (ref 6–20)
CALCIUM SERPL-MCNC: 9.6 MG/DL (ref 8.8–10.4)
CHLORIDE SERPL-SCNC: 105 MMOL/L (ref 98–107)
CREAT SERPL-MCNC: 0.7 MG/DL (ref 0.51–0.95)
EGFRCR SERPLBLD CKD-EPI 2021: >90 ML/MIN/1.73M2
GLUCOSE SERPL-MCNC: 107 MG/DL (ref 70–99)
HCO3 SERPL-SCNC: 24 MMOL/L (ref 22–29)
POTASSIUM SERPL-SCNC: 3.9 MMOL/L (ref 3.4–5.3)
SODIUM SERPL-SCNC: 141 MMOL/L (ref 135–145)
TSH SERPL DL<=0.005 MIU/L-ACNC: 2.8 UIU/ML (ref 0.3–4.2)

## 2025-01-14 PROCEDURE — 80048 BASIC METABOLIC PNL TOTAL CA: CPT | Performed by: NURSE PRACTITIONER

## 2025-01-14 PROCEDURE — 99214 OFFICE O/P EST MOD 30 MIN: CPT | Mod: 25 | Performed by: NURSE PRACTITIONER

## 2025-01-14 PROCEDURE — 84443 ASSAY THYROID STIM HORMONE: CPT | Performed by: NURSE PRACTITIONER

## 2025-01-14 PROCEDURE — 36415 COLL VENOUS BLD VENIPUNCTURE: CPT | Performed by: NURSE PRACTITIONER

## 2025-01-14 PROCEDURE — 99396 PREV VISIT EST AGE 40-64: CPT | Performed by: NURSE PRACTITIONER

## 2025-01-14 RX ORDER — TIZANIDINE 2 MG/1
2 TABLET ORAL 3 TIMES DAILY PRN
Qty: 20 TABLET | Refills: 0 | Status: SHIPPED | OUTPATIENT
Start: 2025-01-14

## 2025-01-14 SDOH — HEALTH STABILITY: PHYSICAL HEALTH: ON AVERAGE, HOW MANY DAYS PER WEEK DO YOU ENGAGE IN MODERATE TO STRENUOUS EXERCISE (LIKE A BRISK WALK)?: 3 DAYS

## 2025-01-14 ASSESSMENT — PAIN SCALES - GENERAL: PAINLEVEL_OUTOF10: NO PAIN (0)

## 2025-01-14 ASSESSMENT — SOCIAL DETERMINANTS OF HEALTH (SDOH): HOW OFTEN DO YOU GET TOGETHER WITH FRIENDS OR RELATIVES?: THREE TIMES A WEEK

## 2025-01-14 NOTE — PATIENT INSTRUCTIONS
Call to schedule mammogram [phone].  Will be notified of pending labs.  See orthopedics for TMJ and neck pain.          Patient Education   Preventive Care Advice   This is general advice given by our system to help you stay healthy. However, your care team may have specific advice just for you. Please talk to your care team about your preventive care needs.  Nutrition  Eat 5 or more servings of fruits and vegetables each day.  Try wheat bread, brown rice and whole grain pasta (instead of white bread, rice, and pasta).  Get enough calcium and vitamin D. Check the label on foods and aim for 100% of the RDA (recommended daily allowance).  Lifestyle  Exercise at least 150 minutes each week  (30 minutes a day, 5 days a week).  Do muscle strengthening activities 2 days a week. These help control your weight and prevent disease.  No smoking.  Wear sunscreen to prevent skin cancer.  Have a dental exam and cleaning every 6 months.  Yearly exams  See your health care team every year to talk about:  Any changes in your health.  Any medicines your care team has prescribed.  Preventive care, family planning, and ways to prevent chronic diseases.  Shots (vaccines)   HPV shots (up to age 26), if you've never had them before.  Hepatitis B shots (up to age 59), if you've never had them before.  COVID-19 shot: Get this shot when it's due.  Flu shot: Get a flu shot every year.  Tetanus shot: Get a tetanus shot every 10 years.  Pneumococcal, hepatitis A, and RSV shots: Ask your care team if you need these based on your risk.  Shingles shot (for age 50 and up)  General health tests  Diabetes screening:  Starting at age 35, Get screened for diabetes at least every 3 years.  If you are younger than age 35, ask your care team if you should be screened for diabetes.  Cholesterol test: At age 39, start having a cholesterol test every 5 years, or more often if advised.  Bone density scan (DEXA): At age 50, ask your care team if you  should have this scan for osteoporosis (brittle bones).  Hepatitis C: Get tested at least once in your life.  STIs (sexually transmitted infections)  Before age 24: Ask your care team if you should be screened for STIs.  After age 24: Get screened for STIs if you're at risk. You are at risk for STIs (including HIV) if:  You are sexually active with more than one person.  You don't use condoms every time.  You or a partner was diagnosed with a sexually transmitted infection.  If you are at risk for HIV, ask about PrEP medicine to prevent HIV.  Get tested for HIV at least once in your life, whether you are at risk for HIV or not.  Cancer screening tests  Cervical cancer screening: If you have a cervix, begin getting regular cervical cancer screening tests starting at age 21.  Breast cancer scan (mammogram): If you've ever had breasts, begin having regular mammograms starting at age 40. This is a scan to check for breast cancer.  Colon cancer screening: It is important to start screening for colon cancer at age 45.  Have a colonoscopy test every 10 years (or more often if you're at risk) Or, ask your provider about stool tests like a FIT test every year or Cologuard test every 3 years.  To learn more about your testing options, visit:   .  For help making a decision, visit:   https://bit.ly/mr57494.  Prostate cancer screening test: If you have a prostate, ask your care team if a prostate cancer screening test (PSA) at age 55 is right for you.  Lung cancer screening: If you are a current or former smoker ages 50 to 80, ask your care team if ongoing lung cancer screenings are right for you.  For informational purposes only. Not to replace the advice of your health care provider. Copyright   2023 [employer]. All rights reserved. Clinically reviewed by the [facility]. [employer] [id number] - REV 01/24.  Learning About Stress  What is stress?     Stress is your body's response to a hard  situation. Your body can have a physical, emotional, or mental response. Stress is a fact of life for most people, and it affects everyone differently. What causes stress for you may not be stressful for someone else.  A lot of things can cause stress. You may feel stress when you go on a job interview, take a test, or run a race. This kind of short-term stress is normal and even useful. It can help you if you need to work hard or react quickly. For example, stress can help you finish an important job on time.  Long-term stress is caused by ongoing stressful situations or events. Examples of long-term stress include long-term health problems, ongoing problems at work, or conflicts in your family. Long-term stress can harm your health.  How does stress affect your health?  When you are stressed, your body responds as though you are in danger. It makes hormones that speed up your heart, make you breathe faster, and give you a burst of energy. This is called the fight-or-flight stress response. If the stress is over quickly, your body goes back to normal and no harm is done.  But if stress happens too often or lasts too long, it can have bad effects. Long-term stress can make you more likely to get sick, and it can make symptoms of some diseases worse. If you tense up when you are stressed, you may develop neck, shoulder, or low back pain. Stress is linked to high blood pressure and heart disease.  Stress also harms your emotional health. It can make you arriaga, tense, or depressed. Your relationships may suffer, and you may not do well at work or school.  What can you do to manage stress?  You can try these things to help manage stress:   Do something active. Exercise or activity can help reduce stress. Walking is a great way to get started. Even everyday activities such as housecleaning or yard work can help.  Try yoga or kenji chi. These techniques combine exercise and meditation. You may need some training at first to  learn them.  Do something you enjoy. For example, listen to music or go to a movie. Practice your hobby or do volunteer work.  Meditate. This can help you relax, because you are not worrying about what happened before or what may happen in the future.  Do guided imagery. Imagine yourself in any setting that helps you feel calm. You can use online videos, books, or a teacher to guide you.  Do breathing exercises. For example:  From a standing position, bend forward from the waist with your knees slightly bent. Let your arms dangle close to the floor.  Breathe in slowly and deeply as you return to a standing position. Roll up slowly and lift your head last.  Hold your breath for just a few seconds in the standing position.  Breathe out slowly and bend forward from the waist.  Let your feelings out. Talk, laugh, cry, and express anger when you need to. Talking with supportive friends or family, a counselor, or a ivan leader about your feelings is a healthy way to relieve stress. Avoid discussing your feelings with people who make you feel worse.  Write. It may help to write about things that are bothering you. This helps you find out how much stress you feel and what is causing it. When you know this, you can find better ways to cope.  What can you do to prevent stress?  You might try some of these things to help prevent stress:  Manage your time. This helps you find time to do the things you want and need to do.  Get enough sleep. Your body recovers from the stresses of the day while you are sleeping.  Get support. Your family, friends, and community can make a difference in how you experience stress.  Limit your news feed. Avoid or limit time on social media or news that may make you feel stressed.  Do something active. Exercise or activity can help reduce stress. Walking is a great way to get started.  Where can you learn more?  Go to https://www.healthwise.net/patiented  Enter N032 in the search box to learn more  "about \"Learning About Stress.\"  Current as of: October 24, 2023  Content Version: 14.3    2024 [employer].   Care instructions adapted under license by your healthcare professional. If you have questions about a medical condition or this instruction, always ask your healthcare professional. [employer] disclaims any warranty or liability for your use of this information.    Substance Use Disorder: Care Instructions  Overview     You can improve your life and health by stopping your use of alcohol or drugs. When you don't drink or use drugs, you may feel and sleep better. You may get along better with your family, friends, and coworkers. There are medicines and programs that can help with substance use disorder.  How can you care for yourself at home?  Here are some ways to help you stay sober and prevent relapse.  If you have been given medicine to help keep you sober or reduce your cravings, be sure to take it exactly as prescribed.  Talk to your doctor about programs that can help you stop using drugs or drinking alcohol.  Do not keep alcohol or drugs in your home.  Plan ahead. Think about what you'll say if other people ask you to drink or use drugs. Try not to spend time with people who drink or use drugs.  Use the time and money spent on drinking or drugs to do something that's important to you.  Preventing a relapse  Have a plan to deal with relapse. Learn to recognize changes in your thinking that lead you to drink or use drugs. Get help before you start to drink or use drugs again.  Try to stay away from situations, friends, or places that may lead you to drink or use drugs.  If you feel the need to drink alcohol or use drugs again, seek help right away. Call a trusted friend or family member. Some people get support from organizations such as Narcotics Anonymous or [place] or from treatment facilities.  If you relapse, get help as soon as you can. Some people make a plan with " another person that outlines what they want that person to do for them if they relapse. The plan usually includes how to handle the relapse and who to notify in case of relapse.  Don't give up. Remember that a relapse doesn't mean that you have failed. Use the experience to learn the triggers that lead you to drink or use drugs. Then quit again. Recovery is a lifelong process. Many people have several relapses before they are able to quit for good.  Follow-up care is a key part of your treatment and safety. Be sure to make and go to all appointments, and call your doctor if you are having problems. It's also a good idea to know your test results and keep a list of the medicines you take.  When should you call for help?   Call 911  anytime you think you may need emergency care. For example, call if you or someone else:    Has overdosed or has withdrawal signs. Be sure to tell the emergency workers that you are or someone else is using or trying to quit using drugs. Overdose or withdrawal signs may include:  Losing consciousness.  Seizure.  Seeing or hearing things that aren't there (hallucinations).     Is thinking or talking about suicide or harming others.   Where to get help 24 hours a day, 7 days a week   If you or someone you know talks about suicide, self-harm, a mental health crisis, a substance use crisis, or any other kind of emotional distress, get help right away. You can:    Call the Suicide and Crisis Lifeline at 982.     Call 4-338-607-TALK (1-608.570.2474).     Text HOME to 273647 to access the Crisis Text Line.   Consider saving these numbers in your phone.  Go to [email or web address] for more information or to chat online.  Call your doctor now or seek immediate medical care if:    You are having withdrawal symptoms. These may include nausea or vomiting, sweating, shakiness, and anxiety.   Watch closely for changes in your health, and be sure to contact your doctor if:    You have a relapse.     You need  "more help or support to stop.   Where can you learn more?  Go to https://www.[employer].net/patiented  Enter H573 in the search box to learn more about \"Substance Use Disorder: Care Instructions.\"  Current as of: November 15, 2023  Content Version: 14.3    2024 [employer].   Care instructions adapted under license by your healthcare professional. If you have questions about a medical condition or this instruction, always ask your healthcare professional. [employer] disclaims any warranty or liability for your use of this information.       "

## 2025-01-14 NOTE — PROGRESS NOTES
"Preventive Care Visit  [facility]  TRUMAN Aguila CNP, Family Medicine  Jan 14, 2025      Assessment & Plan     Routine general medical examination at a health care facility      Visit for screening mammogram    - MA Screening Bilateral w/ [place]; Future    Morbid obesity (H)    She's lost a few pounds and is motivated to continue. Continue with medications and monitoring; healthy diet and increase in activity.    Cervicalgia    - Orthopedic  Referral; Future  - tiZANidine (ZANAFLEX) 2 MG tablet; Take 1 tablet (2 mg) by mouth 3 times daily as needed for muscle spasms.  Continue muscle relaxant as needed for TMJ and neck pain.    TMJ (temporomandibular joint syndrome)    - Orthopedic  Referral; Future  Referral for orthopedics sent.    Hypothyroidism due to Hashimoto's thyroiditis    - TSH with free T4 reflex; Future  She is presently not on levothyroxine, she doesn't know if she just forgot to fill it or what happened.  Will start with TSH assessment.    Screening for diabetes mellitus    - Basic metabolic panel  (Ca, Cl, CO2, Creat, Gluc, K, Na, BUN); Future  Labs pending.          BMI  Estimated body mass index is 35.61 kg/m  as calculated from the following:    Height as of this encounter: 1.6 m (5' 3\").    Weight as of this encounter: 91.2 kg (201 lb).   Weight management plan: Discussed healthy diet and exercise guidelines    Counseling  Appropriate preventive services were addressed with this patient via screening, questionnaire, or discussion as appropriate for fall prevention, nutrition, physical activity, Tobacco-use cessation, social engagement, weight loss and cognition.  Checklist reviewing preventive services available has been given to the patient.  Reviewed patient's diet, addressing concerns and/or questions.   She is at risk for lack of exercise and has been provided with information to increase physical activity for the benefit of her well-being. "   She is at risk for psychosocial distress and has been provided with information to reduce risk.       See Patient Instructions  Patient Instructions   Call to schedule mammogram [phone].  Will be notified of pending labs.  See orthopedics for TMJ and neck pain.          Patient Education  Preventive Care Advice   This is general advice given by our system to help you stay healthy. However, your care team may have specific advice just for you. Please talk to your care team about your preventive care needs.  Nutrition  Eat 5 or more servings of fruits and vegetables each day.  Try wheat bread, brown rice and whole grain pasta (instead of white bread, rice, and pasta).  Get enough calcium and vitamin D. Check the label on foods and aim for 100% of the RDA (recommended daily allowance).  Lifestyle  Exercise at least 150 minutes each week  (30 minutes a day, 5 days a week).  Do muscle strengthening activities 2 days a week. These help control your weight and prevent disease.  No smoking.  Wear sunscreen to prevent skin cancer.  Have a dental exam and cleaning every 6 months.  Yearly exams  See your health care team every year to talk about:  Any changes in your health.  Any medicines your care team has prescribed.  Preventive care, family planning, and ways to prevent chronic diseases.  Shots (vaccines)   HPV shots (up to age 26), if you've never had them before.  Hepatitis B shots (up to age 59), if you've never had them before.  COVID-19 shot: Get this shot when it's due.  Flu shot: Get a flu shot every year.  Tetanus shot: Get a tetanus shot every 10 years.  Pneumococcal, hepatitis A, and RSV shots: Ask your care team if you need these based on your risk.  Shingles shot (for age 50 and up)  General health tests  Diabetes screening:  Starting at age 35, Get screened for diabetes at least every 3 years.  If you are younger than age 35, ask your care team if you should be screened for diabetes.  Cholesterol test:  At age 39, start having a cholesterol test every 5 years, or more often if advised.  Bone density scan (DEXA): At age 50, ask your care team if you should have this scan for osteoporosis (brittle bones).  Hepatitis C: Get tested at least once in your life.  STIs (sexually transmitted infections)  Before age 24: Ask your care team if you should be screened for STIs.  After age 24: Get screened for STIs if you're at risk. You are at risk for STIs (including HIV) if:  You are sexually active with more than one person.  You don't use condoms every time.  You or a partner was diagnosed with a sexually transmitted infection.  If you are at risk for HIV, ask about PrEP medicine to prevent HIV.  Get tested for HIV at least once in your life, whether you are at risk for HIV or not.  Cancer screening tests  Cervical cancer screening: If you have a cervix, begin getting regular cervical cancer screening tests starting at age 21.  Breast cancer scan (mammogram): If you've ever had breasts, begin having regular mammograms starting at age 40. This is a scan to check for breast cancer.  Colon cancer screening: It is important to start screening for colon cancer at age 45.  Have a colonoscopy test every 10 years (or more often if you're at risk) Or, ask your provider about stool tests like a FIT test every year or Cologuard test every 3 years.  To learn more about your testing options, visit:   .  For help making a decision, visit:   https://bit.ly/cp20725.  Prostate cancer screening test: If you have a prostate, ask your care team if a prostate cancer screening test (PSA) at age 55 is right for you.  Lung cancer screening: If you are a current or former smoker ages 50 to 80, ask your care team if ongoing lung cancer screenings are right for you.  For informational purposes only. Not to replace the advice of your health care provider. Copyright   2023 [employer]. All rights reserved. Clinically reviewed by the [facility]. [employer] 550337 - REV 01/24.  Learning About Stress  What is stress?     Stress is your body's response to a hard situation. Your body can have a physical, emotional, or mental response. Stress is a fact of life for most people, and it affects everyone differently. What causes stress for you may not be stressful for someone else.  A lot of things can cause stress. You may feel stress when you go on a job interview, take a test, or run a race. This kind of short-term stress is normal and even useful. It can help you if you need to work hard or react quickly. For example, stress can help you finish an important job on time.  Long-term stress is caused by ongoing stressful situations or events. Examples of long-term stress include long-term health problems, ongoing problems at work, or conflicts in your family. Long-term stress can harm your health.  How does stress affect your health?  When you are stressed, your body responds as though you are in danger. It makes hormones that speed up your heart, make you breathe faster, and give you a burst of energy. This is called the fight-or-flight stress response. If the stress is over quickly, your body goes back to normal and no harm is done.  But if stress happens too often or lasts too long, it can have bad effects. Long-term stress can make you more likely to get sick, and it can make symptoms of some diseases worse. If you tense up when you are stressed, you may develop neck, shoulder, or low back pain. Stress is linked to high blood pressure and heart disease.  Stress also harms your emotional health. It can make you arriaga, tense, or depressed. Your relationships may suffer, and you may not do well at work or school.  What can you do to manage stress?  You can try these things to help manage stress:   Do something active. Exercise or activity can help reduce stress. Walking is a great way to get started. Even everyday activities  such as housecleaning or yard work can help.  Try yoga or kenji chi. These techniques combine exercise and meditation. You may need some training at first to learn them.  Do something you enjoy. For example, listen to music or go to a movie. Practice your hobby or do volunteer work.  Meditate. This can help you relax, because you are not worrying about what happened before or what may happen in the future.  Do guided imagery. Imagine yourself in any setting that helps you feel calm. You can use online videos, books, or a teacher to guide you.  Do breathing exercises. For example:  From a standing position, bend forward from the waist with your knees slightly bent. Let your arms dangle close to the floor.  Breathe in slowly and deeply as you return to a standing position. Roll up slowly and lift your head last.  Hold your breath for just a few seconds in the standing position.  Breathe out slowly and bend forward from the waist.  Let your feelings out. Talk, laugh, cry, and express anger when you need to. Talking with supportive friends or family, a counselor, or a ivan leader about your feelings is a healthy way to relieve stress. Avoid discussing your feelings with people who make you feel worse.  Write. It may help to write about things that are bothering you. This helps you find out how much stress you feel and what is causing it. When you know this, you can find better ways to cope.  What can you do to prevent stress?  You might try some of these things to help prevent stress:  Manage your time. This helps you find time to do the things you want and need to do.  Get enough sleep. Your body recovers from the stresses of the day while you are sleeping.  Get support. Your family, friends, and community can make a difference in how you experience stress.  Limit your news feed. Avoid or limit time on social media or news that may make you feel stressed.  Do something active. Exercise or activity can help reduce  "stress. Walking is a great way to get started.  Where can you learn more?  Go to https://www.[employer].net/patiented  Enter N032 in the search box to learn more about \"Learning About Stress.\"  Current as of: October 24, 2023  Content Version: 14.3    2024 [employer].   Care instructions adapted under license by your healthcare professional. If you have questions about a medical condition or this instruction, always ask your healthcare professional. [employer] disclaims any warranty or liability for your use of this information.    Substance Use Disorder: Care Instructions  Overview     You can improve your life and health by stopping your use of alcohol or drugs. When you don't drink or use drugs, you may feel and sleep better. You may get along better with your family, friends, and coworkers. There are medicines and programs that can help with substance use disorder.  How can you care for yourself at home?  Here are some ways to help you stay sober and prevent relapse.  If you have been given medicine to help keep you sober or reduce your cravings, be sure to take it exactly as prescribed.  Talk to your doctor about programs that can help you stop using drugs or drinking alcohol.  Do not keep alcohol or drugs in your home.  Plan ahead. Think about what you'll say if other people ask you to drink or use drugs. Try not to spend time with people who drink or use drugs.  Use the time and money spent on drinking or drugs to do something that's important to you.  Preventing a relapse  Have a plan to deal with relapse. Learn to recognize changes in your thinking that lead you to drink or use drugs. Get help before you start to drink or use drugs again.  Try to stay away from situations, friends, or places that may lead you to drink or use drugs.  If you feel the need to drink alcohol or use drugs again, seek help right away. Call a trusted friend or family member. Some people get support from " organizations such as Narcotics Anonymous or [employer] or from treatment facilities.  If you relapse, get help as soon as you can. Some people make a plan with another person that outlines what they want that person to do for them if they relapse. The plan usually includes how to handle the relapse and who to notify in case of relapse.  Don't give up. Remember that a relapse doesn't mean that you have failed. Use the experience to learn the triggers that lead you to drink or use drugs. Then quit again. Recovery is a lifelong process. Many people have several relapses before they are able to quit for good.  Follow-up care is a key part of your treatment and safety. Be sure to make and go to all appointments, and call your doctor if you are having problems. It's also a good idea to know your test results and keep a list of the medicines you take.  When should you call for help?   Call 911  anytime you think you may need emergency care. For example, call if you or someone else:    Has overdosed or has withdrawal signs. Be sure to tell the emergency workers that you are or someone else is using or trying to quit using drugs. Overdose or withdrawal signs may include:  Losing consciousness.  Seizure.  Seeing or hearing things that aren't there (hallucinations).     Is thinking or talking about suicide or harming others.   Where to get help 24 hours a day, 7 days a week   If you or someone you know talks about suicide, self-harm, a mental health crisis, a substance use crisis, or any other kind of emotional distress, get help right away. You can:    Call the Suicide and Crisis Lifeline at [facility].     Call 6-727-640-TALK (1-140.535.8286).     Text HOME to 584484 to access the Crisis Text Line.   Consider saving these numbers in your phone.  Go to [employer] for more information or to chat online.  Call your doctor now or seek immediate medical care if:    You are having withdrawal symptoms. These may include nausea  "or vomiting, sweating, shakiness, and anxiety.   Watch closely for changes in your health, and be sure to contact your doctor if:    You have a relapse.     You need more help or support to stop.   Where can you learn more?  Go to https://www.[employer].net/patiented  Enter H573 in the search box to learn more about \"Substance Use Disorder: Care Instructions.\"  Current as of: November 15, 2023  Content Version: 14.3    2024 [employer].   Care instructions adapted under license by your healthcare professional. If you have questions about a medical condition or this instruction, always ask your healthcare professional. [employer] disclaims any warranty or liability for your use of this information.         Jennifer Ceballos is a 44 year old, presenting for the following:  Physical        1/14/2025     1:44 PM   Additional Questions   Roomed by Mireya HALE      Main concern is ongoing TMJ issues. She has a mouth guard fitted by her dentist but is struggling to feel comfortable with it.  She has daily pain in her jaw and into her neck. She was told to consider botox injections, she contacted dermatology and they don't do that.      Health Care Directive  Patient does not have a Health Care Directive: Discussed advance care planning with patient; information given to patient to review.      1/14/2025   General Health   How would you rate your overall physical health? Good   Feel stress (tense, anxious, or unable to sleep) To some extent   (!) STRESS CONCERN      1/14/2025   Nutrition   Three or more servings of calcium each day? Yes   Diet: Regular (no restrictions)   How many servings of fruit and vegetables per day? (!) 2-3   How many sweetened beverages each day? 0-1         1/14/2025   Exercise   Days per week of moderate/strenous exercise 3 days         1/14/2025   Social Factors   Frequency of gathering with friends or relatives Three times a week   Worry food won't last until " get money to buy more No   Food not last or not have enough money for food? No   Do you have housing? (Housing is defined as stable permanent housing and does not include staying ouside in a car, in a tent, in an abandoned building, in an overnight shelter, or couch-surfing.) Yes   Are you worried about losing your housing? No   Lack of transportation? No   Unable to get utilities (heat,electricity)? No         2025   Dental   Dentist two times every year? Yes            Today's PHQ-2 Score:       2025     1:43 PM   PHQ-2 (  [employer])   Q1: Little interest or pleasure in doing things 0   Q2: Feeling down, depressed or hopeless 0   PHQ-2 Score 0    Q1: Little interest or pleasure in doing things Not at all   Q2: Feeling down, depressed or hopeless Not at all   PHQ-2 Score 0       Patient-reported           2025   Substance Use   Alcohol more than 3/day or more than 7/wk No   Do you use any other substances recreationally? (!) CANNABIS PRODUCTS     Social History     Tobacco Use    Smoking status: Former     Current packs/day: 0.00     Types: Cigars, Cigarettes     Quit date:      Years since quittin.0    Smokeless tobacco: Never   Vaping Use    Vaping status: Never Used   Substance Use Topics    Alcohol use: Yes     Comment: occassional    Drug use: No           2023   LAST FHS-7 RESULTS   1st degree relative breast or ovarian cancer Unknown    Any relative bilateral breast cancer Yes    Any male have breast cancer No    Any ONE woman have BOTH breast AND ovarian cancer Yes    Any woman with breast cancer before 50yrs Yes    2 or more relatives with breast AND/OR ovarian cancer Yes    2 or more relatives with breast AND/OR bowel cancer Yes        Proxy-reported        Mammogram Screening - Mammogram every 1-2 years updated in Health Maintenance based on mutual decision making        2025   STI Screening   New sexual partner(s) since last STI/HIV test? No           2019    12:00  AM 3/29/2016    12:00 AM   PAP / HPV   PAP-ABSTRACT See Scanned Document     See Scanned Document           This result is from an external source.     ASCVD Risk   The 10-year ASCVD risk score (Salena HA, et al., 2019) is: 0.6%    Values used to calculate the score:      Age: 44 years      Sex: Female      Is Non- : No      Diabetic: No      Tobacco smoker: No      Systolic Blood Pressure: 112 mmHg      Is BP treated: No      HDL Cholesterol: 62 mg/dL      Total Cholesterol: 236 mg/dL       Reviewed and updated as needed this visit by Provider                    BP Readings from Last 3 Encounters:   01/14/25 112/80   11/27/24 120/86   11/11/24 117/76    Wt Readings from Last 3 Encounters:   01/14/25 91.2 kg (201 lb)   11/27/24 87.1 kg (192 lb)   11/11/24 87.1 kg (192 lb)                  Patient Active Problem List   Diagnosis    Pelvic pain in female    Dysmenorrhea    Endometriosis    Chronic pelvic pain in female    Abnormal uterine bleeding    Anxiety and depression    Epidermoid cyst of skin of back    Neuralgia    Morbid obesity (H)    Surgical menopause on hormone replacement therapy     Past Surgical History:   Procedure Laterality Date    APPENDECTOMY  07/13/2011    BREAST SURGERY  2000    Breast reduction    COLONOSCOPY  09/2011    CYSTOSCOPY N/A 01/11/2021    Procedure: CYSTOSCOPY;  Surgeon: Jessica Gallardo DO;  Location: [facility] OR    GYN SURGERY  01/2012    Colposcopy exam     HYSTERECTOMY      LAPAROSCOPIC HYSTERECTOMY TOTAL, BILATERAL SALPINGO-OOPHORECTOMY, COMBINED N/A 01/11/2021    Procedure: HYSTERECTOMY, TOTAL, LAPAROSCOPIC, WITH BILATERAL SALPINGO-OOPHORECTOMY;  Surgeon: Jessica Gallardo DO;  Location: [facility] OR    RELEASE CARPAL TUNNEL Right 11/11/2024    Procedure: RELEASE, right CARPAL TUNNEL;  Surgeon: Luis Márquez MD;  Location: [facility] OR    RELEASE TRIGGER FINGER Right 11/11/2024    Procedure: RELEASE, TRIGGER, right THUMB;  Surgeon: Luis Márquez MD;   Location: [facility] OR    SHOULDER SURGERY Right        Social History     Tobacco Use    Smoking status: Former     Current packs/day: 0.00     Types: Cigars, Cigarettes     Quit date:      Years since quittin.0    Smokeless tobacco: Never   Substance Use Topics    Alcohol use: Yes     Comment: occassional     Family History   Problem Relation Age of Onset    Arthritis Mother     Heart Disease Father     Gallbladder Disease Father     Arthritis Maternal Grandmother     Unknown/Adopted Maternal Grandmother     C.A.D. Paternal Grandmother     Ovarian Cancer Paternal Grandmother     Cancer Paternal Grandmother     C.A.D. Paternal Grandfather     Cervical Cancer Cousin     Cervical Cancer Paternal Aunt          Current Outpatient Medications   Medication Sig Dispense Refill    dupilumab (DUPIXENT) 300 MG/2ML prefilled pen Inject 2 mLs (300 mg) subcutaneously every 14 days. 4 mL 11    Iodine, Kelp, (KELP PO) Take by mouth daily.      phentermine 15 MG capsule Take 1 capsule (15 mg) by mouth every morning. 30 capsule 5    SELENIUM PO Take by mouth daily.      tiZANidine (ZANAFLEX) 2 MG tablet Take 1 tablet (2 mg) by mouth 3 times daily as needed for muscle spasms. 20 tablet 0    topiramate (TOPAMAX) 50 MG tablet Take 1 tablet (50 mg) by mouth daily. 30 tablet 5    triamcinolone (KENALOG) 0.1 % external ointment Apply sparingly twice daily as needed. 453.6 g 1    VITAMIN D, CHOLECALCIFEROL, PO Take by mouth daily.      levothyroxine (SYNTHROID/LEVOTHROID) 50 MCG tablet Take 1 tablet (50 mcg) by mouth daily (Patient not taking: Reported on 2025) 90 tablet 1     Allergies   Allergen Reactions    Gold Rash     Rash with true test    Nickel Rash     Positive for contact dermatitis, true test    Thimerosal (Thiomersal) Rash     Strong reaction with true test.      Recent Labs   Lab Test 24  1047 24  1318 23  0731 22  0804 21  1438 21  1108 21  1108 20  0925   A1C  --   --   "5.4  --   --   --   --   --    LDL  --   --  129* 144*  --   --   --  137*   HDL  --   --  62 71  --   --   --  78   TRIG  --   --  225* 228*  --   --   --  149   ALT  --   --  19  --  21  --  20  --    CR 0.73  --  0.73 0.65 0.76   < > 0.69 0.66   GFRESTIMATED >90  --  >90 >90 >90   < > >90 >90   GFRESTBLACK  --   --   --   --   --   --   --  >90   POTASSIUM 4.0  --  5.1 4.4 3.9   < > 4.4 3.9   TSH  --  2.89 5.47* 2.18  --   --   --  1.93    < > = values in this interval not displayed.          Review of Systems  Constitutional, HEENT, cardiovascular, pulmonary, gi and gu systems are negative, except as otherwise noted.     Objective    Exam  /80   Pulse 81   Temp 98.3  F (36.8  C) (Tympanic)   Resp 16   Ht 1.6 m (5' 3\")   Wt 91.2 kg (201 lb)   LMP 01/06/2021   SpO2 98%   BMI 35.61 kg/m     Estimated body mass index is 35.61 kg/m  as calculated from the following:    Height as of this encounter: 1.6 m (5' 3\").    Weight as of this encounter: 91.2 kg (201 lb).    Physical Exam  GENERAL: alert and no distress  EYES: Eyes grossly normal to inspection, PERRL and conjunctivae and sclerae normal  HENT: ear canals and TM's normal, nose and mouth without ulcers or lesions  NECK: no adenopathy, no asymmetry, masses, or scars  RESP: lungs clear to auscultation - no rales, rhonchi or wheezes  CV: regular rate and rhythm, normal S1 S2, no S3 or S4, no murmur, click or rub, no peripheral edema  ABDOMEN: soft, nontender, no hepatosplenomegaly, no masses and bowel sounds normal  MS: no gross musculoskeletal defects noted, no edema  SKIN: no suspicious lesions or rashes  NEURO: Normal strength and tone, mentation intact and speech normal  PSYCH: mentation appears normal, affect normal/bright        Signed Electronically by: Dahlia Johns, TRUMAN CNP    "

## 2025-01-15 ENCOUNTER — PATIENT OUTREACH (OUTPATIENT)
Dept: CARE COORDINATION | Facility: CLINIC | Age: 45
End: 2025-01-15
Payer: COMMERCIAL

## 2025-01-18 ENCOUNTER — PATIENT OUTREACH (OUTPATIENT)
Dept: CARE COORDINATION | Facility: CLINIC | Age: 45
End: 2025-01-18
Payer: COMMERCIAL

## 2025-02-13 NOTE — PROGRESS NOTES
Chief Complaint   Patient presents with    Right Wrist - Surgical Followup     Lin is here today for follow up from CTR/Thumb TFR s/p 14 weeks.  Has been noticing additional hand pain over the thumb area, CMC area.  She first noticed this about 1 month ago when she started using her hand more after the surgery and returning to work full time.  Affecting ADLs again.  Pain with finkelstein test.  Patient is having numbness/tingling and pain in this area.         SURGERY: right carpal tunnel release. Right trigger thumb release.  DATE OF SURGERY: 11/11/2024      HPI: Lin Corona is a 44 year old female , who presents for right thumb pain for about a month. Pain at base of thumb and up the wrist / forearm. Onset when started using the hand more after her carpal tunnel release surgery and returning to work fulltime. Affecting her ADLs. Taking aleve.    She is status post right carpal tunnel release, right trigger thumb release 11/11/2014 and has done well, resolution of those symptoms.    Still has some numbness and tingling in the radial 3.5 digits of the LEFT hand, seems worse now since having surgery on the right side. At our initial visit, on clinical exam, left side consistent clinically with carpal tunnel syndrome as well.      PAST MEDICAL HISTORY:   Past Medical History:   Diagnosis Date    Anxiety and depression     Dysmenorrhea     Endometriosis     Intrinsic eczema        PAST SURGICAL HISTORY:   Past Surgical History:   Procedure Laterality Date    APPENDECTOMY  07/13/2011    BREAST SURGERY  2000    Breast reduction    COLONOSCOPY  09/2011    CYSTOSCOPY N/A 01/11/2021    Procedure: CYSTOSCOPY;  Surgeon: Jessica Gallardo DO;  Location: [facility]    GYN SURGERY  01/2012    Colposcopy exam     HYSTERECTOMY      LAPAROSCOPIC HYSTERECTOMY TOTAL, BILATERAL SALPINGO-OOPHORECTOMY, COMBINED N/A 01/11/2021    Procedure: HYSTERECTOMY, TOTAL, LAPAROSCOPIC, WITH BILATERAL SALPINGO-OOPHORECTOMY;  Surgeon:  "Jessica Gallardo, DO;  Location: [facility]    RELEASE CARPAL TUNNEL Right 11/11/2024    Procedure: RELEASE, right CARPAL TUNNEL;  Surgeon: Luis Márquez MD;  Location: [facility]    RELEASE TRIGGER FINGER Right 11/11/2024    Procedure: RELEASE, TRIGGER, right THUMB;  Surgeon: Luis Márquez MD;  Location: [facility]    SHOULDER SURGERY Right        MEDICATIONS:   Current Outpatient Medications:     dupilumab (DUPIXENT) 300 MG/2ML prefilled pen, Inject 2 mLs (300 mg) subcutaneously every 14 days., Disp: 4 mL, Rfl: 11    Iodine, Kelp, (KELP PO), Take by mouth daily., Disp: , Rfl:     levothyroxine (SYNTHROID/LEVOTHROID) 50 MCG tablet, Take 1 tablet (50 mcg) by mouth daily (Patient not taking: Reported on 1/14/2025), Disp: 90 tablet, Rfl: 1    phentermine 15 MG capsule, Take 1 capsule (15 mg) by mouth every morning., Disp: 30 capsule, Rfl: 5    SELENIUM PO, Take by mouth daily., Disp: , Rfl:     tiZANidine (ZANAFLEX) 2 MG tablet, Take 1 tablet (2 mg) by mouth 3 times daily as needed for muscle spasms., Disp: 20 tablet, Rfl: 0    topiramate (TOPAMAX) 50 MG tablet, Take 1 tablet (50 mg) by mouth daily., Disp: 30 tablet, Rfl: 5    triamcinolone (KENALOG) 0.1 % external ointment, Apply sparingly twice daily as needed., Disp: 453.6 g, Rfl: 1    VITAMIN D, CHOLECALCIFEROL, PO, Take by mouth daily., Disp: , Rfl:      ALLERGIES:   Allergies   Allergen Reactions    Gold Rash     Rash with true test    Nickel Rash     Positive for contact dermatitis, true test    Thimerosal (Thiomersal) Rash     Strong reaction with true test.          PHYSICAL EXAM  GENERAL APPEARANCE: healthy, alert, no distress.   SKIN: no suspicious lesions or rashes  RESPIRATORY: No increased work of breathing.  NEURO: Normal strength and tone, mentation intact and speech normal  PSYCH:  mentation appears normal and affect normal/bright. Not anxious.        /80   Pulse 76   Ht 1.6 m (5' 3\")   Wt 90.7 kg (200 lb)   LMP 01/06/2021   SpO2 98%   BMI " 35.43 kg/m       RIGHT HAND / WRIST EXAM:     Volar incision healed..  Thumb incision healed well.     There is mild swelling radial wrist.  There is severe tender to palpation over the radial styloid, 1st dorsal extensor compartment.  Small, palpable nodule at radial styloid, 1st extensors, slight mobile, painful.   Positive Finkelsteins test.       Intact sensation to light touch in the distribution of the median, radial, and ulnar nerves of the hand. Intact sensation of the radial and ulnar digital nerves of the thumb, index, long (middle), ring, and small finger.    Brisk capillary refill to all fingers, warm and well-perfused.   Palpable radial pulse.    3 views right wrist 2/19/2025 reviewed, No obvious fracture or dislocation. No obvious bony abnormality or lesion. Degenerative change at the first MCP joint. There is some irregularity at the base of the proximal phalanx of the thumb which is likely secondary to old injury but does not appear acute. No dislocation. Right wrist otherwise negative.       ASSESSMENT: 44 year old female:  1)  right thumb and radial wrist pain, dequervains tenosynovitis with likely cyst of tendon sheath.         PLAN:   Likely tendinitis from increased activities at work following surgery.    Nonoperative treatment. Expect improvement in most cases 6-8 weeks.  * Rest. Immobilization in removable thumb spica splints.   * will refer to [facility] for thumb based handsplints, as well as treatment modalities as indicated.  * Activity modification - avoid activities that aggravate symptoms.  * NSAIDS - regular use for inflammation, with food, as long as no contra-indications.    * Ice twice daily to three times daily.  * Tylenol as needed for pain  * Injections: discussed injection. She elects to proceed. See procedure note below. Unable to decompress nodule.  Will refer to sports medicine for ultrasound evaluation, guided 1st extensor cyst aspiration/injection.  *  Return to clinic as needed.  .    * All questions were addressed and answered prior to discharge from clinic today. The patient acknowledges an understanding of and agreement with the plan set forth during today's visit. Patient was advised to call our office or MyChart us if any further questions arise upon leaving our office today.      Luis Márquez M.D., M.S.  Dept. of Orthopaedic Surgery  [facility]     Hand / Upper Extremity Injection/Arthrocentesis: R extensor compartment 1    Date/Time: 2/19/2025 10:43 AM    Performed by: Luis Márquez MD  Authorized by: Luis Márquez MD    Needle Size:  25 G  Guidance: landmark    Approach:  Radial  Condition: de Quervain's      Site:  R extensor compartment 1  Medications:  0.5 mL BUPivacaine 0.25 %; 20 mg triamcinolone 40 MG/ML  Outcome:  Tolerated well, no immediate complications  Procedure discussed: discussed risks, benefits, and alternatives    Consent Given by:  Patient  Timeout: timeout called immediately prior to procedure    Prep: patient was prepped and draped in usual sterile fashion

## 2025-02-14 NOTE — TELEPHONE ENCOUNTER
Dahlia Johns APRN [facility] Pool28 minutes ago (11:00 AM)     CV  MRI tonight of what? Lab work was negative for inflammatory markers, not certain what else to suggest at this point.  AN Longo       Copied from routing message  See my chart message      Elle Babin RN on 5/29/2024 at 11:34 AM     Statement Selected

## 2025-02-19 ENCOUNTER — ANCILLARY PROCEDURE (OUTPATIENT)
Dept: GENERAL RADIOLOGY | Facility: CLINIC | Age: 45
End: 2025-02-19
Attending: ORTHOPAEDIC SURGERY
Payer: COMMERCIAL

## 2025-02-19 ENCOUNTER — OFFICE VISIT (OUTPATIENT)
Dept: ORTHOPEDICS | Facility: CLINIC | Age: 45
End: 2025-02-19
Payer: COMMERCIAL

## 2025-02-19 VITALS
HEIGHT: 63 IN | BODY MASS INDEX: 35.44 KG/M2 | SYSTOLIC BLOOD PRESSURE: 118 MMHG | DIASTOLIC BLOOD PRESSURE: 80 MMHG | HEART RATE: 76 BPM | OXYGEN SATURATION: 98 % | WEIGHT: 200 LBS

## 2025-02-19 DIAGNOSIS — M25.531 ACUTE PAIN OF RIGHT WRIST: ICD-10-CM

## 2025-02-19 DIAGNOSIS — M79.644 THUMB PAIN, RIGHT: ICD-10-CM

## 2025-02-19 DIAGNOSIS — M65.4 DE QUERVAIN'S TENOSYNOVITIS, RIGHT: Primary | ICD-10-CM

## 2025-02-19 PROCEDURE — 99213 OFFICE O/P EST LOW 20 MIN: CPT | Mod: 25 | Performed by: ORTHOPAEDIC SURGERY

## 2025-02-19 PROCEDURE — 73110 X-RAY EXAM OF WRIST: CPT | Mod: TC | Performed by: RADIOLOGY

## 2025-02-19 PROCEDURE — 20550 NJX 1 TENDON SHEATH/LIGAMENT: CPT | Mod: RT | Performed by: ORTHOPAEDIC SURGERY

## 2025-02-19 RX ORDER — BUPIVACAINE HYDROCHLORIDE 2.5 MG/ML
0.5 INJECTION, SOLUTION INFILTRATION; PERINEURAL
Status: COMPLETED | OUTPATIENT
Start: 2025-02-19 | End: 2025-02-19

## 2025-02-19 RX ORDER — TRIAMCINOLONE ACETONIDE 40 MG/ML
20 INJECTION, SUSPENSION INTRA-ARTICULAR; INTRAMUSCULAR
Status: COMPLETED | OUTPATIENT
Start: 2025-02-19 | End: 2025-02-19

## 2025-02-19 RX ADMIN — TRIAMCINOLONE ACETONIDE 20 MG: 40 INJECTION, SUSPENSION INTRA-ARTICULAR; INTRAMUSCULAR at 10:43

## 2025-02-19 RX ADMIN — BUPIVACAINE HYDROCHLORIDE 0.5 ML: 2.5 INJECTION, SOLUTION INFILTRATION; PERINEURAL at 10:43

## 2025-02-19 ASSESSMENT — PAIN SCALES - GENERAL: PAINLEVEL_OUTOF10: MODERATE PAIN (6)

## 2025-02-19 NOTE — LETTER
2/19/2025      Lin Corona  [address] [place] [id number]      Dear Colleague,    Thank you for referring your patient, Lin Corona, to the [facility] [place]. Please see a copy of my visit note below.      Chief Complaint   Patient presents with     Right Wrist - Surgical Followup     Lin is here today for follow up from CTR/Thumb TFR s/p 14 weeks.  Has been noticing additional hand pain over the thumb area, CMC area.  She first noticed this about 1 month ago when she started using her hand more after the surgery and returning to work full time.  Affecting ADLs again.  Pain with finkelstein test.  Patient is having numbness/tingling and pain in this area.         SURGERY: right carpal tunnel release. Right trigger thumb release.  DATE OF SURGERY: 11/11/2024      HPI: Lin Corona is a 44 year old female , who presents for right thumb pain for about a month. Pain at base of thumb and up the wrist / forearm. Onset when started using the hand more after her carpal tunnel release surgery and returning to work fulltime. Affecting her ADLs. Taking aleve.    She is status post right carpal tunnel release, right trigger thumb release 11/11/2014 and has done well, resolution of those symptoms.    Still has some numbness and tingling in the radial 3.5 digits of the LEFT hand, seems worse now since having surgery on the right side. At our initial visit, on clinical exam, left side consistent clinically with carpal tunnel syndrome as well.      PAST MEDICAL HISTORY:   Past Medical History:   Diagnosis Date     Anxiety and depression      Dysmenorrhea      Endometriosis      Intrinsic eczema        PAST SURGICAL HISTORY:   Past Surgical History:   Procedure Laterality Date     APPENDECTOMY  07/13/2011     BREAST SURGERY  2000    Breast reduction     COLONOSCOPY  09/2011     CYSTOSCOPY N/A 01/11/2021    Procedure: CYSTOSCOPY;  Surgeon: Jessica Gallardo DO;  Location: [facility]     GYN  SURGERY  01/2012    Colposcopy exam      HYSTERECTOMY       LAPAROSCOPIC HYSTERECTOMY TOTAL, BILATERAL SALPINGO-OOPHORECTOMY, COMBINED N/A 01/11/2021    Procedure: HYSTERECTOMY, TOTAL, LAPAROSCOPIC, WITH BILATERAL SALPINGO-OOPHORECTOMY;  Surgeon: Jessica Gallardo DO;  Location: [facility]     RELEASE CARPAL TUNNEL Right 11/11/2024    Procedure: RELEASE, right CARPAL TUNNEL;  Surgeon: Luis Márquez MD;  Location: [facility]     RELEASE TRIGGER FINGER Right 11/11/2024    Procedure: RELEASE, TRIGGER, right THUMB;  Surgeon: Luis Márquez MD;  Location: [facility]     SHOULDER SURGERY Right        MEDICATIONS:   Current Outpatient Medications:      dupilumab (DUPIXENT) 300 MG/2ML prefilled pen, Inject 2 mLs (300 mg) subcutaneously every 14 days., Disp: 4 mL, Rfl: 11     Iodine, Kelp, (KELP PO), Take by mouth daily., Disp: , Rfl:      levothyroxine (SYNTHROID/LEVOTHROID) 50 MCG tablet, Take 1 tablet (50 mcg) by mouth daily (Patient not taking: Reported on 1/14/2025), Disp: 90 tablet, Rfl: 1     phentermine 15 MG capsule, Take 1 capsule (15 mg) by mouth every morning., Disp: 30 capsule, Rfl: 5     SELENIUM PO, Take by mouth daily., Disp: , Rfl:      tiZANidine (ZANAFLEX) 2 MG tablet, Take 1 tablet (2 mg) by mouth 3 times daily as needed for muscle spasms., Disp: 20 tablet, Rfl: 0     topiramate (TOPAMAX) 50 MG tablet, Take 1 tablet (50 mg) by mouth daily., Disp: 30 tablet, Rfl: 5     triamcinolone (KENALOG) 0.1 % external ointment, Apply sparingly twice daily as needed., Disp: 453.6 g, Rfl: 1     VITAMIN D, CHOLECALCIFEROL, PO, Take by mouth daily., Disp: , Rfl:      ALLERGIES:   Allergies   Allergen Reactions     Gold Rash     Rash with true test     Nickel Rash     Positive for contact dermatitis, true test     Thimerosal (Thiomersal) Rash     Strong reaction with true test.          PHYSICAL EXAM  GENERAL APPEARANCE: healthy, alert, no distress.   SKIN: no suspicious lesions or rashes  RESPIRATORY: No increased work of  "breathing.  NEURO: Normal strength and tone, mentation intact and speech normal  PSYCH:  mentation appears normal and affect normal/bright. Not anxious.        /80   Pulse 76   Ht 1.6 m (5' 3\")   Wt 90.7 kg (200 lb)   LMP 01/06/2021   SpO2 98%   BMI 35.43 kg/m       RIGHT HAND / WRIST EXAM:     Volar incision healed..  Thumb incision healed well.     There is mild swelling radial wrist.  There is severe tender to palpation over the radial styloid, 1st dorsal extensor compartment.  Small, palpable nodule at radial styloid, 1st extensors, slight mobile, painful.   Positive Finkelsteins test.       Intact sensation to light touch in the distribution of the median, radial, and ulnar nerves of the hand. Intact sensation of the radial and ulnar digital nerves of the thumb, index, long (middle), ring, and small finger.    Brisk capillary refill to all fingers, warm and well-perfused.   Palpable radial pulse.    3 views right wrist 2/19/2025 reviewed, No obvious fracture or dislocation. No obvious bony abnormality or lesion. Degenerative change at the first MCP joint. There is some irregularity at the base of the proximal phalanx of the thumb which is likely secondary to old injury but does not appear acute. No dislocation. Right wrist otherwise negative.       ASSESSMENT: 44 year old female:  1)  right thumb and radial wrist pain, dequervains tenosynovitis with likely cyst of tendon sheath.         PLAN:   Likely tendinitis from increased activities at work following surgery.    Nonoperative treatment. Expect improvement in most cases 6-8 weeks.  * Rest. Immobilization in removable thumb spica splints.   * will refer to [facility] for thumb based handsplints, as well as treatment modalities as indicated.  * Activity modification - avoid activities that aggravate symptoms.  * NSAIDS - regular use for inflammation, with food, as long as no contra-indications.    * Ice twice daily to three times " daily.  * Tylenol as needed for pain  * Injections: discussed injection. She elects to proceed. See procedure note below. Unable to decompress nodule.  Will refer to sports medicine for ultrasound evaluation, guided 1st extensor cyst aspiration/injection.  * Return to clinic as needed.  .    * All questions were addressed and answered prior to discharge from clinic today. The patient acknowledges an understanding of and agreement with the plan set forth during today's visit. Patient was advised to call our office or MyChart us if any further questions arise upon leaving our office today.      Luis Márquez M.D., M.S.  Dept. of Orthopaedic Surgery  [facility]     Hand / Upper Extremity Injection/Arthrocentesis: R extensor compartment 1    Date/Time: 2/19/2025 10:43 AM    Performed by: Luis Márquez MD  Authorized by: Luis Márquez MD    Needle Size:  25 G  Guidance: landmark    Approach:  Radial  Condition: de Quervain's      Site:  R extensor compartment 1  Medications:  0.5 mL BUPivacaine 0.25 %; 20 mg triamcinolone 40 MG/ML  Outcome:  Tolerated well, no immediate complications  Procedure discussed: discussed risks, benefits, and alternatives    Consent Given by:  Patient  Timeout: timeout called immediately prior to procedure    Prep: patient was prepped and draped in usual sterile fashion          Again, thank you for allowing me to participate in the care of your patient.        Sincerely,        Luis Márquez MD    Electronically signed

## 2025-02-20 ENCOUNTER — PATIENT OUTREACH (OUTPATIENT)
Dept: CARE COORDINATION | Facility: CLINIC | Age: 45
End: 2025-02-20
Payer: COMMERCIAL

## 2025-02-24 ENCOUNTER — OFFICE VISIT (OUTPATIENT)
Dept: ORTHOPEDICS | Facility: CLINIC | Age: 45
End: 2025-02-24
Attending: ORTHOPAEDIC SURGERY
Payer: COMMERCIAL

## 2025-02-24 DIAGNOSIS — M79.644 THUMB PAIN, RIGHT: ICD-10-CM

## 2025-02-24 DIAGNOSIS — M65.4 DE QUERVAIN'S TENOSYNOVITIS, RIGHT: ICD-10-CM

## 2025-02-24 DIAGNOSIS — M25.531 ACUTE PAIN OF RIGHT WRIST: ICD-10-CM

## 2025-02-24 PROCEDURE — 99213 OFFICE O/P EST LOW 20 MIN: CPT | Performed by: FAMILY MEDICINE

## 2025-02-24 NOTE — LETTER
2/24/2025      Lin Corona  [address] [place] [id number]      Dear Colleague,    Thank you for referring your patient, Lin Corona, to the [facility] [place]. Please see a copy of my visit note below.    # Right Wrist Pain and Swelling: Lin Corona  was seen today for right wrist pain/swelling. Symptoms had been going on for 1+ years following carpal tunnel release surgery. On examination there are positive findings of no significant ganglion cyst, pain with palpation over the 1st dorsal compartment with small amount of fluid in the sheath. Likely cause of patient's condition due to De Quervain's tenosynovitis. Other possible conditions contributing to symptoms include ganglion cyst though one previously seen on MRI not visualized today.  Counseled patient on nature of condition and treatment options.  Given this plan as below, follow-up 1 mon as needed     Image Findings: reviewed right hand MRI, Ultrasound showing no dorsal hand/wrist ganglion cyst, fluid in tendon sheath of 1st dorsal compartment  Treatment: Activities as tolerated, home exercises given today  Job: As tolerated  Medications/Injections: Limited tylenol/ibuprofen for pain for 1-2 weeks, Topical Voltaren gel, none today  Follow-up: In one month if symptoms do not improve, sooner if worsening  Can consider steroid injection with flare of symptoms    Please call [phone]   Ask for my team if you have any questions or concerns    If you have not yet received the influenza vaccine but would like to get one, please call  [phone] or you can schedule via [employer]    It was great seeing you today!    Kishore Krause MD, CAM     Patient's conditions were thoroughly discussed during today's visit with greater than 50% of the visit spent counseling the patient with total time spent face-to-face with the patient being 20 minutes.          Again, thank you for allowing me to participate in the care of  your patient.        Sincerely,        Kishore Krause MD    Electronically signed

## 2025-03-03 NOTE — NURSING NOTE
Is the patient currently in the state of [place]? YES    Visit mode:VIDEO    If the visit is dropped, the patient can be reconnected by: VIDEO VISIT: Text to cell phone: [phone]    Will anyone else be joining the visit? NO      How would you like to obtain your AVS? MyChart    Are changes needed to the allergy or medication list? NO    Reason for visit: Consult    ABBEY Abernathy on 7/13/2023 at 1:18 PM        
Detail Level: Detailed
Detail Level: Zone

## 2025-03-03 NOTE — PROGRESS NOTES
# Right Wrist Pain and Swelling: Lin Corona  was seen today for right wrist pain/swelling. Symptoms had been going on for 1+ years following carpal tunnel release surgery. On examination there are positive findings of no significant ganglion cyst, pain with palpation over the 1st dorsal compartment with small amount of fluid in the sheath. Likely cause of patient's condition due to De Quervain's tenosynovitis. Other possible conditions contributing to symptoms include ganglion cyst though one previously seen on MRI not visualized today.  Counseled patient on nature of condition and treatment options.  Given this plan as below, follow-up 1 mon as needed     Image Findings: reviewed right hand MRI, Ultrasound showing no dorsal hand/wrist ganglion cyst, fluid in tendon sheath of 1st dorsal compartment  Treatment: Activities as tolerated, home exercises given today  Job: As tolerated  Medications/Injections: Limited tylenol/ibuprofen for pain for 1-2 weeks, Topical Voltaren gel, none today  Follow-up: In one month if symptoms do not improve, sooner if worsening  Can consider steroid injection with flare of symptoms    Please call [phone]   Ask for my team if you have any questions or concerns    If you have not yet received the influenza vaccine but would like to get one, please call  [phone] or you can schedule via [employer]    It was great seeing you today!    Kishore Krause MD, CAQSM     Patient's conditions were thoroughly discussed during today's visit with greater than 50% of the visit spent counseling the patient with total time spent face-to-face with the patient being 20 minutes.

## 2025-03-05 ENCOUNTER — HOSPITAL ENCOUNTER (OUTPATIENT)
Dept: MAMMOGRAPHY | Facility: CLINIC | Age: 45
Discharge: HOME | End: 2025-03-05
Attending: NURSE PRACTITIONER
Payer: COMMERCIAL

## 2025-03-05 DIAGNOSIS — Z12.31 VISIT FOR SCREENING MAMMOGRAM: ICD-10-CM

## 2025-03-05 PROCEDURE — 77063 BREAST TOMOSYNTHESIS BI: CPT

## 2025-03-05 PROCEDURE — 77067 SCR MAMMO BI INCL CAD: CPT

## 2025-03-06 ENCOUNTER — MYC MEDICAL ADVICE (OUTPATIENT)
Dept: FAMILY MEDICINE | Facility: CLINIC | Age: 45
End: 2025-03-06

## 2025-03-10 NOTE — TELEPHONE ENCOUNTER
Copied from routing message:    Dahlia Johns APRN [facility] days ago     CV  Yes we can discuss this at next routine visit.  AN Longo       Patient notified via my chart message.       Elle Bbain RN on 3/10/2025 at 9:37 AM

## 2025-03-11 NOTE — PATIENT INSTRUCTIONS
# Right Wrist Pain and Swelling: Lin Corona  was seen today for right wrist pain/swelling. Symptoms had been going on for 1+ years following carpal tunnel release surgery. On examination there are positive findings of no significant ganglion cyst, pain with palpation over the 1st dorsal compartment with small amount of fluid in the sheath. Likely cause of patient's condition due to De Quervain's tenosynovitis. Other possible conditions contributing to symptoms include ganglion cyst though one previously seen on MRI not visualized today.  Counseled patient on nature of condition and treatment options.  Given this plan as below, follow-up 1 mon as needed     Image Findings: reviewed right hand MRI, Ultrasound showing no dorsal hand/wrist ganglion cyst, fluid in tendon sheath of 1st dorsal compartment  Treatment: Activities as tolerated, home exercises given today  Job: As tolerated  Medications/Injections: Limited tylenol/ibuprofen for pain for 1-2 weeks, Topical Voltaren gel, none today  Follow-up: In one month if symptoms do not improve, sooner if worsening  Can consider steroid injection with flare of symptoms    Please call [phone]   Ask for my team if you have any questions or concerns    If you have not yet received the influenza vaccine but would like to get one, please call  [phone] or you can schedule via [employer]    It was great seeing you today!    Kishore Krause MD, CA[facility]

## 2025-03-13 ENCOUNTER — MYC MEDICAL ADVICE (OUTPATIENT)
Dept: ORTHOPEDICS | Facility: CLINIC | Age: 45
End: 2025-03-13
Payer: COMMERCIAL

## 2025-03-13 DIAGNOSIS — M25.531 CHRONIC PAIN OF RIGHT WRIST: Primary | ICD-10-CM

## 2025-03-13 DIAGNOSIS — G89.29 CHRONIC PAIN OF RIGHT WRIST: Primary | ICD-10-CM

## 2025-03-13 RX ORDER — NABUMETONE 500 MG/1
500 TABLET, FILM COATED ORAL 2 TIMES DAILY
Qty: 30 TABLET | Refills: 1 | Status: SHIPPED | OUTPATIENT
Start: 2025-03-13

## 2025-03-13 NOTE — TELEPHONE ENCOUNTER
Per chart review:  -10/2/24 right hand MRI   -11/11/24 right carpal tunnel release and right thumb trigger finger release with Dr. Márquez  -2/19/25 right wrist xrays done, Right Dequervains steroid injection done by Dr. Márquez   -3/10/25 last OV with Dr. Krause, steroid injection performed       Selene Nelson, ATC

## 2025-03-19 ENCOUNTER — HOSPITAL ENCOUNTER (OUTPATIENT)
Dept: MRI IMAGING | Facility: CLINIC | Age: 45
Discharge: HOME | End: 2025-03-19
Attending: FAMILY MEDICINE
Payer: COMMERCIAL

## 2025-03-19 DIAGNOSIS — G89.29 CHRONIC PAIN OF RIGHT WRIST: ICD-10-CM

## 2025-03-19 DIAGNOSIS — M25.531 CHRONIC PAIN OF RIGHT WRIST: ICD-10-CM

## 2025-03-19 PROCEDURE — 73221 MRI JOINT UPR EXTREM W/O DYE: CPT | Mod: RT

## 2025-03-19 PROCEDURE — 73221 MRI JOINT UPR EXTREM W/O DYE: CPT | Mod: 26 | Performed by: RADIOLOGY

## 2025-03-27 ENCOUNTER — TELEPHONE (OUTPATIENT)
Dept: DERMATOLOGY | Facility: CLINIC | Age: 45
End: 2025-03-27
Payer: COMMERCIAL

## 2025-03-27 NOTE — TELEPHONE ENCOUNTER
sent mychart to patient    Thank you,    Lesley SELLERSRN BSN  [facility]- [phone]

## 2025-03-27 NOTE — TELEPHONE ENCOUNTER
Received fax stating that we are unable to reach the patient to place their refill order and according to our records they are overdue for their medication at this time. Last fill date : 12/31/2024

## 2025-03-31 ENCOUNTER — THERAPY VISIT (OUTPATIENT)
Dept: OCCUPATIONAL THERAPY | Facility: CLINIC | Age: 45
End: 2025-03-31
Attending: FAMILY MEDICINE
Payer: COMMERCIAL

## 2025-03-31 DIAGNOSIS — G89.29 CHRONIC PAIN OF RIGHT WRIST: ICD-10-CM

## 2025-03-31 DIAGNOSIS — M25.531 CHRONIC PAIN OF RIGHT WRIST: ICD-10-CM

## 2025-03-31 PROCEDURE — 97110 THERAPEUTIC EXERCISES: CPT | Mod: GO | Performed by: OCCUPATIONAL THERAPIST

## 2025-03-31 PROCEDURE — 97140 MANUAL THERAPY 1/> REGIONS: CPT | Mod: GO | Performed by: OCCUPATIONAL THERAPIST

## 2025-03-31 PROCEDURE — 97165 OT EVAL LOW COMPLEX 30 MIN: CPT | Mod: GO | Performed by: OCCUPATIONAL THERAPIST

## 2025-03-31 PROCEDURE — 97026 INFRARED THERAPY: CPT | Mod: GO | Performed by: OCCUPATIONAL THERAPIST

## 2025-03-31 NOTE — PROGRESS NOTES
OCCUPATIONAL THERAPY EVALUATION  Type of Visit: Evaluation        Fall Risk Screen:  Fall screen completed by: OT  Have you fallen 2 or more times in the past year?: No  Have you fallen and had an injury in the past year?: No  Is patient a fall risk?: No    Subjective        Presenting condition or subjective complaint: hand painright wrist/elbow pain. Symptoms had been going on for 6+ months persisting after carpal tunnel release, De Quervain's and intersection steroid injections.  3/10/25 had injection in first and second dorsal compartments. About 6 weeks after surgery started using more and then started having more radial wrist pain. Today is not as bad of day as she has had in past.   Date of onset: 01/01/25    Relevant medical history:   none right shoulder surgery 8 years ago  Dates & types of surgery: carpal tunnel trigger thumb 11~2024,gallbladder removed 05-23,hysterectomy 01 2021Right trigger thumb and CTR  11/11/24    Prior diagnostic imaging/testing results: MRI; X-ray     Prior therapy history for the same diagnosis, illness or injury: Yes 3or4 years ago here    Prior Level of Function  Transfers: Independent  Ambulation: Independent  ADL: Independent  IADL:  independent    Living Environment  Social support: With a significant other or spouse   Type of home: House   Stairs to enter the home: No       Ramp: No   Stairs inside the home: Yes 13 Is there a railing: Yes     Help at home: None  Equipment owned:       Employment: Yes   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  Any of your usual work, housework or school activities 2-Moderate Difficulty   b.  Your usual hobbies, recreational or sporting activities 2-Moderate Difficulty   c.  Lifting a bag of groceries to waist level 2-Moderate Difficulty   d.  Placing an object onto, or removing it from an overhead shelf 3-A Little bit of Difficulty   e.  Washing your hair or scalp 2-Moderate Difficulty   f.   Pushing up on your hands (e.g., from bathtub or chair) 1-Quite a bit of Difficulty   g.  Preparing food (e.g., peeling, cutting) 2-Moderate Difficulty   h.  Driving 3-A Little bit of Difficulty   I.   Vacuuming, sweeping, or raking 2-Moderate Difficulty   j.   Dressing 2-Moderate Difficulty   k.  Doing up buttons 2-Moderate Difficulty   l.   Using tools or appliances 2-Moderate Difficulty   m. Opening doors 2-Moderate Difficulty   n.  Cleaning 2-Moderate Difficulty   o.  Tying or lacing shoes 3-A Little bit of Difficulty   p.  Sleeping 1-Quite a bit of Difficulty   q.  Laundering clothes. (e.g., washing, ironing, folding) 3-A Little bit of Difficulty   r.   Opening a jar 1-Quite a bit of Difficulty   s.  Throwing a ball 1-Quite a bit of Difficulty   t.   Carrying a small suitcase with your affected limb  1-Quite a bit of Difficulty   Column Totals: /80 39        Patient-reported        PAIN:  Pain Level at Rest: 4/10  Pain Level with Use: 7/10  Pain Location: wrist and radial wrist and forearm and sometimes will radiate up to elbow  Pain Quality: Sharp and Other  Pain Frequency: intermittent  Pain is Worst: daytime or nighttime  Pain is Exacerbated By: turning, lifting, twisting  Pain is Relieved By: rest and steroid, wrap, laser wrap, topicals  Pain Progression: Improved  slightly      EDEMA:  DWC Right 17 cm, left  16 cm m          SENSATION: Decreased Median Nerve distribution per pt report and  Decreased Ulnar Nerve distribution per pt report         ROM:   Wrist ROM  Left AROM Right AROM    Extension 60 62   Flexion 60 60   Radial Deviation (RD) 22 15   Ulnar Deviation (UD) 20 8   UD with Th Flex     Supination     Pronation     Kapandji- right 7, left -9        SPECIAL TESTS:   De Quervain's Special Tests  Pain Report Left Right   Finkelstein's Test  positive   Radial Nerve Tinel's (DRSN)  positive   Intersection test  mild       STRENGTH:     Measured in pounds 3/31/2025 3/31/2025    Left Right   Average 44 38 mild pain     Lateral Pinch  Measured in pounds 3/31/2025 3/31/2025    Left Right   Average 12 8     3 Point Pinch  Measured in pounds 3/31/2025 3/31/2025    Left Right   Average 12 mild pain 8     Increased pain with resistance to wrist extension and APL, EPB  PALPATION: Pain with palpation to intersection and 2nd and 3rd dorsal compartments           Assessment & Plan   CLINICAL IMPRESSIONS  Medical Diagnosis: chronic right wrist pain    Treatment Diagnosis: right wrist pain affecting ADL's IADL's    Impression/Assessment: Pt is a 44 year old female presenting to Occupational Therapy due to above diagnoses.   Appears to have first, second and third dorsal compartment involvement.  The following significant findings have been identified: Impaired ROM, Impaired sensation, Impaired strength, Pain, and edema .  These identified deficits interfere with their ability to perform self care tasks, work tasks, recreational activities, household chores, driving ,  yard work, and meal planning and preparation as compared to previous level of function.     Clinical Decision Making (Complexity):  Assessment of Occupational Performance: 5 or more Performance Deficits  Occupational Performance Limitations: bathing/showering, dressing, hygiene and grooming, care of pets, driving and community mobility, home establishment and management, meal preparation and cleanup, shopping, sleep, work, and leisure  activities  Clinical Decision Making (Complexity): Low complexity    PLAN OF CARE  Treatment Interventions:  Modalities:  US, Iontophoresis, and Laser Light  Therapeutic Exercise:  AROM, PROM, and Isometrics  Neuromuscular re-education:  Nerve Gliding, Desensitization, and Kinesiotaping  Manual Techniques:  Friction massage, Myofascial release, Manual edema mobilization, and tool assisted massage  Orthotic Fabrication:  Static and Forearm based  Self Care:  Self Care Tasks, Ergonomic Considerations, and Work Tasks    Long Term Goals   OT Goal 1  Goal Identifier: home program  Goal Description: Patient to be independent with home program, not needing more than 15% cuing  Rationale: In order to maximize safety and independence with ADL/IADLs  Target Date: 05/26/25  OT Goal 2  Goal Identifier: UEFI  Goal Description: Patient to improve UEFI score by 20 points to improve pain free functional use  Rationale: In order to maximize safety and independence with ADL/IADLs  Goal Progress: currently 39/80  Target Date: 05/12/25  OT Goal 3  Goal Identifier: sleep  Goal Description: Patient toscore 4 on UEFI to be able to sleep through night and wake without pain  Rationale: In order to maximize safety and independence with ADL/IADLs  Goal Progress: currently 1  Target Date: 05/26/25      Frequency of Treatment: 1x/week  Duration of Treatment: 8 weeks     Recommended Referrals to Other Professionals:   Education Assessment: Learner/Method: Patient;Listening;Reading;Demonstration;Pictures/Video;No Barriers to Learning  Education Comments: PTRX on pphone     Risks and benefits of evaluation/treatment have been explained.   Patient/Family/caregiver agrees with Plan of Care.     Evaluation Time:    OT Eval, Low Complexity Minutes (54192): 25       Signing Clinician: JOHN PAUL Messina/L CHT  Occupational Therapist, Certified Hand Therapist

## 2025-04-15 ENCOUNTER — THERAPY VISIT (OUTPATIENT)
Dept: OCCUPATIONAL THERAPY | Facility: CLINIC | Age: 45
End: 2025-04-15
Attending: FAMILY MEDICINE
Payer: COMMERCIAL

## 2025-04-15 DIAGNOSIS — G89.29 CHRONIC PAIN OF RIGHT WRIST: Primary | ICD-10-CM

## 2025-04-15 DIAGNOSIS — M25.531 CHRONIC PAIN OF RIGHT WRIST: Primary | ICD-10-CM

## 2025-04-15 PROCEDURE — 97140 MANUAL THERAPY 1/> REGIONS: CPT | Mod: GO | Performed by: OCCUPATIONAL THERAPIST

## 2025-04-15 PROCEDURE — 97022 WHIRLPOOL THERAPY: CPT | Mod: GO | Performed by: OCCUPATIONAL THERAPIST

## 2025-04-29 ENCOUNTER — THERAPY VISIT (OUTPATIENT)
Dept: OCCUPATIONAL THERAPY | Facility: CLINIC | Age: 45
End: 2025-04-29
Attending: FAMILY MEDICINE
Payer: COMMERCIAL

## 2025-04-29 DIAGNOSIS — G89.29 CHRONIC PAIN OF RIGHT WRIST: Primary | ICD-10-CM

## 2025-04-29 DIAGNOSIS — M25.531 CHRONIC PAIN OF RIGHT WRIST: Primary | ICD-10-CM

## 2025-04-29 PROCEDURE — 97035 APP MDLTY 1+ULTRASOUND EA 15: CPT | Mod: GO | Performed by: OCCUPATIONAL THERAPIST

## 2025-04-29 PROCEDURE — 97026 INFRARED THERAPY: CPT | Mod: GO | Performed by: OCCUPATIONAL THERAPIST

## 2025-04-29 PROCEDURE — 97140 MANUAL THERAPY 1/> REGIONS: CPT | Mod: GO | Performed by: OCCUPATIONAL THERAPIST

## 2025-05-06 ENCOUNTER — THERAPY VISIT (OUTPATIENT)
Dept: OCCUPATIONAL THERAPY | Facility: CLINIC | Age: 45
End: 2025-05-06
Attending: FAMILY MEDICINE
Payer: COMMERCIAL

## 2025-05-06 DIAGNOSIS — G89.29 CHRONIC PAIN OF RIGHT WRIST: Primary | ICD-10-CM

## 2025-05-06 DIAGNOSIS — M25.531 CHRONIC PAIN OF RIGHT WRIST: Primary | ICD-10-CM

## 2025-05-06 PROCEDURE — 97140 MANUAL THERAPY 1/> REGIONS: CPT | Mod: GO | Performed by: OCCUPATIONAL THERAPIST

## 2025-05-06 PROCEDURE — 97026 INFRARED THERAPY: CPT | Mod: GO | Performed by: OCCUPATIONAL THERAPIST

## 2025-05-13 ENCOUNTER — THERAPY VISIT (OUTPATIENT)
Dept: OCCUPATIONAL THERAPY | Facility: CLINIC | Age: 45
End: 2025-05-13
Attending: FAMILY MEDICINE
Payer: COMMERCIAL

## 2025-05-13 DIAGNOSIS — M25.531 CHRONIC PAIN OF RIGHT WRIST: Primary | ICD-10-CM

## 2025-05-13 DIAGNOSIS — G89.29 CHRONIC PAIN OF RIGHT WRIST: Primary | ICD-10-CM

## 2025-05-13 PROCEDURE — 97140 MANUAL THERAPY 1/> REGIONS: CPT | Mod: GO | Performed by: OCCUPATIONAL THERAPIST

## 2025-05-13 PROCEDURE — 97035 APP MDLTY 1+ULTRASOUND EA 15: CPT | Mod: GO | Performed by: OCCUPATIONAL THERAPIST

## 2025-06-03 ENCOUNTER — THERAPY VISIT (OUTPATIENT)
Dept: OCCUPATIONAL THERAPY | Facility: CLINIC | Age: 45
End: 2025-06-03
Attending: FAMILY MEDICINE
Payer: COMMERCIAL

## 2025-06-03 DIAGNOSIS — M25.531 CHRONIC PAIN OF RIGHT WRIST: Primary | ICD-10-CM

## 2025-06-03 DIAGNOSIS — G89.29 CHRONIC PAIN OF RIGHT WRIST: Primary | ICD-10-CM

## 2025-06-03 PROCEDURE — 97035 APP MDLTY 1+ULTRASOUND EA 15: CPT | Mod: GO | Performed by: OCCUPATIONAL THERAPIST

## 2025-06-03 PROCEDURE — 97140 MANUAL THERAPY 1/> REGIONS: CPT | Mod: GO | Performed by: OCCUPATIONAL THERAPIST

## 2025-06-17 ENCOUNTER — THERAPY VISIT (OUTPATIENT)
Dept: OCCUPATIONAL THERAPY | Facility: CLINIC | Age: 45
End: 2025-06-17
Attending: FAMILY MEDICINE
Payer: COMMERCIAL

## 2025-06-17 DIAGNOSIS — M25.531 CHRONIC PAIN OF RIGHT WRIST: Primary | ICD-10-CM

## 2025-06-17 DIAGNOSIS — G89.29 CHRONIC PAIN OF RIGHT WRIST: Primary | ICD-10-CM

## 2025-06-17 PROCEDURE — 97140 MANUAL THERAPY 1/> REGIONS: CPT | Mod: GO | Performed by: OCCUPATIONAL THERAPIST

## 2025-06-17 PROCEDURE — 97035 APP MDLTY 1+ULTRASOUND EA 15: CPT | Mod: GO | Performed by: OCCUPATIONAL THERAPIST

## 2025-07-01 ENCOUNTER — THERAPY VISIT (OUTPATIENT)
Dept: OCCUPATIONAL THERAPY | Facility: CLINIC | Age: 45
End: 2025-07-01
Attending: FAMILY MEDICINE
Payer: COMMERCIAL

## 2025-07-01 DIAGNOSIS — M25.531 CHRONIC PAIN OF RIGHT WRIST: Primary | ICD-10-CM

## 2025-07-01 DIAGNOSIS — G89.29 CHRONIC PAIN OF RIGHT WRIST: Primary | ICD-10-CM

## 2025-07-01 PROCEDURE — 97022 WHIRLPOOL THERAPY: CPT | Mod: GO | Performed by: OCCUPATIONAL THERAPIST

## 2025-07-01 PROCEDURE — 97140 MANUAL THERAPY 1/> REGIONS: CPT | Mod: GO | Performed by: OCCUPATIONAL THERAPIST

## 2025-07-14 ENCOUNTER — OFFICE VISIT (OUTPATIENT)
Dept: ORTHOPEDICS | Facility: CLINIC | Age: 45
End: 2025-07-14
Payer: COMMERCIAL

## 2025-07-14 DIAGNOSIS — M25.521 CHRONIC PAIN OF RIGHT ELBOW: ICD-10-CM

## 2025-07-14 DIAGNOSIS — G89.29 CHRONIC PAIN OF RIGHT WRIST: Primary | ICD-10-CM

## 2025-07-14 DIAGNOSIS — M25.531 CHRONIC PAIN OF RIGHT WRIST: Primary | ICD-10-CM

## 2025-07-14 DIAGNOSIS — G89.29 CHRONIC PAIN OF RIGHT ELBOW: ICD-10-CM

## 2025-07-14 PROCEDURE — 1125F AMNT PAIN NOTED PAIN PRSNT: CPT | Performed by: FAMILY MEDICINE

## 2025-07-14 PROCEDURE — 99213 OFFICE O/P EST LOW 20 MIN: CPT | Performed by: FAMILY MEDICINE

## 2025-07-14 ASSESSMENT — PAIN SCALES - GENERAL: PAINLEVEL_OUTOF10: SEVERE PAIN (7)

## 2025-07-14 NOTE — PATIENT INSTRUCTIONS
# Acute on Chronic Right Wrist/Elbow Pain: Lin Corona  was seen today for right wrist/elbow pain. Symptoms had been going on for 7+ mon persisting after carpal tunnel release, De Quervain's and intersection steroid injetions. On examination there are positive findings of tenderness to palpation over wrist compartments 1, 4 and 6, tenderness to palpation over the lateral epicondyle. Pain with resisted wrist extension, no significant cervical findings. Imaging findings showed intersection syndrome (irritation of 1st and 2nd dorsal compartments). Uncertain cause of condition possible tendinopathy vs cervical radiculopathy.  Counseled patient on nature of condition and treatment options.  Can consider steroid injections over 4th and/or lateral epicondyle. Given this plan as below, follow-up 3-4 weeks.     Image Findings: reviewed right wrist MRI  Treatment: Activities as tolerated, continue home exercises, hand therapy as needed, can consider hand surgery referral  Job: As tolerated  Medications/Injections: Limited tylenol/ibuprofen for pain for 1-2 weeks, Topical Voltaren gel, Defer today given previous two did not help more than two weeks  Follow-up: as needed for injections at different sites vs referral to hand surgeon    Please call [phone]   Ask for my team if you have any questions or concerns    If you have not yet received the influenza vaccine but would like to get one, please call  [phone] or you can schedule via [employer]    It was great seeing you again today!    Kishore Krause MD, [facility]

## 2025-07-14 NOTE — PROGRESS NOTES
ASSESSMENT & PLAN    Lin was seen today for pain and follow up.    Diagnoses and all orders for this visit:    Chronic pain of right wrist    Chronic pain of right elbow      # Acute on Chronic Right Wrist/Elbow Pain: Lin Corona  was seen today for right wrist/elbow pain. Symptoms had been going on for 7+ mon persisting after carpal tunnel release, De Quervain's and intersection steroid injetions. On examination there are positive findings of tenderness to palpation over wrist compartments 1, 4 and 6, tenderness to palpation over the lateral epicondyle. Pain with resisted wrist extension, no significant cervical findings. Imaging findings showed intersection syndrome (irritation of 1st and 2nd dorsal compartments). Uncertain cause of condition possible tendinopathy vs cervical radiculopathy.  Counseled patient on nature of condition and treatment options.  Can consider steroid injections over 4th and/or lateral epicondyle. Given this plan as below, follow-up 3-4 weeks.     Image Findings: reviewed right wrist MRI  Treatment: Activities as tolerated, continue home exercises, hand therapy as needed, can consider hand surgery referral  Job: As tolerated  Medications/Injections: Limited tylenol/ibuprofen for pain for 1-2 weeks, Topical Voltaren gel, Defer today given previous two did not help more than two weeks  Follow-up: as needed for injections at different sites vs referral to hand surgeon    -----    SUBJECTIVE:  Lin Corona is a 44 year old female who is seen in follow-up for right wrist pain. They were last seen 3/21/2025.  The patient is seen by themselves.    Since their last visit reports worsening right wrist pain (diffuse).  Occasional numbness and tingling in palm. Noticing some discoloration in forearm.  They indicate that their current pain level is 7/10. They have tried occupational therapy, Medrol dosepak, right wrist MRI 3/19/25, right wrist 1st and 2nd dorsal compartment steroid  injection 3/10/25, right DeQuervain's steroid injection 2/19/25 (Dr. Márquez) right trigger thumb and carpal tunnel release 11/11/24.        Patient's past medical, surgical, social, and family histories were reviewed today and no changes are noted.    REVIEW OF SYSTEMS:  Constitutional: NEGATIVE for fever, chills, change in weight  Skin: NEGATIVE for worrisome rashes, moles or lesions  GI/: NEGATIVE for bowel or bladder changes  Neuro: NEGATIVE for weakness, dizziness or paresthesias    OBJECTIVE:  LMP 01/06/2021    General: healthy, alert and in no distress  HEENT: no scleral icterus or conjunctival erythema  Skin: no suspicious lesions or rash. No jaundice.  CV: regular rhythm by palpation, no pedal edema  Resp: normal respiratory effort without conversational dyspnea   Psych: normal mood and affect  Gait: normal steady gait with appropriate coordination and balance  Neuro: normal light touch sensory exam of the extremities.    MSK:    RIGHT WRIST  Inspection:    No swelling or obvious deformity or asymmetry  Palpation:  TTP over dorsal wrist compartments 1, 4 and 6    Metacarpals: normal    Thumb: normal    Fingers: normal  Range of Motion:    Full (active and passive) flexion, extension, pronation/supination, and ulnar/radial deviation.  Strength:     strength limited slightly by pain. Normal pinch strength.  Special Test:  Pain with resisted wrist extension    Independent visualization of the below image:  Results for orders placed or performed during the hospital encounter of 03/19/25   MR Wrist Right w/o Contrast    Narrative    MR right wrist without contrast 3/19/2025 8:14 AM    Techniques: Multiplanar multisequence imaging of the right wrist was  obtained without administration of intra-articular or intravenous  contrast using routing protocol.    History: acute worsening of chronic right wrist pain concern for  tendinopathy restricting ability to work; Chronic pain of right wrist;  Chronic pain of  right wrist    Comparison: 2/19/2025    Findings:    Dorsal external marker placed over the capitate.    Triangular Fibrocartilage: No radial, central, or ulnar-sided  triangular cartilage perforation.    Interosseous Ligaments:  Scapholunate ligament: Grossly intact.  Lunatotriquetral ligament: Grossly intact.    Bones: No fracture, stress reaction, or osseous lesion. Tiny cystlike  changes in the lunate and capitate.    Articulations:  Joint effusion: Physiologic.    Tendons:  Flexor tendons: No tear or tendon sheath effusion.  Extensor tendons: Extensor carpi ulnaris low-grade intrasubstance tear  with mild tenosynovitis. Partially visualized focal tenosynovial fluid  with internal debris, likely synovitis at the level of proximal  intersection. Loss of normal hypointense signal of the third and first  extensor compartment tendons, presumably magic angle artifact related.  Otherwise, no tear or tendon sheath effusion.    Miscellaneous soft tissues: No volar or dorsal ganglion cysts. Median  nerve, ulnar nerve, carpal tunnel and Guyon's canal are unremarkable.      Impression    IMPRESSION:  1. Proximal intersection syndrome, partially visualized.  2. Extensor carpi ulnaris low-grade intrasubstance tear with mild  tenosynovitis.    tibdit         SYSTEM ID:  [id number]          Kishore Krause MD, [facility]    Disclaimer: This note consists of symbols derived from keyboarding, dictation and/or voice recognition software. As a result, there may be errors in the script that have gone undetected. Please consider this when interpreting information found in this chart.

## 2025-07-14 NOTE — LETTER
7/14/2025      Lin Corona  [address] [place] [id number]      Dear Colleague,    Thank you for referring your patient, Lin Corona, to the [facility] [place]. Please see a copy of my visit note below.    ASSESSMENT & PLAN    Lin was seen today for pain and follow up.    Diagnoses and all orders for this visit:    Chronic pain of right wrist    Chronic pain of right elbow      # Acute on Chronic Right Wrist/Elbow Pain: Lin Corona  was seen today for right wrist/elbow pain. Symptoms had been going on for 7+ mon persisting after carpal tunnel release, De Quervain's and intersection steroid injetions. On examination there are positive findings of tenderness to palpation over wrist compartments 1, 4 and 6, tenderness to palpation over the lateral epicondyle. Pain with resisted wrist extension, no significant cervical findings. Imaging findings showed intersection syndrome (irritation of 1st and 2nd dorsal compartments). Uncertain cause of condition possible tendinopathy vs cervical radiculopathy.  Counseled patient on nature of condition and treatment options.  Can consider steroid injections over 4th and/or lateral epicondyle. Given this plan as below, follow-up 3-4 weeks.     Image Findings: reviewed right wrist MRI  Treatment: Activities as tolerated, continue home exercises, hand therapy as needed, can consider hand surgery referral  Job: As tolerated  Medications/Injections: Limited tylenol/ibuprofen for pain for 1-2 weeks, Topical Voltaren gel, Defer today given previous two did not help more than two weeks  Follow-up: as needed for injections at different sites vs referral to hand surgeon    -----    SUBJECTIVE:  Lin Corona is a 44 year old female who is seen in follow-up for right wrist pain. They were last seen 3/21/2025.  The patient is seen by themselves.    Since their last visit reports worsening right wrist pain (diffuse).  Occasional numbness and  tingling in palm. Noticing some discoloration in forearm.  They indicate that their current pain level is 7/10. They have tried occupational therapy, Medrol dosepak, right wrist MRI 3/19/25, right wrist 1st and 2nd dorsal compartment steroid injection 3/10/25, right DeQuervain's steroid injection 2/19/25 (Dr. Márquez) right trigger thumb and carpal tunnel release 11/11/24.        Patient's past medical, surgical, social, and family histories were reviewed today and no changes are noted.    REVIEW OF SYSTEMS:  Constitutional: NEGATIVE for fever, chills, change in weight  Skin: NEGATIVE for worrisome rashes, moles or lesions  GI/: NEGATIVE for bowel or bladder changes  Neuro: NEGATIVE for weakness, dizziness or paresthesias    OBJECTIVE:  LMP 01/06/2021    General: healthy, alert and in no distress  HEENT: no scleral icterus or conjunctival erythema  Skin: no suspicious lesions or rash. No jaundice.  CV: regular rhythm by palpation, no pedal edema  Resp: normal respiratory effort without conversational dyspnea   Psych: normal mood and affect  Gait: normal steady gait with appropriate coordination and balance  Neuro: normal light touch sensory exam of the extremities.    MSK:    RIGHT WRIST  Inspection:    No swelling or obvious deformity or asymmetry  Palpation:  TTP over dorsal wrist compartments 1, 4 and 6    Metacarpals: normal    Thumb: normal    Fingers: normal  Range of Motion:    Full (active and passive) flexion, extension, pronation/supination, and ulnar/radial deviation.  Strength:     strength limited slightly by pain. Normal pinch strength.  Special Test:  Pain with resisted wrist extension    Independent visualization of the below image:  Results for orders placed or performed during the hospital encounter of 03/19/25   MR Wrist Right w/o Contrast    Narrative    MR right wrist without contrast 3/19/2025 8:14 AM    Techniques: Multiplanar multisequence imaging of the right wrist was  obtained without  administration of intra-articular or intravenous  contrast using routing protocol.    History: acute worsening of chronic right wrist pain concern for  tendinopathy restricting ability to work; Chronic pain of right wrist;  Chronic pain of right wrist    Comparison: 2/19/2025    Findings:    Dorsal external marker placed over the capitate.    Triangular Fibrocartilage: No radial, central, or ulnar-sided  triangular cartilage perforation.    Interosseous Ligaments:  Scapholunate ligament: Grossly intact.  Lunatotriquetral ligament: Grossly intact.    Bones: No fracture, stress reaction, or osseous lesion. Tiny cystlike  changes in the lunate and capitate.    Articulations:  Joint effusion: Physiologic.    Tendons:  Flexor tendons: No tear or tendon sheath effusion.  Extensor tendons: Extensor carpi ulnaris low-grade intrasubstance tear  with mild tenosynovitis. Partially visualized focal tenosynovial fluid  with internal debris, likely synovitis at the level of proximal  intersection. Loss of normal hypointense signal of the third and first  extensor compartment tendons, presumably magic angle artifact related.  Otherwise, no tear or tendon sheath effusion.    Miscellaneous soft tissues: No volar or dorsal ganglion cysts. Median  nerve, ulnar nerve, carpal tunnel and Guyon's canal are unremarkable.      Impression    IMPRESSION:  1. Proximal intersection syndrome, partially visualized.  2. Extensor carpi ulnaris low-grade intrasubstance tear with mild  tenosynovitis.    DESTINI JACQUELINE         SYSTEM ID:  [id number]          Kishore Krause MD, [facility]    Disclaimer: This note consists of symbols derived from keyboarding, dictation and/or voice recognition software. As a result, there may be errors in the script that have gone undetected. Please consider this when interpreting information found in this chart.      Again, thank you for allowing me to participate in the care of your  patient.        Sincerely,        Kishore Krause MD    Electronically signed

## 2025-07-24 ENCOUNTER — MYC MEDICAL ADVICE (OUTPATIENT)
Dept: ORTHOPEDICS | Facility: CLINIC | Age: 45
End: 2025-07-24
Payer: COMMERCIAL

## 2025-07-24 DIAGNOSIS — M25.531 CHRONIC PAIN OF RIGHT WRIST: Primary | ICD-10-CM

## 2025-07-24 DIAGNOSIS — G89.29 CHRONIC PAIN OF RIGHT WRIST: Primary | ICD-10-CM

## 2025-07-24 NOTE — CONFIDENTIAL NOTE
Referral to ortho surgery placed for patient given symptoms worsening in right wrist.    Kishore Krause MD

## 2025-08-04 ENCOUNTER — TRANSFERRED RECORDS (OUTPATIENT)
Dept: HEALTH INFORMATION MANAGEMENT | Facility: CLINIC | Age: 45
End: 2025-08-04
Payer: COMMERCIAL

## 2025-08-05 ENCOUNTER — PATIENT OUTREACH (OUTPATIENT)
Dept: CARE COORDINATION | Facility: CLINIC | Age: 45
End: 2025-08-05
Payer: COMMERCIAL

## 2025-08-13 ENCOUNTER — OFFICE VISIT (OUTPATIENT)
Dept: FAMILY MEDICINE | Facility: CLINIC | Age: 45
End: 2025-08-13
Payer: COMMERCIAL

## 2025-08-13 ENCOUNTER — TELEPHONE (OUTPATIENT)
Dept: FAMILY MEDICINE | Facility: CLINIC | Age: 45
End: 2025-08-13

## 2025-08-13 VITALS
HEIGHT: 62 IN | BODY MASS INDEX: 37.17 KG/M2 | TEMPERATURE: 97.7 F | OXYGEN SATURATION: 99 % | DIASTOLIC BLOOD PRESSURE: 80 MMHG | HEART RATE: 82 BPM | WEIGHT: 202 LBS | RESPIRATION RATE: 16 BRPM | SYSTOLIC BLOOD PRESSURE: 128 MMHG

## 2025-08-13 DIAGNOSIS — M26.609 TMJ (TEMPOROMANDIBULAR JOINT SYNDROME): ICD-10-CM

## 2025-08-13 DIAGNOSIS — M25.531 RIGHT WRIST PAIN: ICD-10-CM

## 2025-08-13 DIAGNOSIS — Z01.818 PREOP GENERAL PHYSICAL EXAM: Primary | ICD-10-CM

## 2025-08-13 LAB
ANION GAP SERPL CALCULATED.3IONS-SCNC: 13 MMOL/L (ref 7–15)
BUN SERPL-MCNC: 16.8 MG/DL (ref 6–20)
CALCIUM SERPL-MCNC: 9.5 MG/DL (ref 8.8–10.4)
CHLORIDE SERPL-SCNC: 107 MMOL/L (ref 98–107)
CREAT SERPL-MCNC: 0.8 MG/DL (ref 0.51–0.95)
EGFRCR SERPLBLD CKD-EPI 2021: >90 ML/MIN/1.73M2
ERYTHROCYTE [DISTWIDTH] IN BLOOD BY AUTOMATED COUNT: 12.9 % (ref 10–15)
GLUCOSE SERPL-MCNC: 99 MG/DL (ref 70–99)
HCO3 SERPL-SCNC: 22 MMOL/L (ref 22–29)
HCT VFR BLD AUTO: 39.6 % (ref 35–47)
HGB BLD-MCNC: 13.1 G/DL (ref 11.7–15.7)
MCH RBC QN AUTO: 29.8 PG (ref 26.5–33)
MCHC RBC AUTO-ENTMCNC: 33.1 G/DL (ref 31.5–36.5)
MCV RBC AUTO: 90 FL (ref 78–100)
PLATELET # BLD AUTO: 239 10E3/UL (ref 150–450)
POTASSIUM SERPL-SCNC: 3.8 MMOL/L (ref 3.4–5.3)
RBC # BLD AUTO: 4.4 10E6/UL (ref 3.8–5.2)
SODIUM SERPL-SCNC: 142 MMOL/L (ref 135–145)
WBC # BLD AUTO: 7.11 10E3/UL (ref 4–11)

## 2025-08-13 PROCEDURE — 36415 COLL VENOUS BLD VENIPUNCTURE: CPT | Performed by: NURSE PRACTITIONER

## 2025-08-13 PROCEDURE — 3079F DIAST BP 80-89 MM HG: CPT | Performed by: NURSE PRACTITIONER

## 2025-08-13 PROCEDURE — 3074F SYST BP LT 130 MM HG: CPT | Performed by: NURSE PRACTITIONER

## 2025-08-13 PROCEDURE — 80048 BASIC METABOLIC PNL TOTAL CA: CPT | Performed by: NURSE PRACTITIONER

## 2025-08-13 PROCEDURE — G2211 COMPLEX E/M VISIT ADD ON: HCPCS | Performed by: NURSE PRACTITIONER

## 2025-08-13 PROCEDURE — 99214 OFFICE O/P EST MOD 30 MIN: CPT | Performed by: NURSE PRACTITIONER

## 2025-08-13 PROCEDURE — 85027 COMPLETE CBC AUTOMATED: CPT | Performed by: NURSE PRACTITIONER

## 2025-08-13 PROCEDURE — 1126F AMNT PAIN NOTED NONE PRSNT: CPT | Performed by: NURSE PRACTITIONER

## 2025-08-13 RX ORDER — TIZANIDINE 2 MG/1
2 TABLET ORAL 3 TIMES DAILY PRN
Qty: 20 TABLET | Refills: 0 | Status: SHIPPED | OUTPATIENT
Start: 2025-08-13

## 2025-08-13 ASSESSMENT — PAIN SCALES - GENERAL: PAINLEVEL_OUTOF10: NO PAIN (0)

## (undated) DEVICE — GOWN IMPERVIOUS SPECIALTY XLG/XLONG 32474

## (undated) DEVICE — GLOVE BIOGEL PI MICRO SZ 7.5 48575

## (undated) DEVICE — SU ETHILON 4-0 FS-1 1629H

## (undated) DEVICE — PACK HAND

## (undated) DEVICE — ENDO TROCAR SLEEVE KII ADV FIXATION 05X100MM CFS02

## (undated) DEVICE — DEVICE ENDO STITCH APPLIER 10MM 173016

## (undated) DEVICE — SOL NACL 0.9% IRRIG 1000ML BOTTLE 07138-09

## (undated) DEVICE — GLOVE PROTEXIS W/NEU-THERA 6.5  2D73TE65

## (undated) DEVICE — NDL 25GA 1.5" 305127

## (undated) DEVICE — NDL INSUFFLATION 120MM VERRES 172015

## (undated) DEVICE — ESU LIGASURE LAPAROSCOPIC BLUNT TIP SEALER 5MMX37CM LF1837

## (undated) DEVICE — DRSG GAUZE 4X4" TRAY

## (undated) DEVICE — IMM ALUMI HAND XLG 761

## (undated) DEVICE — SU ETHILON 3-0 PS-2 18" 1669H

## (undated) DEVICE — FILTER LAPROSHIELD SMOKE EVAC LSF1

## (undated) DEVICE — TUBING CYSTO/BLADDER IRRIG SET 80" 06544-01

## (undated) DEVICE — Device

## (undated) DEVICE — PAD PERI INDIV WRAP 11" 2022

## (undated) DEVICE — SU VICRYL 4-0 PS-2 18" UND J496G

## (undated) DEVICE — KIT PATIENT POSITIONING PIGAZZI LATEX FREE 40580

## (undated) DEVICE — SUCTION IRR STRYKERFLOW II W/TIP 250-070-520

## (undated) DEVICE — DRAPE STERI TOWEL SM 1000

## (undated) DEVICE — BNDG ELASTIC 3"X5YDS UNSTERILE 6611-30

## (undated) DEVICE — SYR 10ML LL W/O NDL

## (undated) DEVICE — SU VICRYL 0 CT-1 36" J946H

## (undated) DEVICE — GOWN XLG DISP 9545

## (undated) DEVICE — CAST PADDING 4" UNSTERILE 9044

## (undated) DEVICE — NDL 18GA 1.5" 305196

## (undated) DEVICE — SOL NACL 0.9% INJ 1000ML BAG 07983-09

## (undated) DEVICE — SU VICRYL 0 UR-6 27" J603H

## (undated) DEVICE — DECANTER VIAL 2006S

## (undated) DEVICE — SYR 50ML LL W/O NDL 309653

## (undated) DEVICE — CATH TRAY FOLEY SURESTEP 16FR W/URINE MTR STATLK LF A303416A

## (undated) DEVICE — GLOVE BIOGEL PI MICRO SZ 8.0 48580

## (undated) DEVICE — SU ENDO STITCH POLYSORB 2-0 ES-9 48"  170053

## (undated) DEVICE — PREP POVIDONE IODINE SOLUTION 10% 120ML

## (undated) DEVICE — DRAPE POUCH INSTRUMENT 3 POCKET 1018L

## (undated) DEVICE — BLADE KNIFE SURG 11 371111

## (undated) DEVICE — TUBING C02 INSUFFLATION W/FILTER

## (undated) DEVICE — GOWN LG DISP 9515

## (undated) DEVICE — ESU ENDO SCISSORS 5MM CVD 5DCS

## (undated) DEVICE — STOCKING SLEEVE COMPRESSION CALF MED

## (undated) DEVICE — PREP CHLORAPREP 26ML TINTED ORANGE  260815

## (undated) DEVICE — PACK LAPAROSCOPY/PELVISCOPY STD

## (undated) DEVICE — CAST PLASTER SPLINT 4X15" 7394

## (undated) DEVICE — DRSG ADAPTIC 3X3" 6112

## (undated) DEVICE — ENDO TROCAR FIRST ENTRY KII FIOS ADV FIX 11X100MM CFF33

## (undated) DEVICE — ENDO TROCAR FIRST ENTRY KII FIOS ADV FIX 05X100MM CFF03

## (undated) DEVICE — SOL WATER IRRIG 1000ML BOTTLE 07139-09

## (undated) DEVICE — ADH SKIN CLOSURE PREMIERPRO EXOFIN 1.0ML 3470

## (undated) DEVICE — ESU CORD MONOPOLAR 10'  E0510

## (undated) DEVICE — DRAPE MAYO STAND 23X54 8337

## (undated) DEVICE — ESU HOLSTER PLASTIC DISP E2400

## (undated) DEVICE — CUFF TOURN 18IN STRL DISP

## (undated) DEVICE — SYR BULB IRRIG 50ML LATEX FREE 0035280

## (undated) DEVICE — GLOVE BIOGEL PI MICRO INDICATOR UNDERGLOVE SZ 7.5 48975

## (undated) DEVICE — ANTIFOG SOLUTION W/FOAM PAD 31142527

## (undated) RX ORDER — GABAPENTIN 300 MG/1
CAPSULE ORAL
Status: DISPENSED
Start: 2024-11-11

## (undated) RX ORDER — ACETAMINOPHEN 325 MG/1
TABLET ORAL
Status: DISPENSED
Start: 2021-01-11

## (undated) RX ORDER — GLYCOPYRROLATE 0.2 MG/ML
INJECTION, SOLUTION INTRAMUSCULAR; INTRAVENOUS
Status: DISPENSED
Start: 2021-01-11

## (undated) RX ORDER — BUPIVACAINE HYDROCHLORIDE 2.5 MG/ML
INJECTION, SOLUTION INFILTRATION; PERINEURAL
Status: DISPENSED
Start: 2021-01-11

## (undated) RX ORDER — LIDOCAINE HYDROCHLORIDE 10 MG/ML
INJECTION, SOLUTION INFILTRATION; PERINEURAL
Status: DISPENSED
Start: 2024-11-11

## (undated) RX ORDER — BUPIVACAINE HYDROCHLORIDE 2.5 MG/ML
INJECTION, SOLUTION EPIDURAL; INFILTRATION; INTRACAUDAL
Status: DISPENSED
Start: 2024-11-11

## (undated) RX ORDER — LIDOCAINE HYDROCHLORIDE 10 MG/ML
INJECTION, SOLUTION EPIDURAL; INFILTRATION; INTRACAUDAL; PERINEURAL
Status: DISPENSED
Start: 2021-01-11

## (undated) RX ORDER — KETOROLAC TROMETHAMINE 30 MG/ML
INJECTION, SOLUTION INTRAMUSCULAR; INTRAVENOUS
Status: DISPENSED
Start: 2021-01-11

## (undated) RX ORDER — DEXAMETHASONE SODIUM PHOSPHATE 4 MG/ML
INJECTION, SOLUTION INTRA-ARTICULAR; INTRALESIONAL; INTRAMUSCULAR; INTRAVENOUS; SOFT TISSUE
Status: DISPENSED
Start: 2024-11-11

## (undated) RX ORDER — LIDOCAINE HYDROCHLORIDE 10 MG/ML
INJECTION, SOLUTION EPIDURAL; INFILTRATION; INTRACAUDAL; PERINEURAL
Status: DISPENSED
Start: 2024-11-11

## (undated) RX ORDER — DIPHENHYDRAMINE HYDROCHLORIDE 50 MG/ML
INJECTION INTRAMUSCULAR; INTRAVENOUS
Status: DISPENSED
Start: 2024-11-11

## (undated) RX ORDER — GABAPENTIN 300 MG/1
CAPSULE ORAL
Status: DISPENSED
Start: 2021-01-11

## (undated) RX ORDER — CEFAZOLIN SODIUM 2 G/100ML
INJECTION, SOLUTION INTRAVENOUS
Status: DISPENSED
Start: 2021-01-11

## (undated) RX ORDER — FENTANYL CITRATE 50 UG/ML
INJECTION, SOLUTION INTRAMUSCULAR; INTRAVENOUS
Status: DISPENSED
Start: 2021-01-11

## (undated) RX ORDER — FENTANYL CITRATE-0.9 % NACL/PF 10 MCG/ML
PLASTIC BAG, INJECTION (ML) INTRAVENOUS
Status: DISPENSED
Start: 2021-01-11

## (undated) RX ORDER — DEXAMETHASONE SODIUM PHOSPHATE 4 MG/ML
INJECTION, SOLUTION INTRA-ARTICULAR; INTRALESIONAL; INTRAMUSCULAR; INTRAVENOUS; SOFT TISSUE
Status: DISPENSED
Start: 2021-01-11

## (undated) RX ORDER — OXYCODONE HCL 5 MG/5 ML
SOLUTION, ORAL ORAL
Status: DISPENSED
Start: 2021-01-11

## (undated) RX ORDER — ONDANSETRON 2 MG/ML
INJECTION INTRAMUSCULAR; INTRAVENOUS
Status: DISPENSED
Start: 2021-01-11

## (undated) RX ORDER — PROPOFOL 10 MG/ML
INJECTION, EMULSION INTRAVENOUS
Status: DISPENSED
Start: 2021-01-11

## (undated) RX ORDER — MEPERIDINE HYDROCHLORIDE 25 MG/ML
INJECTION INTRAMUSCULAR; INTRAVENOUS; SUBCUTANEOUS
Status: DISPENSED
Start: 2021-01-11

## (undated) RX ORDER — ONDANSETRON 2 MG/ML
INJECTION INTRAMUSCULAR; INTRAVENOUS
Status: DISPENSED
Start: 2024-11-11

## (undated) RX ORDER — ACETAMINOPHEN 325 MG/1
TABLET ORAL
Status: DISPENSED
Start: 2024-11-11

## (undated) RX ORDER — KETOROLAC TROMETHAMINE 30 MG/ML
INJECTION, SOLUTION INTRAMUSCULAR; INTRAVENOUS
Status: DISPENSED
Start: 2024-11-11

## (undated) RX ORDER — PHENAZOPYRIDINE HYDROCHLORIDE 200 MG/1
TABLET, FILM COATED ORAL
Status: DISPENSED
Start: 2021-01-11

## (undated) RX ORDER — EPHEDRINE SULFATE 50 MG/ML
INJECTION, SOLUTION INTRAMUSCULAR; INTRAVENOUS; SUBCUTANEOUS
Status: DISPENSED
Start: 2021-01-11